# Patient Record
Sex: MALE | Race: BLACK OR AFRICAN AMERICAN | Employment: FULL TIME | URBAN - METROPOLITAN AREA
[De-identification: names, ages, dates, MRNs, and addresses within clinical notes are randomized per-mention and may not be internally consistent; named-entity substitution may affect disease eponyms.]

---

## 2019-03-08 ENCOUNTER — HOSPITAL ENCOUNTER (INPATIENT)
Age: 44
LOS: 2 days | Discharge: HOME | DRG: 305 | End: 2019-03-10
Attending: EMERGENCY MEDICINE | Admitting: FAMILY MEDICINE
Payer: COMMERCIAL

## 2019-03-08 ENCOUNTER — APPOINTMENT (OUTPATIENT)
Dept: GENERAL RADIOLOGY | Age: 44
DRG: 305 | End: 2019-03-08
Payer: COMMERCIAL

## 2019-03-08 DIAGNOSIS — R77.8 ELEVATED TROPONIN: ICD-10-CM

## 2019-03-08 DIAGNOSIS — N17.9 AKI (ACUTE KIDNEY INJURY) (HCC): ICD-10-CM

## 2019-03-08 DIAGNOSIS — I16.0 HYPERTENSIVE URGENCY: Primary | ICD-10-CM

## 2019-03-08 LAB
A/G RATIO: 1.2 (ref 1.1–2.2)
ALBUMIN SERPL-MCNC: 4.6 G/DL (ref 3.4–5)
ALP BLD-CCNC: 77 U/L (ref 40–129)
ALT SERPL-CCNC: 15 U/L (ref 10–40)
ANION GAP SERPL CALCULATED.3IONS-SCNC: 16 MMOL/L (ref 3–16)
AST SERPL-CCNC: 15 U/L (ref 15–37)
BASOPHILS ABSOLUTE: 0.1 K/UL (ref 0–0.2)
BASOPHILS RELATIVE PERCENT: 1.1 %
BILIRUB SERPL-MCNC: 0.7 MG/DL (ref 0–1)
BILIRUBIN URINE: NEGATIVE
BLOOD, URINE: ABNORMAL
BUN BLDV-MCNC: 18 MG/DL (ref 7–20)
CALCIUM SERPL-MCNC: 9.6 MG/DL (ref 8.3–10.6)
CHLORIDE BLD-SCNC: 101 MMOL/L (ref 99–110)
CLARITY: ABNORMAL
CO2: 23 MMOL/L (ref 21–32)
COLOR: YELLOW
CREAT SERPL-MCNC: 1.4 MG/DL (ref 0.9–1.3)
EOSINOPHILS ABSOLUTE: 0 K/UL (ref 0–0.6)
EOSINOPHILS RELATIVE PERCENT: 0.6 %
EPITHELIAL CELLS, UA: 1 /HPF (ref 0–5)
GFR AFRICAN AMERICAN: >60
GFR NON-AFRICAN AMERICAN: 55
GLOBULIN: 3.7 G/DL
GLUCOSE BLD-MCNC: 104 MG/DL (ref 70–99)
GLUCOSE URINE: NEGATIVE MG/DL
HCT VFR BLD CALC: 37.9 % (ref 40.5–52.5)
HEMOGLOBIN: 12.8 G/DL (ref 13.5–17.5)
HYALINE CASTS: 7 /LPF (ref 0–8)
KETONES, URINE: NEGATIVE MG/DL
LEUKOCYTE ESTERASE, URINE: NEGATIVE
LYMPHOCYTES ABSOLUTE: 2.4 K/UL (ref 1–5.1)
LYMPHOCYTES RELATIVE PERCENT: 31.8 %
MCH RBC QN AUTO: 28.5 PG (ref 26–34)
MCHC RBC AUTO-ENTMCNC: 33.8 G/DL (ref 31–36)
MCV RBC AUTO: 84.3 FL (ref 80–100)
MICROSCOPIC EXAMINATION: YES
MONOCYTES ABSOLUTE: 0.5 K/UL (ref 0–1.3)
MONOCYTES RELATIVE PERCENT: 6.6 %
NEUTROPHILS ABSOLUTE: 4.5 K/UL (ref 1.7–7.7)
NEUTROPHILS RELATIVE PERCENT: 59.9 %
NITRITE, URINE: NEGATIVE
PDW BLD-RTO: 13.3 % (ref 12.4–15.4)
PH UA: 6.5 (ref 5–8)
PLATELET # BLD: 255 K/UL (ref 135–450)
PMV BLD AUTO: 11 FL (ref 5–10.5)
POTASSIUM SERPL-SCNC: 3.6 MMOL/L (ref 3.5–5.1)
PROTEIN UA: >=300 MG/DL
RBC # BLD: 4.5 M/UL (ref 4.2–5.9)
RBC UA: 5 /HPF (ref 0–4)
SODIUM BLD-SCNC: 140 MMOL/L (ref 136–145)
SPECIFIC GRAVITY UA: 1.02 (ref 1–1.03)
TOTAL PROTEIN: 8.3 G/DL (ref 6.4–8.2)
TROPONIN: 0.02 NG/ML
TROPONIN: 0.03 NG/ML
URINE TYPE: ABNORMAL
UROBILINOGEN, URINE: 1 E.U./DL
WBC # BLD: 7.5 K/UL (ref 4–11)
WBC UA: 2 /HPF (ref 0–5)

## 2019-03-08 PROCEDURE — 6370000000 HC RX 637 (ALT 250 FOR IP): Performed by: NURSE PRACTITIONER

## 2019-03-08 PROCEDURE — 71046 X-RAY EXAM CHEST 2 VIEWS: CPT

## 2019-03-08 PROCEDURE — 93005 ELECTROCARDIOGRAM TRACING: CPT | Performed by: EMERGENCY MEDICINE

## 2019-03-08 PROCEDURE — 99285 EMERGENCY DEPT VISIT HI MDM: CPT

## 2019-03-08 PROCEDURE — 6370000000 HC RX 637 (ALT 250 FOR IP): Performed by: FAMILY MEDICINE

## 2019-03-08 PROCEDURE — 83036 HEMOGLOBIN GLYCOSYLATED A1C: CPT

## 2019-03-08 PROCEDURE — 2580000003 HC RX 258: Performed by: FAMILY MEDICINE

## 2019-03-08 PROCEDURE — 6360000002 HC RX W HCPCS: Performed by: FAMILY MEDICINE

## 2019-03-08 PROCEDURE — 85025 COMPLETE CBC W/AUTO DIFF WBC: CPT

## 2019-03-08 PROCEDURE — 93010 ELECTROCARDIOGRAM REPORT: CPT | Performed by: INTERNAL MEDICINE

## 2019-03-08 PROCEDURE — 2060000000 HC ICU INTERMEDIATE R&B

## 2019-03-08 PROCEDURE — 80053 COMPREHEN METABOLIC PANEL: CPT

## 2019-03-08 PROCEDURE — 84484 ASSAY OF TROPONIN QUANT: CPT

## 2019-03-08 PROCEDURE — 81001 URINALYSIS AUTO W/SCOPE: CPT

## 2019-03-08 PROCEDURE — 6370000000 HC RX 637 (ALT 250 FOR IP): Performed by: EMERGENCY MEDICINE

## 2019-03-08 PROCEDURE — 36415 COLL VENOUS BLD VENIPUNCTURE: CPT

## 2019-03-08 RX ORDER — NICOTINE 21 MG/24HR
1 PATCH, TRANSDERMAL 24 HOURS TRANSDERMAL DAILY
Status: DISCONTINUED | OUTPATIENT
Start: 2019-03-08 | End: 2019-03-09

## 2019-03-08 RX ORDER — AMLODIPINE BESYLATE 5 MG/1
10 TABLET ORAL DAILY
Status: DISCONTINUED | OUTPATIENT
Start: 2019-03-09 | End: 2019-03-10

## 2019-03-08 RX ORDER — CARVEDILOL 6.25 MG/1
12.5 TABLET ORAL 2 TIMES DAILY WITH MEALS
Status: DISCONTINUED | OUTPATIENT
Start: 2019-03-08 | End: 2019-03-10

## 2019-03-08 RX ORDER — SODIUM CHLORIDE 0.9 % (FLUSH) 0.9 %
10 SYRINGE (ML) INJECTION EVERY 12 HOURS SCHEDULED
Status: DISCONTINUED | OUTPATIENT
Start: 2019-03-08 | End: 2019-03-10 | Stop reason: HOSPADM

## 2019-03-08 RX ORDER — ACETAMINOPHEN 80 MG
TABLET,CHEWABLE ORAL
Status: COMPLETED
Start: 2019-03-08 | End: 2019-03-08

## 2019-03-08 RX ORDER — SODIUM CHLORIDE 0.9 % (FLUSH) 0.9 %
10 SYRINGE (ML) INJECTION PRN
Status: DISCONTINUED | OUTPATIENT
Start: 2019-03-08 | End: 2019-03-10 | Stop reason: HOSPADM

## 2019-03-08 RX ORDER — ONDANSETRON 2 MG/ML
4 INJECTION INTRAMUSCULAR; INTRAVENOUS EVERY 6 HOURS PRN
Status: DISCONTINUED | OUTPATIENT
Start: 2019-03-08 | End: 2019-03-10 | Stop reason: HOSPADM

## 2019-03-08 RX ORDER — ACETAMINOPHEN 325 MG/1
650 TABLET ORAL EVERY 4 HOURS PRN
Status: DISCONTINUED | OUTPATIENT
Start: 2019-03-08 | End: 2019-03-10 | Stop reason: HOSPADM

## 2019-03-08 RX ORDER — AMLODIPINE BESYLATE 5 MG/1
5 TABLET ORAL DAILY
Status: DISCONTINUED | OUTPATIENT
Start: 2019-03-08 | End: 2019-03-09

## 2019-03-08 RX ORDER — LISINOPRIL 10 MG/1
10 TABLET ORAL DAILY
Status: DISCONTINUED | OUTPATIENT
Start: 2019-03-09 | End: 2019-03-09

## 2019-03-08 RX ORDER — LISINOPRIL 10 MG/1
5 TABLET ORAL DAILY
Status: DISCONTINUED | OUTPATIENT
Start: 2019-03-08 | End: 2019-03-09

## 2019-03-08 RX ORDER — BENZONATATE 100 MG/1
100 CAPSULE ORAL 3 TIMES DAILY PRN
Status: DISCONTINUED | OUTPATIENT
Start: 2019-03-08 | End: 2019-03-10 | Stop reason: HOSPADM

## 2019-03-08 RX ORDER — HYDRALAZINE HYDROCHLORIDE 20 MG/ML
10 INJECTION INTRAMUSCULAR; INTRAVENOUS EVERY 4 HOURS PRN
Status: DISCONTINUED | OUTPATIENT
Start: 2019-03-08 | End: 2019-03-10

## 2019-03-08 RX ADMIN — ENOXAPARIN SODIUM 40 MG: 40 INJECTION SUBCUTANEOUS at 22:45

## 2019-03-08 RX ADMIN — CARVEDILOL 12.5 MG: 6.25 TABLET, FILM COATED ORAL at 19:02

## 2019-03-08 RX ADMIN — LISINOPRIL 5 MG: 10 TABLET ORAL at 17:28

## 2019-03-08 RX ADMIN — Medication: at 17:45

## 2019-03-08 RX ADMIN — HYDRALAZINE HYDROCHLORIDE 10 MG: 20 INJECTION INTRAMUSCULAR; INTRAVENOUS at 18:57

## 2019-03-08 RX ADMIN — Medication 10 ML: at 22:47

## 2019-03-08 RX ADMIN — AMLODIPINE BESYLATE 5 MG: 5 TABLET ORAL at 17:28

## 2019-03-08 RX ADMIN — BENZONATATE 100 MG: 100 CAPSULE ORAL at 22:45

## 2019-03-09 ENCOUNTER — APPOINTMENT (OUTPATIENT)
Dept: CT IMAGING | Age: 44
DRG: 305 | End: 2019-03-09
Payer: COMMERCIAL

## 2019-03-09 LAB
CHOLESTEROL, TOTAL: 186 MG/DL (ref 0–199)
EKG ATRIAL RATE: 107 BPM
EKG DIAGNOSIS: NORMAL
EKG P AXIS: 55 DEGREES
EKG P-R INTERVAL: 148 MS
EKG Q-T INTERVAL: 386 MS
EKG QRS DURATION: 116 MS
EKG QTC CALCULATION (BAZETT): 515 MS
EKG R AXIS: 43 DEGREES
EKG T AXIS: 26 DEGREES
EKG VENTRICULAR RATE: 107 BPM
ESTIMATED AVERAGE GLUCOSE: 99.7 MG/DL
HBA1C MFR BLD: 5.1 %
HDLC SERPL-MCNC: 45 MG/DL (ref 40–60)
LDL CHOLESTEROL CALCULATED: 122 MG/DL
LEFT VENTRICULAR EJECTION FRACTION MODE: NORMAL
LV EF: 45 %
LV EF: 45 %
LVEF MODALITY: NORMAL
TRIGL SERPL-MCNC: 93 MG/DL (ref 0–150)
TROPONIN: 0.02 NG/ML
VLDLC SERPL CALC-MCNC: 19 MG/DL

## 2019-03-09 PROCEDURE — 2060000000 HC ICU INTERMEDIATE R&B

## 2019-03-09 PROCEDURE — 80061 LIPID PANEL: CPT

## 2019-03-09 PROCEDURE — 6370000000 HC RX 637 (ALT 250 FOR IP): Performed by: INTERNAL MEDICINE

## 2019-03-09 PROCEDURE — 84484 ASSAY OF TROPONIN QUANT: CPT

## 2019-03-09 PROCEDURE — 6370000000 HC RX 637 (ALT 250 FOR IP): Performed by: FAMILY MEDICINE

## 2019-03-09 PROCEDURE — 36415 COLL VENOUS BLD VENIPUNCTURE: CPT

## 2019-03-09 PROCEDURE — 6360000002 HC RX W HCPCS: Performed by: FAMILY MEDICINE

## 2019-03-09 PROCEDURE — 2580000003 HC RX 258: Performed by: FAMILY MEDICINE

## 2019-03-09 PROCEDURE — 93306 TTE W/DOPPLER COMPLETE: CPT

## 2019-03-09 PROCEDURE — 99222 1ST HOSP IP/OBS MODERATE 55: CPT | Performed by: INTERNAL MEDICINE

## 2019-03-09 PROCEDURE — 71250 CT THORAX DX C-: CPT

## 2019-03-09 PROCEDURE — 6370000000 HC RX 637 (ALT 250 FOR IP): Performed by: EMERGENCY MEDICINE

## 2019-03-09 RX ORDER — ASPIRIN 81 MG/1
81 TABLET, CHEWABLE ORAL DAILY
Status: DISCONTINUED | OUTPATIENT
Start: 2019-03-09 | End: 2019-03-10 | Stop reason: HOSPADM

## 2019-03-09 RX ORDER — LISINOPRIL 20 MG/1
20 TABLET ORAL DAILY
Status: DISCONTINUED | OUTPATIENT
Start: 2019-03-09 | End: 2019-03-10 | Stop reason: HOSPADM

## 2019-03-09 RX ADMIN — ASPIRIN 81 MG 81 MG: 81 TABLET ORAL at 08:26

## 2019-03-09 RX ADMIN — LISINOPRIL 5 MG: 10 TABLET ORAL at 08:26

## 2019-03-09 RX ADMIN — CARVEDILOL 12.5 MG: 6.25 TABLET, FILM COATED ORAL at 17:54

## 2019-03-09 RX ADMIN — LISINOPRIL 20 MG: 20 TABLET ORAL at 18:00

## 2019-03-09 RX ADMIN — HYDRALAZINE HYDROCHLORIDE 10 MG: 20 INJECTION INTRAMUSCULAR; INTRAVENOUS at 00:32

## 2019-03-09 RX ADMIN — ENOXAPARIN SODIUM 40 MG: 40 INJECTION SUBCUTANEOUS at 22:10

## 2019-03-09 RX ADMIN — Medication 10 ML: at 08:25

## 2019-03-09 RX ADMIN — CARVEDILOL 12.5 MG: 6.25 TABLET, FILM COATED ORAL at 08:25

## 2019-03-09 RX ADMIN — AMLODIPINE BESYLATE 10 MG: 5 TABLET ORAL at 08:27

## 2019-03-09 RX ADMIN — Medication 10 ML: at 22:10

## 2019-03-09 RX ADMIN — AMLODIPINE BESYLATE 5 MG: 5 TABLET ORAL at 08:27

## 2019-03-09 RX ADMIN — LISINOPRIL 10 MG: 10 TABLET ORAL at 08:26

## 2019-03-10 VITALS
BODY MASS INDEX: 27.04 KG/M2 | HEART RATE: 81 BPM | WEIGHT: 210.7 LBS | OXYGEN SATURATION: 97 % | TEMPERATURE: 98.3 F | HEIGHT: 74 IN | RESPIRATION RATE: 18 BRPM | DIASTOLIC BLOOD PRESSURE: 106 MMHG | SYSTOLIC BLOOD PRESSURE: 157 MMHG

## 2019-03-10 LAB
ALBUMIN SERPL-MCNC: 4 G/DL (ref 3.4–5)
ANION GAP SERPL CALCULATED.3IONS-SCNC: 12 MMOL/L (ref 3–16)
BUN BLDV-MCNC: 20 MG/DL (ref 7–20)
CALCIUM SERPL-MCNC: 8.9 MG/DL (ref 8.3–10.6)
CHLORIDE BLD-SCNC: 102 MMOL/L (ref 99–110)
CO2: 25 MMOL/L (ref 21–32)
CREAT SERPL-MCNC: 1.4 MG/DL (ref 0.9–1.3)
CREATININE URINE: 82.2 MG/DL (ref 39–259)
GFR AFRICAN AMERICAN: >60
GFR NON-AFRICAN AMERICAN: 55
GLUCOSE BLD-MCNC: 143 MG/DL (ref 70–99)
PHOSPHORUS: 3.8 MG/DL (ref 2.5–4.9)
POTASSIUM SERPL-SCNC: 3.8 MMOL/L (ref 3.5–5.1)
PROTEIN PROTEIN: 12 MG/DL
SODIUM BLD-SCNC: 139 MMOL/L (ref 136–145)

## 2019-03-10 PROCEDURE — 2580000003 HC RX 258: Performed by: FAMILY MEDICINE

## 2019-03-10 PROCEDURE — 84156 ASSAY OF PROTEIN URINE: CPT

## 2019-03-10 PROCEDURE — 6370000000 HC RX 637 (ALT 250 FOR IP): Performed by: NURSE PRACTITIONER

## 2019-03-10 PROCEDURE — 99999 PR OFFICE/OUTPT VISIT,PROCEDURE ONLY: CPT | Performed by: INTERNAL MEDICINE

## 2019-03-10 PROCEDURE — 6370000000 HC RX 637 (ALT 250 FOR IP): Performed by: FAMILY MEDICINE

## 2019-03-10 PROCEDURE — 82570 ASSAY OF URINE CREATININE: CPT

## 2019-03-10 PROCEDURE — 80069 RENAL FUNCTION PANEL: CPT

## 2019-03-10 PROCEDURE — 6370000000 HC RX 637 (ALT 250 FOR IP): Performed by: INTERNAL MEDICINE

## 2019-03-10 PROCEDURE — 36415 COLL VENOUS BLD VENIPUNCTURE: CPT

## 2019-03-10 PROCEDURE — 6360000002 HC RX W HCPCS: Performed by: FAMILY MEDICINE

## 2019-03-10 RX ORDER — NIFEDIPINE 30 MG/1
60 TABLET, EXTENDED RELEASE ORAL DAILY
Status: DISCONTINUED | OUTPATIENT
Start: 2019-03-10 | End: 2019-03-10 | Stop reason: HOSPADM

## 2019-03-10 RX ORDER — CARVEDILOL 25 MG/1
25 TABLET ORAL 2 TIMES DAILY WITH MEALS
Status: DISCONTINUED | OUTPATIENT
Start: 2019-03-10 | End: 2019-03-10 | Stop reason: HOSPADM

## 2019-03-10 RX ORDER — LISINOPRIL 20 MG/1
20 TABLET ORAL DAILY
Qty: 30 TABLET | Refills: 3 | Status: SHIPPED | OUTPATIENT
Start: 2019-03-11 | End: 2019-04-02 | Stop reason: SDUPTHER

## 2019-03-10 RX ORDER — NIFEDIPINE 30 MG/1
60 TABLET, EXTENDED RELEASE ORAL DAILY
Status: DISCONTINUED | OUTPATIENT
Start: 2019-03-10 | End: 2019-03-10

## 2019-03-10 RX ORDER — HYDRALAZINE HYDROCHLORIDE 25 MG/1
25 TABLET, FILM COATED ORAL EVERY 8 HOURS SCHEDULED
Status: DISCONTINUED | OUTPATIENT
Start: 2019-03-10 | End: 2019-03-10

## 2019-03-10 RX ORDER — CARVEDILOL 25 MG/1
25 TABLET ORAL 2 TIMES DAILY WITH MEALS
Qty: 60 TABLET | Refills: 3 | Status: SHIPPED | OUTPATIENT
Start: 2019-03-11 | End: 2019-04-02 | Stop reason: SDUPTHER

## 2019-03-10 RX ORDER — CARVEDILOL 6.25 MG/1
12.5 TABLET ORAL ONCE
Status: COMPLETED | OUTPATIENT
Start: 2019-03-10 | End: 2019-03-10

## 2019-03-10 RX ORDER — HYDRALAZINE HYDROCHLORIDE 25 MG/1
25 TABLET, FILM COATED ORAL EVERY 6 HOURS PRN
Status: DISCONTINUED | OUTPATIENT
Start: 2019-03-10 | End: 2019-03-10 | Stop reason: HOSPADM

## 2019-03-10 RX ORDER — NIFEDIPINE 60 MG/1
60 TABLET, FILM COATED, EXTENDED RELEASE ORAL 2 TIMES DAILY
Qty: 60 TABLET | Refills: 3 | Status: SHIPPED | OUTPATIENT
Start: 2019-03-10 | End: 2019-04-02 | Stop reason: SDUPTHER

## 2019-03-10 RX ORDER — ASPIRIN 81 MG/1
81 TABLET, CHEWABLE ORAL DAILY
Qty: 30 TABLET | Refills: 3 | Status: SHIPPED | OUTPATIENT
Start: 2019-03-11 | End: 2019-04-02 | Stop reason: SDUPTHER

## 2019-03-10 RX ADMIN — CARVEDILOL 12.5 MG: 6.25 TABLET, FILM COATED ORAL at 06:53

## 2019-03-10 RX ADMIN — BENZONATATE 100 MG: 100 CAPSULE ORAL at 06:53

## 2019-03-10 RX ADMIN — NIFEDIPINE 60 MG: 30 TABLET, FILM COATED, EXTENDED RELEASE ORAL at 17:59

## 2019-03-10 RX ADMIN — AMLODIPINE BESYLATE 10 MG: 5 TABLET ORAL at 08:13

## 2019-03-10 RX ADMIN — HYDRALAZINE HYDROCHLORIDE 25 MG: 25 TABLET, FILM COATED ORAL at 13:50

## 2019-03-10 RX ADMIN — HYDRALAZINE HYDROCHLORIDE 10 MG: 20 INJECTION INTRAMUSCULAR; INTRAVENOUS at 06:53

## 2019-03-10 RX ADMIN — Medication 10 ML: at 08:15

## 2019-03-10 RX ADMIN — CARVEDILOL 25 MG: 25 TABLET, FILM COATED ORAL at 18:04

## 2019-03-10 RX ADMIN — ASPIRIN 81 MG 81 MG: 81 TABLET ORAL at 08:13

## 2019-03-10 RX ADMIN — LISINOPRIL 20 MG: 20 TABLET ORAL at 08:13

## 2019-03-10 RX ADMIN — CARVEDILOL 12.5 MG: 6.25 TABLET, FILM COATED ORAL at 16:10

## 2019-03-10 RX ADMIN — HYDRALAZINE HYDROCHLORIDE 25 MG: 25 TABLET, FILM COATED ORAL at 08:13

## 2019-03-10 ASSESSMENT — PAIN SCALES - GENERAL
PAINLEVEL_OUTOF10: 0

## 2019-03-11 ENCOUNTER — TELEPHONE (OUTPATIENT)
Dept: CARDIOLOGY | Age: 44
End: 2019-03-11

## 2019-04-02 ENCOUNTER — OFFICE VISIT (OUTPATIENT)
Dept: CARDIOLOGY CLINIC | Age: 44
End: 2019-04-02
Payer: COMMERCIAL

## 2019-04-02 VITALS
WEIGHT: 213.2 LBS | SYSTOLIC BLOOD PRESSURE: 126 MMHG | HEIGHT: 74 IN | HEART RATE: 65 BPM | DIASTOLIC BLOOD PRESSURE: 96 MMHG | OXYGEN SATURATION: 98 % | BODY MASS INDEX: 27.36 KG/M2

## 2019-04-02 DIAGNOSIS — I10 HYPERTENSION, UNSPECIFIED TYPE: ICD-10-CM

## 2019-04-02 DIAGNOSIS — R06.83 SNORES: Primary | ICD-10-CM

## 2019-04-02 PROCEDURE — 99213 OFFICE O/P EST LOW 20 MIN: CPT | Performed by: INTERNAL MEDICINE

## 2019-04-02 RX ORDER — LISINOPRIL 20 MG/1
TABLET ORAL
Qty: 90 TABLET | Refills: 3 | Status: SHIPPED | OUTPATIENT
Start: 2019-04-02 | End: 2020-04-27

## 2019-04-02 RX ORDER — CARVEDILOL 25 MG/1
25 TABLET ORAL 2 TIMES DAILY WITH MEALS
Qty: 180 TABLET | Refills: 3 | Status: SHIPPED | OUTPATIENT
Start: 2019-04-02 | End: 2020-04-27

## 2019-04-02 RX ORDER — NIFEDIPINE 60 MG/1
TABLET, FILM COATED, EXTENDED RELEASE ORAL
Qty: 90 TABLET | Refills: 3 | Status: SHIPPED | OUTPATIENT
Start: 2019-04-02 | End: 2020-04-27

## 2019-04-02 RX ORDER — ASPIRIN 81 MG/1
81 TABLET, CHEWABLE ORAL DAILY
Qty: 90 TABLET | Refills: 3 | Status: ON HOLD | OUTPATIENT
Start: 2019-04-02 | End: 2020-07-27 | Stop reason: SDUPTHER

## 2019-04-02 NOTE — PATIENT INSTRUCTIONS
132/84 LA, 138/72 RA   Suggest getting upper arm blood pressure cuff (Omron is a good brand). Take Lisinopril with morning carvedilol and nifedipine with evening carvedilol.

## 2019-04-02 NOTE — PROGRESS NOTES
[facility]   Cardiac Follow up    Referring Provider:  Mirna Troncoso MD     Chief Complaint   Patient presents with    Hypertension    1 Month Follow-Up    Dizziness     \" from the medicine\", started after the new med      History of Present Illness:  Mr. Elton Ansari is here today for hospital follow up (see note below). He has a history of labile hypertension, stopped taking meds for awhile. Recent admission for elevated pressures and no symptoms. Today, he states he feels fairly well overall, but his meds make him feel \"high. \" He has dizziness at times    3/9/19 [facility] cardiology consult> The patient is a 42-year-old gentleman who has a history of hypertension. In the past when he was told he had hypertension, he decided to make lifestyle changes with regular exercise, smoking cessation, and dietary treatment. With that, he thought he would no longer need treatment for hypertension, so he stopped his medications. He also went through a marital divorce which was quite stressful for the patient. With that, he became quite  depressed and had trouble with activity. He has noted in the last month, he has had a fairly persistent cough. He went to an Urgent [facility]. He was not having chest discomfort but was found to have a blood pressure of 220/110. Troponin was elevated. He was referred to the emergency room and from the emergency room, he was admitted for further care. As mentioned above, he denies significant shortness of breath. He denies chest discomfort. His only complaint is this  persistent cough. Denies headache, leg swelling or chills. Past Medical History:   has a past medical history of Hyperlipidemia and Hypertension. Surgical History:   has no past surgical history on file. Social History:   reports that he quit smoking about 12 months ago. His smoking use included cigarettes. He smoked 1.00 pack per day.  He has never used smokeless tobacco. He study. Return for follow up in 4 months. Recommend stress test once pressures are under consistent control. I appreciate the opportunity of cooperating in the care of this individual.    Jonah Welch. Francisco Bravo M.D., [address] attestation: This note was scribed in the presence of Dr. Francisco Bravo MD, by Brice Zavaleta RN. The scribe's documentation has been prepared under my direction and personally reviewed by me in its entirety. I confirm that the note above accurately reflects all work, treatment, procedures, and medical decision making performed by me.

## 2019-05-01 ENCOUNTER — APPOINTMENT (OUTPATIENT)
Dept: CT IMAGING | Age: 44
End: 2019-05-01
Payer: COMMERCIAL

## 2019-05-01 ENCOUNTER — HOSPITAL ENCOUNTER (EMERGENCY)
Age: 44
Discharge: HOME | End: 2019-05-01
Attending: EMERGENCY MEDICINE
Payer: COMMERCIAL

## 2019-05-01 VITALS
TEMPERATURE: 98.2 F | OXYGEN SATURATION: 97 % | HEART RATE: 97 BPM | DIASTOLIC BLOOD PRESSURE: 100 MMHG | WEIGHT: 212 LBS | RESPIRATION RATE: 18 BRPM | SYSTOLIC BLOOD PRESSURE: 163 MMHG | BODY MASS INDEX: 27.21 KG/M2 | HEIGHT: 74 IN

## 2019-05-01 DIAGNOSIS — R09.89 GLOBUS SENSATION: Primary | ICD-10-CM

## 2019-05-01 LAB
A/G RATIO: 1 (ref 1.1–2.2)
ALBUMIN SERPL-MCNC: 4.2 G/DL (ref 3.4–5)
ALP BLD-CCNC: 56 U/L (ref 40–129)
ALT SERPL-CCNC: 9 U/L (ref 10–40)
ANION GAP SERPL CALCULATED.3IONS-SCNC: 10 MMOL/L (ref 3–16)
APTT: 31.2 SEC (ref 26–36)
AST SERPL-CCNC: 43 U/L (ref 15–37)
BASOPHILS ABSOLUTE: 0.1 K/UL (ref 0–0.2)
BASOPHILS RELATIVE PERCENT: 1.1 %
BILIRUB SERPL-MCNC: 0.4 MG/DL (ref 0–1)
BUN BLDV-MCNC: 11 MG/DL (ref 7–20)
CALCIUM SERPL-MCNC: 8.8 MG/DL (ref 8.3–10.6)
CHLORIDE BLD-SCNC: 98 MMOL/L (ref 99–110)
CO2: 22 MMOL/L (ref 21–32)
CREAT SERPL-MCNC: 1 MG/DL (ref 0.9–1.3)
EOSINOPHILS ABSOLUTE: 0 K/UL (ref 0–0.6)
EOSINOPHILS RELATIVE PERCENT: 0.5 %
GFR AFRICAN AMERICAN: >60
GFR NON-AFRICAN AMERICAN: >60
GLOBULIN: 4.1 G/DL
GLUCOSE BLD-MCNC: 164 MG/DL (ref 70–99)
HCT VFR BLD CALC: 35.3 % (ref 40.5–52.5)
HEMOGLOBIN: 11.9 G/DL (ref 13.5–17.5)
INR BLD: 1.06 (ref 0.86–1.14)
LYMPHOCYTES ABSOLUTE: 1.1 K/UL (ref 1–5.1)
LYMPHOCYTES RELATIVE PERCENT: 22 %
MCH RBC QN AUTO: 28.2 PG (ref 26–34)
MCHC RBC AUTO-ENTMCNC: 33.9 G/DL (ref 31–36)
MCV RBC AUTO: 83.3 FL (ref 80–100)
MONOCYTES ABSOLUTE: 0.3 K/UL (ref 0–1.3)
MONOCYTES RELATIVE PERCENT: 6.6 %
NEUTROPHILS ABSOLUTE: 3.5 K/UL (ref 1.7–7.7)
NEUTROPHILS RELATIVE PERCENT: 69.8 %
PDW BLD-RTO: 13.7 % (ref 12.4–15.4)
PLATELET # BLD: 250 K/UL (ref 135–450)
PMV BLD AUTO: 10.4 FL (ref 5–10.5)
POTASSIUM SERPL-SCNC: 4.9 MMOL/L (ref 3.5–5.1)
PRO-BNP: 178 PG/ML (ref 0–124)
PROTHROMBIN TIME: 12.1 SEC (ref 9.8–13)
RBC # BLD: 4.23 M/UL (ref 4.2–5.9)
SODIUM BLD-SCNC: 130 MMOL/L (ref 136–145)
TOTAL PROTEIN: 8.3 G/DL (ref 6.4–8.2)
TROPONIN: <0.01 NG/ML
WBC # BLD: 5 K/UL (ref 4–11)

## 2019-05-01 PROCEDURE — 6360000002 HC RX W HCPCS: Performed by: PHYSICIAN ASSISTANT

## 2019-05-01 PROCEDURE — 99284 EMERGENCY DEPT VISIT MOD MDM: CPT

## 2019-05-01 PROCEDURE — 70450 CT HEAD/BRAIN W/O DYE: CPT

## 2019-05-01 PROCEDURE — 85730 THROMBOPLASTIN TIME PARTIAL: CPT

## 2019-05-01 PROCEDURE — 36415 COLL VENOUS BLD VENIPUNCTURE: CPT

## 2019-05-01 PROCEDURE — 6360000004 HC RX CONTRAST MEDICATION: Performed by: EMERGENCY MEDICINE

## 2019-05-01 PROCEDURE — 2500000003 HC RX 250 WO HCPCS: Performed by: PHYSICIAN ASSISTANT

## 2019-05-01 PROCEDURE — 96375 TX/PRO/DX INJ NEW DRUG ADDON: CPT

## 2019-05-01 PROCEDURE — 84484 ASSAY OF TROPONIN QUANT: CPT

## 2019-05-01 PROCEDURE — 83880 ASSAY OF NATRIURETIC PEPTIDE: CPT

## 2019-05-01 PROCEDURE — 96374 THER/PROPH/DIAG INJ IV PUSH: CPT

## 2019-05-01 PROCEDURE — 93005 ELECTROCARDIOGRAM TRACING: CPT | Performed by: PHYSICIAN ASSISTANT

## 2019-05-01 PROCEDURE — 80053 COMPREHEN METABOLIC PANEL: CPT

## 2019-05-01 PROCEDURE — 85025 COMPLETE CBC W/AUTO DIFF WBC: CPT

## 2019-05-01 PROCEDURE — 85610 PROTHROMBIN TIME: CPT

## 2019-05-01 PROCEDURE — 70491 CT SOFT TISSUE NECK W/DYE: CPT

## 2019-05-01 RX ORDER — OMEPRAZOLE 20 MG/1
20 CAPSULE, DELAYED RELEASE ORAL DAILY
Qty: 30 CAPSULE | Refills: 0 | Status: ON HOLD | OUTPATIENT
Start: 2019-05-01 | End: 2020-07-27 | Stop reason: HOSPADM

## 2019-05-01 RX ORDER — ONDANSETRON 2 MG/ML
4 INJECTION INTRAMUSCULAR; INTRAVENOUS ONCE
Status: DISCONTINUED | OUTPATIENT
Start: 2019-05-01 | End: 2019-05-01 | Stop reason: HOSPADM

## 2019-05-01 RX ORDER — SODIUM CHLORIDE 9 MG/ML
INJECTION, SOLUTION INTRAVENOUS
Status: DISCONTINUED
Start: 2019-05-01 | End: 2019-05-01 | Stop reason: HOSPADM

## 2019-05-01 RX ORDER — METHYLPREDNISOLONE SODIUM SUCCINATE 125 MG/2ML
125 INJECTION, POWDER, LYOPHILIZED, FOR SOLUTION INTRAMUSCULAR; INTRAVENOUS ONCE
Status: COMPLETED | OUTPATIENT
Start: 2019-05-01 | End: 2019-05-01

## 2019-05-01 RX ADMIN — METHYLPREDNISOLONE SODIUM SUCCINATE 125 MG: 125 INJECTION, POWDER, FOR SOLUTION INTRAMUSCULAR; INTRAVENOUS at 14:50

## 2019-05-01 RX ADMIN — FAMOTIDINE 20 MG: 10 INJECTION, SOLUTION INTRAVENOUS at 14:50

## 2019-05-01 RX ADMIN — IOPAMIDOL 75 ML: 755 INJECTION, SOLUTION INTRAVENOUS at 16:34

## 2019-05-01 RX ADMIN — GLUCAGON HYDROCHLORIDE 1 MG: KIT at 14:50

## 2019-05-01 NOTE — ED NOTES
Pt reports he feels much better - has stopped spitting and clearing throat. Pt updated on POC. Pt positioned for comfort. Call light in reach. Bed locked and in low position. Pt denies any needs or concerns at this time.      Yolanda Sharp RN  05/01/19 1200

## 2019-05-01 NOTE — ED NOTES
Patient discharged at this time in no acute distress after verbalizing understanding of discharge instructions and need for follow up with PCP .   Pt left ambulatory to discharge area after reviewing and receiving copy of ov AVS.   Patient education  Learner- Patient and family  Motivation and readiness to learn- Medium to High  Barriers to learning- None  Learning preference/provided instructions- Both written and verbal discharge instructions     Elisa Godoy RN  05/01/19 8172

## 2019-05-01 NOTE — ED PROVIDER NOTES
This patient was seen by the Mid-Level Provider. I have seen and examined the patient, agree with the workup, evaluation, management and diagnosis. Care plan has been discussed. My assessment reveals     A 17-year-old male who presents with difficulty swallowing. The patient states he was eating salad prior to arrival when he felt like something got stuck in his throat. He denies any other complaints. He denies any history of this. Exam: The patient was given a difficult time swallowing his secretions. He would causally spit into a emesis bag. There are no focal neurological motor sensory deficits. Medical decision making: The patient has remained stable status hospital course. His workup was extensive including a CT to rule out underlying stroke. Finally, the patient suddenly started to swallow without difficulty. We contacted GI for appropriate follow-up. The patient was discharged with instructions to return if worse.     Results for orders placed or performed during the hospital encounter of 05/01/19   CBC Auto Differential   Result Value Ref Range    WBC 5.0 4.0 - 11.0 K/uL    RBC 4.23 4.20 - 5.90 M/uL    Hemoglobin 11.9 (L) 13.5 - 17.5 g/dL    Hematocrit 35.3 (L) 40.5 - 52.5 %    MCV 83.3 80.0 - 100.0 fL    MCH 28.2 26.0 - 34.0 pg    MCHC 33.9 31.0 - 36.0 g/dL    RDW 13.7 12.4 - 15.4 %    Platelets 466 355 - 996 K/uL    MPV 10.4 5.0 - 10.5 fL    Neutrophils % 69.8 %    Lymphocytes % 22.0 %    Monocytes % 6.6 %    Eosinophils % 0.5 %    Basophils % 1.1 %    Neutrophils # 3.5 1.7 - 7.7 K/uL    Lymphocytes # 1.1 1.0 - 5.1 K/uL    Monocytes # 0.3 0.0 - 1.3 K/uL    Eosinophils # 0.0 0.0 - 0.6 K/uL    Basophils # 0.1 0.0 - 0.2 K/uL   Comprehensive Metabolic Panel   Result Value Ref Range    Sodium 130 (L) 136 - 145 mmol/L    Potassium 4.9 3.5 - 5.1 mmol/L    Chloride 98 (L) 99 - 110 mmol/L    CO2 22 21 - 32 mmol/L    Anion Gap 10 3 - 16    Glucose 164 (H) 70 - 99 mg/dL    BUN 11 7 - 20 mg/dL CREATININE 1.0 0.9 - 1.3 mg/dL    GFR Non-African American >60 >60    GFR African American >60 >60    Calcium 8.8 8.3 - 10.6 mg/dL    Total Protein 8.3 (H) 6.4 - 8.2 g/dL    Alb 4.2 3.4 - 5.0 g/dL    Albumin/Globulin Ratio 1.0 (L) 1.1 - 2.2    Total Bilirubin 0.4 0.0 - 1.0 mg/dL    Alkaline Phosphatase 56 40 - 129 U/L    ALT 9 (L) 10 - 40 U/L    AST 43 (H) 15 - 37 U/L    Globulin 4.1 g/dL   Troponin   Result Value Ref Range    Troponin <0.01 <0.01 ng/mL   APTT   Result Value Ref Range    aPTT 31.2 26.0 - 36.0 sec   Protime-INR   Result Value Ref Range    Protime 12.1 9.8 - 13.0 sec    INR 1.06 0.86 - 1.14   Brain Natriuretic Peptide   Result Value Ref Range    Pro- (H) 0 - 124 pg/mL   EKG 12 Lead   Result Value Ref Range    Ventricular Rate 65 BPM    Atrial Rate 65 BPM    P-R Interval 144 ms    QRS Duration 110 ms    Q-T Interval 440 ms    QTc Calculation (Bazett) 457 ms    P Axis 55 degrees    R Axis 36 degrees    T Axis 55 degrees    Diagnosis Normal sinus rhythmNormal ECG      Ct Head Wo Contrast    Result Date: 5/1/2019  EXAMINATION: CT OF THE HEAD WITHOUT CONTRAST  5/1/2019 4:31 pm TECHNIQUE: CT of the head was performed without the administration of intravenous contrast. Dose modulation, iterative reconstruction, and/or weight based adjustment of the mA/kV was utilized to reduce the radiation dose to as low as reasonably achievable. COMPARISON: None. HISTORY: ORDERING SYSTEM PROVIDED HISTORY: difficulty swallowing TECHNOLOGIST PROVIDED HISTORY: Has a \"code stroke\" or \"stroke alert\" been called? ->No Ordering Physician Provided Reason for Exam: difficulty swallowing Acuity: Unknown Type of Exam: Unknown FINDINGS: BRAIN/VENTRICLES: There is a area of encephalomalacia changes seen within the internal capsule near the caudate nucleus on the right from prior old lacune or infarct. There is no intra-axial or extra-axial bleed. There is no evidence for mass or mass effect.   The ventricles are normal size shape and configuration. ORBITS: The visualized portion of the orbits demonstrate no acute abnormality. SINUSES: The visualized paranasal sinuses and mastoid air cells demonstrate no acute abnormality. SOFT TISSUES/SKULL:  No acute abnormality of the visualized skull or soft tissues. Old lacunar infarcts seen within the internal capsule near the caudate nucleus on the right measuring approximately 9.5 mm in with. No acute intracranial abnormality. Ct Soft Tissue Neck W Contrast    Result Date: 5/1/2019  EXAMINATION: CT OF THE NECK SOFT TISSUE WITH CONTRAST  5/1/2019 TECHNIQUE: CT of the neck was performed with the administration of intravenous contrast. Multiplanar reformatted images are provided for review. Dose modulation, iterative reconstruction, and/or weight based adjustment of the mA/kV was utilized to reduce the radiation dose to as low as reasonably achievable. COMPARISON: None. HISTORY: ORDERING SYSTEM PROVIDED HISTORY: trouble swallowing TECHNOLOGIST PROVIDED HISTORY: If patient is on cardiac monitor and/or pulse ox, they may be taken off cardiac monitor and pulse ox, left on O2 if currently on. All monitors reattached when patient returns to room. Ordering Physician Provided Reason for Exam: Foreign Body ([facility] he was eating a salad and startred feeling like something was stuck in his throat, constantly clearing throat, no SOB, some emesis noted) Acuity: Acute Type of Exam: Initial FINDINGS: PHARYNX/LARYNX:  Nasopharynx, oropharynx, larynx, and hypopharynx are patent without asymmetric soft tissue or focal, discrete mass. There is nonspecific pooling of secretions in the dependent aspect of the hypopharynx. Visualized esophagus is normal. ORAL CAVITY:  Evaluation of the oral cavity is degraded by artifact related to dental amalgam.  Visualized floor of mouth and oral tongue are unremarkable.  SALIVARY GLANDS/THYROID:  The parotid and submandibular glands appear

## 2019-05-01 NOTE — ED NOTES
Pt returned from CT. Pt remains alert, still denies resp distress but states his throat feels the same. Pt continues to spit up. Pt updated on POC. Pt positioned for comfort. Call light in reach. Bed locked and in low position. Pt denies any needs or concerns at this time.      Irma Riley RN  05/01/19 6041

## 2019-05-01 NOTE — ED NOTES
Bed: 20  Expected date:   Expected time:   Means of arrival:   Comments:  [facility]  05/01/19 3171

## 2019-05-02 ENCOUNTER — TELEPHONE (OUTPATIENT)
Dept: CARDIOLOGY CLINIC | Age: 44
End: 2019-05-02

## 2019-05-02 LAB
EKG ATRIAL RATE: 65 BPM
EKG DIAGNOSIS: NORMAL
EKG P AXIS: 55 DEGREES
EKG P-R INTERVAL: 144 MS
EKG Q-T INTERVAL: 440 MS
EKG QRS DURATION: 110 MS
EKG QTC CALCULATION (BAZETT): 457 MS
EKG R AXIS: 36 DEGREES
EKG T AXIS: 55 DEGREES
EKG VENTRICULAR RATE: 65 BPM

## 2019-05-02 PROCEDURE — 93010 ELECTROCARDIOGRAM REPORT: CPT | Performed by: INTERNAL MEDICINE

## 2019-05-02 ASSESSMENT — ENCOUNTER SYMPTOMS
RHINORRHEA: 0
COUGH: 0
DIARRHEA: 0
TROUBLE SWALLOWING: 1
SHORTNESS OF BREATH: 0
ABDOMINAL PAIN: 0
VOMITING: 0
VOICE CHANGE: 0
NAUSEA: 0

## 2019-05-02 NOTE — ED PROVIDER NOTES
[address] [facility]        Pt Name: Nory Goff  MRN: [id number]  [place] 1975  Date of evaluation: 5/1/2019  Provider: Rylie Faulkner PA-C  PCP: Chelsea Hamm MD    This patient was seen and evaluated by the attending physician Linda Collier MD      [address] [place] [address]       Chief Complaint   Patient presents with    Foreign Body     Pt arrived via EMS from [facility] he was eating a salad and startred feeling like something was stuck in his throat, constantly clearing throat, no SOB, some emesis noted       HISTORY OF PRESENT ILLNESS   (Location/Symptom, Timing/Onset, Context/Setting, Quality, Duration, Modifying Factors, Severity)  Note limiting factors. Nory Goff is a 40 y.o. male who presents to the emergency department today for evaluation for nausea and vomiting. The patient states that shortly before arriving to the ED he was at [facility], and he states that he was eating a salad, and he suddenly felt like \"my friend will go down anymore\". He states that he went to the bathroom and he started vomiting. The patient does not feel that there is anything stuck in his throat but he continues to feel like he cannot tolerate his secretions and he states that \"I feel like my throat is paralyzed and I can't get anything down\". Patient otherwise has no complaints. He denies feeling nauseous but he feels that he has to vomit up his secretions. He denies any headaches. No visual changes. No numbness, tingling or weakness. No chest pain or shortness of breath. Denies any rashes. He denies any trouble swallowing or voice changes. He denies any fevers or chills. He states that he is not allergic to anything that he ate, he denies any history of a food impaction. He has no other complaints at this time. Nursing Notes were all reviewed and agreed with or any disagreements were addressed  in the HPI.     REVIEW OF SYSTEMS (2-9 systems for level 4, 10 or more for level 5)     Review of Systems   Constitutional: Negative for activity change, appetite change, chills and fever. HENT: Positive for trouble swallowing. Negative for congestion, rhinorrhea and voice change. Eyes: Negative for visual disturbance. Respiratory: Negative for cough and shortness of breath. Cardiovascular: Negative for chest pain. Gastrointestinal: Negative for abdominal pain, diarrhea, nausea and vomiting. Genitourinary: Negative for difficulty urinating, dysuria and hematuria. Neurological: Negative for weakness and numbness. Positives and Pertinent negatives as per HPI. Except as noted abovein the ROS, all other systems were reviewed and negative. PAST MEDICAL HISTORY     Past Medical History:   Diagnosis Date    Hyperlipidemia     Hypertension          SURGICAL HISTORY   History reviewed. No pertinent surgical history. [address]       Discharge Medication List as of 5/1/2019  6:03 PM      CONTINUE these medications which have NOT CHANGED    Details   aspirin 81 MG chewable tablet Take 1 tablet by mouth daily, Disp-90 tablet, R-3Normal      lisinopril (PRINIVIL;ZESTRIL) 20 MG tablet Take one tablet in the morning., Disp-90 tablet, R-3Normal      carvedilol (COREG) 25 MG tablet Take 1 tablet by mouth 2 times daily (with meals), Disp-180 tablet, R-3Normal      NIFEdipine (ADALAT CC) 60 MG extended release tablet Take one tablet in the evening, Disp-90 tablet, R-3Normal               ALLERGIES     Patient has no known allergies.     FAMILYHISTORY       Family History   Problem Relation Age of Onset    Diabetes Father     Diabetes Mother     Diabetes Maternal Grandmother     Diabetes Maternal Grandfather     Diabetes Paternal Grandmother     Diabetes Paternal Grandfather           SOCIAL HISTORY       Social History     Socioeconomic History    Marital status: Legally      Spouse name: None    Number of children: None    Years of education: None    Highest education level: None   Occupational History    None   Social Needs    Financial resource strain: None    Food insecurity:     Worry: None     Inability: None    Transportation needs:     Medical: None     Non-medical: None   Tobacco Use    Smoking status: Former Smoker     Packs/day: 1.00     Types: Cigarettes     Last attempt to quit: 3/5/2018     Years since quittin.1    Smokeless tobacco: Never Used   Substance and Sexual Activity    Alcohol use: Yes     Comment: occacional    Drug use: No    Sexual activity: None   Lifestyle    Physical activity:     Days per week: None     Minutes per session: None    Stress: None   Relationships    Social connections:     Talks on phone: None     Gets together: None     Attends Church service: None     Active member of club or organization: None     Attends meetings of clubs or organizations: None     Relationship status: None    Intimate partner violence:     Fear of current or ex partner: None     Emotionally abused: None     Physically abused: None     Forced sexual activity: None   Other Topics Concern    None   Social History Narrative    None       SCREENINGS             PHYSICAL EXAM    (up to 7 for level 4, 8 or more for level 5)     ED Triage Vitals   BP Temp Temp Source Pulse Resp SpO2 Height Weight   19 1407 19 1409 19 1409 19 1409 19 1409 19 1407 19 1409 19 1409   (!) 163/100 98.2 °F (36.8 °C) Oral 97 18 96 % 6' 2\" (1.88 m) 212 lb (96.2 kg)       Physical Exam   Constitutional: He is oriented to person, place, and time. He appears well-developed and well-nourished. HENT:   Head: Normocephalic and atraumatic. Right Ear: External ear normal.   Left Ear: External ear normal.   Nose: Nose normal.   Mouth/Throat: Oropharynx is clear and moist. No oropharyngeal exudate.     actively spitting his secretions into the bag, there is no sublingual small food bolus impaction which has now passed  I did discuss this with GI, who recommends that we start the patient on a PPI, and to follow up to have an outpatient endoscopy. As patient is feeling back to baseline he is tolerating oral secretions without any difficulty do not feel that he needs an emergent endoscopy or further workup at this time. He is to obtain follow-up with GI within the next 2-3 days reevaluation. He'll be given a prescription for Prilosec. He is to return to the ED for any new or worsening symptoms. Patient was understanding and agreeable plan. She will discharge. My suspicion is low at this time for acute CVA, TIA, cranial hemorrhage, retropharyngeal abscess, peritonsillar abscess, epiglottitis, acute respiratory distress or other emergent etiology    FINAL IMPRESSION      1.  Globus sensation          DISPOSITION/PLAN   DISPOSITION Decision To Discharge 05/01/2019 05:51:19 PM      PATIENT REFERREDTO:  Charlette Baumann MD  [address] [facility], [address] [place] [address]  [phone]    Schedule an appointment as soon as possible for a visit in 2 days      [facility]  [address] [facility]  [phone]    As needed, If symptoms worsen      DISCHARGE MEDICATIONS:  Discharge Medication List as of 5/1/2019  6:03 PM      START taking these medications    Details   omeprazole (PRILOSEC) 20 MG delayed release capsule Take 1 capsule by mouth daily for 30 doses, Disp-30 capsule, R-0Print             DISCONTINUED MEDICATIONS:  Discharge Medication List as of 5/1/2019  6:03 PM                 (Please note that portions ofthis note were completed with a voice recognition program.  Efforts were made to edit the dictations but occasionally words are mis-transcribed.)    Zachary Patel PA-C (electronically signed)            Zachary Patel PA-C  05/02/19 1012

## 2019-05-02 NOTE — TELEPHONE ENCOUNTER
Type of paperwork dropped off: (FMLA/ DISABILITY/PATIENT ASSISTANCE)  FMLA    Who is the paperwork for: Hannah Herrera    What condition is it for:     Is the condition chronic or intermittent:     Dates of missed work:  ( if applicable) 3/06/53-7/81/57    Where is the paperwork to be sent: [address] [phone]    Patient would like to  a copy of paperwork once faxed.       caitie

## 2020-04-27 RX ORDER — NIFEDIPINE 60 MG/1
TABLET, EXTENDED RELEASE ORAL
Qty: 30 TABLET | Refills: 11 | Status: ON HOLD | OUTPATIENT
Start: 2020-04-27 | End: 2020-08-07 | Stop reason: HOSPADM

## 2020-04-27 RX ORDER — LISINOPRIL 20 MG/1
TABLET ORAL
Qty: 30 TABLET | Refills: 11 | Status: ON HOLD | OUTPATIENT
Start: 2020-04-27 | End: 2020-07-27 | Stop reason: HOSPADM

## 2020-04-27 RX ORDER — CARVEDILOL 25 MG/1
TABLET ORAL
Qty: 60 TABLET | Refills: 11 | Status: ON HOLD | OUTPATIENT
Start: 2020-04-27 | End: 2020-08-07 | Stop reason: HOSPADM

## 2020-07-21 ENCOUNTER — HOSPITAL ENCOUNTER (EMERGENCY)
Age: 45
Discharge: ANOTHER HOSPITAL | DRG: 066 | End: 2020-07-21
Attending: EMERGENCY MEDICINE | Admitting: INTERNAL MEDICINE
Payer: COMMERCIAL

## 2020-07-21 ENCOUNTER — APPOINTMENT (OUTPATIENT)
Dept: GENERAL RADIOLOGY | Age: 45
DRG: 066 | End: 2020-07-21
Payer: COMMERCIAL

## 2020-07-21 ENCOUNTER — APPOINTMENT (OUTPATIENT)
Dept: MRI IMAGING | Age: 45
DRG: 065 | End: 2020-07-21
Attending: INTERNAL MEDICINE
Payer: COMMERCIAL

## 2020-07-21 ENCOUNTER — APPOINTMENT (OUTPATIENT)
Dept: CT IMAGING | Age: 45
DRG: 066 | End: 2020-07-21
Payer: COMMERCIAL

## 2020-07-21 ENCOUNTER — HOSPITAL ENCOUNTER (INPATIENT)
Age: 45
LOS: 8 days | Discharge: NURSING, REHAB OR LONG-TERM CARE | DRG: 065 | End: 2020-07-29
Attending: INTERNAL MEDICINE | Admitting: INTERNAL MEDICINE
Payer: COMMERCIAL

## 2020-07-21 VITALS
HEART RATE: 77 BPM | SYSTOLIC BLOOD PRESSURE: 141 MMHG | DIASTOLIC BLOOD PRESSURE: 77 MMHG | RESPIRATION RATE: 15 BRPM | BODY MASS INDEX: 27.59 KG/M2 | TEMPERATURE: 97.7 F | HEIGHT: 74 IN | WEIGHT: 215 LBS | OXYGEN SATURATION: 96 %

## 2020-07-21 PROBLEM — I63.9 ACUTE CVA (CEREBROVASCULAR ACCIDENT) (HCC): Status: ACTIVE | Noted: 2020-07-21

## 2020-07-21 PROBLEM — I16.1 HYPERTENSIVE EMERGENCY: Status: ACTIVE | Noted: 2020-07-21

## 2020-07-21 LAB
A/G RATIO: 1.4 (ref 1.1–2.2)
ALBUMIN SERPL-MCNC: 4.3 G/DL (ref 3.4–5)
ALP BLD-CCNC: 68 U/L (ref 40–129)
ALT SERPL-CCNC: 10 U/L (ref 10–40)
ANION GAP SERPL CALCULATED.3IONS-SCNC: 12 MMOL/L (ref 3–16)
AST SERPL-CCNC: 13 U/L (ref 15–37)
BASOPHILS ABSOLUTE: 0.1 K/UL (ref 0–0.2)
BASOPHILS RELATIVE PERCENT: 0.8 %
BILIRUB SERPL-MCNC: 0.7 MG/DL (ref 0–1)
BILIRUBIN URINE: NEGATIVE
BLOOD, URINE: ABNORMAL
BUN BLDV-MCNC: 13 MG/DL (ref 7–20)
CALCIUM SERPL-MCNC: 9.2 MG/DL (ref 8.3–10.6)
CHLORIDE BLD-SCNC: 103 MMOL/L (ref 99–110)
CHOLESTEROL, TOTAL: 213 MG/DL (ref 0–199)
CLARITY: ABNORMAL
CO2: 23 MMOL/L (ref 21–32)
COLOR: YELLOW
CREAT SERPL-MCNC: 1.3 MG/DL (ref 0.9–1.3)
EKG ATRIAL RATE: 60 BPM
EKG DIAGNOSIS: NORMAL
EKG P AXIS: 33 DEGREES
EKG P-R INTERVAL: 160 MS
EKG Q-T INTERVAL: 436 MS
EKG QRS DURATION: 110 MS
EKG QTC CALCULATION (BAZETT): 436 MS
EKG R AXIS: 33 DEGREES
EKG T AXIS: 10 DEGREES
EKG VENTRICULAR RATE: 60 BPM
EOSINOPHILS ABSOLUTE: 0 K/UL (ref 0–0.6)
EOSINOPHILS RELATIVE PERCENT: 0.1 %
EPITHELIAL CELLS, UA: 0 /HPF (ref 0–5)
GFR AFRICAN AMERICAN: >60
GFR NON-AFRICAN AMERICAN: 60
GLOBULIN: 3 G/DL
GLUCOSE BLD-MCNC: 166 MG/DL (ref 70–99)
GLUCOSE URINE: NEGATIVE MG/DL
HCT VFR BLD CALC: 38.2 % (ref 40.5–52.5)
HDLC SERPL-MCNC: 52 MG/DL (ref 40–60)
HEMOGLOBIN: 12.9 G/DL (ref 13.5–17.5)
HYALINE CASTS: 0 /LPF (ref 0–8)
INR BLD: 1.02 (ref 0.86–1.14)
KETONES, URINE: NEGATIVE MG/DL
LDL CHOLESTEROL CALCULATED: 141 MG/DL
LEUKOCYTE ESTERASE, URINE: NEGATIVE
LYMPHOCYTES ABSOLUTE: 1.4 K/UL (ref 1–5.1)
LYMPHOCYTES RELATIVE PERCENT: 20 %
MCH RBC QN AUTO: 29.3 PG (ref 26–34)
MCHC RBC AUTO-ENTMCNC: 33.8 G/DL (ref 31–36)
MCV RBC AUTO: 86.6 FL (ref 80–100)
MICROSCOPIC EXAMINATION: YES
MONOCYTES ABSOLUTE: 0.4 K/UL (ref 0–1.3)
MONOCYTES RELATIVE PERCENT: 5.8 %
NEUTROPHILS ABSOLUTE: 5.3 K/UL (ref 1.7–7.7)
NEUTROPHILS RELATIVE PERCENT: 73.3 %
NITRITE, URINE: NEGATIVE
PDW BLD-RTO: 13.4 % (ref 12.4–15.4)
PH UA: 8 (ref 5–8)
PLATELET # BLD: 253 K/UL (ref 135–450)
PMV BLD AUTO: 9.7 FL (ref 5–10.5)
POTASSIUM REFLEX MAGNESIUM: 4 MMOL/L (ref 3.5–5.1)
PRO-BNP: 212 PG/ML (ref 0–124)
PROTEIN UA: ABNORMAL MG/DL
PROTHROMBIN TIME: 11.8 SEC (ref 10–13.2)
RBC # BLD: 4.42 M/UL (ref 4.2–5.9)
RBC UA: 3 /HPF (ref 0–4)
SODIUM BLD-SCNC: 138 MMOL/L (ref 136–145)
SPECIFIC GRAVITY UA: 1.01 (ref 1–1.03)
TOTAL PROTEIN: 7.3 G/DL (ref 6.4–8.2)
TRIGL SERPL-MCNC: 100 MG/DL (ref 0–150)
TROPONIN: <0.01 NG/ML
TSH REFLEX: 0.35 UIU/ML (ref 0.27–4.2)
URINE REFLEX TO CULTURE: ABNORMAL
URINE TYPE: ABNORMAL
UROBILINOGEN, URINE: 0.2 E.U./DL
VLDLC SERPL CALC-MCNC: 20 MG/DL
WBC # BLD: 7.2 K/UL (ref 4–11)
WBC UA: 0 /HPF (ref 0–5)

## 2020-07-21 PROCEDURE — 84443 ASSAY THYROID STIM HORMONE: CPT

## 2020-07-21 PROCEDURE — 6370000000 HC RX 637 (ALT 250 FOR IP): Performed by: STUDENT IN AN ORGANIZED HEALTH CARE EDUCATION/TRAINING PROGRAM

## 2020-07-21 PROCEDURE — 6360000004 HC RX CONTRAST MEDICATION: Performed by: EMERGENCY MEDICINE

## 2020-07-21 PROCEDURE — 6360000004 HC RX CONTRAST MEDICATION: Performed by: NEUROLOGICAL SURGERY

## 2020-07-21 PROCEDURE — 80061 LIPID PANEL: CPT

## 2020-07-21 PROCEDURE — A9576 INJ PROHANCE MULTIPACK: HCPCS | Performed by: NEUROLOGICAL SURGERY

## 2020-07-21 PROCEDURE — 96374 THER/PROPH/DIAG INJ IV PUSH: CPT

## 2020-07-21 PROCEDURE — U0003 INFECTIOUS AGENT DETECTION BY NUCLEIC ACID (DNA OR RNA); SEVERE ACUTE RESPIRATORY SYNDROME CORONAVIRUS 2 (SARS-COV-2) (CORONAVIRUS DISEASE [COVID-19]), AMPLIFIED PROBE TECHNIQUE, MAKING USE OF HIGH THROUGHPUT TECHNOLOGIES AS DESCRIBED BY CMS-2020-01-R: HCPCS

## 2020-07-21 PROCEDURE — 99285 EMERGENCY DEPT VISIT HI MDM: CPT

## 2020-07-21 PROCEDURE — 2500000003 HC RX 250 WO HCPCS: Performed by: STUDENT IN AN ORGANIZED HEALTH CARE EDUCATION/TRAINING PROGRAM

## 2020-07-21 PROCEDURE — 2000000000 HC ICU R&B

## 2020-07-21 PROCEDURE — 83880 ASSAY OF NATRIURETIC PEPTIDE: CPT

## 2020-07-21 PROCEDURE — 71045 X-RAY EXAM CHEST 1 VIEW: CPT

## 2020-07-21 PROCEDURE — 85025 COMPLETE CBC W/AUTO DIFF WBC: CPT

## 2020-07-21 PROCEDURE — 2580000003 HC RX 258: Performed by: EMERGENCY MEDICINE

## 2020-07-21 PROCEDURE — 2500000003 HC RX 250 WO HCPCS: Performed by: EMERGENCY MEDICINE

## 2020-07-21 PROCEDURE — 2580000003 HC RX 258: Performed by: STUDENT IN AN ORGANIZED HEALTH CARE EDUCATION/TRAINING PROGRAM

## 2020-07-21 PROCEDURE — 6360000002 HC RX W HCPCS: Performed by: EMERGENCY MEDICINE

## 2020-07-21 PROCEDURE — 93010 ELECTROCARDIOGRAM REPORT: CPT | Performed by: INTERNAL MEDICINE

## 2020-07-21 PROCEDURE — 85610 PROTHROMBIN TIME: CPT

## 2020-07-21 PROCEDURE — 70450 CT HEAD/BRAIN W/O DYE: CPT

## 2020-07-21 PROCEDURE — 36415 COLL VENOUS BLD VENIPUNCTURE: CPT

## 2020-07-21 PROCEDURE — 80053 COMPREHEN METABOLIC PANEL: CPT

## 2020-07-21 PROCEDURE — 70496 CT ANGIOGRAPHY HEAD: CPT

## 2020-07-21 PROCEDURE — 6360000002 HC RX W HCPCS: Performed by: STUDENT IN AN ORGANIZED HEALTH CARE EDUCATION/TRAINING PROGRAM

## 2020-07-21 PROCEDURE — 84484 ASSAY OF TROPONIN QUANT: CPT

## 2020-07-21 PROCEDURE — 1200000000 HC SEMI PRIVATE

## 2020-07-21 PROCEDURE — 83036 HEMOGLOBIN GLYCOSYLATED A1C: CPT

## 2020-07-21 PROCEDURE — 93005 ELECTROCARDIOGRAM TRACING: CPT | Performed by: EMERGENCY MEDICINE

## 2020-07-21 PROCEDURE — 70553 MRI BRAIN STEM W/O & W/DYE: CPT

## 2020-07-21 PROCEDURE — 81001 URINALYSIS AUTO W/SCOPE: CPT

## 2020-07-21 PROCEDURE — 99223 1ST HOSP IP/OBS HIGH 75: CPT | Performed by: INTERNAL MEDICINE

## 2020-07-21 RX ORDER — ACETAMINOPHEN 325 MG/1
650 TABLET ORAL EVERY 6 HOURS PRN
Status: DISCONTINUED | OUTPATIENT
Start: 2020-07-21 | End: 2020-07-29 | Stop reason: HOSPADM

## 2020-07-21 RX ORDER — MECLIZINE HCL 12.5 MG/1
25 TABLET ORAL ONCE
Status: DISCONTINUED | OUTPATIENT
Start: 2020-07-21 | End: 2020-07-21 | Stop reason: HOSPADM

## 2020-07-21 RX ORDER — DIPHENHYDRAMINE HYDROCHLORIDE 50 MG/ML
25 INJECTION INTRAMUSCULAR; INTRAVENOUS ONCE
Status: COMPLETED | OUTPATIENT
Start: 2020-07-21 | End: 2020-07-21

## 2020-07-21 RX ORDER — ASPIRIN 300 MG/1
300 SUPPOSITORY RECTAL DAILY
Status: CANCELLED | OUTPATIENT
Start: 2020-07-21

## 2020-07-21 RX ORDER — ONDANSETRON 2 MG/ML
4 INJECTION INTRAMUSCULAR; INTRAVENOUS
Status: DISCONTINUED | OUTPATIENT
Start: 2020-07-21 | End: 2020-07-21 | Stop reason: HOSPADM

## 2020-07-21 RX ORDER — ACETAMINOPHEN 650 MG/1
650 SUPPOSITORY RECTAL EVERY 6 HOURS PRN
Status: DISCONTINUED | OUTPATIENT
Start: 2020-07-21 | End: 2020-07-29 | Stop reason: HOSPADM

## 2020-07-21 RX ORDER — NIFEDIPINE 30 MG/1
60 TABLET, FILM COATED, EXTENDED RELEASE ORAL DAILY
Status: DISCONTINUED | OUTPATIENT
Start: 2020-07-21 | End: 2020-07-29 | Stop reason: HOSPADM

## 2020-07-21 RX ORDER — SODIUM CHLORIDE 0.9 % (FLUSH) 0.9 %
10 SYRINGE (ML) INJECTION EVERY 12 HOURS SCHEDULED
Status: DISCONTINUED | OUTPATIENT
Start: 2020-07-21 | End: 2020-07-29 | Stop reason: HOSPADM

## 2020-07-21 RX ORDER — ASPIRIN 81 MG/1
81 TABLET, CHEWABLE ORAL DAILY
Status: DISCONTINUED | OUTPATIENT
Start: 2020-07-21 | End: 2020-07-29 | Stop reason: HOSPADM

## 2020-07-21 RX ORDER — CARVEDILOL 25 MG/1
25 TABLET ORAL 2 TIMES DAILY WITH MEALS
Status: DISCONTINUED | OUTPATIENT
Start: 2020-07-21 | End: 2020-07-29 | Stop reason: HOSPADM

## 2020-07-21 RX ORDER — ONDANSETRON 2 MG/ML
4 INJECTION INTRAMUSCULAR; INTRAVENOUS EVERY 6 HOURS PRN
Status: DISCONTINUED | OUTPATIENT
Start: 2020-07-21 | End: 2020-07-29 | Stop reason: HOSPADM

## 2020-07-21 RX ORDER — SODIUM CHLORIDE, SODIUM LACTATE, POTASSIUM CHLORIDE, CALCIUM CHLORIDE 600; 310; 30; 20 MG/100ML; MG/100ML; MG/100ML; MG/100ML
1000 INJECTION, SOLUTION INTRAVENOUS ONCE
Status: COMPLETED | OUTPATIENT
Start: 2020-07-21 | End: 2020-07-21

## 2020-07-21 RX ORDER — POLYETHYLENE GLYCOL 3350 17 G/17G
17 POWDER, FOR SOLUTION ORAL DAILY PRN
Status: CANCELLED | OUTPATIENT
Start: 2020-07-21

## 2020-07-21 RX ORDER — ONDANSETRON 2 MG/ML
4 INJECTION INTRAMUSCULAR; INTRAVENOUS EVERY 6 HOURS PRN
Status: CANCELLED | OUTPATIENT
Start: 2020-07-21

## 2020-07-21 RX ORDER — PROMETHAZINE HYDROCHLORIDE 25 MG/1
12.5 TABLET ORAL EVERY 6 HOURS PRN
Status: DISCONTINUED | OUTPATIENT
Start: 2020-07-21 | End: 2020-07-29 | Stop reason: HOSPADM

## 2020-07-21 RX ORDER — METOCLOPRAMIDE HYDROCHLORIDE 5 MG/ML
10 INJECTION INTRAMUSCULAR; INTRAVENOUS ONCE
Status: COMPLETED | OUTPATIENT
Start: 2020-07-21 | End: 2020-07-21

## 2020-07-21 RX ORDER — SODIUM CHLORIDE 0.9 % (FLUSH) 0.9 %
10 SYRINGE (ML) INJECTION EVERY 12 HOURS SCHEDULED
Status: CANCELLED | OUTPATIENT
Start: 2020-07-21

## 2020-07-21 RX ORDER — PROMETHAZINE HYDROCHLORIDE 25 MG/1
12.5 TABLET ORAL EVERY 6 HOURS PRN
Status: CANCELLED | OUTPATIENT
Start: 2020-07-21

## 2020-07-21 RX ORDER — ASPIRIN 81 MG/1
81 TABLET ORAL DAILY
Status: CANCELLED | OUTPATIENT
Start: 2020-07-21

## 2020-07-21 RX ORDER — POLYETHYLENE GLYCOL 3350 17 G/17G
17 POWDER, FOR SOLUTION ORAL DAILY PRN
Status: DISCONTINUED | OUTPATIENT
Start: 2020-07-21 | End: 2020-07-29 | Stop reason: HOSPADM

## 2020-07-21 RX ORDER — SODIUM CHLORIDE 0.9 % (FLUSH) 0.9 %
10 SYRINGE (ML) INJECTION PRN
Status: DISCONTINUED | OUTPATIENT
Start: 2020-07-21 | End: 2020-07-29 | Stop reason: HOSPADM

## 2020-07-21 RX ORDER — LISINOPRIL 20 MG/1
20 TABLET ORAL DAILY
Status: DISCONTINUED | OUTPATIENT
Start: 2020-07-22 | End: 2020-07-22

## 2020-07-21 RX ORDER — LABETALOL HYDROCHLORIDE 5 MG/ML
10 INJECTION, SOLUTION INTRAVENOUS EVERY 10 MIN PRN
Status: CANCELLED | OUTPATIENT
Start: 2020-07-21

## 2020-07-21 RX ORDER — PANTOPRAZOLE SODIUM 40 MG/1
40 TABLET, DELAYED RELEASE ORAL
Status: DISCONTINUED | OUTPATIENT
Start: 2020-07-22 | End: 2020-07-29 | Stop reason: HOSPADM

## 2020-07-21 RX ORDER — ASPIRIN 81 MG/1
324 TABLET, CHEWABLE ORAL ONCE
Status: DISCONTINUED | OUTPATIENT
Start: 2020-07-21 | End: 2020-07-21 | Stop reason: HOSPADM

## 2020-07-21 RX ORDER — ATORVASTATIN CALCIUM 80 MG/1
40 TABLET, FILM COATED ORAL NIGHTLY
Status: CANCELLED | OUTPATIENT
Start: 2020-07-21

## 2020-07-21 RX ORDER — SODIUM CHLORIDE 0.9 % (FLUSH) 0.9 %
10 SYRINGE (ML) INJECTION PRN
Status: CANCELLED | OUTPATIENT
Start: 2020-07-21

## 2020-07-21 RX ADMIN — ASPIRIN 81 MG: 81 TABLET, CHEWABLE ORAL at 18:12

## 2020-07-21 RX ADMIN — METOCLOPRAMIDE HYDROCHLORIDE 10 MG: 5 INJECTION INTRAMUSCULAR; INTRAVENOUS at 07:08

## 2020-07-21 RX ADMIN — SODIUM CHLORIDE 2.5 MG/HR: 9 INJECTION, SOLUTION INTRAVENOUS at 20:01

## 2020-07-21 RX ADMIN — CARVEDILOL 25 MG: 25 TABLET, FILM COATED ORAL at 19:34

## 2020-07-21 RX ADMIN — IOPAMIDOL 75 ML: 755 INJECTION, SOLUTION INTRAVENOUS at 05:48

## 2020-07-21 RX ADMIN — DIPHENHYDRAMINE HYDROCHLORIDE 25 MG: 50 INJECTION, SOLUTION INTRAMUSCULAR; INTRAVENOUS at 07:07

## 2020-07-21 RX ADMIN — ACETAMINOPHEN 650 MG: 325 TABLET ORAL at 18:12

## 2020-07-21 RX ADMIN — SODIUM CHLORIDE, POTASSIUM CHLORIDE, SODIUM LACTATE AND CALCIUM CHLORIDE 1000 ML: 600; 310; 30; 20 INJECTION, SOLUTION INTRAVENOUS at 05:26

## 2020-07-21 RX ADMIN — NIFEDIPINE 60 MG: 30 TABLET, EXTENDED RELEASE ORAL at 19:34

## 2020-07-21 RX ADMIN — DEXTROSE MONOHYDRATE 5 MG/HR: 50 INJECTION, SOLUTION INTRAVENOUS at 07:08

## 2020-07-21 RX ADMIN — ONDANSETRON 4 MG: 2 INJECTION INTRAMUSCULAR; INTRAVENOUS at 14:31

## 2020-07-21 RX ADMIN — ONDANSETRON 4 MG: 2 INJECTION INTRAMUSCULAR; INTRAVENOUS at 05:27

## 2020-07-21 RX ADMIN — SODIUM CHLORIDE 7.5 MG/HR: 9 INJECTION, SOLUTION INTRAVENOUS at 12:15

## 2020-07-21 RX ADMIN — GADOTERIDOL 20 ML: 279.3 INJECTION, SOLUTION INTRAVENOUS at 21:03

## 2020-07-21 ASSESSMENT — PAIN - FUNCTIONAL ASSESSMENT: PAIN_FUNCTIONAL_ASSESSMENT: ACTIVITIES ARE NOT PREVENTED

## 2020-07-21 ASSESSMENT — PAIN DESCRIPTION - DESCRIPTORS: DESCRIPTORS: ACHING;DULL

## 2020-07-21 ASSESSMENT — ENCOUNTER SYMPTOMS
NAUSEA: 0
SINUS PRESSURE: 0
PHOTOPHOBIA: 0
SHORTNESS OF BREATH: 0
VOMITING: 0
WHEEZING: 0

## 2020-07-21 ASSESSMENT — PAIN SCALES - GENERAL
PAINLEVEL_OUTOF10: 3
PAINLEVEL_OUTOF10: 0

## 2020-07-21 ASSESSMENT — PAIN DESCRIPTION - FREQUENCY: FREQUENCY: CONTINUOUS

## 2020-07-21 ASSESSMENT — PAIN DESCRIPTION - PAIN TYPE: TYPE: ACUTE PAIN

## 2020-07-21 ASSESSMENT — PAIN DESCRIPTION - PROGRESSION: CLINICAL_PROGRESSION: NOT CHANGED

## 2020-07-21 ASSESSMENT — PAIN DESCRIPTION - LOCATION: LOCATION: HEAD

## 2020-07-21 NOTE — ED NOTES
Report called to [facility], spoke with Noella Gitelman RN, v/u. Denies further questions at this time. [employer] transport team report given as well, v/u. A&O with no signs of distress. Belongings in tow. Pt on [facility] and taken out ER exit.         Jez Morris RN  07/21/20 5180

## 2020-07-21 NOTE — H&P
MEDICATIONS:     No current facility-administered medications on file prior to encounter. Current Outpatient Medications on File Prior to Encounter   Medication Sig Dispense Refill    carvedilol (COREG) 25 MG tablet TAKE 1 TABLET BY MOUTH TWICE A DAY WITH MEALS 60 tablet 11    lisinopril (PRINIVIL;ZESTRIL) 20 MG tablet TAKE ONE TABLET IN THE MORNING. 30 tablet 11    NIFEdipine (PROCARDIA XL) 60 MG extended release tablet TAKE ONE TABLET IN THE EVENING 30 tablet 11    omeprazole (PRILOSEC) 20 MG delayed release capsule Take 1 capsule by mouth daily for 30 doses 30 capsule 0    aspirin 81 MG chewable tablet Take 1 tablet by mouth daily 90 tablet 3         Scheduled Meds:   Continuous Infusions:  PRN Meds:    Allergies: No Known Allergies    REVIEW OF SYSTEMS:       History obtained from chart review and the patient    Review of Systems   Constitutional: Negative for fever. HENT: Negative for sinus pressure. Eyes: Negative for photophobia and visual disturbance. Respiratory: Negative for shortness of breath and wheezing. Cardiovascular: Negative for chest pain. Gastrointestinal: Negative for nausea and vomiting. Neurological: Positive for dizziness. Negative for light-headedness and headaches. PHYSICAL EXAM:       Vitals: There were no vitals taken for this visit. I/O:  No intake or output data in the 24 hours ending 07/21/20 1139  No intake/output data recorded. No intake/output data recorded. Physical Examination:     Physical Exam  Vitals signs reviewed. HENT:      Head: Normocephalic. Cardiovascular:      Rate and Rhythm: Normal rate and regular rhythm. Heart sounds: No murmur. Pulmonary:      Effort: Pulmonary effort is normal.      Breath sounds: Normal breath sounds. No wheezing. Abdominal:      General: Bowel sounds are normal.   Neurological:      General: No focal deficit present. Mental Status: He is alert and oriented to person, place, and time. Sensory: No sensory deficit. Motor: No weakness. No results for input(s): PHART, LXL2RXS, PO2ART in the last 72 hours. DATA:       Labs:  CBC:   Recent Labs     07/21/20  0500   WBC 7.2   HGB 12.9*   HCT 38.2*          BMP:   Recent Labs     07/21/20  0500      K 4.0      CO2 23   BUN 13   CREATININE 1.3   GLUCOSE 166*     LFT's:   Recent Labs     07/21/20  0500   AST 13*   ALT 10   BILITOT 0.7   ALKPHOS 68     Troponin:   Recent Labs     07/21/20  0500   TROPONINI <0.01     BNP:No results for input(s): BNP in the last 72 hours. ABGs: No results for input(s): PHART, ATA5HVK, PO2ART in the last 72 hours. INR:   Recent Labs     07/21/20  0500   INR 1.02       U/A:  Recent Labs     07/21/20  0717   COLORU YELLOW   PHUR 8.0   WBCUA 0   RBCUA 3   CLARITYU CLOUDY*   SPECGRAV 1.015   LEUKOCYTESUR Negative   UROBILINOGEN 0.2   BILIRUBINUR Negative   BLOODU TRACE*   GLUCOSEU Negative       MRI BRAIN WO CONTRAST    (Results Pending)       EKG:   Echo:  Micro:     ASSESSMENT AND PLAN:   Timmy Guillaume is a 39 y.o. male, who presented with headache and dizziness. In the ED was found to be hypertensive  and CTA revealed severe stenosis of both ICAs, VAs. CT showed a right cerebellar infarct. Dizziness/vertigo 2/2 right cerebellar hemisphere infarct  CT head showed Subtle apparent infarct within the lateral aspect of the right cerebellar hemisphere, not definitely seen on the prior study of 05/01/2019, possibly acute or chronic.  -neurosurgery following  -neurology consulted  -q1h neuro checks  -MRI brain- pending  -ECHO w bubble study ordered   -continue ASA 81    Severe stenosis of ICA, VA bilaterally  CTA head and neck showed Right V4 segment of vertebral artery occlusion, Left V4 segment is severely stenotic. Right intracranial ICA cavernous and paraclinoid segments 70% stenosis. Left intracranial ICA cavernous segment 50% stenosis.    -neurosurgery following    Hypertensive emergency  In ED SBP 200s, on admission [facility] 150/107  -Currently normotensive  -Cardene gtt (5mg/hr)  -Keep SBP <160  -hold home antihypertensives (carvedilol 25, lisinopril 20, nifedipine 60)  -Plan to wean off cardene drip and start home meds when appropriate     Hyperlipidemia  Most recent  (March 2019)  -Initiate high intensity statin once he has passed swallow evaluation  -Lipid panel pending      Code Status:Full Code  FEN: diet general  PPX:  none  DISPO: icu    This patient has been staffed and discussed with Adarsh Waldron MD.   -----------------------------  Dominguez Spicer DO, PGY-1  7/21/2020  11:39 AM    Pulm/CC    Patient seen and examined. I agree with Dr. Tito Harmon history, physical, lab findings, assessment and plan.     Carlos Sabillon mD

## 2020-07-21 NOTE — CONSULTS
Neurology Consult Note  Reason for Consult: Right cerebellar stroke  Chief complaint:dizziness  Dr Mary Fregoso MD asked me to see Jessika Arndt in consultation for evaluation of right cerebellar stroke    History of Present Illness:  Patient in isolation for R/O COVID, HPI obtained per chart review. Patient was seen during Covid-19 pandemic and all efforts made to respect recommendations of social distancing and decrease PPE have been made. Some portions of history and examination may be omitted if not essential to active issue. History obtained by chart review. Patient woke up yesterday in his normal state of health. At around 9 am he developed a headache. At 1300, patient became dizzy as well and began having some nausea and dry heaving. Went to [facility] ER early this morning with complaints of headache, dizziness, and nausea. CT scan was obtained and was concerning for acute versus chronic right cerebellar stroke. Was hypertensive upon arrival (systolic BP 082F) and was placed on a nicardipine gtt. CTA Head/Neck shows moderate to severe stenosis of both ICA's and both VA's (right V4 segment of vertebral artery occlusion. Patient was transferred to [place] [address] ICU for further neurosurgical follow up. Neurosurgery on board as well given possible cerebellar stroke in a young patient and concern for developing cerebellar edema. Per ICU RN, patient dizziness has improved since admission. He has a history of hypertension and hyperlipidemia. On 81 mg Aspirin at home . Does not take statin at home. Remote history of smoking. Quit in 2018. Denies history of stroke or heart attack but does have old stroke visualized on head CT. Medical History:  Past Medical History:   Diagnosis Date    Hyperlipidemia     Hypertension      No past surgical history on file.   Medications Prior to Admission: carvedilol (COREG) 25 MG tablet, TAKE 1 TABLET BY MOUTH TWICE A DAY WITH MEALS  lisinopril (PRINIVIL;ZESTRIL) 20 MG tablet, TAKE ONE TABLET IN THE MORNING. NIFEdipine (PROCARDIA XL) 60 MG extended release tablet, TAKE ONE TABLET IN THE EVENING  omeprazole (PRILOSEC) 20 MG delayed release capsule, Take 1 capsule by mouth daily for 30 doses  aspirin 81 MG chewable tablet, Take 1 tablet by mouth daily  No Known Allergies  Family History   Problem Relation Age of Onset    Diabetes Father     Diabetes Mother     Diabetes Maternal Grandmother     Diabetes Maternal Grandfather     Diabetes Paternal Grandmother     Diabetes Paternal Grandfather      Social History     Tobacco Use   Smoking Status Former Smoker    Packs/day: 1.00    Types: Cigarettes    Last attempt to quit: 3/5/2018    Years since quittin.3   Smokeless Tobacco Never Used     Social History     Substance and Sexual Activity   Drug Use No     Social History     Substance and Sexual Activity   Alcohol Use Yes    Comment: occacional       ROS:  Unable to obtain - in COVID isolation    Exam:  Blood pressure 126/87, pulse 94, temperature 97.6 °F (36.4 °C), resp. rate 14, SpO2 94 %. Per RN:  Patient is alert, and fully oriented. Following commands well. No speech deficits  Tracks RN around the room  Moving extremities fully and symmetrically     NIHSS 0      Labs  Na: 138  BUN: 13  Cr: 1.3  WBC: 7.2  Glucose: 166      Studies  CT Head WO Contrast:  Impression    1. No acute intracranial hemorrhage or mass. 2. Subtle apparent infarct within the lateral aspect of the right cerebellar    hemisphere, not definitely seen on the prior study of 2019, possibly    acute or chronic.  Suggest clinical correlation, and if concerning, consider    further characterization with a follow-up brain MRI. 3. Stable chronic lacunar infarcts within the right basal ganglia, right    internal capsule, extending into the right caudate nucleus.       CTA Head and Neck:     Impression    Both cervical ICAs are patent.         Variant anatomy of right vertebral arteries, both diminutive.         Right V4 segment of vertebral artery occlusion.         Left V4 segment is severely stenotic.         Basilar artery has multifocal moderate stenoses.         Segmental moderately severe stenoses of both P2 segments.         Left PICA is severely stenotic distally.         Right intracranial ICA cavernous and paraclinoid segments 70% stenosis.         Left intracranial ICA cavernous segment 50% stenosis.         Both MCAs and ACAs are patent. Impression:  1. Dizziness   2. Right vertebral artery occlusion   3. Intracranial atherosclerosis  4. Hypertension  5. Hyperlipidemia     Francie Brar is a 39 y.o. male who presented with headache, dizziness, nausea and vomiting in the setting of severe hypertension and CT head concerning for acute vs chronic right cerebellar ischemic stroke. Vessel imaging revealed right vertebral artery stenosis and multiple areas of intracranial stenosis. Must rule out vascular event. If MRI unrevealing for acute ischemic stroke,  it is possible that symptoms are related to severe hypertension. Recommendations:  - Await MRI brain   - Further recs pending MRI  - ECHO w/ bubble   - Continue Aspirin 81 mg   - Initiate high intensity statin once he has passed his swallow evaluation  - Maintain good secondary stroke prevention; LDL goal 70 and below, A1C goal 7.0 and below, BP goal eventually 140/90 and below, however would very slowly lower to this goal over the next week while avoiding marked fluctuations in BP and hypotension   - Continue ICU care and q1h neuro checks   - Obtain TSH, A1C, and lipid panel(ordered)  - PT/OT/ST, rehab as able      Total time spent on chart review, discussion with RN , and preparing the note 60 minutes.        A copy of this note was provided for Dr Whit Delvalle MD     [address] Neurology

## 2020-07-21 NOTE — ED NOTES
Bed: 05  Expected date:   Expected time:   Means of arrival:   Comments:  RN has [address] [facility], [facility]  07/21/20 2318

## 2020-07-21 NOTE — PLAN OF CARE
Received a call from [facility] ED regarding Mr. Kev Bennett. 38 yo M with a hx of HTN and HLD who presented to the ED with headache and dizziness that started on 7/20. The headache started around 09:00 and then the dizziness/off balance sensation started around 13:00. After the dizziness began, he also had associated nausea. In the ED, patient was severely hypertensive with SBP >200. I personally reviewed and independently interpreted Mr. Marily Nroth imaging. CTH was negative for acute hemorrhage. There was concern for possible R lateral cerebellar infarct. There is also evidence of moderate bilateral microvascular/lacunar disease including an old R basal ganglia infarct. There was also evidence of diffuse ICAD. CTA revealed a diminutive/hypoplastic and irregular R vertebral artery that completely occludes within the proximal V4 segment. The L vertebral artery is dominant and is without significant atherosclerotic disease until the V4 segment where there is diffuse, moderate-to-severe, atherosclerotic disease which results in multifocal narrowing within the segment including an area of near-critical narrowing within the distal V4 segment just proximal to the VB junction. The right posterior inferior cerebellar artery distribution appears to be supplied by an AICA-PICA complex, which appears grossly patent. There is diffuse ICAD, which results in multifocal mild-to-moderate narrowing throughout the basilar artery, cavernous and supraclinoid ICA, proximal MCAs, ACAs, and proximal PCAs. No appreciable evidence of filling of a posterior communicating artery bilaterally. There is no significant extracranial or carotid bifurcation stenosis. I am concerned that Mr. Kev Bennett has a new R cerebellar or other posterior circulation infarct versus TIA related to severe vertebrobasilar ICAD. Other possibility is hypertensive urgency.  He also has occlusion of a diminutive R vertebral artery within the proximal V4 segment    - No indication for acute surgical intervention  - Recommend transfer to [facility] for further evaluation and management and possible malignant edema watch depending on size of the potential R cerebellar infarct  - Recommend starting ASA. Would recommend loading and starting Plavix depending on the size of the stroke volume if a stroke is present if it is felt that patient's symptoms are related to posterior circulation infarct or TIA  - MRI brain ASAP to evaluate for posterior circulation infarct  - Neurology consult for secondary stroke prevention and management of antiplatelets  - Check lipid panel and hemoglobin A1c  - Optimize atherosclerotic risk factors with a long term goal of normotension (<140/90 or <130/80 in diabetics) and immediate goals of LDL <70 and HgA1c <7.0  - Patient needs to be extensively counseled about the importance of managing his risk factors.  He has near critical stenosis within his distal V4 segment and his posterior circulation is isolated (no evidence of a posterior communicating artery bilaterally), and so he is at significantly increased risk of a devastating posterior circulation infarct

## 2020-07-21 NOTE — PROGRESS NOTES
Patient p/w dizziness, with hypertensive emergency  Discussed with ICU team, patient will be getting covid test.  To preserve PPE and minimize exposure patient was not seen physically   Once COVID negative and/or patient out of ICU will take over care.   Management per critical care team.    Mary Fregoso

## 2020-07-21 NOTE — ED PROVIDER NOTES
[facility] Emergency Department    CHIEF COMPLAINT  Chief Complaint   Patient presents with    Dizziness     Pt states that he started with a headache around 9 am then dizziness around 13, + n/v, denies sob or chest pain. HISTORY OF PRESENT ILLNESS  Mulugeta Golden is a 39 y.o. male  who presents to the ED complaining of headache onset around 9 AM or so with a vertiginous type dizziness and feeling off balance starting around 1 PM.  Patient has had some nausea with dry heaving since then and poor appetite today. No chest pain or shortness of breath. He is notably hypertensive in triage and does have a history of hypertension. He also has a history of hyperlipidemia. No history of stroke or heart attack though. He denies any focalizing numbness tingling or weakness in the arms or legs. He has had no confusion or trouble with speech or swallowing. No other complaints, modifying factors or associated symptoms. I have reviewed the following from the nursing documentation. Past Medical History:   Diagnosis Date    Hyperlipidemia     Hypertension      History reviewed. No pertinent surgical history.   Family History   Problem Relation Age of Onset    Diabetes Father     Diabetes Mother     Diabetes Maternal Grandmother     Diabetes Maternal Grandfather     Diabetes Paternal Grandmother     Diabetes Paternal Grandfather      Social History     Socioeconomic History    Marital status: Legally      Spouse name: Not on file    Number of children: Not on file    Years of education: Not on file    Highest education level: Not on file   Occupational History    Not on file   Social Needs    Financial resource strain: Not on file    Food insecurity     Worry: Not on file     Inability: Not on file   [employer] needs     Medical: Not on file     Non-medical: Not on file   Tobacco Use    Smoking status: Former Smoker     Packs/day: 1.00     Types: Cigarettes (PRILOSEC) 20 MG delayed release capsule Take 1 capsule by mouth daily for 30 doses 30 capsule 0     No Known Allergies    REVIEW OF SYSTEMS  10 systems reviewed, pertinent positives per HPI otherwise noted to be negative. PHYSICAL EXAM  BP (!) 203/101   Pulse 60   Temp 97.7 °F (36.5 °C) (Oral)   Resp 14   Ht 6' 2\" (1.88 m)   Wt 215 lb (97.5 kg)   SpO2 97%   BMI 27.60 kg/m²    GENERAL APPEARANCE: Awake and alert. Cooperative. No distress. HENT: Normocephalic. Atraumatic. Mucous membranes are dry. NECK: Supple. EYES: PERRL. EOM's grossly intact. No nystagmus noted. HEART/CHEST: RRR. No murmurs. No chest wall tenderness. LUNGS: Respirations unlabored. CTAB. Good air exchange. Speaking comfortably in full sentences. ABDOMEN: No tenderness. Soft. Non-distended. No masses. No organomegaly. No guarding or rebound. Normal bowel sounds throughout. MUSCULOSKELETAL: No extremity edema. Compartments soft. No deformity. No tenderness in the extremities. All extremities neurovascularly intact. SKIN: Warm and dry. No acute rashes. NEUROLOGICAL: Alert and oriented. CN's 2-12 intact. No gross facial drooping. Strength 5/5, sensation intact in all extremities. NIHSS 0. 2 plus DTR's in knees bilaterally. Gait not able to stand. Finger nose finger testing normal bilaterally. Heel shin testing normal bilaterally. PSYCHIATRIC: Normal mood and affect. LABS  I have reviewed all labs for this visit.    Results for orders placed or performed during the hospital encounter of 07/21/20   CBC auto differential   Result Value Ref Range    WBC 7.2 4.0 - 11.0 K/uL    RBC 4.42 4.20 - 5.90 M/uL    Hemoglobin 12.9 (L) 13.5 - 17.5 g/dL    Hematocrit 38.2 (L) 40.5 - 52.5 %    MCV 86.6 80.0 - 100.0 fL    MCH 29.3 26.0 - 34.0 pg    MCHC 33.8 31.0 - 36.0 g/dL    RDW 13.4 12.4 - 15.4 %    Platelets 675 544 - 038 K/uL    MPV 9.7 5.0 - 10.5 fL    Neutrophils % 73.3 %    Lymphocytes % 20.0 %    Monocytes % 5.8 % Eosinophils % 0.1 %    Basophils % 0.8 %    Neutrophils Absolute 5.3 1.7 - 7.7 K/uL    Lymphocytes Absolute 1.4 1.0 - 5.1 K/uL    Monocytes Absolute 0.4 0.0 - 1.3 K/uL    Eosinophils Absolute 0.0 0.0 - 0.6 K/uL    Basophils Absolute 0.1 0.0 - 0.2 K/uL   Comprehensive Metabolic Panel w/ Reflex to MG   Result Value Ref Range    Sodium 138 136 - 145 mmol/L    Potassium reflex Magnesium 4.0 3.5 - 5.1 mmol/L    Chloride 103 99 - 110 mmol/L    CO2 23 21 - 32 mmol/L    Anion Gap 12 3 - 16    Glucose 166 (H) 70 - 99 mg/dL    BUN 13 7 - 20 mg/dL    CREATININE 1.3 0.9 - 1.3 mg/dL    GFR Non-African American 60 (A) >60    GFR African American >60 >60    Calcium 9.2 8.3 - 10.6 mg/dL    Total Protein 7.3 6.4 - 8.2 g/dL    Alb 4.3 3.4 - 5.0 g/dL    Albumin/Globulin Ratio 1.4 1.1 - 2.2    Total Bilirubin 0.7 0.0 - 1.0 mg/dL    Alkaline Phosphatase 68 40 - 129 U/L    ALT 10 10 - 40 U/L    AST 13 (L) 15 - 37 U/L    Globulin 3.0 g/dL   Protime-INR   Result Value Ref Range    Protime 11.8 10.0 - 13.2 sec    INR 1.02 0.86 - 1.14   Troponin   Result Value Ref Range    Troponin <0.01 <0.01 ng/mL   Brain Natriuretic Peptide   Result Value Ref Range    Pro- (H) 0 - 124 pg/mL   EKG 12 Lead   Result Value Ref Range    Ventricular Rate 60 BPM    Atrial Rate 60 BPM    P-R Interval 160 ms    QRS Duration 110 ms    Q-T Interval 436 ms    QTc Calculation (Bazett) 436 ms    P Axis 33 degrees    R Axis 33 degrees    T Axis 10 degrees    Diagnosis       Normal sinus rhythmMinimal voltage criteria for LVH, may be normal variantBorderline ECG     The 12 lead EKG was interpreted by me as follows:  Rate: normal with a rate of 60  Rhythm: sinus  Axis: normal  Intervals: normal [place], narrow QRS, normal QTc  ST segments: no ST elevations or depressions  T waves: no abnormal inversions  Non-specific T wave changes: not present   LVH  Prior EKG comparison: EKG dated 5/1/19 is not significantly different    RADIOLOGY    Ct Head Wo Contrast    Result Date: 7/21/2020  1. No acute intracranial hemorrhage or mass. 2. Subtle apparent infarct within the lateral aspect of the right cerebellar hemisphere, not definitely seen on the prior study of 05/01/2019, possibly acute or chronic. Suggest clinical correlation, and if concerning, consider further characterization with a follow-up brain MRI. 3. Stable chronic lacunar infarcts within the right basal ganglia, right internal capsule, extending into the right caudate nucleus. Xr Chest Portable    Result Date: 7/21/2020  New nonspecific elevation of the right hemidiaphragm, with mild right basilar atelectasis evident. There is no evidence of pneumonia. Cta Head Neck W Contrast    Result Date: 7/21/2020  EXAMINATION: CTA OF THE HEAD AND NECK WITH CONTRAST 7/21/2020 5:35 am: TECHNIQUE: CTA of the head and neck was performed with the administration of intravenous contrast. Multiplanar reformatted images are provided for review. MIP images are provided for review. Stenosis of the internal carotid arteries measured using NASCET criteria. Dose modulation, iterative reconstruction, and/or weight based adjustment of the mA/kV was utilized to reduce the radiation dose to as low as reasonably achievable. COMPARISON: None. HISTORY: ORDERING SYSTEM PROVIDED HISTORY: dizzy vertigo HTN TECHNOLOGIST PROVIDED HISTORY: Reason for exam:->dizzy vertigo HTN Reason for Exam: vertigo Acuity: Unknown Type of Exam: Unknown Additional signs and symptoms: dizzy vertigo HTN Relevant Medical/Surgical History: (Pt states that he started with a headache around 9 am then dizziness around 13, + n/v, denies sob or chest pain. ) FINDINGS: CTA NECK: AORTIC ARCH/ARCH VESSELS: No dissection or arterial injury. No significant stenosis of the brachiocephalic or subclavian arteries. CAROTID ARTERIES: No dissection, arterial injury, or hemodynamically significant stenosis by NASCET criteria.  VERTEBRAL ARTERIES: On the right, there is variant anatomy with is dual vertebral artery branches, both of which are diminutive and both of which arising from the subclavian is is. The more distal branch is tweak like within indeterminate terminus while the proximal branch supplies the majority of flow which becomes diminutive and occluded at the V4 level. On the left, dissection, arterial injury, or significant stenosis. SOFT TISSUES: The lung apices are clear. No cervical or superior mediastinal lymphadenopathy. The larynx and pharynx are unremarkable. No acute abnormality of the salivary and thyroid glands. BONES: No acute osseous abnormality. CTA HEAD: ANTERIOR CIRCULATION: On the right, there is approximately 70% segmental stenosis of the cavernous and paraclinoid segment of internal carotid artery is. MCA and CANELO are patent. On the left, there is 50% stenosis of the cavernous and paraclinoid segment of ICA, MCA and CANELO are patent. POSTERIOR CIRCULATION: Right V4 segment is occluded. Left beast 4 segment is is 75% narrowed multifocal moderate stenosis of the basilar artery. Right and left P segment segmental is moderately severe stenoses. .  Right PICA appears patent. Left PICA severely narrowed distally. OTHER: No dural venous sinus thrombosis on this non-dedicated study. BRAIN: No mass effect or midline shift. No extra-axial fluid collection. The gray-white differentiation is maintained. Both cervical ICAs are patent. Variant anatomy of right vertebral arteries, both diminutive. Right V4 segment of vertebral artery occlusion. Left V4 segment is severely stenotic. Basilar artery has multifocal moderate stenoses. Segmental moderately severe stenoses of both P2 segments. Left PICA is severely stenotic distally. Right intracranial ICA cavernous and paraclinoid segments 70% stenosis. Left intracranial ICA cavernous segment 50% stenosis. Both MCAs and ACAs are patent. ED COURSE/MDM  Patient seen and evaluated. Old records reviewed.  Labs and imaging reviewed and results discussed with patient. After initial evaluation, differential diagnostic considerations included: benign positional vertigo, labyrinthitis/otitis, sepsis, dehydration/orthostasis, vasovagal reaction, stroke, TIA, intracranial bleed, migraine, anxiety, ACS/dysrhythmia, medication side effects, head trauma    The patient's ED workup was notable for initial concern for posterior circulation type dizziness based on his significant hypertension and vertiginous type dizziness. NIH score is 0. Symptoms were between 12 and 24 hours, with a headache starting at 9 AM yesterday and progressing into the more vertiginous type dizziness closer to 1 PM with presentation to our ER after 4 AM this morning. His head CT demonstrated a cerebellar infarct of age-indeterminates but based on it is a CT finding, would suggest that this is at least 12 or more hours old and therefore not amenable to TPA despite his symptoms. Patient was ordered aspirin at this point as well as a Cardene drip for blood pressure management and antiemetics. At this time the patient is not able to tolerate meclizine. No obvious metabolic derangement in the rest of his work-up and his CBC is normal.  EKG is a sinus rhythm with LVH but his troponin is negative. Dr. Pippa Resendiz from stroke team at [facility] was consulted about the patient's ED history, physical, workup, and course so far. Recommendations from this consultant included due to timeline, agreed that not a tPA candidate, BP goal for now [id number] systolic or so. Not amenable to intervention based on review of HCT and symptoms and timeline. Vertebral artery occlusion at V4 on the right as noted on CTA which otherwise shows significant disease but no acute findings in the posterior circulation. Catina will be consulted for any ongoing potential management of this finding.     Dr. Shawna Salazar from [facility] was consulted about the patient's ED history, physical, workup, and course so far. Recommendations from this consultant included transfer [facility]. During the patient's ED course, the patient was given:  Medications   ondansetron (ZOFRAN) injection 4 mg (4 mg Intravenous Given 7/21/20 0527)   meclizine (ANTIVERT) tablet 25 mg (0 mg Oral Held 7/21/20 9744)   aspirin chewable tablet 324 mg (has no administration in time range)   niCARdipine (CARDENE) 25 mg in dextrose 5 % 250 mL infusion (5 mg/hr Intravenous New Bag 7/21/20 0708)   lactated ringers infusion 1,000 mL (0 mLs Intravenous Stopped 7/21/20 0625)   iopamidol (ISOVUE-370) 76 % injection 75 mL (75 mLs Intravenous Given 7/21/20 0548)   diphenhydrAMINE (BENADRYL) injection 25 mg (25 mg Intravenous Given 7/21/20 0707)   metoclopramide (REGLAN) injection 10 mg (10 mg Intravenous Given 7/21/20 0708)        CLINICAL IMPRESSION  1. Dizziness    2. Cerebellar stroke ([facility] [address]    3. Acute nonintractable headache, unspecified headache type    4. Malignant hypertension        Blood pressure (!) 203/101, pulse 60, temperature 97.7 °F (36.5 °C), temperature source Oral, resp. rate 14, height 6' 2\" (1.88 m), weight 215 lb (97.5 kg), SpO2 97 %. DISPOSITION  Timmy Guillaume was admitted in fair condition. Transfer to [facility]    The total critical care time spent while evaluating and treating this patient was at least 50 minutes. This excludes time spent doing separately billable procedures. This includes time at the bedside, data interpretation, medication management, obtaining critical history from collateral sources if the patient is unable to provide it directly, and physician consultation. Specifics of interventions taken and potentially life-threatening diagnostic considerations are listed above in the medical decision making. DISCLAIMER: This chart was created using Dragon dictation software.   Efforts were made by me to ensure accuracy, however some errors may be present due to limitations of this technology and occasionally words are

## 2020-07-21 NOTE — CONSULTS
NEUROSURGERY CONSULT NOTE    Tami Venegasis  [id number]   1975   2020    Requesting physician: Carolina Cool MD    Reason for consultation: Craniotomy watch for cerebellar stroke    History of present illness: Patient is a 39 y.o. male w/ PMH of HTN and HLD who presented on 2020 to [facility] ED c/o dizziness and vertigo. Patient is in [place] precautions and so information provided by previous provider and nursing notes. Patient states his headache started yesterday around 9 AM, and feeling off balance started around 1 PM. Patient has had some nausea with dry heaves since then and poor appetite today. No chest pain or shortness of breath. He is notably hypertensive in triage. No history of stroke or heart attack though. He denies any focalizing numbness tingling or weakness in the arms or legs. He has had no confusion or trouble with speech or swallowing. CT Head showed acute right cerebellar stroke and CTA Head/Neck shows moderate to severe stenosis of both ICA's and both VA's (right V4 segment of vertebral artery occlusion). Patient was transferred to [facility] ICU for further neurosurgical follow up. ROS:   MILADIS 2/2 COVID precautions    No Known Allergies    Past Medical History:   Diagnosis Date    Hyperlipidemia     Hypertension         No past surgical history on file.     Social History     Occupational History    Not on file   Tobacco Use    Smoking status: Former Smoker     Packs/day: 1.00     Types: Cigarettes     Last attempt to quit: 3/5/2018     Years since quittin.3    Smokeless tobacco: Never Used   Substance and Sexual Activity    Alcohol use: Yes     Comment: occacional    Drug use: No    Sexual activity: Not on file        Family History   Problem Relation Age of Onset    Diabetes Father     Diabetes Mother     Diabetes Maternal Grandmother     Diabetes Maternal Grandfather     Diabetes Paternal Grandmother     Diabetes Paternal Grandfather         No outpatient medications have been marked as taking for the 7/21/20 encounter [facility] Encounter). No current facility-administered medications for this encounter. Objective: There were no vitals taken for this visit. Physical Exam:   Due to limiting exposure to COVID-19, initial encounter was completed by using EMR and from conversations with medical team involved in case. Patient was not interviewed or examined, at this time. I have personally reviewed the reports and images of labs, radiological studies, current and old medical records    Per bedside RN: Patient c/o dizziness and vertigo, otherwise neurologically intact. Patient is A&O x4. No weakness, numbness, tingling, and/or pain along spine or in extremities. Radiological Findings:  Ct Head Wo Contrast  Result Date: 7/21/2020  1. No acute intracranial hemorrhage or mass. 2. Subtle apparent infarct within the lateral aspect of the right cerebellar hemisphere, not definitely seen on the prior study of 05/01/2019, possibly acute or chronic. Suggest clinical correlation, and if concerning, consider further characterization with a follow-up brain MRI. 3. Stable chronic lacunar infarcts within the right basal ganglia, right internal capsule, extending into the right caudate nucleus. Cta Head Neck W Contrast  Result Date: 7/21/2020  Both cervical ICAs are patent. Variant anatomy of right vertebral arteries, both diminutive. Right V4 segment of vertebral artery occlusion. Left V4 segment is severely stenotic. Basilar artery has multifocal moderate stenoses. Segmental moderately severe stenoses of both P2 segments. Left PICA is severely stenotic distally. Right intracranial ICA cavernous and paraclinoid segments 70% stenosis. Left intracranial ICA cavernous segment 50% stenosis. Both MCAs and ACAs are patent.        Labs:  Recent Labs     07/21/20  0500   WBC 7.2   HGB 12.9*   HCT 38.2*          Recent Labs     07/21/20  0500      K 4.0    CO2 23   BUN 13   CREATININE 1.3   GLUCOSE 166*   CALCIUM 9.2       Recent Labs     07/21/20  0500   PROTIME 11.8   INR 1.02       Patient Active Problem List    Diagnosis Date Noted    Acute CVA (cerebrovascular accident) ([address] 07/21/2020    Hypertensive emergency 07/21/2020    Hypertension 04/02/2019    Hypertensive urgency 03/08/2019       Assessment:  Patient is a 39 y.o. male w/dizziness and vertigo 2/2 right cerebellar hemisphere infarct with CTA Head/Neck showing moderate to severe stenosis of both ICA's and both VA's (right V4 segment of vertebral artery occlusion). Plan:  1. No emergent neurosurgical intervention indicated  2. Neurologic exams frequency:  - ICU: Q1H until otherwise directed  3. For change in exam MUST contact neurosurgery team along with critical care or primary team  4. Cerebellar Stroke:  - MRI Brain to evaluate stroke burden  - Neurology consulted for stroke w/u  - Neurovascular surgeon reviewed CTA Head/Neck and stated no neurovascular intervention indicated  5. Pain control: Managed by medical team  6. Thank you for consult. Will follow inpatient. Please call with any questions or decline in neurological status    DISPO: Remain inpatient from neurosurgery standpoint. Dispo timing to be determined by primary team once patient is medically stable for discharge. Patient was discussed with Dr. Ree Geller who agrees with above assessment and plan.      Electronically signed by: MARY Pinzon, 7/21/2020 11:30 AM  [phone]

## 2020-07-22 LAB
A/G RATIO: 1.4 (ref 1.1–2.2)
ALBUMIN SERPL-MCNC: 4.5 G/DL (ref 3.4–5)
ALP BLD-CCNC: 68 U/L (ref 40–129)
ALT SERPL-CCNC: 9 U/L (ref 10–40)
ANION GAP SERPL CALCULATED.3IONS-SCNC: 11 MMOL/L (ref 3–16)
AST SERPL-CCNC: 12 U/L (ref 15–37)
BASOPHILS ABSOLUTE: 0.1 K/UL (ref 0–0.2)
BASOPHILS RELATIVE PERCENT: 0.7 %
BILIRUB SERPL-MCNC: 0.5 MG/DL (ref 0–1)
BUN BLDV-MCNC: 17 MG/DL (ref 7–20)
CALCIUM SERPL-MCNC: 9.7 MG/DL (ref 8.3–10.6)
CHLORIDE BLD-SCNC: 100 MMOL/L (ref 99–110)
CO2: 26 MMOL/L (ref 21–32)
CREAT SERPL-MCNC: 1.3 MG/DL (ref 0.9–1.3)
EOSINOPHILS ABSOLUTE: 0 K/UL (ref 0–0.6)
EOSINOPHILS RELATIVE PERCENT: 0.2 %
ESTIMATED AVERAGE GLUCOSE: 108.3 MG/DL
GFR AFRICAN AMERICAN: >60
GFR NON-AFRICAN AMERICAN: 60
GLOBULIN: 3.3 G/DL
GLUCOSE BLD-MCNC: 163 MG/DL (ref 70–99)
HBA1C MFR BLD: 5.4 %
HCT VFR BLD CALC: 38.7 % (ref 40.5–52.5)
HEMOGLOBIN: 13.2 G/DL (ref 13.5–17.5)
LYMPHOCYTES ABSOLUTE: 2.2 K/UL (ref 1–5.1)
LYMPHOCYTES RELATIVE PERCENT: 23.4 %
MCH RBC QN AUTO: 29.6 PG (ref 26–34)
MCHC RBC AUTO-ENTMCNC: 34 G/DL (ref 31–36)
MCV RBC AUTO: 86.9 FL (ref 80–100)
MONOCYTES ABSOLUTE: 0.6 K/UL (ref 0–1.3)
MONOCYTES RELATIVE PERCENT: 6.9 %
NEUTROPHILS ABSOLUTE: 6.4 K/UL (ref 1.7–7.7)
NEUTROPHILS RELATIVE PERCENT: 68.8 %
PDW BLD-RTO: 13.5 % (ref 12.4–15.4)
PLATELET # BLD: 276 K/UL (ref 135–450)
PMV BLD AUTO: 10.4 FL (ref 5–10.5)
POTASSIUM REFLEX MAGNESIUM: 4.1 MMOL/L (ref 3.5–5.1)
RBC # BLD: 4.45 M/UL (ref 4.2–5.9)
SODIUM BLD-SCNC: 137 MMOL/L (ref 136–145)
TOTAL PROTEIN: 7.8 G/DL (ref 6.4–8.2)
WBC # BLD: 9.2 K/UL (ref 4–11)

## 2020-07-22 PROCEDURE — 6370000000 HC RX 637 (ALT 250 FOR IP): Performed by: NEUROLOGICAL SURGERY

## 2020-07-22 PROCEDURE — 2500000003 HC RX 250 WO HCPCS: Performed by: STUDENT IN AN ORGANIZED HEALTH CARE EDUCATION/TRAINING PROGRAM

## 2020-07-22 PROCEDURE — 80053 COMPREHEN METABOLIC PANEL: CPT

## 2020-07-22 PROCEDURE — 2000000000 HC ICU R&B

## 2020-07-22 PROCEDURE — 2580000003 HC RX 258: Performed by: STUDENT IN AN ORGANIZED HEALTH CARE EDUCATION/TRAINING PROGRAM

## 2020-07-22 PROCEDURE — 6370000000 HC RX 637 (ALT 250 FOR IP): Performed by: STUDENT IN AN ORGANIZED HEALTH CARE EDUCATION/TRAINING PROGRAM

## 2020-07-22 PROCEDURE — 6360000002 HC RX W HCPCS: Performed by: NURSE PRACTITIONER

## 2020-07-22 PROCEDURE — 36415 COLL VENOUS BLD VENIPUNCTURE: CPT

## 2020-07-22 PROCEDURE — 6360000002 HC RX W HCPCS: Performed by: STUDENT IN AN ORGANIZED HEALTH CARE EDUCATION/TRAINING PROGRAM

## 2020-07-22 PROCEDURE — 85025 COMPLETE CBC W/AUTO DIFF WBC: CPT

## 2020-07-22 PROCEDURE — 99232 SBSQ HOSP IP/OBS MODERATE 35: CPT | Performed by: INTERNAL MEDICINE

## 2020-07-22 RX ORDER — LABETALOL 20 MG/4 ML (5 MG/ML) INTRAVENOUS SYRINGE
10 EVERY 4 HOURS PRN
Status: DISCONTINUED | OUTPATIENT
Start: 2020-07-22 | End: 2020-07-29 | Stop reason: HOSPADM

## 2020-07-22 RX ORDER — CLOPIDOGREL BISULFATE 75 MG/1
75 TABLET ORAL DAILY
Status: DISCONTINUED | OUTPATIENT
Start: 2020-07-22 | End: 2020-07-29 | Stop reason: HOSPADM

## 2020-07-22 RX ORDER — HYDRALAZINE HYDROCHLORIDE 20 MG/ML
10 INJECTION INTRAMUSCULAR; INTRAVENOUS EVERY 6 HOURS PRN
Status: DISCONTINUED | OUTPATIENT
Start: 2020-07-22 | End: 2020-07-29 | Stop reason: HOSPADM

## 2020-07-22 RX ORDER — PRAVASTATIN SODIUM 40 MG
40 TABLET ORAL NIGHTLY
Status: DISCONTINUED | OUTPATIENT
Start: 2020-07-22 | End: 2020-07-22

## 2020-07-22 RX ORDER — ATORVASTATIN CALCIUM 80 MG/1
80 TABLET, FILM COATED ORAL NIGHTLY
Status: DISCONTINUED | OUTPATIENT
Start: 2020-07-22 | End: 2020-07-29 | Stop reason: HOSPADM

## 2020-07-22 RX ORDER — LISINOPRIL 20 MG/1
40 TABLET ORAL DAILY
Status: DISCONTINUED | OUTPATIENT
Start: 2020-07-23 | End: 2020-07-25

## 2020-07-22 RX ORDER — HEPARIN SODIUM 5000 [USP'U]/ML
5000 INJECTION, SOLUTION INTRAVENOUS; SUBCUTANEOUS EVERY 8 HOURS
Status: DISCONTINUED | OUTPATIENT
Start: 2020-07-22 | End: 2020-07-29 | Stop reason: HOSPADM

## 2020-07-22 RX ORDER — LABETALOL 20 MG/4 ML (5 MG/ML) INTRAVENOUS SYRINGE
10 EVERY 4 HOURS PRN
Status: DISCONTINUED | OUTPATIENT
Start: 2020-07-22 | End: 2020-07-22

## 2020-07-22 RX ADMIN — CLOPIDOGREL BISULFATE 75 MG: 75 TABLET ORAL at 13:32

## 2020-07-22 RX ADMIN — HYDRALAZINE HYDROCHLORIDE 10 MG: 20 INJECTION INTRAMUSCULAR; INTRAVENOUS at 12:25

## 2020-07-22 RX ADMIN — PANTOPRAZOLE SODIUM 40 MG: 40 TABLET, DELAYED RELEASE ORAL at 07:38

## 2020-07-22 RX ADMIN — ONDANSETRON 4 MG: 2 INJECTION INTRAMUSCULAR; INTRAVENOUS at 21:28

## 2020-07-22 RX ADMIN — HEPARIN SODIUM 5000 UNITS: 5000 INJECTION INTRAVENOUS; SUBCUTANEOUS at 12:21

## 2020-07-22 RX ADMIN — ATORVASTATIN CALCIUM 80 MG: 80 TABLET, FILM COATED ORAL at 20:06

## 2020-07-22 RX ADMIN — Medication 10 ML: at 09:00

## 2020-07-22 RX ADMIN — Medication 10 ML: at 11:20

## 2020-07-22 RX ADMIN — LABETALOL 20 MG/4 ML (5 MG/ML) INTRAVENOUS SYRINGE 10 MG: at 11:20

## 2020-07-22 RX ADMIN — HEPARIN SODIUM 5000 UNITS: 5000 INJECTION INTRAVENOUS; SUBCUTANEOUS at 20:06

## 2020-07-22 RX ADMIN — ASPIRIN 81 MG: 81 TABLET, CHEWABLE ORAL at 09:22

## 2020-07-22 RX ADMIN — NIFEDIPINE 60 MG: 30 TABLET, EXTENDED RELEASE ORAL at 09:21

## 2020-07-22 RX ADMIN — CARVEDILOL 25 MG: 25 TABLET, FILM COATED ORAL at 18:15

## 2020-07-22 RX ADMIN — Medication 10 ML: at 20:06

## 2020-07-22 RX ADMIN — CARVEDILOL 25 MG: 25 TABLET, FILM COATED ORAL at 09:22

## 2020-07-22 RX ADMIN — LISINOPRIL 20 MG: 20 TABLET ORAL at 09:21

## 2020-07-22 ASSESSMENT — ENCOUNTER SYMPTOMS
CHOKING: 0
VOMITING: 0
DIARRHEA: 0
NAUSEA: 0
SHORTNESS OF BREATH: 0

## 2020-07-22 ASSESSMENT — PAIN SCALES - GENERAL
PAINLEVEL_OUTOF10: 0

## 2020-07-22 NOTE — PROGRESS NOTES
135/79 -- -- 91 17 --   07/22/20 0515 (!) 148/76 -- -- 79 17 --   07/22/20 0500 (!) 148/82 -- -- 88 17 --   07/22/20 0445 136/79 -- -- 74 17 --   07/22/20 0430 (!) 158/97 -- -- 74 15 --   07/22/20 0415 (!) 145/87 -- -- 78 15 --   07/22/20 0400 138/81 98.8 °F (37.1 °C) Oral 81 17 98 %   07/22/20 0345 138/79 -- -- 81 17 --   07/22/20 0330 126/81 -- -- 81 18 --   07/22/20 0300 (!) 131/98 -- -- 91 20 94 %   07/22/20 0245 112/73 -- -- 91 15 93 %   07/22/20 0230 115/74 -- -- 88 (!) 0 --   07/22/20 0215 120/74 -- -- 93 17 --   07/22/20 0200 108/70 -- -- 88 (!) 0 --   07/22/20 0145 108/64 -- -- 89 10 --   07/22/20 0130 107/68 -- -- 87 12 --   07/22/20 0115 106/70 -- -- 90 16 --   07/22/20 0100 (!) 118/54 -- -- 91 14 93 %   07/22/20 0045 (!) 103/39 -- -- 96 12 94 %   07/22/20 0030 (!) 116/57 -- -- 88 13 94 %   07/22/20 0015 126/67 -- -- 86 (!) 0 96 %   07/22/20 0000 114/68 98.8 °F (37.1 °C) Oral 87 (!) 0 97 %   07/21/20 2345 126/72 -- -- 87 10 97 %       Intake/Output Summary (Last 24 hours) at 7/22/2020 0741  Last data filed at 7/22/2020 0045  Gross per 24 hour   Intake 400 ml   Output 1875 ml   Net -1475 ml       Review of Systems   Constitutional: Negative for fever. Eyes: Positive for visual disturbance. Trouble focusing on text   Respiratory: Negative for choking and shortness of breath. Cardiovascular: Negative for chest pain. Gastrointestinal: Negative for diarrhea, nausea and vomiting. Neurological: Negative for dizziness, light-headedness, numbness and headaches. Physical Exam  Vitals signs reviewed. HENT:      Head: Normocephalic. Cardiovascular:      Rate and Rhythm: Normal rate and regular rhythm. Pulses: Normal pulses. Heart sounds: No murmur. No gallop. Pulmonary:      Effort: Pulmonary effort is normal.      Breath sounds: No wheezing. Abdominal:      General: Bowel sounds are normal.   Neurological:      General: No focal deficit present.       Mental Status: He is oriented to person, place, and time. Motor: No weakness. LABS:    CBC:   Recent Labs     07/21/20  0500 07/22/20  0438   WBC 7.2 9.2   HGB 12.9* 13.2*   HCT 38.2* 38.7*    276   MCV 86.6 86.9     Renal:    Recent Labs     07/21/20  0500 07/22/20  0438    137   K 4.0 4.1    100   CO2 23 26   BUN 13 17   CREATININE 1.3 1.3   GLUCOSE 166* 163*   CALCIUM 9.2 9.7   ANIONGAP 12 11     Hepatic:   Recent Labs     07/21/20  0500 07/22/20  0438   AST 13* 12*   ALT 10 9*   BILITOT 0.7 0.5   PROT 7.3 7.8   LABALBU 4.3 4.5   ALKPHOS 68 68     Troponin:   Recent Labs     07/21/20  0500   TROPONINI <0.01     BNP: No results for input(s): BNP in the last 72 hours. Lipids:   Recent Labs     07/21/20  1717   CHOL 213*   HDL 52     ABGs:  No results for input(s): PHART, QSG7RIS, PO2ART, EKH0JJW, BEART, THGBART, Z1OSAKUL, PTC3KIC in the last 72 hours. INR:   Recent Labs     07/21/20  0500   INR 1.02     Lactate: No results for input(s): LACTATE in the last 72 hours. Cultures:  -----------------------------------------------------------------  RAD:   MRI BRAIN W WO CONTRAST   Final Result      1. Small right and moderate left cerebellar infarcts. No acute hemorrhage. 2.  Mild chronic small vessel ischemic disease. Chronic right basal ganglia lacunar infarct. Assessment/Plan:   Lyla Teixeira is a 39 y.o. male, who presented with headache and dizziness.  In the ED was found to be hypertensive  and CTA revealed severe stenosis of both ICAs, VAs. CT showed a right cerebellar infarct.     Dizziness/vertigo 2/2 right cerebellar hemisphere infarct  CT head showed Subtle apparent infarct within the lateral aspect of the right cerebellar hemisphere, not definitely seen on the prior study of 05/01/2019, possibly acute or chronic.  -neurosurgery following  -neurology consulted  -q1h neuro checks  -MRI brain showed small right and moderate left cerebellar infarcts without acute hemorrhage. Mild chronic small vessel ischemic disease. Chronic right basal ganglia lacunar infarct. -ECHO w bubble study ordered (March 2019 ECHO EF=45)  -continue ASA 81     Severe stenosis of ICA, VA bilaterally  CTA head and neck showed Right V4 segment of vertebral artery occlusion, Left V4 segment is severely stenotic. Right intracranial ICA cavernous and paraclinoid segments 70% stenosis. Left intracranial ICA cavernous segment 50% stenosis. -neurosurgery following     Hypertensive emergency  In ED SBP 200s, on admission [facility] 150/107  -Currently normotensive  -Cardene gtt stopped   -restarted home antihypertensives (carvedilol 25, increased lisinopril 40, nifedipine 60)  -Labetolol 10 prn     Hyperlipidemia  Lipid panel- cholesterol 213, , HDL 52,   -Start Pravastatin 40    Code Status: full  FEN: diet general  PPX: none  DISPO: icu    Dominguez Spicer DO, PGY-1  07/22/20  7:41 AM    This patient has been staffed and discussed with Adarsh Waldron MD.     [facility]     Patient seen and examined. I agree with Dr. Tito Harmon history, physical, lab findings, assessment and plan.     Keep in ICU with frequent neuro checks given bilateral cerebellar infarcts  ASA/Plavix  Lipitor  Goal SBP < 220  Neuro following     Carlos Sabillon MD

## 2020-07-22 NOTE — PROGRESS NOTES
Pt continues to complain of dizziness while lying in bed. Neuro exam unchanged. Reminded not to exit bed without assistance. Bed alarm on. Pt has call light.  Will continue to monitor

## 2020-07-22 NOTE — CARE COORDINATION
refill/ meds to beds program?:      Meds To Beds General Rules:  1. Can ONLY be done Monday- Friday between 8:30am-5pm  2. Prescription(s) must be in pharmacy by 3pm to be filled same day  3. Copy of patient's insurance/ prescription drug card and patient face sheet must be sent along with the prescription(s)  4. Cost of Rx cannot be added to hospital bill. If financial assistance is needed, please contact unit  or ;  or  CANNOT provide pharmacy voucher for patients co-pays  5.  Patients can then  the prescription on their way out of the hospital at discharge, or pharmacy can deliver to the bedside if staff is available. (payment due at time of pick-up or delivery - cash, check, or card accepted)     Able to afford home medications/ co-pay costs: Yes    ADLS  Support Systems:      PT AM-PAC:   /24  OT AM-PAC:   /24    [place]: home with son  Steps:     Plans to RETURN to current housing: Yes  Barriers to RETURNING to current housing: medical complications    Home Care Information  Currently ACTIVE with [address]: No  Home Care Agency: Not Applicable    Currently ACTIVE with [place] on Aging: No      Durable Medical Equipment  DME Provider:   Equipment: none noted    Home Oxygen and [address] prior to admission: No  [address]: Not Applicable    Dialysis  Active with HD/PD prior to admission: No  Nephrologist:     HD Center:  Not Applicable    DISCHARGE PLAN:  Disposition: Home- No Services Needed    Transportation PLAN for discharge: family     Factors facilitating achievement of predicted outcomes: Family support, Motivated, Cooperative and Pleasant    Barriers to discharge: Medical complications    Additional Case Management Notes: see above    The Plan for Transition of Care is related to the following treatment goals of Hypertensive emergency [I16.1]    The Patient and/or patient representative Jesus Walls and his family were provided with a choice of provider and agrees with the discharge plan Not Indicated    Freedom of choice list was provided with basic dialogue that supports the patient's individualized plan of care/goals and shares the quality data associated with the providers.  Not Indicated    Care Transition patient: No    Governor JAYA Castellano, DAYANA-S  [facility]  Case Management Department  Ph: [phone]

## 2020-07-22 NOTE — PROGRESS NOTES
NEUROSURGERY PROGRESS NOTE    7/22/2020 9:32 AM                               Deepti Romero                      LOS: 1 day     Physical Exam:    Vitals:    07/22/20 0736   BP: (!) 145/87   Pulse: 96   Resp: 22   Temp: 98.3 °F (36.8 °C)   SpO2: 95%     Due to limiting exposure to COVID-19, initial encounter was completed by using EMR and from conversations with medical team involved in case. Patient was not interviewed or examined, at this time. I have personally reviewed the reports and images of labs, radiological studies, current and old medical records     Per bedside RN: Patient c/o dizziness and vertigo, otherwise neurologically intact. Patient is A&O x4. No weakness, numbness, tingling, and/or pain along spine or in extremities.     Radiological Findings:  Ct Head Wo Contrast  Result Date: 7/21/2020  1. No acute intracranial hemorrhage or mass. 2. Subtle apparent infarct within the lateral aspect of the right cerebellar hemisphere, not definitely seen on the prior study of 05/01/2019, possibly acute or chronic. Suggest clinical correlation, and if concerning, consider further characterization with a follow-up brain MRI. 3. Stable chronic lacunar infarcts within the right basal ganglia, right internal capsule, extending into the right caudate nucleus.      Cta Head Neck W Contrast  Result Date: 7/21/2020  Both cervical ICAs are patent. Variant anatomy of right vertebral arteries, both diminutive. Right V4 segment of vertebral artery occlusion. Left V4 segment is severely stenotic. Basilar artery has multifocal moderate stenoses. Segmental moderately severe stenoses of both P2 segments. Left PICA is severely stenotic distally. Right intracranial ICA cavernous and paraclinoid segments 70% stenosis. Left intracranial ICA cavernous segment 50% stenosis. Both MCAs and ACAs are patent. Mri Brain W Wo Contrast  Result Date: 7/21/2020  1. Small right and moderate left cerebellar infarcts.  No acute hemorrhage. 2.  Mild chronic small vessel ischemic disease. Chronic right basal ganglia lacunar infarct. Labs:  Recent Labs     07/22/20  0438   WBC 9.2   HGB 13.2*   HCT 38.7*          Recent Labs     07/22/20  0438      K 4.1      CO2 26   BUN 17   CREATININE 1.3   GLUCOSE 163*   CALCIUM 9.7       Recent Labs     07/21/20  0500   PROTIME 11.8   INR 1.02       Patient Active Problem List    Diagnosis Date Noted    Acute CVA (cerebrovascular accident) ([facility] [address] 07/21/2020    Hypertensive emergency 07/21/2020    Hypertension 04/02/2019    Hypertensive urgency 03/08/2019     Assessment:  Patient is a 39 y.o. male w/dizziness and vertigo 2/2 right cerebellar hemisphere infarct with CTA Head/Neck showing moderate to severe stenosis of both ICA's and both VA's (right V4 segment of vertebral artery occlusion).      Plan:  1. Neurologic exams frequency: Q1H  2. For change in exam MUST contact neurosurgery team along with critical care or primary team  3. Cerebellar Stroke:  - Neurovascular surgeon reviewed CTA Head/Neck and stated no neurovascular intervention indicated  - MRI Brain shows small right and moderate left cerebellar infarcts. No acute hemorrhage  - Neurology consulted for secondary stroke prevention and management of antiplatelets, appreciate recs  - Patient needs to be extensively counseled about the importance of managing his risk factors. He has near critical stenosis within his distal V4 segment and his posterior circulation is isolated (no evidence of a posterior communicating artery bilaterally), and so he is at significantly increased risk of a devastating posterior circulation infarct  4. Pain control: Managed by medical team  5.  Will follow peripherally while inpatient.  Please call with any questions or decline in neurological status     DISPO: Remain in ICU until tomorrow morning and if stable patient can go to [facility] neuro checks and transfer to the floor from neurosurgery standpoint. Dispo timing to be determined by primary team once patient is medically stable for discharge.     Patient was discussed with Dr. Melvi Wtason who agrees with above assessment and plan.      Electronically signed by: MARY Carrizales, 7/22/2020 9:32 AM  [phone]

## 2020-07-22 NOTE — PROGRESS NOTES
Pt's SBP sustaining 160-170's. ICU resident Dr Ankit Sharp notified of above. Verbal order with readback to administer 10mg Labetalol IV. Awaiting medication from pharmacy.

## 2020-07-22 NOTE — PLAN OF CARE
- MRI personally reviewed and independently interpreted. Patchy bilateral cerebellar infarcts, left greater than right. No significant mass effect from the infarcts. Redemonstration of significant bilateral microvascular/lacunar disease including a R basal ganglia infarct. - After reviewing imaging, it is felt that the patient is unlikely to develop malignant edema related to his infarcts and so recommend starting Plavix (ordered). Would avoid a loading dose of Plavix in the off chance he does require a decompressive surgery  - Continue ASA  - Neurology for management of DAPT moving forward. Likely will need 90 days of DAPT for symptomatic ICAD followed by lifelong antiplatelet monotherapy  - Significantly elevated LDL.  Change Pravastatin to Atorvastatin 80 mg (ordered)  - Spoke with Neurology MALGORZATA Saurav Shaffer yesterday and reiterated the importance of communicating his increased risk of a devastating posterior circulation infarct to Paula Ian Fan and encouraging to aggressively work to control his atherosclerotic risk factors with goals of normotension (<140/90 or <130/80 in diabetics), LDL <70, and HgA1c <7.0  - Neurosurgery will follow peripherally

## 2020-07-22 NOTE — PROGRESS NOTES
Neurology Consult Note    Updates:  Neurologic exam stable - still having dizziness / ataxia  Per nursing staff tries to get up w/o assistance    Exam:  Blood pressure (!) 175/112, pulse 84, temperature 98.3 °F (36.8 °C), temperature source Oral, resp. rate 15, SpO2 96 %. Patient was admitted during Covid-19 pandemic and currently in droplet isolation. All efforts made to respect recommendations of social distancing and to decrease PPE usage have been made. Unable to perform face to face examination of the patient. Exam taken from nursing staff. Patient observed from doorway and exam taken from nursing staff:  Patient awake, alert, oriented x 4, speech clear  Full strength throughout all extremities - R sided ataxia   C/o dizziness when out of bed    Labs:  Reviewed   Studies:  MRI of brain w/o contrast 7/21/2020  Impression         1.  Small right and moderate left cerebellar infarcts. No acute hemorrhage. 2.  Mild chronic small vessel ischemic disease. Chronic right basal ganglia lacunar infarct.           Impression:  Chema Leon is a 39 y.o. male who presented with headaches, dizziness, n/v; found to have acute multifocal cerebellar ischemic strokes, largest on the L    Recommendations:  -Echocardiogram with bubble  -Nursing bedside swallow before PO   -ASA 81mg PO daily / Plavix - DAPT x 90 days  -Lipitor 80mg PO QHS  -SCDs for DVT prophylaxis & SQH  -PT/OT: eval and treat, PMR consult if rehab appropriate   -ST: eval and treat   -Allow BP to autoregulate for first 24 hours after stroke and treat BP >220/110 with labetalol   -Check lipid panel and hemoglobin A1C - if not already completed  -Telemetry   -ICU today w/ Q1H Neurologic exams - if remains stable can transfer to floor in AM    Continue education w/ patient reiterated risk of a devastating posterior circulation infarct to Mr. Seun Meehan and encouraging to aggressively work to control his atherosclerotic risk factors with goals of normotension (<140/90 or <130/80 in diabetics), LDL <70, and HgA1c <7.0    tPA given: Not candidate d/t presenting outside window  DVT prophylaxis: Yes  Dysphagia Screening: Yes  PT/OT Rehab: Yes  Smoking Cessation Counseling: Yes  Stroke Education: Yes  Antithrombotic by end of day 2: Yes    Follow up w/ Dr. Keke Red in 1 month    Horace Wall, APRN - CNP  Neurology

## 2020-07-22 NOTE — PROGRESS NOTES
Pt continues to exit bed without calling for assistance. PT again re-education on need to call for assistance to exit bed. Pt states, \"I don't really want help using the bathroom. \" pt educated on MD orders for bedrest/ICU protocols re: exiting bed with assistance.

## 2020-07-22 NOTE — PROGRESS NOTES
BP sustaining >160. Last labetalol at 1120, last hydralazine 1225. ICU residents aware. Will continue to monitor. [id number]

## 2020-07-22 NOTE — PROGRESS NOTES
AM assessment completed. Pt in bed, eyes closed. Denies pain, nausea. Awakens easily. NIHSS 0. No neuro deficits noted. R Hand PIV NS locked and capped. T 98.3 HR 83, RR 13, /90, spo2 94% on RA. Pt has call light. Alarm on bed. Will continue to monitor.

## 2020-07-22 NOTE — FLOWSHEET NOTE
Speech Language Pathology - Attempt - No Treatment  Order received for \"SLP eval treat. \"  Chart reviewed; pt is on a general diet. Spoke with RN, who indicates pt needs cognitive assessment but they have no swallowing or communication concerns at this time. RN also indicates that pt is still in isolation for covid rule-out. In an effort to minimize exposure & conserve PPE, will hold off on face-to-face assessment until covid results are back. Please page ST department with any concerns.     Electronically Signed by:  Olivia Souza., Jenni Medina  Speech-Language Pathologist  [place]. [phone]  Pager #[phone]

## 2020-07-22 NOTE — PROGRESS NOTES
Pt c/o dizziness when standing. Pt re-educated on calling for assistance when transferring OOB. Pt agrees to plan, has call light. Alarm on bed for safety. Will continue to monitor.

## 2020-07-22 NOTE — PROGRESS NOTES
Shift assessment completed. Pt AXOX4. C/o slight nausea. No neuro deficits noted. Cardene gtt remains running. PO BP meds given per order. Plan to go to MRI tonight. Will monitor.

## 2020-07-22 NOTE — PROGRESS NOTES
Pt complains of \"unfocused\" vision and LUE/hand \"flailing, uncontrolled\". No neurological deficits noted on assessment. Will continue to monitor.

## 2020-07-22 NOTE — PLAN OF CARE
Problem: HEMODYNAMIC STATUS  Goal: Patient has stable vital signs and fluid balance  7/22/2020 1540 by Iam Ricks RN  Outcome: Ongoing  Note: PT's SBP has been 130-170's with dBP . PRN doses of labetalol and hydralazine administered at 1120 and 1225, respectively. Pt off Cardene gtt since 0000 on 7/22. Will continue to monitor. 7/22/2020 0540 by Kerwin Cadena RN  Outcome: Ongoing     Problem: ACTIVITY INTOLERANCE/IMPAIRED MOBILITY  Goal: Mobility/activity is maintained at optimum level for patient  7/22/2020 1540 by Iam Ricks RN  Outcome: Ongoing  Note: PT complains of dizziness/lightheadedness while sitting up in bed. Pt states he feels his L hand is weak and \"flails\". BUE  are strong, equal on exam. PT/OT order in place, awaiting COVID19 results. Will continue to monitor. 7/22/2020 0540 by Kerwin Cadena RN  Outcome: Ongoing     Problem: COMMUNICATION IMPAIRMENT  Goal: Ability to express needs and understand communication  7/22/2020 1540 by Iam Ricks RN  Outcome: Ongoing  7/22/2020 0540 by Kerwin Cadena RN  Outcome: Ongoing     Problem: Falls - Risk of:  Goal: Will remain free from falls  Description: Will remain free from falls  Outcome: Ongoing  Note: Pt is a fall risk. Fall risk protocol in place. See Lavada Hopping Fall Score. Pt bed is in low position, bed alarm is on, side rails up, fall risk bracelet applied. , non-skid footwear in use. Patient/family educated on fall risk protocol, instructed to call for assistance when needed and belongings are in reach. PT does exit bed without calling for assistance. Will continue with hourly rounds for po intake, pain needs, toileting and repositioning as needed. Will continue to monitor for needs. Goal: Absence of physical injury  Description: Absence of physical injury  Outcome: Ongoing     Problem: Pain:  Description: Pain management should include both nonpharmacologic and pharmacologic interventions.   Goal: Pain level will decrease  Description: Pain level will decrease  Outcome: Ongoing  Goal: Control of acute pain  Description: Control of acute pain  Outcome: Ongoing  Note: Pt denies pain, will continue to monitor. Goal: Control of chronic pain  Description: Control of chronic pain  Outcome: Ongoing     Problem: Skin Integrity:  Goal: Will show no infection signs and symptoms  Description: Will show no infection signs and symptoms  Outcome: Ongoing  Note: PT skin is warm, dry and overall intact. Pt repositions self frequently in bed. Will continue to monitor.    Goal: Absence of new skin breakdown  Description: Absence of new skin breakdown  Outcome: Ongoing

## 2020-07-23 LAB
A/G RATIO: 1.3 (ref 1.1–2.2)
ALBUMIN SERPL-MCNC: 4.4 G/DL (ref 3.4–5)
ALP BLD-CCNC: 64 U/L (ref 40–129)
ALT SERPL-CCNC: 11 U/L (ref 10–40)
ANION GAP SERPL CALCULATED.3IONS-SCNC: 14 MMOL/L (ref 3–16)
AST SERPL-CCNC: 14 U/L (ref 15–37)
BASOPHILS ABSOLUTE: 0.1 K/UL (ref 0–0.2)
BASOPHILS RELATIVE PERCENT: 1.3 %
BILIRUB SERPL-MCNC: 0.6 MG/DL (ref 0–1)
BUN BLDV-MCNC: 17 MG/DL (ref 7–20)
CALCIUM SERPL-MCNC: 9.8 MG/DL (ref 8.3–10.6)
CHLORIDE BLD-SCNC: 100 MMOL/L (ref 99–110)
CO2: 24 MMOL/L (ref 21–32)
CREAT SERPL-MCNC: 1.2 MG/DL (ref 0.9–1.3)
EOSINOPHILS ABSOLUTE: 0 K/UL (ref 0–0.6)
EOSINOPHILS RELATIVE PERCENT: 0.6 %
GFR AFRICAN AMERICAN: >60
GFR NON-AFRICAN AMERICAN: >60
GLOBULIN: 3.3 G/DL
GLUCOSE BLD-MCNC: 121 MG/DL (ref 70–99)
HCT VFR BLD CALC: 40.1 % (ref 40.5–52.5)
HEMOGLOBIN: 13.5 G/DL (ref 13.5–17.5)
LV EF: 55 %
LVEF MODALITY: NORMAL
LYMPHOCYTES ABSOLUTE: 2.5 K/UL (ref 1–5.1)
LYMPHOCYTES RELATIVE PERCENT: 31.9 %
MCH RBC QN AUTO: 29.2 PG (ref 26–34)
MCHC RBC AUTO-ENTMCNC: 33.6 G/DL (ref 31–36)
MCV RBC AUTO: 86.8 FL (ref 80–100)
MONOCYTES ABSOLUTE: 0.6 K/UL (ref 0–1.3)
MONOCYTES RELATIVE PERCENT: 7.8 %
NEUTROPHILS ABSOLUTE: 4.6 K/UL (ref 1.7–7.7)
NEUTROPHILS RELATIVE PERCENT: 58.4 %
PDW BLD-RTO: 13.4 % (ref 12.4–15.4)
PLATELET # BLD: 306 K/UL (ref 135–450)
PMV BLD AUTO: 9.6 FL (ref 5–10.5)
POTASSIUM REFLEX MAGNESIUM: 3.7 MMOL/L (ref 3.5–5.1)
RBC # BLD: 4.62 M/UL (ref 4.2–5.9)
REPORT: NORMAL
SARS-COV-2: NOT DETECTED
SODIUM BLD-SCNC: 138 MMOL/L (ref 136–145)
THIS TEST SENT TO: NORMAL
TOTAL PROTEIN: 7.7 G/DL (ref 6.4–8.2)
WBC # BLD: 7.9 K/UL (ref 4–11)

## 2020-07-23 PROCEDURE — 6370000000 HC RX 637 (ALT 250 FOR IP): Performed by: STUDENT IN AN ORGANIZED HEALTH CARE EDUCATION/TRAINING PROGRAM

## 2020-07-23 PROCEDURE — 97530 THERAPEUTIC ACTIVITIES: CPT

## 2020-07-23 PROCEDURE — 80053 COMPREHEN METABOLIC PANEL: CPT

## 2020-07-23 PROCEDURE — 2580000003 HC RX 258: Performed by: STUDENT IN AN ORGANIZED HEALTH CARE EDUCATION/TRAINING PROGRAM

## 2020-07-23 PROCEDURE — 6360000002 HC RX W HCPCS: Performed by: STUDENT IN AN ORGANIZED HEALTH CARE EDUCATION/TRAINING PROGRAM

## 2020-07-23 PROCEDURE — C8929 TTE W OR WO FOL WCON,DOPPLER: HCPCS

## 2020-07-23 PROCEDURE — 93356 MYOCRD STRAIN IMG SPCKL TRCK: CPT

## 2020-07-23 PROCEDURE — 85025 COMPLETE CBC W/AUTO DIFF WBC: CPT

## 2020-07-23 PROCEDURE — 2000000000 HC ICU R&B

## 2020-07-23 PROCEDURE — 6360000002 HC RX W HCPCS: Performed by: NURSE PRACTITIONER

## 2020-07-23 PROCEDURE — 97535 SELF CARE MNGMENT TRAINING: CPT

## 2020-07-23 PROCEDURE — 97162 PT EVAL MOD COMPLEX 30 MIN: CPT

## 2020-07-23 PROCEDURE — 97166 OT EVAL MOD COMPLEX 45 MIN: CPT

## 2020-07-23 PROCEDURE — 99232 SBSQ HOSP IP/OBS MODERATE 35: CPT | Performed by: INTERNAL MEDICINE

## 2020-07-23 PROCEDURE — 2500000003 HC RX 250 WO HCPCS: Performed by: STUDENT IN AN ORGANIZED HEALTH CARE EDUCATION/TRAINING PROGRAM

## 2020-07-23 PROCEDURE — 6370000000 HC RX 637 (ALT 250 FOR IP): Performed by: NEUROLOGICAL SURGERY

## 2020-07-23 PROCEDURE — 97116 GAIT TRAINING THERAPY: CPT

## 2020-07-23 PROCEDURE — 36415 COLL VENOUS BLD VENIPUNCTURE: CPT

## 2020-07-23 RX ORDER — ISOSORBIDE MONONITRATE 30 MG/1
30 TABLET, EXTENDED RELEASE ORAL DAILY
Status: DISCONTINUED | OUTPATIENT
Start: 2020-07-23 | End: 2020-07-29 | Stop reason: HOSPADM

## 2020-07-23 RX ADMIN — PROMETHAZINE HYDROCHLORIDE 12.5 MG: 25 TABLET ORAL at 11:10

## 2020-07-23 RX ADMIN — ATORVASTATIN CALCIUM 80 MG: 80 TABLET, FILM COATED ORAL at 20:39

## 2020-07-23 RX ADMIN — LISINOPRIL 40 MG: 20 TABLET ORAL at 09:16

## 2020-07-23 RX ADMIN — HEPARIN SODIUM 5000 UNITS: 5000 INJECTION INTRAVENOUS; SUBCUTANEOUS at 12:42

## 2020-07-23 RX ADMIN — Medication 10 ML: at 20:42

## 2020-07-23 RX ADMIN — PANTOPRAZOLE SODIUM 40 MG: 40 TABLET, DELAYED RELEASE ORAL at 06:21

## 2020-07-23 RX ADMIN — HEPARIN SODIUM 5000 UNITS: 5000 INJECTION INTRAVENOUS; SUBCUTANEOUS at 20:39

## 2020-07-23 RX ADMIN — Medication 10 ML: at 11:11

## 2020-07-23 RX ADMIN — ONDANSETRON 4 MG: 2 INJECTION INTRAMUSCULAR; INTRAVENOUS at 11:06

## 2020-07-23 RX ADMIN — CARVEDILOL 25 MG: 25 TABLET, FILM COATED ORAL at 09:16

## 2020-07-23 RX ADMIN — CARVEDILOL 25 MG: 25 TABLET, FILM COATED ORAL at 18:09

## 2020-07-23 RX ADMIN — HYDRALAZINE HYDROCHLORIDE 10 MG: 20 INJECTION INTRAMUSCULAR; INTRAVENOUS at 09:35

## 2020-07-23 RX ADMIN — NIFEDIPINE 60 MG: 30 TABLET, EXTENDED RELEASE ORAL at 09:17

## 2020-07-23 RX ADMIN — ISOSORBIDE MONONITRATE 30 MG: 30 TABLET, EXTENDED RELEASE ORAL at 09:16

## 2020-07-23 RX ADMIN — LABETALOL 20 MG/4 ML (5 MG/ML) INTRAVENOUS SYRINGE 10 MG: at 06:29

## 2020-07-23 RX ADMIN — CLOPIDOGREL BISULFATE 75 MG: 75 TABLET ORAL at 09:17

## 2020-07-23 RX ADMIN — HEPARIN SODIUM 5000 UNITS: 5000 INJECTION INTRAVENOUS; SUBCUTANEOUS at 04:30

## 2020-07-23 RX ADMIN — ASPIRIN 81 MG: 81 TABLET, CHEWABLE ORAL at 09:17

## 2020-07-23 ASSESSMENT — ENCOUNTER SYMPTOMS
SHORTNESS OF BREATH: 0
DIARRHEA: 0
COUGH: 0
NAUSEA: 0
VOMITING: 0

## 2020-07-23 ASSESSMENT — PAIN SCALES - GENERAL
PAINLEVEL_OUTOF10: 0
PAINLEVEL_OUTOF10: 0

## 2020-07-23 NOTE — PROGRESS NOTES
Hospitalist Progress Note      PCP: Hiral Magallon MD    Date of Admission: 7/21/2020    Chief Complaint:  Dizziness. Interval history: 17-year-old male with a past medical history of hypertension (noncompliant with the medication) presented with dizziness diagnosed with a cerebellar stroke      Subjective: Patient is feeling well. He is complaining of nausea. He denies any focal weakness. Complaining of dizziness. Denies any headache. Medications:  Reviewed    InfusionMedications   Scheduled Medications    isosorbide mononitrate  30 mg Oral Daily    lisinopril  40 mg Oral Daily    heparin (porcine)  5,000 Units Subcutaneous Q8H    atorvastatin  80 mg Oral Nightly    clopidogrel  75 mg Oral Daily    aspirin  81 mg Oral Daily    pantoprazole  40 mg Oral QAM AC    sodium chloride flush  10 mL Intravenous 2 times per day    carvedilol  25 mg Oral BID WC    NIFEdipine  60 mg Oral Daily     PRN Meds: hydrALAZINE, labetalol, sodium chloride flush, acetaminophen **OR** acetaminophen, polyethylene glycol, promethazine **OR** ondansetron, perflutren lipid microspheres      Intake/Output Summary (Last 24 hours) at 7/23/2020 1155  Last data filed at 7/23/2020 0941  Gross per 24 hour   Intake --   Output 1600 ml   Net -1600 ml       Physical Exam Performed:    BP (!) 162/106   Pulse 83   Temp 98.8 °F (37.1 °C) (Oral)   Resp 16   Wt 245 lb 9.5 oz (111.4 kg) Comment: bed extendr added  SpO2 98%   BMI 31.53 kg/m²   Physical Exam  Constitutional:       Appearance: Normal appearance. HENT:      Head: Normocephalic and atraumatic. Neck:      Musculoskeletal: Normal range of motion and neck supple. Cardiovascular:      Rate and Rhythm: Normal rate and regular rhythm. Pulses: Normal pulses. Heart sounds: Normal heart sounds. Pulmonary:      Effort: Pulmonary effort is normal.      Breath sounds: Normal breath sounds. Abdominal:      General: Abdomen is flat.       Palpations: Abdomen is soft. Skin:     General: Skin is warm and dry. Capillary Refill: Capillary refill takes less than 2 seconds. Neurological:      Mental Status: He is alert. Comments: No focal deficits. He has ataxia on the left side while ambulating. Given that he was feeling dizzy he was not ambulated. Physical examination is limited   Psychiatric:         Mood and Affect: Mood normal.         Behavior: Behavior normal.           Labs:   Recent Labs     07/21/20  0500 07/22/20  0438 07/23/20  0446   WBC 7.2 9.2 7.9   HGB 12.9* 13.2* 13.5   HCT 38.2* 38.7* 40.1*    276 306     Recent Labs     07/21/20  0500 07/22/20  0438 07/23/20  0445    137 138   K 4.0 4.1 3.7    100 100   CO2 23 26 24   BUN 13 17 17   CREATININE 1.3 1.3 1.2   CALCIUM 9.2 9.7 9.8     Recent Labs     07/21/20  0500 07/22/20  0438 07/23/20  0445   AST 13* 12* 14*   ALT 10 9* 11   BILITOT 0.7 0.5 0.6   ALKPHOS 68 68 64     Recent Labs     07/21/20  0500   INR 1.02     Recent Labs     07/21/20  0500   TROPONINI <0.01       Urinalysis:      Lab Results   Component Value Date    NITRU Negative 07/21/2020    WBCUA 0 07/21/2020    BACTERIA 1+ 02/14/2013    RBCUA 3 07/21/2020    BLOODU TRACE 07/21/2020    SPECGRAV 1.015 07/21/2020    GLUCOSEU Negative 07/21/2020    GLUCOSEU NEGATIVE 11/14/2011       Radiology:  MRI BRAIN W WO CONTRAST   Final Result      1. Small right and moderate left cerebellar infarcts. No acute hemorrhage. 2.  Mild chronic small vessel ischemic disease. Chronic right basal ganglia lacunar infarct. Assessment & Plan  ASSESSMENT AND PLAN      Active Problems:    Hypertensive emergency  Plan: Patient is on Coreg, lisinopril, added on Imdur. Discussed with ICU team, consider addition of hydrochlorothiazide if the blood pressure remains uncontrolled. 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 Patient is on aspirin, statin, Plavix. Echocardiogram pending.   Neurology is following. DVT prophylaxis: [] Lovenox  [x] SQ Heparin  [] SCDs because of  [] warfarin/oral direct thrombin inhibitor [] Encourage ambulation      Diet:  DIET GENERAL; Safety Tray; Safety Tray (Disposables)    Code Status: Full Code    PT/OT Eval Status: pending     Dispo - ICU for another 24 hours.     Luis Ware MD

## 2020-07-23 NOTE — PROGRESS NOTES
ICU Progress Note    Admit Date: 7/21/2020  Day: 3  Vent Day: None  IV Access:Peripheral  IV Fluids:None  Vasopressors:None                Antibiotics: None  Diet: DIET GENERAL; Safety Tray; Safety Tray (Disposables)    CC: Cerebellar stroke, hypertensive emergency, headaches/dizziness     Interval history: Remains hypertensive -170s. Added Imdur 30 daily. No acute events overnight. Pt denies headaches, dizziness, feeling off balance, NVD, fever. HPI: Mr. Margy Gonzalez is a 40 yo M PMH HTN, hyperlipidemia who presented to [facility] ED early Tuesday 7/21 AM with complaint of HA and dizziness. He describes the dizziness as the room spinning around him. Symptoms began on Monday 7/20 AM. He states he takes 3 hypertensive medications (carvedilol, lisinopril, and nifedipine) and has been taking for 1 year. Began having a headache. By afternoon he felt off balance  He denies any prior episodes of dizziness. Had intermittent episodes of dry heaving. Denied nausea, vomiting, visual disturbances, trouble speaking, weakness, history of stroke. In the ED was found to be hypertensive  and CTA revealed severe stenosis of both ICAs, VAs. CT showed a right cerebellar infarct. MRI revealed small right and moderate left cerebellar infarcts.      Medications:     Scheduled Meds:   isosorbide mononitrate  30 mg Oral Daily    lisinopril  40 mg Oral Daily    heparin (porcine)  5,000 Units Subcutaneous Q8H    atorvastatin  80 mg Oral Nightly    clopidogrel  75 mg Oral Daily    aspirin  81 mg Oral Daily    pantoprazole  40 mg Oral QAM AC    sodium chloride flush  10 mL Intravenous 2 times per day    carvedilol  25 mg Oral BID WC    NIFEdipine  60 mg Oral Daily     Continuous Infusions:  PRN Meds:hydrALAZINE, labetalol, sodium chloride flush, acetaminophen **OR** acetaminophen, polyethylene glycol, promethazine **OR** ondansetron, perflutren lipid microspheres    Objective:   Vitals:   T-max:  Patient Vitals for the past 8 hrs:   BP Temp Temp src Pulse Resp SpO2 Weight   07/23/20 0916 (!) 162/106 -- -- 83 -- -- --   07/23/20 0915 (!) 162/106 -- -- 87 16 98 % --   07/23/20 0900 (!) 160/109 -- -- 88 11 95 % --   07/23/20 0845 -- -- -- 85 -- 97 % --   07/23/20 0830 (!) 159/106 -- -- 92 22 95 % --   07/23/20 0815 (!) 152/99 -- -- 81 (!) 0 96 % --   07/23/20 0800 (!) 185/120 98.8 °F (37.1 °C) Oral 81 15 96 % --   07/23/20 0745 (!) 187/123 -- -- 83 19 95 % --   07/23/20 0730 (!) 194/140 -- -- 79 13 96 % --   07/23/20 0715 (!) 187/135 -- -- 80 16 96 % --   07/23/20 0700 (!) 178/120 -- -- 74 10 98 % --   07/23/20 0645 (!) 167/116 -- -- 82 14 99 % --   07/23/20 0600 (!) 179/129 -- -- 69 16 96 % 245 lb 9.5 oz (111.4 kg)   07/23/20 0500 (!) 158/113 -- -- 82 17 97 % --   07/23/20 0400 (!) 156/99 98.2 °F (36.8 °C) Oral 82 23 92 % --   07/23/20 0300 (!) 155/112 -- -- 82 12 91 % --       Intake/Output Summary (Last 24 hours) at 7/23/2020 1036  Last data filed at 7/23/2020 0941  Gross per 24 hour   Intake --   Output 1650 ml   Net -1650 ml       Review of Systems   Constitutional: Negative for fever. Respiratory: Negative for cough and shortness of breath. Cardiovascular: Negative for chest pain and leg swelling. Gastrointestinal: Negative for diarrhea, nausea and vomiting. Neurological: Negative for dizziness, weakness, light-headedness and headaches. Physical Exam  Vitals signs reviewed. HENT:      Head: Normocephalic. Cardiovascular:      Rate and Rhythm: Normal rate and regular rhythm. Heart sounds: No murmur. No gallop. Pulmonary:      Effort: Pulmonary effort is normal. No respiratory distress. Neurological:      General: No focal deficit present. Mental Status: He is alert and oriented to person, place, and time.            LABS:    CBC:   Recent Labs     07/21/20  0500 07/22/20  0438 07/23/20  0446   WBC 7.2 9.2 7.9   HGB 12.9* 13.2* 13.5   HCT 38.2* 38.7* 40.1*    276 306   MCV 86.6 86.9 86.8     Renal:    Recent Labs     07/21/20  0500 07/22/20  0438 07/23/20  0445    137 138   K 4.0 4.1 3.7    100 100   CO2 23 26 24   BUN 13 17 17   CREATININE 1.3 1.3 1.2   GLUCOSE 166* 163* 121*   CALCIUM 9.2 9.7 9.8   ANIONGAP 12 11 14     Hepatic:   Recent Labs     07/21/20  0500 07/22/20  0438 07/23/20  0445   AST 13* 12* 14*   ALT 10 9* 11   BILITOT 0.7 0.5 0.6   PROT 7.3 7.8 7.7   LABALBU 4.3 4.5 4.4   ALKPHOS 68 68 64     Troponin:   Recent Labs     07/21/20  0500   TROPONINI <0.01     BNP: No results for input(s): BNP in the last 72 hours. Lipids:   Recent Labs     07/21/20  1717   CHOL 213*   HDL 52     ABGs:  No results for input(s): PHART, HHX5WGU, PO2ART, YKD7JZW, BEART, THGBART, Z3DIJBXN, SPA5JVB in the last 72 hours. INR:   Recent Labs     07/21/20  0500   INR 1.02     Lactate: No results for input(s): LACTATE in the last 72 hours. Cultures:  -----------------------------------------------------------------  RAD:   MRI BRAIN W WO CONTRAST   Final Result      1. Small right and moderate left cerebellar infarcts. No acute hemorrhage. 2.  Mild chronic small vessel ischemic disease. Chronic right basal ganglia lacunar infarct. Assessment/Plan:   Trevin Chavis is a 38 yo M who presented with headache and dizziness. In the eD was found to be hypertensive  and CTA revealed severe stenosis of both ICAs, VAs. CT showed a right cerebellar infarct. MRI confirmed. Dizziness/vertigo 2/2 right cerebellar hemisphere infarct  CT head showed Subtle apparent infarct within the lateral aspect of the right cerebellar hemisphere, not definitely seen on the prior study of 05/01/2019 possibly acute or chronic. MRI brain showed small right and moderate left cerebellar infarcts without acute hemorrhage. Mild chronic small vessel ischemic disease.  Chronic right basal ganglia lacunar infarct.   -neurosurgery following  -neurology following  -PT/OT: eval and treat, PMR consult if rehab appropriate   -ST: eval and treat  -q1h neuro checks  -ECHO w bubble study ordered (March 2019 ECHO EF=45)  -ASA 81mg PO daily / Plavix - DAPT x 90 days  -SCDs for DVT prophylaxis & SQH     Severe stenosis of ICA, VA bilaterally  CTA head and neck showed Right V4 segment of vertebral artery occlusion, Left V4 segment is severely stenotic. Right intracranial ICA cavernous and paraclinoid segments 70% stenosis. Left intracranial ICA cavernous segment 50% stenosis.   -neurosurgery following     Hypertensive emergency  In ED SBP 200s, on admission [facility] 150/107  -BP: (152-194)/() past 8 hours  -Cardene gtt stopped   -restarted home antihypertensives (carvedilol 25, increased lisinopril 40, nifedipine 60)  -Imdur 30 daily  -Labetolol 10 prn     Hyperlipidemia  Lipid panel- cholesterol 213, , HDL 52,   -Lipitor 80mg PO QHS (switched from Pravastatin per neuro recs)    Code Status: full  FEN: diet gen  PPX: subq heparin, SCDs  DISPO: icu    John Van DO, PGY-1  07/23/20  7:22 AM    This patient has been staffed and discussed with Tammi Sahu MD.    [facility]     Patient seen and examined.  I agree with Dr. Wilkins's history, physical, lab findings, assessment and plan.     Keep in ICU until tmrw with frequent neuro checks given bilateral cerebellar infarcts (will be 72 hrs)  ASA/Plavix  Lipitor  Goal SBP < 220  Neuro following    Anila Joseph MD

## 2020-07-23 NOTE — PROGRESS NOTES
Neurology Progress Note  Seen for acute ischemic stroke    Updates:  Patient tells me that dizziness is improving, but not yet resolved    ROS:   Review of Systems   Respiratory: Negative for cough and shortness of breath. Gastrointestinal: Negative for nausea and vomiting. Neurological: Positive for dizziness. Negative for headaches. Exam:  Blood pressure (!) 162/106, pulse 83, temperature 98.8 °F (37.1 °C), temperature source Oral, resp. rate 16, weight 245 lb 9.5 oz (111.4 kg), SpO2 98 %. Constitutional    Vital signs: BP, HR, and RR reviewed   General Alert, no distress, well-nourished  Eyes: Unable to visualize the fundi  Cardiovascular: pulses symmetric in all 4 extremities. No peripheral edema. Psychiatric: cooperative with examination, no  psychotic behavior noted. Neurologic  Mental status: Eyes open spontaneously   orientation to person, place, month, year   General fund of knowledge grossly intact   Memory grossly intact   Attention intact as able to attend well to the exam , able to read the clock    Language fluent in conversation   Comprehension intact; follows simple commands  Cranial nerves:   CN2: Visual Fields full w/o extinction on confrontational testing  CN 3,4,6: extraocular muscles intact  CN5: facial sensation symmetric   CN7:face symmetric without dysarthria  CN8: hearing grossly intact  CN9: palate elevated symmetrically  CN11: trap full strength on shoulder shrug  CN12: tongue midline with protrusion  Strength: 5/5 strength in all 4 extremities   Cerebellar/coordination: finger nose finger normal without ataxia on the right, with ataxia on the left  Tone: normal in all 4 extremities  Gait: Deferred for safety       Labs:  LDL: 141  A1C: 5.4  TSH: 0.35      Studies:  MRI of brain w/o contrast 7/21/2020  Impression         1.  Small right and moderate left cerebellar infarcts. No acute hemorrhage. 2.  Mild chronic small vessel ischemic disease.  Chronic right basal ganglia lacunar infarct.           CT Head WO Contrast:  Impression    1. No acute intracranial hemorrhage or mass. 2. Subtle apparent infarct within the lateral aspect of the right cerebellar    hemisphere, not definitely seen on the prior study of 05/01/2019, possibly    acute or chronic.  Suggest clinical correlation, and if concerning, consider    further characterization with a follow-up brain MRI. 3. Stable chronic lacunar infarcts within the right basal ganglia, right    internal capsule, extending into the right caudate nucleus.       CTA Head and Neck:      Impression    Both cervical ICAs are patent.         Variant anatomy of right vertebral arteries, both diminutive.         Right V4 segment of vertebral artery occlusion.         Left V4 segment is severely stenotic.         Basilar artery has multifocal moderate stenoses.         Segmental moderately severe stenoses of both P2 segments.         Left PICA is severely stenotic distally.         Right intracranial ICA cavernous and paraclinoid segments 70% stenosis.         Left intracranial ICA cavernous segment 50% stenosis.         Both MCAs and ACAs are patent.            Impression:  1. Acute ischemic stroke  2. Right vertebral artery occlusion   3. Intracranial atherosclerosis  4. Hypertension  5. Hyperlipidemia     Lyla Teixeira is a 39 y.o. male who presented with headaches, dizziness, n/v; found to have acute bilateral cerebellar ischemic strokes, largest on the left. Vessel imaging revealed right vertebral artery occlusion and multiple areas of intracranial stenosis.      Clinically improving. Recommendations:  - Had long discussion with the patient regarding his high risk for a devastating posterior circulation infarct given he has an occluded right vertebral artery and severe stenosis of his left vertebral artery.  He understands the risk and appears very motivated to remain compliant with medication, follow up with a neurologist as

## 2020-07-23 NOTE — PROGRESS NOTES
Speech Language Pathology  Facility/Department: [facility] ICU   CLINICAL BEDSIDE SWALLOW EVALUATION    NAME: Soledad Ariza  : 1975  MRN: [id number]    ADMISSION DATE: 2020  ADMITTING DIAGNOSIS: has Hypertensive urgency; Hypertension; Acute CVA (cerebrovascular accident) ([address] and Hypertensive emergency on their problem list.  ONSET DATE: 2020      RECENT RESULTS  CXR 2020  Impression    New nonspecific elevation of the right hemidiaphragm, with mild right basilar    atelectasis evident. Jayda Carota is no evidence of pneumonia.           MRI brain results 2020  Impression         1.  Small right and moderate left cerebellar infarcts. No acute hemorrhage. 2.  Mild chronic small vessel ischemic disease. Chronic right basal ganglia lacunar infarct.             Date of Eval: 2020  Evaluating Therapist: Zulma Amin    Current Diet level:  Current Diet : Regular  Current Liquid Diet : Thin    Primary Complaint  Patient Complaint: pt denies any problems with swallowing    Pain:  Pain Assessment  Pain Assessment: denies    Reason for Referral  Soledad Ariza was referred for a bedside swallow evaluation to assess the efficiency of his swallow function, identify signs and symptoms of aspiration and make recommendations regarding safe dietary consistencies, effective compensatory strategies, and safe eating environment. Impression  Pt consumed trials of water via cup and straw, including 3 ounces of uninterrupted swallows of water via straw with no overt signs of aspiration, voice remained clear. After cricket cracker, pt coughing and clearing throat. Pt reported it felt like crumbs were in his throat and his mouth had been dry. Pt given time to recover and then re-assessed with liquids including another 3 ounce water trial with no further coughing noted. Pt demonstrated effective mastication with hard solids.    Dysphagia Diagnosis: Swallow function appears grossly intact  Dysphagia Outcome Severity Scale: Level 6: Within functional limits/Modified independence     Treatment Plan  Requires SLP Intervention: Yes  Duration/Frequency of Treatment: 1-2 x  D/C Recommendations: To be determined     Recommended Diet and Intervention  Diet Solids Recommendation: cont Regular  Liquid Consistency Recommendation: Thin   Recommended Form of Meds: Whole with water  Therapeutic Interventions: Diet tolerance monitoring;Patient/Family education;Oral care    Compensatory Swallowing Strategies  Upright as possible for all oral intake    Treatment/Goals  1- The patient will tolerate recommended diet without observed clinical signs of aspiration    2-The patient/caregiver will demonstrate understanding of dysphagia recommendations/ compensatory strategies for improved swallowing safety. 7/23: Educated pt to purpose of visit, s/s of aspiration, concern if aspiration occurs, rationale for diet recommendation/strategies to reduce risk for aspiration and instruction to notify staff if any signs emerge. Pt and family stated good understanding and agreeable  Cont goal     General  Chart Reviewed: Yes  Behavior/Cognition: Alert; Cooperative;Pleasant mood  Respiratory Status: Room air  Breath Sounds: Clear  Communication Observation: Functional  Follows Directions: Complex  Dentition: Adequate  Patient Positioning: Upright in bed  Baseline Vocal Quality: Normal  Volitional Cough: Strong  Prior Dysphagia History: none  Consistencies Administered: Reg solid; Dysphagia Pureed (Dysphagia I); Thin - straw; Thin - cup; Ice Chips    Vision/Hearing  Vision  Vision: Impaired  Vision Exceptions: Wears glasses for reading  Hearing  Hearing: Within functional limits    Oral Motor Deficits  Oral/Motor  Oral Motor:  Within functional limits    Oral Phase Dysfunction  WFL     Indicators of Pharyngeal Phase Dysfunction  Pt consumed trials of water via cup and straw, including 3 ounces of uninterrupted swallows of water via straw with no overt signs of aspiration, voice remained clear. After cricket cracker, pt coughing and clearing throat. Pt reported it felt like crumbs were in his throat and his mouth had been dry. Pt given time to recover and then re-assessed with liquids including another 3 ounce water trial with no further coughing noted. Prognosis  Individuals consulted  Consulted and agree with results and recommendations: Patient; Family member;RN    Education  Patient Education: pt and family member educated to purpose of visit  Patient Education Response: Verbalizes understanding  Safety Devices in place: Yes  Type of devices: Call light within reach       Therapy Time  SLP Individual Minutes  Time In: 2967  Time Out: 1304  Minutes: 15     Plan:  Recommended diet: cont regular diet/thin liquids -if any s/s of aspiration emerge, or there is respiratory decline,  make NPO until further evaluated by SLP  Dc goal - most likely will not require follow up  Pt therapy goal: denies speech or swallowing issues  Pt dc goal: to figure out what I need to do when I go home  Lidia Carvajal M.S./[facility]SLP #[id number]  Pg.  # [phone]  Needs met prior to leaving room, call light within reach, d/w WALESKA Londono  This document will serve as a dc summary if pt dc prior to next visit  7/23/2020 2:02 PM

## 2020-07-23 NOTE — PROGRESS NOTES
Speech Language Pathology  Facility/Department: [facility] ICU  Initial Speech/Language/Cognitive Assessment/DC    NAME: Francie Brar  : 1975   MRN: [id number]  ADMISSION DATE: 2020  ADMITTING DIAGNOSIS: has Hypertensive urgency; Hypertension; Acute CVA (cerebrovascular accident) [facility]); and Hypertensive emergency on their problem list.  DATE ONSET:  2020    Date of Eval: 2020   Evaluating Therapist: LILY Graves    RECENT RESULTS  CXR 2020  Impression    New nonspecific elevation of the right hemidiaphragm, with mild right basilar    atelectasis evident. Lesleigh Lyndon is no evidence of pneumonia.             MRI brain results 2020  Impression         1.  Small right and moderate left cerebellar infarcts. No acute hemorrhage. 2.  Mild chronic small vessel ischemic disease. Chronic right basal ganglia lacunar infarct.           Primary Complaint: pt states he still feels a little unsteady    Pain:  Pain Assessment  Pain Assessment: denies  Assessment:  Speech/language/cognition WFL's. Pt speech is intelligible, no dysarthria or apraxia noted. No aphasia/ word finding noted, good thought formulation. Pt scored 30/30 on the MOCA cognitive screening test. Pt provided solutions to everyday problems, solved money/time concept problems and demonstrated good insight into deficits. pt asking appropriate questions about when he is dc. Pt and family member deny any changes in cognition or speech.     Recommendations:  Requires SLP Intervention: no  Duration/Frequency of Treatment: no follow up indicated     Plan:   Goals:  Long-term Goals  Goal 1: n/a   Patient/family involved in developing goals and treatment plan: yes    Subjective:  Previous level of function and limitations: independent, working, lives with 12 yr old son  General  Chart Reviewed: Yes  Family / Caregiver Present: Yes     Vision  Vision: glasses for reading  Hearing  Hearing: Within functional limits      Objective: Oral/Motor  Oral Motor: Within functional limits    Auditory Comprehension  Comprehension: Within Functional Limits      Expression  Primary Mode of Expression: Verbal    Verbal Expression  Verbal Expression: Within functional limits    Reading:   [facility]    Written Expression  Dominant Hand: Right  Written Expression: Within Functional Limits    Motor Speech  Motor Speech: Within Functional Limits    Pragmatics/Social Functioning  Pragmatics: Within functional limits    Cognition:   Pt scored 30/30 on the MOCA cognitive screening tool  Orientation  Overall Orientation Status: Within Functional Limits  Attention  Attention: Within Functional Limits  Memory  Memory: Within Funtional Limits  Problem Solving  Problem Solving: Within Functional Limits  Numeric Reasoning  Numeric Reasoning: Within Functional Limits  Safety/Judgement  Safety/Judgement: Within Functional Limits     Prognosis:  Speech Therapy Prognosis  Prognosis: Good  Individuals consulted  Consulted and agree with results and recommendations: Patient; Family member;RN    Education:  Patient Education: pt and family member educated to purpose of evaluation and results  Patient Education Response: Verbalizes understanding  Safety Devices in place: Yes  Type of devices: Call light within reach    Therapy Time:   Individual Concurrent Group Co-treatment   Time In 1240         Time Out 1259         Minutes 19               Plan:  No follow up for speech/cog indicated at this time. Needs met prior to leaving room, call light within reach, family member at bedside. D/w RN  Dougie Aleman M.S./CCC-SLP #[id number]  Pg.  # [phone]  7/23/2020 1:52 PM

## 2020-07-23 NOTE — PROGRESS NOTES
PM assessment completed. Medications administered. Neuro assessment unchanged. Patient VSS, alert and oriented but lethargic from not having adequate sleep. Will continue to monitor.

## 2020-07-23 NOTE — CARE COORDINATION
Case Management Assessment           Daily Note                 Date/ Time of Note: 7/23/2020 4:35 PM         Note completed by: Nedra Danger Day    Patient Name: Mulugeta Golden  YOB: 1975    Diagnosis:Hypertensive emergency [I16.1]  Patient Admission Status: Inpatient    Date of Admission:7/21/2020 11:17 AM Length of Stay: 2 GLOS:        Current Plan of Care: Therapy recommendations for ARU. Stroke. Still some dizziness. Remain in ICU another 24 hours. Still hypertensive.   ________________________________________________________________________________________  PT AM-PAC: 12 / 24 per last evaluation on: 7/23    OT AM-PAC: 18 / 24 per last evaluation on: 7/23     DME Needs for discharge: Defer   ________________________________________________________________________________________  Discharge Plan: Other: Home vs. Placement  Therapy recommendations for inpatient rehab. Tentative discharge date: 7/25    Current barriers to discharge: Possible placement. Medical Clearance, Any DME recommendations     Referrals completed: Not Applicable    Resources/ information provided: Not indicated at this time  ________________________________________________________________________________________  Case Management Notes:Origonal plan was to return home with teenage son. Therapy scores currently indicate ARU. Therapy may re-eval for possible progress. SW to follow up tomorrow and dicuss rehab options if patient interested vs. C. Would require pre-cert. \      Wale Tomlinson and his family were provided with choice of provider; he and his family are in agreement with the discharge plan.     Care Transition Patient: JAYA Castelan  The [address] [place] [address] ICU  Case Management Department  Ph: [phone]

## 2020-07-23 NOTE — PROGRESS NOTES
Occupational Therapy   Occupational Therapy Initial Assessment and Treatment    Date: 2020   Patient Name: Jenn Chakraborty  MRN: [id number]     : 1975    Date of Service: 2020    Discharge Recommendations:  Jenn Chakraborty scored a 18/24 on the AM-PAC ADL Inpatient form. Current research shows that an AM-PAC score of 17 or less is typically not associated with a discharge to the patient's home setting. Based on the patient's AM-PAC score and their current ADL deficits, it is recommended that the patient have 5-7 sessions per week of Occupational Therapy at d/c to increase the patient's independence. At this time, this patient demonstrates the endurance, and/or tolerance for 3 hours of therapy each day, with a treatment frequency of 5-7x/wk. Please see assessment section for further patient specific details. If patient discharges prior to next session this note will serve as a discharge summary. Please see below for the latest assessment towards goals. OT Equipment Recommendations  Equipment Needed: No  Other: defer recommendations to discharge faciltiy    Assessment   Performance deficits / Impairments: Decreased functional mobility ; Decreased ADL status; Decreased balance;Decreased coordination  Assessment: Pt admitted w/ c/o vertigo - found to have (+) for Small right and moderate left cerebellar infarcts. Pt demonstrating impaired standing balance and requiring increased assist w/ ADLs, transfers, and functional mobility from independent baseline. Pt needing Mod A of 2 persons for short distance mobiltiy - ataxic LLE w/ L knee hyperextending. Pt will benefit from continued inpt OT at discharge. Pt expressing high level of motivation. Continue per POC.   Treatment Diagnosis: impaired ADLs/transfers, impaired LUE coordination  Decision Making: Medium Complexity  OT Education: OT Role;Plan of Care  Patient Education: encouraged pt to incorporate use of L hand in tasks - stated understanding/agreement  REQUIRES OT FOLLOW UP: Yes  Activity Tolerance  Activity Tolerance: Patient Tolerated treatment well  Safety Devices  Safety Devices in place: Yes  Type of devices: Nurse notified; Left in bed;Call light within reach; Bed alarm in place           Patient Diagnosis(es): There were no encounter diagnoses. has a past medical history of Hyperlipidemia and Hypertension. has no past surgical history on file. Treatment Diagnosis: impaired ADLs/transfers, impaired LUE coordination      Restrictions  Position Activity Restriction  Other position/activity restrictions: activity as tolerated    Subjective   General  Chart Reviewed: Yes  Patient assessed for rehabilitation services?: Yes  Additional Pertinent Hx: 39 y.o. M presented to the ED with headache and dizziness that started on 7/20. 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 Patient was transferred to [facility] ICU for further neurosurgical follow up; Leisa Pereira Neurovascular surgeon reviewed CTA Head/Neck and stated no neurovascular intervention indicated. PMH: HTN, Hyperlipidemia.   Referring Practitioner: ANYA Matos CNP  Diagnosis: Hypertensive Urgency    Patient Currently in Pain: Denies    Social/Functional History  Social/Functional History  Lives With: Son  Type of Home: Apartment  Home Layout: One level  Home Access: (4 ALEX w/ rail to lower level apartment)  Bathroom Shower/Tub: Tub/Shower unit  Bathroom Toilet: Standard  Bathroom Equipment: (none)  Bathroom Accessibility: Accessible  Home Equipment: (none)  ADL Assistance: Independent  Homemaking Assistance: Independent  Ambulation Assistance: Independent  Transfer Assistance: Independent  Active : Yes  Occupation: Full time employment  Type of occupation: customer service  Leisure & Hobbies: playing the piano; playing chess Objective   Vision: Impaired  Vision Exceptions: Wears glasses at all times(reports blurry w/ close up reading (ie texts))  Hearing: Within functional limits      Orientation  Overall Orientation Status: Within Normal Limits     Observation/Palpation  Observation: L eyelid droop - reports baseline; keeping eyes closed much of treatment; mild nystagmus     Balance  Sitting Balance: Stand by assistance  Standing Balance: (Mod A of 2 for posterior LOB (after initially standing from bed); wide KAMALA \"I feel like I'm being pulled backwards.; Monet of 1-2)    Functional Mobility  Functional - Mobility Device: Other(bilateral hand held assist)  Activity: To/from bathroom  Assist Level: (Mod A of 2)  Functional Mobility Comments: Note: very unsteady, ataxic L foot placement and L knee hyperextending    Toilet Transfers  Toilet - Technique: Ambulating  Equipment Used: Standard toilet(w/ bar)  Level of assistance: Min A     ADL  LE Dressing: Contact guard assistance(to don/doff socks)  Toileting: (denied need; at least Min A for standing balance for clothing management)     Tone RUE  RUE Tone: Normotonic  Tone LUE  LUE Tone: Normotonic  Coordination  Movements Are Fluid And Coordinated: No  Coordination and Movement description: Fine motor impairments;Gross motor impairments;Decreased accuracy; Left UE        Bed mobility  Supine to Sit: Stand by assistance  Sit to Supine: Stand by assistance     Transfers  Stand Step Transfers: 2 Person assistance(Mod A of 2 (bed to chair w/ bilateral hand held assist))  Sit to stand: (Min A of 2 (from bed);  Min A (from toilet w/ bar))  Stand to sit: Minimal assistance(cues to orient body prior to sitting down)        Cognition  Overall Cognitive Status: WFL           Sensation  Overall Sensation Status: Impaired(numbness lips since Monday)             LUE Strength  Gross LUE Strength: WFL  LUE Strength Comment: 5/5  RUE Strength  Gross RUE Strength: WFL  RUE Strength Comment: 5/5 Pt seen by OT for eval and treat.  Treatment included: bed mobility, unsupported sitting, transfer, ADL, pt education            Plan   Plan  Times per week: 5-7  Times per day: Daily  Current Treatment Recommendations: Balance Training, Functional Mobility Training, Endurance Training, Safety Education & Training, Self-Care / ADL                                                      AM-PAC Score        AM-PAC Inpatient Daily Activity Raw Score: 18 (07/23/20 1457)  AM-PAC Inpatient ADL T-Scale Score : 38.66 (07/23/20 1457)  ADL Inpatient CMS 0-100% Score: 46.65 (07/23/20 1457)  ADL Inpatient CMS G-Code Modifier : CK (07/23/20 1457)    Goals  Short term goals  Time Frame for Short term goals: Discharge  Short term goal 1: toilet transfer w/ CGA  Short term goal 2: grooming task in standing, CGA  Short term goal 3: pants w/ CGA  Patient Goals   Patient goals : to be independent; be able to play the piano       Therapy Time   Individual Concurrent Group Co-treatment   Time In 1315         Time Out 1353         Minutes 38              Timed Code Treatment Minutes:  23 Minutes    Total Treatment Minutes:  Wandy PATTERSONR/L #[id number]

## 2020-07-23 NOTE — PLAN OF CARE
Problem: Falls - Risk of:  Goal: Will remain free from falls  Description: Will remain free from falls  7/23/2020 0242 by Luz Malhotra RN  Outcome: Patient has been free from falls and injuries this shift. Chahal fall risk assessed each shift. Bed locked in lowest position, alarm engaged. Fall bracelet in place, fall sign present outside door, fall socks present on patient. Patients call light within reach. Patient educated on use of call light. No attempts to get out of bed at this time. Room free of clutter. Will continue to round and assess needs. 7/22/2020 1540 by Ivan Heller RN  Outcome: Ongoing  Note: Pt is a fall risk. Fall risk protocol in place. See Brittney Medrano Fall Score. Pt bed is in low position, bed alarm is on, side rails up, fall risk bracelet applied. , non-skid footwear in use. Patient/family educated on fall risk protocol, instructed to call for assistance when needed and belongings are in reach. PT does exit bed without calling for assistance. Will continue with hourly rounds for po intake, pain needs, toileting and repositioning as needed. Will continue to monitor for needs. Problem: Pain:  Goal: Control of acute pain  Description: Control of acute pain  7/23/2020 0242 by Luz Malhotra RN  Outcome: Patient's pain assessed every shift, post procedure, prn, or any changes in status. Pt's pain is assessed using 0-10 pain scale. Pt understands how to communicate pain to the RN using the pain scale. Pt understands to notify RN with of new onset pain. Pt's pain has been controlled this shift. 7/22/2020 1540 by Ivan Heller RN  Outcome: Ongoing  Note: Pt denies pain, will continue to monitor. Problem: Skin Integrity:  Goal: Absence of new skin breakdown  Description: Absence of new skin breakdown  7/23/2020 0242 by Luz Malhotra RN  Outcome: Patient's skin assessed q4h hours and prn. Patient turns self. Skin is kept clean and dry.  Preventative dressings have been applied. Sacral heart placed and assessed to be CDI. Mike scale is assessed and documented each shift. Pt family is educated on importance of frequent repositioning and assessment. Patient's skin integrity maintained throughout shift.     7/22/2020 1540 by Iam Rikcs RN  Outcome: Ongoing

## 2020-07-23 NOTE — PROGRESS NOTES
Patient is in isolation pending COVID test.  To preserve PPE and minimize exposure , patient was not seen physically. Once patient is out of ICU or COVID negative. Will take over care. Management per critical care team.    Discussed with NS, 1 more day in ICU.     Leonard Jordan

## 2020-07-23 NOTE — PROGRESS NOTES
Physical Therapy    Facility/Department: [address] [place] [address] ICU  Initial Assessment and treatment    NAME: Fam Guidry  : 1975  MRN: [id number]    Date of Service: 2020    Discharge Recommendations:    Fam Guidry scored a  on the AM-PAC short mobility form. Current research shows that an AM-PAC score of 17 or less is typically not associated with a discharge to the patient's home setting. Based on the patient's AM-PAC score and their current functional mobility deficits, it is recommended that the patient have 5-7 sessions per week of Physical Therapy at d/c to increase the patient's independence. At this time, this patient demonstrates the endurance, and/or tolerance for 3 hours of therapy each day, with a treatment frequency of 5-7x/wk. Please see assessment section for further patient specific details. PT Equipment Recommendations  Equipment Needed: No(defer to next level of care)    Assessment   Body structures, Functions, Activity limitations: Decreased functional mobility ; Decreased safe awareness;Decreased balance;Decreased endurance  Assessment: Patient tolerated session well, transferring and ambulating in room and bathroom as above with assist x 2. Patient demonstrates unsteady gait with ataxic LLE as outlined above. Patient overall shows decreased awareness of deficits and reports dizziness throughout session. Patient needing verbal cues for safety throughout session. Patient typically independent with ADLs and IADLs. He is currently functioning well below baseline level. Recommend continued skilled PT upon discharge. Will follow while in acute care setting to maximize independence with mobility. Treatment Diagnosis: impaired mobility associated with hypertensive emergency  Prognosis: Good  Decision Making: Medium Complexity  Patient Education: Educated on role of PT, safety with mobility, use of call light, d/c recommendations; patient verbalized understanding.   REQUIRES PT FOLLOW UP: Yes  Activity Tolerance  Activity Tolerance: Patient Tolerated treatment well;Patient limited by endurance       Patient Diagnosis(es): There were no encounter diagnoses. has a past medical history of Hyperlipidemia and Hypertension. has no past surgical history on file. Restrictions  Position Activity Restriction  Other position/activity restrictions: activity as tolerated  Vision/Hearing  Vision: Impaired  Vision Exceptions: Wears glasses for reading  Hearing: Within functional limits     Subjective  General  Chart Reviewed: Yes  Patient assessed for rehabilitation services?: Yes  Additional Pertinent Hx: Patient is a 40 y/o male admitted 7/21 with dizziness, vertigo and headache. Patient found to be hypertensive in the emergency room. MRI brain (+) for Small right and moderate left cerebellar infarcts. No acute hemorrhage. CT head (+) for Subtle apparent infarct within the lateral aspect of the right cerebellar hemisphere. PMH significant for HTN, HLD. Response To Previous Treatment: Not applicable  Family / Caregiver Present: Yes  Referring Practitioner: MARY Small  Referral Date : 07/22/20  Diagnosis: hypertensive emergency  Follows Commands: Within Functional Limits  General Comment  Comments: Patient supine in bed upon arrival.  Subjective  Subjective: Patient agreeable to PT evaluation. \"When will I be able to walk again? \"  Pain Screening  Patient Currently in Pain: Denies  Vital Signs  Patient Currently in Pain: Denies       Orientation  Orientation  Overall Orientation Status: Within Functional Limits  Social/Functional History  Social/Functional History  Lives With: Son  Type of Home: Apartment  Home Layout: One level  Home Access: (4 ALEX w/ rail to lower level apartment)  Bathroom Shower/Tub: Tub/Shower unit  Bathroom Toilet: Standard  Bathroom Equipment: (none)  Bathroom Accessibility: Accessible  Home Equipment: (none)  ADL Assistance: Independent  Homemaking Assistance: Independent  Ambulation Assistance: Independent  Transfer Assistance: Independent  Active : Yes  Occupation: Full time employment  Type of occupation: customer service  Leisure & Hobbies: playing the piano; playing chess  Cognition   Cognition  Overall Cognitive Status: WFL    Objective          AROM RLE (degrees)  RLE AROM: WFL  AROM LLE (degrees)  LLE AROM : WFL  Strength RLE  Strength RLE: WFL  Strength LLE  Strength LLE: WFL        Bed mobility  Supine to Sit: Stand by assistance  Sit to Supine: Stand by assistance  Scooting: Stand by assistance(to EOB)  Comment: patient returned to supine at end of session for ECHO  Transfers  Sit to Stand: Minimal Assistance;2 Person Assistance(min assist x 2 from EOB, min assist x 1 from toilet with grab bar and from bedside chair)  Stand to sit: Minimal Assistance(to toilet, to bedside chair and to EOB, verbal cues for safety and reach back)  Bed to Chair: Moderate assistance;2 Person Assistance(to take steps with HHA x 2)  Ambulation  Ambulation?: Yes  Ambulation 1  Surface: level tile  Device: Hand-Held Assist(HHA x 2)  Assistance:  Moderate assistance;2 Person assistance(mod assist x 2)  Quality of Gait: gait ataxic on left with variable foot placement, left knee hyperextension during stance, decreased hip/knee flexion during swing, unsteady gait  Distance: 3-4 steps from EOB to bedside chair, 12' into bathroom, 15' back to EOB sitting  Stairs/Curb  Stairs?: No     Balance  Sitting - Static: Good  Sitting - Dynamic: Good  Standing - Static: Poor(mod assist x 2 for static standing at EOB, loss of balance posteriorly)  Standing - Dynamic: Poor(mod assist x 2 to take steps with HHA x 2)        Plan   Plan  Times per week: 5-7  Current Treatment Recommendations: Strengthening, Transfer Training, Endurance Training, Patient/Caregiver Education & Training, Balance Training, Gait Training, Functional Mobility Training, Safety Education & Training  Safety Devices  Type of devices: Gait belt, Left in bed, Call light within reach, Bed alarm in place, Nurse notified    OutComes Score                                                  AM-PAC Score  AM-PAC Inpatient Mobility Raw Score : 12 (07/23/20 1508)  AM-PAC Inpatient T-Scale Score : 35.33 (07/23/20 1508)  Mobility Inpatient CMS 0-100% Score: 68.66 (07/23/20 1508)  Mobility Inpatient CMS G-Code Modifier : CL (07/23/20 1508)          Goals  Short term goals  Time Frame for Short term goals: discharge  Short term goal 1: patient will perform bed mobility with modified independence  Short term goal 2: patient will perform transfers sit<>stand with SBA  Short term goal 3: patient will ambulate 48' with FWW and min assist x 1  Patient Goals   Patient goals : to be independent       Therapy Time   Individual Concurrent Group Co-treatment   Time In 1315         Time Out 1353         Minutes 38         Timed Code Treatment Minutes: 23 Minutes    Timed Code Treatment Minutes:  23 Minutes    Total Treatment Minutes:    23 minutes treatment + 15 minutes evaluation = 38 total treatment minutes  If patient discharges prior to next session this note will serve as a discharge summary. Please see below for the latest assessment towards goals.    Edgardo CAPUTO [address]

## 2020-07-24 LAB
A/G RATIO: 1.3 (ref 1.1–2.2)
ALBUMIN SERPL-MCNC: 4.3 G/DL (ref 3.4–5)
ALP BLD-CCNC: 66 U/L (ref 40–129)
ALT SERPL-CCNC: 11 U/L (ref 10–40)
ANION GAP SERPL CALCULATED.3IONS-SCNC: 12 MMOL/L (ref 3–16)
AST SERPL-CCNC: 12 U/L (ref 15–37)
BASOPHILS ABSOLUTE: 0 K/UL (ref 0–0.2)
BASOPHILS RELATIVE PERCENT: 0.5 %
BILIRUB SERPL-MCNC: 0.4 MG/DL (ref 0–1)
BUN BLDV-MCNC: 22 MG/DL (ref 7–20)
CALCIUM SERPL-MCNC: 10 MG/DL (ref 8.3–10.6)
CHLORIDE BLD-SCNC: 100 MMOL/L (ref 99–110)
CO2: 23 MMOL/L (ref 21–32)
CREAT SERPL-MCNC: 1.5 MG/DL (ref 0.9–1.3)
EOSINOPHILS ABSOLUTE: 0 K/UL (ref 0–0.6)
EOSINOPHILS RELATIVE PERCENT: 0.6 %
GFR AFRICAN AMERICAN: >60
GFR NON-AFRICAN AMERICAN: 51
GLOBULIN: 3.3 G/DL
GLUCOSE BLD-MCNC: 110 MG/DL (ref 70–99)
HCT VFR BLD CALC: 38.4 % (ref 40.5–52.5)
HEMOGLOBIN: 12.9 G/DL (ref 13.5–17.5)
LYMPHOCYTES ABSOLUTE: 2.5 K/UL (ref 1–5.1)
LYMPHOCYTES RELATIVE PERCENT: 31.8 %
MCH RBC QN AUTO: 29.1 PG (ref 26–34)
MCHC RBC AUTO-ENTMCNC: 33.7 G/DL (ref 31–36)
MCV RBC AUTO: 86.3 FL (ref 80–100)
MONOCYTES ABSOLUTE: 0.7 K/UL (ref 0–1.3)
MONOCYTES RELATIVE PERCENT: 8.9 %
NEUTROPHILS ABSOLUTE: 4.5 K/UL (ref 1.7–7.7)
NEUTROPHILS RELATIVE PERCENT: 58.2 %
PDW BLD-RTO: 13.5 % (ref 12.4–15.4)
PLATELET # BLD: 284 K/UL (ref 135–450)
PMV BLD AUTO: 10 FL (ref 5–10.5)
POTASSIUM REFLEX MAGNESIUM: 4.1 MMOL/L (ref 3.5–5.1)
RBC # BLD: 4.45 M/UL (ref 4.2–5.9)
SODIUM BLD-SCNC: 135 MMOL/L (ref 136–145)
TOTAL PROTEIN: 7.6 G/DL (ref 6.4–8.2)
WBC # BLD: 7.8 K/UL (ref 4–11)

## 2020-07-24 PROCEDURE — 97112 NEUROMUSCULAR REEDUCATION: CPT

## 2020-07-24 PROCEDURE — 36415 COLL VENOUS BLD VENIPUNCTURE: CPT

## 2020-07-24 PROCEDURE — 97116 GAIT TRAINING THERAPY: CPT

## 2020-07-24 PROCEDURE — 6360000002 HC RX W HCPCS: Performed by: NURSE PRACTITIONER

## 2020-07-24 PROCEDURE — 92526 ORAL FUNCTION THERAPY: CPT

## 2020-07-24 PROCEDURE — 99232 SBSQ HOSP IP/OBS MODERATE 35: CPT | Performed by: INTERNAL MEDICINE

## 2020-07-24 PROCEDURE — 85025 COMPLETE CBC W/AUTO DIFF WBC: CPT

## 2020-07-24 PROCEDURE — 1200000000 HC SEMI PRIVATE

## 2020-07-24 PROCEDURE — 6370000000 HC RX 637 (ALT 250 FOR IP): Performed by: NEUROLOGICAL SURGERY

## 2020-07-24 PROCEDURE — 6370000000 HC RX 637 (ALT 250 FOR IP): Performed by: STUDENT IN AN ORGANIZED HEALTH CARE EDUCATION/TRAINING PROGRAM

## 2020-07-24 PROCEDURE — 2580000003 HC RX 258: Performed by: STUDENT IN AN ORGANIZED HEALTH CARE EDUCATION/TRAINING PROGRAM

## 2020-07-24 PROCEDURE — 6360000002 HC RX W HCPCS: Performed by: STUDENT IN AN ORGANIZED HEALTH CARE EDUCATION/TRAINING PROGRAM

## 2020-07-24 PROCEDURE — 97530 THERAPEUTIC ACTIVITIES: CPT

## 2020-07-24 PROCEDURE — 97535 SELF CARE MNGMENT TRAINING: CPT

## 2020-07-24 PROCEDURE — 80053 COMPREHEN METABOLIC PANEL: CPT

## 2020-07-24 RX ADMIN — PANTOPRAZOLE SODIUM 40 MG: 40 TABLET, DELAYED RELEASE ORAL at 06:00

## 2020-07-24 RX ADMIN — ATORVASTATIN CALCIUM 80 MG: 80 TABLET, FILM COATED ORAL at 21:34

## 2020-07-24 RX ADMIN — HEPARIN SODIUM 5000 UNITS: 5000 INJECTION INTRAVENOUS; SUBCUTANEOUS at 12:36

## 2020-07-24 RX ADMIN — HEPARIN SODIUM 5000 UNITS: 5000 INJECTION INTRAVENOUS; SUBCUTANEOUS at 21:35

## 2020-07-24 RX ADMIN — ASPIRIN 81 MG: 81 TABLET, CHEWABLE ORAL at 08:00

## 2020-07-24 RX ADMIN — LISINOPRIL 40 MG: 20 TABLET ORAL at 08:00

## 2020-07-24 RX ADMIN — NIFEDIPINE 60 MG: 30 TABLET, EXTENDED RELEASE ORAL at 08:00

## 2020-07-24 RX ADMIN — HEPARIN SODIUM 5000 UNITS: 5000 INJECTION INTRAVENOUS; SUBCUTANEOUS at 06:00

## 2020-07-24 RX ADMIN — CLOPIDOGREL BISULFATE 75 MG: 75 TABLET ORAL at 08:00

## 2020-07-24 RX ADMIN — Medication 10 ML: at 08:01

## 2020-07-24 RX ADMIN — CARVEDILOL 25 MG: 25 TABLET, FILM COATED ORAL at 07:59

## 2020-07-24 RX ADMIN — CARVEDILOL 25 MG: 25 TABLET, FILM COATED ORAL at 16:49

## 2020-07-24 RX ADMIN — ISOSORBIDE MONONITRATE 30 MG: 30 TABLET, EXTENDED RELEASE ORAL at 08:00

## 2020-07-24 RX ADMIN — HYDRALAZINE HYDROCHLORIDE 10 MG: 20 INJECTION INTRAMUSCULAR; INTRAVENOUS at 08:07

## 2020-07-24 ASSESSMENT — ENCOUNTER SYMPTOMS
NAUSEA: 0
VOMITING: 0
WHEEZING: 0
ABDOMINAL PAIN: 0
SHORTNESS OF BREATH: 0
DIARRHEA: 0

## 2020-07-24 ASSESSMENT — PAIN SCALES - GENERAL
PAINLEVEL_OUTOF10: 0

## 2020-07-24 NOTE — PROGRESS NOTES
Neurology Octavio Note  Seen for acute ischemic stroke    Updates:  Walked with therapy this morning. Endorses he feels weak and dizzy when standing but symptoms continue to improve     ROS:   Respiratory: Negative for cough and shortness of breath. Gastrointestinal: Negative for nausea and vomiting. Neurological: Positive for dizziness. Negative for headaches. Exam:  Blood pressure (!) 158/114, pulse 84, temperature 98 °F (36.7 °C), temperature source Oral, resp. rate 14, weight 245 lb 9.5 oz (111.4 kg), SpO2 97 %. Constitutional                          Vital signs: BP, HR, and RR reviewed            General Alert, no distress, well-nourished  Eyes: Unable to visualize the fundi  Cardiovascular: pulses symmetric in all 4 extremities. No peripheral edema. Psychiatric: cooperative with examination, no  psychotic behavior noted.   Neurologic  Mental status: Eyes open spontaneously   orientation to person, place, month, year              General fund of knowledge grossly intact              Memory grossly intact              Attention intact as able to attend well to the exam , able to read the clock               Language fluent in conversation              Comprehension intact; follows simple commands  Cranial nerves:   CN2: Visual Fields full w/o extinction on confrontational testing, although he did cheat  CN 3,4,6: extraocular muscles intact  CN5: facial sensation symmetric   CN7:face symmetric without dysarthria  CN8: hearing grossly intact  CN9: palate elevated symmetrically  CN11: trap full strength on shoulder shrug  CN12: tongue midline with protrusion  Strength: 5/5 strength in all 4 extremities   Cerebellar/coordination: finger nose finger normal without ataxia on the right, with ataxia on the left  Tone: normal in all 4 extremities  Gait: Deferred for safety      Labs:  LDL: 141  A1C: 5.4  TSH: 0.35     Studies:  MRI of brain w/o contrast 7/21/2020  Impression         1.  Small right and moderate left cerebellar infarcts. No acute hemorrhage. 2.  Mild chronic small vessel ischemic disease. Chronic right basal ganglia lacunar infarct.            CT Head WO Contrast:  Impression    1. No acute intracranial hemorrhage or mass. 2. Subtle apparent infarct within the lateral aspect of the right cerebellar    hemisphere, not definitely seen on the prior study of 05/01/2019, possibly    acute or chronic.  Suggest clinical correlation, and if concerning, consider    further characterization with a follow-up brain MRI. 3. Stable chronic lacunar infarcts within the right basal ganglia, right    internal capsule, extending into the right caudate nucleus.       CTA Head and Neck:      Impression    Both cervical ICAs are patent.         Variant anatomy of right vertebral arteries, both diminutive.         Right V4 segment of vertebral artery occlusion.         Left V4 segment is severely stenotic.         Basilar artery has multifocal moderate stenoses.         Segmental moderately severe stenoses of both P2 segments.         Left PICA is severely stenotic distally.         Right intracranial ICA cavernous and paraclinoid segments 70% stenosis.         Left intracranial ICA cavernous segment 50% stenosis.         Both MCAs and ACAs are patent.        ECHO 7/23:  EF 55%  LA normal size  A bubble study was performed and fails to show evidence of right to left shunting     Impression:  1. Acute ischemic stroke  2. Right vertebral artery occlusion   3. Intracranial atherosclerosis  4. Hypertension  5. Hyperlipidemia      Ravinder Tamez a 39 y. o. male who presented with headaches, dizziness, n/v; found to have acute bilateral cerebellar ischemic strokes, largest on the left.      Vessel imaging revealed right vertebral artery occlusion and multiple areas of intracranial stenosis, some areas severe.     ECHO completed yesterday, unrevealing.     Continues to clinically improve.      Recommendations:  - Continue DAPT ( aspirin 81 mg daily and plavix 75 mg daily ) for 3 months, followed by monotherapy with Plavix 75 mg daily  -Continue high intensity statin  - PT/OT/ST, rehab as able  -Continue on telemetry while admitted   - Maintain good secondary stroke prevention ; LDL goal 70 and below, A1C goal 7.0 and below, BP goal eventually 141/90 and below, however, BP goal should be slowly achieved over the next few days while avoiding marked fluctuations in BP and hypotension  - Follow up with Dr. Ramsey Slider with [address] [place] [address] [address] Neurology in 1 month  - Will sign off. No further recommendations.  Please call with questions     A copy of this note was provided for MD Pari Chang [address] Neurology

## 2020-07-24 NOTE — PROGRESS NOTES
Patient transferred to [facility]. Medications collected from lockbox. Patient's belongings also transferred to [facility].

## 2020-07-24 NOTE — CONSULTS
NEPHROLOGY CONSULTATION      Reason for Consult:  HTN, CKD  Requesting Physician:          HISTORY OF PRESENT ILLNESS:      39 y.o. male admitted 3 days ago for headaches and dizziness; he was found to have acute ischemic stroke: MRI  showed bilateral cerebellar infarcts, small on right, and moderately sized on the left( also had CT and CTA)   Stroke team was consulted and recommended against tPA. Neurosurgery and Neurology are  on board . Mr Mahi Mullins was seen by my partner dr Katharina Nobles, in March 2019 at [facility]. He  has a significant past medical history of HTN known for several years; he stopped BP meds at some point in 2018 ( started on an exercise regimen and weight loss). He was admitted in March 2019 with  profound BP elevation 230's/120's.    The patient has elevated creatinine of 1.4 in March 2019. He has extensive family history of HTN and his mother has ESRD,on maintenance HD(she has profound HTN despite multiple medications).       Past Medical History:    Diagnosis Date    Hyperlipidemia      Hypertension               Past Surgical History:    No past surgical history on file.          Current Medications:    List reviewed  Allergies:  Patient has no known allergies. Social History: Former smoker, quit in 2018    Family History: Mother has severe HTN and ESRD ( on maintenance HD at [facility])    REVIEW OF SYSTEMS:    As per HPI, otherwise negative or noncontributory on review of 10 systems  PHYSICAL EXAM:      Vitals:     Wt Readings from Last 3 Encounters:   07/23/20 245 lb 9.5 oz (111.4 kg)   07/21/20 215 lb (97.5 kg)   05/01/19 212 lb (96.2 kg)     Temp Readings from Last 3 Encounters:   07/24/20 97.3 °F (36.3 °C) (Oral)   07/21/20 97.7 °F (36.5 °C) (Oral)   05/01/19 98.2 °F (36.8 °C) (Oral)     BP Readings from Last 3 Encounters:   07/24/20 125/74   07/21/20 (!) 141/77   05/01/19 (!) 163/100     Pulse Readings from Last 3 Encounters:   07/24/20 84   07/21/20 77 05/01/19 97   GEN:NAD  PERRLA, EOMI, oropharynx clear  Neck supple, no thyromegaly  No LAD in cervical or supraclavicular areas  Lungs: normal respirtaory effort, CTA  Heart RRR, no rub  Abdomen soft, no mass  No CCE      DATA:    Lab Results   Component Value Date    WBC 7.8 07/24/2020    HGB 12.9 (L) 07/24/2020    HCT 38.4 (L) 07/24/2020    MCV 86.3 07/24/2020     07/24/2020     Lab Results   Component Value Date     07/24/2020    K 4.1 07/24/2020     07/24/2020    CO2 23 07/24/2020    BUN 22 07/24/2020    CREATININE 1.5 07/24/2020    GLUCOSE 110 07/24/2020    CALCIUM 10.0 07/24/2020            IMPRESSION/RECOMMENDATIONS:    1. HTN- possibly malignant HTN, but there is a certain degree of medical non-compliance ( not taking medications, did not f/u with dr Anyi Naranjo as planned); in 2019 he was discharged on carvedilol 25 mg BID, lisinopril 20 mg daily,Nifedipine ED 60 mg po daily and take second dose in day if SBP >160    -given strong family history, essential HTN was suspected ; still agree with workup for secondary etiology at this time, as ordered    2. CKD- creatinine 1.4 mg/dl in 2019, now at 1.5  -Due likely to uncontrolled HTN   UPC was  0.15 in 2019 - so concern for FSG or other GN much less with this  -will repeat     3. Acute ischemic stroke- per your management    DISPO- will need to f/u with dr Anyi Naranjo upon discharge

## 2020-07-24 NOTE — PROGRESS NOTES
HOD#4 Htn crisis with cerebellar CVA, bilat intracranial stenosis    Pt remains oriented x4 denying loss of sensation, headache, or parasthesia of any extremity. L UE + LE slightly weaker. L sided ataxia seems lessened. SBP/MAP maintained per parameters. Resting comfortably overnight. Call light within reach.

## 2020-07-24 NOTE — PROGRESS NOTES
perflutren lipid microspheres    Objective:   Vitals:   T-max:  Patient Vitals for the past 8 hrs:   BP Temp Temp src Pulse Resp   07/24/20 0730 (!) 158/114 -- -- 84 14   07/24/20 0700 (!) 144/116 -- -- 81 15   07/24/20 0630 (!) 151/114 -- -- 78 14   07/24/20 0600 (!) 155/116 -- -- 82 14   07/24/20 0530 (!) 155/125 -- -- 83 11   07/24/20 0500 (!) 167/119 -- -- 77 15   07/24/20 0430 (!) 140/106 -- -- 82 12   07/24/20 0400 (!) 146/83 98 °F (36.7 °C) Oral 83 15   07/24/20 0330 (!) 129/90 -- -- 83 13   07/24/20 0300 119/76 -- -- 80 14       Intake/Output Summary (Last 24 hours) at 7/24/2020 3289  Last data filed at 7/24/2020 0400  Gross per 24 hour   Intake 500 ml   Output 1075 ml   Net -575 ml       Review of Systems   Constitutional: Negative for fatigue and fever. Respiratory: Negative for shortness of breath and wheezing. Cardiovascular: Negative for chest pain. Gastrointestinal: Negative for abdominal pain, diarrhea, nausea and vomiting. Neurological: Positive for dizziness. Negative for light-headedness and headaches. Psychiatric/Behavioral: Positive for dysphoric mood. Physical Exam  Vitals signs and nursing note reviewed. Cardiovascular:      Rate and Rhythm: Normal rate and regular rhythm. Pulses: Normal pulses. Heart sounds: No murmur. Pulmonary:      Effort: Pulmonary effort is normal.      Breath sounds: Normal breath sounds. No wheezing. Abdominal:      Tenderness: There is no abdominal tenderness. Neurological:      General: No focal deficit present. Mental Status: He is oriented to person, place, and time. Sensory: No sensory deficit. Motor: Weakness present.       Comments: LUE weakness 4/5           LABS:    CBC:   Recent Labs     07/22/20  0438 07/23/20  0446 07/24/20  0437   WBC 9.2 7.9 7.8   HGB 13.2* 13.5 12.9*   HCT 38.7* 40.1* 38.4*    306 284   MCV 86.9 86.8 86.3     Renal:    Recent Labs     07/22/20  0438 07/23/20  0445 07/24/20  0437   NA 137 138 135*   K 4.1 3.7 4.1    100 100   CO2 26 24 23   BUN 17 17 22*   CREATININE 1.3 1.2 1.5*   GLUCOSE 163* 121* 110*   CALCIUM 9.7 9.8 10.0   ANIONGAP 11 14 12     Hepatic:   Recent Labs     07/22/20  0438 07/23/20  0445 07/24/20  0437   AST 12* 14* 12*   ALT 9* 11 11   BILITOT 0.5 0.6 0.4   PROT 7.8 7.7 7.6   LABALBU 4.5 4.4 4.3   ALKPHOS 68 64 66     Troponin: No results for input(s): TROPONINI in the last 72 hours. BNP: No results for input(s): BNP in the last 72 hours. Lipids:   Recent Labs     07/21/20  1717   CHOL 213*   HDL 52     ABGs:  No results for input(s): PHART, UXN0JPK, PO2ART, VIN2PFR, BEART, THGBART, Y5RUVNAJ, UPQ3MWS in the last 72 hours. INR: No results for input(s): INR in the last 72 hours. Lactate: No results for input(s): LACTATE in the last 72 hours. Cultures:  -----------------------------------------------------------------  RAD:   MRI BRAIN W WO CONTRAST   Final Result      1. Small right and moderate left cerebellar infarcts. No acute hemorrhage. 2.  Mild chronic small vessel ischemic disease. Chronic right basal ganglia lacunar infarct. Assessment/Plan:   Alex Taylor is a 40 yo M who presented with headache and dizziness. In the eD was found to be hypertensive  and CTA revealed severe stenosis of both ICAs, VAs. CT showed a right cerebellar infarct. MRI confirmed.      Dizziness/vertigo 2/2 right cerebellar hemisphere infarct  CT head showed Subtle apparent infarct within the lateral aspect of the right cerebellar hemisphere, not definitely seen on the prior study of 05/01/2019 possibly acute or chronic. MRI brain showed small right and moderate left cerebellar infarcts without acute hemorrhage. Mild chronic small vessel ischemic disease.  Chronic right basal ganglia lacunar infarct.   -neurosurgery following  -neurology following  -PT/OT: eval and treat, PMR consult if rehab appropriate   -ST: eval and treat  -q4h neuro checks  -ECHO w bubble study (7/23)- Ejection fraction of 55%, bubble study negative for right to left shunting. -ASA 81mg PO daily / Plavix - DAPT x 90 days  -SCDs for DVT prophylaxis & SQH     Severe stenosis of ICA, VA bilaterally  CTA head and neck showed Right V4 segment of vertebral artery occlusion, Left V4 segment is severely stenotic. Right intracranial ICA cavernous and paraclinoid segments 70% stenosis. Left intracranial ICA cavernous segment 50% stenosis.   -neurosurgery following     Hypertensive emergency  In ED SBP 200s, on admission [facility] 150/107  -BP: (119-167)/()  for the past 8hours  -Cardene gtt stopped   -restarted home antihypertensives (carvedilol 25, increased lisinopril 40, nifedipine 60)  -Imdur 30 daily  -Labetolol 10 prn  -Aldosterone, renin pending  -Nephrology consulted for secondary hypertension workup     Hyperlipidemia  Lipid panel- cholesterol 213, , HDL 52,   -Lipitor 80mg PO QHS    Code Status: full  FEN: diet gen  PPX: subq heparin, SCDs  DISPO: transfer to [address] [place] [address] DO Diane, PGY-1  07/24/20  8:21 AM    This patient has been staffed and discussed with Fabrizio Simms MD.    [facility]     Patient seen and examined.  I agree with Dr. Wilkins's history, physical, lab findings, assessment and plan.     Ok to TXF to 5T Tele with q 4 hrs neurochecks  Cont ASA/Plavix  Cont Lipitor  Goal SBP ~ 140  PT/OT  PM&R eval for ARU as think he will begood candidate    Will sign off     Katina Duran MD

## 2020-07-24 NOTE — PROGRESS NOTES
ICU Transfer Note          PGY-1    Hospital Day: 4                                                         Code:Full Code  Admit Date: 7/21/2020                                 PCP: Junior De Leon MD      SUBJECTIVE:  Briefly this is a 39 y.o. male who presented with a 1 day history of headaches and dizziness. He was found to have found to have severe hypertension and a right lateral cerebellar ischemic stroke on CT non-contrast. CTA showed right V4 segment vertebral artery occlusion, Left V4 segment of vertebral artery severely stenotic. MRI later showed bilateral cerebellar infarcts, small on right, and moderately sized on the left. Stroke team was consulted and recommended against tPA. Neurosurgery and Neurology were on board and following the patient regularly. Patient was started on nicardipine drip for blood pressure control after which he was transitioned to PO medications (carvedilol 25 mg BID, lisinopril 40mg, Nifedipine 60mg, and later Imdur 30mg). Patient was also started on a statin and low-dose aspirin. Echo yesterday showed normal EF. Nephrology was consulted for secondary hypertension, renin aldosterone levels pending. Renal US planned, but not yet ordered. PHYSICAL EXAM:  BP (!) 158/114   Pulse 75   Temp 98 °F (36.7 °C) (Oral)   Resp 14   Wt 245 lb 9.5 oz (111.4 kg) Comment: bed extendr added  SpO2 97%   BMI 31.53 kg/m²      Physical Exam  Constitutional:       General: He is not in acute distress. Appearance: Normal appearance. He is not ill-appearing. Comments: sleepy   HENT:      Head: Normocephalic and atraumatic. Eyes:      General: No visual field deficit. Cardiovascular:      Rate and Rhythm: Normal rate and regular rhythm. Pulses: Normal pulses. Heart sounds: Normal heart sounds. No murmur. No friction rub. No gallop. Pulmonary:      Effort: Pulmonary effort is normal.      Breath sounds: Normal breath sounds. No wheezing, rhonchi or rales.    Abdominal: Palpations: Abdomen is soft. Tenderness: There is no abdominal tenderness. There is no guarding or rebound. Musculoskeletal:         General: No swelling. Skin:     General: Skin is warm and dry. Capillary Refill: Capillary refill takes less than 2 seconds. Neurological:      Mental Status: He is alert and oriented to person, place, and time. Cranial Nerves: Cranial nerves are intact. No cranial nerve deficit, dysarthria or facial asymmetry. Sensory: Sensation is intact. Motor: Motor function is intact. No weakness, tremor or atrophy. Coordination: Finger-Nose-Finger Test abnormal (Left UE ataxia). Heel to [facility] Test normal.      Comments: 5/5 Strength in Bilateral Upper and Lower Extremities    Per PT/OT note: patient very unsteady and frequently loses balance when on feet;       ASSESSMENT AND PLAN:    Please see progress note dated 7/24/2020    FEN: DIET GENERAL; Safety Tray; Safety Tray (Disposables)  PPx: SubQ hep  CODE: Full Code  DISPO: Transfer from ICU to [facility]    The objective and subjective findings as well as the ICU course of treatment have been reviewed with the ICU team. The treatment plan has been reviewed with the ICU team. The patient is being transferred to [place] [address] in stable condition.      Miguel Tadeo MD  Internal Medicine Resident, PGY-1  7/24/2020  11:39 AM

## 2020-07-24 NOTE — PROGRESS NOTES
PT'S ORIGINAL APPT WAS @   1300  , PT ARRIVED @  1305    , PT WAS ROOMED @   1311    hormonal replacement therapy (delestrogen & depotestosterone) ,injection given via   L  gluteal   information given to pt about menopause & hrt medications,    Physical Therapy  Facility/Department: [facility] ICU  Daily Treatment Note  NAME: Raul Solis  : 1975  MRN: [id number]    Date of Service: 2020    Discharge Recommendations:  Raul Solis scored a 17/24 on the AM-PAC short mobility form. Current research shows that an AM-PAC score of 17 or less is typically not associated with a discharge to the patient's home setting. Based on the patient's AM-PAC score and their current functional mobility deficits, it is recommended that the patient have 5-7 sessions per week of Physical Therapy at d/c to increase the patient's independence. At this time, this patient demonstrates the endurance, and/or tolerance for 3 hours of therapy each day, with a treatment frequency of 5-7x/wk. Please see assessment section for further patient specific details. If patient discharges prior to next session this note will serve as a discharge summary. Please see below for the latest assessment towards goals. Patient would benefit from continued therapy after discharge   PT Equipment Recommendations  Equipment Needed: (defer for now)    Assessment   Body structures, Functions, Activity limitations: Decreased functional mobility ; Decreased safe awareness;Decreased balance;Decreased endurance  Assessment: Pt progressing with functional mobility. However remains a high fall risk & unsafe for D/C home. Pt would benefit from cont inpt PT upon D/C. Will cont PT per plan of care to maximize independence. Treatment Diagnosis: impaired mobility associated with hypertensive emergency  Prognosis: Good  PT Education: Transfer Training;General Safety;Gait Training  REQUIRES PT FOLLOW UP: Yes  Activity Tolerance  Activity Tolerance: Patient Tolerated treatment well;Patient limited by fatigue     Patient Diagnosis(es): There were no encounter diagnoses. has a past medical history of Hyperlipidemia and Hypertension. has no past surgical history on file.     Restrictions  Position Activity Restriction  Other position/activity restrictions: activity as tolerated  Subjective   General  Chart Reviewed: Yes  Additional Pertinent Hx: Patient is a 40 y/o male admitted 7/21 with dizziness, vertigo and headache. Patient found to be hypertensive in the emergency room. MRI brain (+) for Small right and moderate left cerebellar infarcts. No acute hemorrhage. CT head (+) for Subtle apparent infarct within the lateral aspect of the right cerebellar hemisphere. PMH significant for HTN, HLD. Family / Caregiver Present: Yes(wife)  Referring Practitioner: MARY Small  Subjective  Subjective: Pt supine in bed & agreeable to PT. General Comment  Comments: Pt motivated & cooperative. Pain Screening  Patient Currently in Pain: Denies  Vital Signs  Patient Currently in Pain: Denies             Objective   Bed mobility  Supine to Sit: Stand by assistance  Sit to Supine: Stand by assistance  Transfers  Sit to Stand: Minimal Assistance  Stand to sit: Minimal Assistance;Contact guard assistance  Ambulation  Ambulation?: Yes  Ambulation 1  Surface: level tile  Device: Rolling Walker  Assistance: Minimal assistance  Quality of Gait: step-to pattern leading with left. decreased stance time & foot clearance left. narrow KAMALA. slight lean to left at times. heavy use of UEs. Distance: 150 ft  Comments: verbal cues for increased KAMALA. pt reporting fatigue & light-headedness. Stairs/Curb  Stairs?: No     Balance  Standing - Static: Fair(CGA to min A with RW)  Standing - Dynamic: Fair;Poor(min A with RW)  Exercises  Comments: standing with RW x 20 bilat LE: marching & heel/toe raises.  (pt required min A for balance)         Comment: sit<->stand x 10 from chair to RW                                                                    AM-PAC Score  AM-PAC Inpatient Mobility Raw Score : 17 (07/24/20 1524)  AM-PAC Inpatient T-Scale Score : 42.13 (07/24/20 1524)  Mobility Inpatient CMS 0-100% Score: 50.57 (07/24/20 1524)  Mobility Inpatient CMS G-Code Modifier : CK (07/24/20 1524)          Goals  Short term goals  Time Frame for Short term goals: discharge  Short term goal 1: patient will perform bed mobility with modified independence  Short term goal 2: patient will perform transfers sit<>stand with SBA  Short term goal 3: patient will ambulate 48' with FWW and min assist x 1 (met 7/24). new goal: amb 150 ft with/without AAD CGA.   Patient Goals   Patient goals : to be independent    Plan    Plan  Times per week: 5-7  Current Treatment Recommendations: Strengthening, Transfer Training, Endurance Training, Patient/Caregiver Education & Training, Balance Training, Gait Training, Functional Mobility Training, Safety Education & Training  Safety Devices  Type of devices: Gait belt, Left in bed, Call light within reach, Bed alarm in place, Nurse notified     Therapy Time   Individual Concurrent Group Co-treatment   Time In 1227         Time Out 1255         Minutes ELMER Rosa

## 2020-07-24 NOTE — CARE COORDINATION
Case Management Assessment           Daily Note                 Date/ Time of Note: 7/24/2020 3:36 PM         Note completed by: Torri Rising Day    Patient Name: Fam Guidry  YOB: 1975    Diagnosis:Hypertensive emergency [I16.1]  Patient Admission Status: Inpatient    Date of Admission:7/21/2020 11:17 AM Length of Stay: 3 GLOS:        Current Plan of Care: Therapy recommendations for ARU. Stroke. Downgrade to floor. ARU consult placed. Referral to [facility], [facility], and [facility] (has bed Tuesday) Will require pre-cert.  ______________________________________________________________________________  PT AM-PAC: 17 / 24 per last evaluation on: 7/23    OT AM-PAC: 18 / 24 per last evaluation on: 7/23     DME Needs for discharge: Defer   ________________________________________________________________________________________  Discharge Plan: Other: Home vs. Placement  Therapy recommendations for inpatient rehab. Tentative discharge date: 7/27 vs 7/28     Current barriers to discharge: ARU placement Pre-cert needed. Medical Clearance     Referrals completed: ARU: [facility], [facility], [facility] ARU     Resources/ information provided: Not indicated at this time  ________________________________________________________________________________________  Case Management Notes: SW met with patient at bedside. SW dicussed possible placement and patient agreeable of need for rehab before returning home. Recs for ARU. Patient has no preference. Referrals placed to [facility] (Jeannine Cushing), [facility] Amy Lips [phone]), and [facility] ([phone]). SW sent referrals. Pre-cert needed. [facility] reported having bed early next week. Dr. Mikala Lozoya placed PM&R consult after MEAGAN ment with RN LAZARUS cadet and Director and they directed that consult could be placed. MEAGAN continues to follow.      Both [facility] and [facility] accept patient's insurance and will follow up [facility] Monday AM regarding need and to start pre-cert pending patient choice. Saminamaninder Escobar and his family were provided with choice of provider; he and his family are in agreement with the discharge plan.     Care Transition Patient: JAYA Gomez  The [address] [place] [address] ICU  Case Management Department  Ph: [phone]

## 2020-07-24 NOTE — PROGRESS NOTES
Patient Tolerated treatment well  Safety Devices  Safety Devices in place: Yes  Type of devices: Nurse notified;Call light within reach; Chair alarm in place; Left in chair         Patient Diagnosis(es): There were no encounter diagnoses. has a past medical history of Hyperlipidemia and Hypertension. has no past surgical history on file. Restrictions  Position Activity Restriction  Other position/activity restrictions: activity as tolerated  Subjective   General  Chart Reviewed: Yes  Patient assessed for rehabilitation services?: Yes  Additional Pertinent Hx: 39 y.o. M presented to the ED with headache and dizziness that started on 7/20. 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 Patient was transferred to [facility] ICU for further neurosurgical follow up; Tip Lopez Neurovascular surgeon reviewed CTA Head/Neck and stated no neurovascular intervention indicated. PMH: HTN, Hyperlipidemia. Family / Caregiver Present: No  Referring Practitioner: ANYA Rainey CNP  Diagnosis: Hypertensive Urgency    Subjective  Subjective: In bed on entry. Agreeable to OT. \"I'm trying to keep my mind from the darkness. \" (expressing feelings of depressing w/ current situation)    Vital Signs  Patient Currently in Pain: Denies     Orientation  Orientation  Overall Orientation Status: Within Normal Limits     Objective    ADL  Grooming: Contact guard assistance(brushing teeth in standing at sink level; CGA for balance; able to open containers and utilizing both RUE and LUE - decreased dexterity and motor control w/ LUE)  LE Dressing: Minimal assistance(don pants - steadying assist in standing; donned socks while seated at EOB - SBA)           Balance  Sitting Balance: Stand by assistance  Standing Balance: (CGA - static (for brushing teeth at sink - abd resting against sink);  Min A (for pullup of pants in standing))    Standing Balance  Time: > 10 minutes  Activity: mobiltiy bed > sink for grooming task > mobility in room  Comment: tolerated well; c/o fatigue at end of treatment session    Functional Mobility  Functional Mobility Comments:  Mod A ambulation to/from sink w/o AD - very unsteady and multiple LOB during mobility, LLE ataxic and \"tightrope walking\" - cues to keep KAMALA widened; introduced wh walker - improved to Min/Mod A (cues for stepping sequence, increased assist w/ turning, cues for widened KAMALA)    Bed mobility  Supine to Sit: Stand by assistance  Scooting: Stand by assistance(to EOB)     Transfers  Sit to stand: Contact guard assistance(from EOB and chair)  Stand to sit: Minimal assistance(cues to reach back and be centered prior to sitting down)           Coordination  Gross Motor: reaching objects and intentional placing/stacking of items - clumsy LUE (although improved some from yesterday); did knock over a few items; utilized LUE in grooming task - clumsy and decreased dexterity observed  Fine Motor: finger opposition intact bilaterally; able to open toothpaste tube and mouthrinse bottle w/ effort and intentionality                 Cognition  Overall Cognitive Status: WFL                       Type of ROM/Therapeutic Exercise  Comment: AROM shoulder flex x 7 (emphasis on slow controlled movements)                    Plan   Plan  Times per week: 5-7  Times per day: Daily  Current Treatment Recommendations: Balance Training, Functional Mobility Training, Endurance Training, Safety Education & Training, Self-Care / ADL                                                    AM-PAC Score        AM-[facility] Inpatient Daily Activity Raw Score: 18 (07/24/20 0955)  AM-PAC Inpatient ADL T-Scale Score : 38.66 (07/24/20 0955)  ADL Inpatient CMS 0-100% Score: 46.65 (07/24/20 0955)  ADL Inpatient CMS G-Code Modifier : CK (07/24/20 0955)    Goals  Short term goals  Time Frame for Short term goals: Discharge  Short term goal 1: toilet transfer w/ CGA (not met)  Short term goal 2: grooming task in standing, CGA (MET 7/24); revised goal: grooming task in standing at sink level, SBA (not met)  Short term goal 3: pants w/ CGA (not met)  Patient Goals   Patient goals : to be independent; be able to play the piano       Therapy Time   Individual Concurrent Group Co-treatment   Time In 0830         Time Out 0915         Minutes [facility] OTR/L #[id number]

## 2020-07-24 NOTE — PLAN OF CARE
Problem: HEMODYNAMIC STATUS  Goal: Patient has stable vital signs and fluid balance  7/24/2020 1113 by Jose Juan Valentine RN  Outcome: Ongoing  Pt on scheduled BP medications. Hydralazine given once this shift. BP has been controlled after AM medications so far. Problem: ACTIVITY INTOLERANCE/IMPAIRED MOBILITY  Goal: Mobility/activity is maintained at optimum level for patient  7/24/2020 1113 by Jose Juan Valentine RN  Outcome: Ongoing  Pt worked with PT/OT this shift. Up to chair for about an hour after. Walks with walker, gait belt, and x1-2 assist.     Problem: Falls - Risk of:  Goal: Will remain free from falls  Description: Will remain free from falls  7/24/2020 1113 by Jose Juan Valentine RN  Outcome: Ongoing  Independent with ADL's. Uses call light appropriately for assistance as needed. Bed breaks on, non skid footwear on, side rails up x2, call light within reach. Hourly rounding performed per unit protocol. Will continue safety precautions. Problem: Pain:  Goal: Pain level will decrease  Description: Pain level will decrease  Outcome: Ongoing   No complaints of pain this shift. Problem: Skin Integrity:  Goal: Will show no infection signs and symptoms  Description: Will show no infection signs and symptoms  Outcome: Met This Shift   No skin issues noted this shift.

## 2020-07-24 NOTE — PROGRESS NOTES
Speech Language Pathology  Facility/Department: [facility] ICU  Dysphagia Daily Treatment Note / DISCHARGE    NAME: Alex Taylor  : 1975  MRN: [id number]    Patient Diagnosis(es):   Patient Active Problem List    Diagnosis Date Noted    Acute CVA (cerebrovascular accident) ([facility] [address] 2020    Hypertensive emergency 2020    Hypertension 2019    Hypertensive urgency 2019     Allergies: No Known Allergies  Onset Date: 2020  Current Diet Level:  Regular / thins        CXR (2020)-  Impression    New nonspecific elevation of the right hemidiaphragm, with mild right basilar    atelectasis evident.  There is no evidence of pneumonia. Previous MBS -N/A    Chart reviewed. Medical Diagnosis: CVA  Treatment Diagnosis: Oropharyngeal Dysphagia    BSE Impression (2020)-  Pt consumed trials of water via cup and straw, including 3 ounces of uninterrupted swallows of water via straw with no overt signs of aspiration, voice remained clear. After cricket cracker, pt coughing and clearing throat. Pt reported it felt like crumbs were in his throat and his mouth had been dry. Pt given time to recover and then re-assessed with liquids including another 3 ounce water trial with no further coughing noted. Pt demonstrated effective mastication with hard solids. Dysphagia Diagnosis: Swallow function appears grossly intact  Dysphagia Outcome Severity Scale: Level 6: Within functional limits/Modified independence     MBS results - Not indicated    Pain: None indicated     Current Diet : Regular / thins    Treatment:  Pt seen bedside to address the following goals:  1- The patient will tolerate recommended diet without observed clinical signs of aspiration   - Chart review does not reveal any documented difficulty on current recommended diet level. Pt afebrile and on room air per flowsheets. Rn with no specific concerns re: dysphagia at this time.  Pt consumed soft solids, thins via straw and pills whole with liquids. No overt s/s of aspiration. Pt does report coughing and \"tickling\" with breads and crackers at baseline. None this date. GOAL MET.      2-The patient/caregiver will demonstrate understanding of dysphagia recommendations/ compensatory strategies for improved swallowing safety. 7/23: Educated pt to purpose of visit, s/s of aspiration, concern if aspiration occurs, rationale for diet recommendation/strategies to reduce risk for aspiration and instruction to notify staff if any signs emerge. Pt and family stated good understanding and agreeable  Cont goal   7/24 - Education ongoing re: aspiration, aspiration pneumonia, importance of oral care. Provided with utensils for oral care. Pt indicated understanding and also requesting mouthwash at this time. GOAL MET.      Patient/Family/Caregiver Education:  No family present. See above. Compensatory Strategies:  General aspiration precautions only     Plan: Discharge from speech therapy  Diet recommendations: Regular diet / thins  DC recommendation: No indications for speech therapy. Treatment: 10 minutes  D/W nursing. Viji  Needs met prior to leaving room, call button in reach. Salazar Bui M.A., [address] [place] [phone]  Speech-Language Pathologist  Pg.  # [phone]

## 2020-07-24 NOTE — PROGRESS NOTES
4 Eyes Admission Assessment     I agree as the admission nurse that 2 RN's have performed a thorough Head to Toe Skin Assessment on the patient. ALL assessment sites listed below have been assessed on admission. Areas assessed by both nurses:   [x]   Head, Face, and Ears   [x]   Shoulders, Back, and Chest  [x]   Arms, Elbows, and Hands   [x]   Coccyx, Sacrum, and Ischum  [x]   Legs, Feet, and Heels        Does the Patient have Skin Breakdown?   No         Mike Prevention initiated:  No   Wound Care Orders initiated:  No      [facility] nurse consulted for Pressure Injury (Stage 3,4, Unstageable, DTI, NWPT, and Complex wounds):  No      Nurse 1 eSignature: Electronically signed by Nimco Lopes on 7/24/20 at 7:09 PM EDT    **SHARE this note so that the co-signing nurse is able to place an eSignature**    Nurse 2 eSignature: Electronically signed by Caesar Sosa RN on 7/24/20 at 7:10 PM EDT

## 2020-07-24 NOTE — PROGRESS NOTES
Patient admitted to room 5527 at 1900 from ICU. 4-eye skin assessment completed. Patient is a/o x4. Patient denies complaints of nausea/pain. Non-skid socks remain in place. Oriented patient to room and call light. Bed locked and in lowest positioned. Bedside table, personal belongings, and nurse call light within reach. Instructed patient to utilize call light for assistance. Bed alarm on. Will continue to monitor.

## 2020-07-25 LAB
A/G RATIO: 1.3 (ref 1.1–2.2)
ALBUMIN SERPL-MCNC: 4.2 G/DL (ref 3.4–5)
ALP BLD-CCNC: 71 U/L (ref 40–129)
ALT SERPL-CCNC: 11 U/L (ref 10–40)
ANION GAP SERPL CALCULATED.3IONS-SCNC: 10 MMOL/L (ref 3–16)
AST SERPL-CCNC: 11 U/L (ref 15–37)
BASOPHILS ABSOLUTE: 0 K/UL (ref 0–0.2)
BASOPHILS RELATIVE PERCENT: 0.6 %
BILIRUB SERPL-MCNC: 0.7 MG/DL (ref 0–1)
BUN BLDV-MCNC: 19 MG/DL (ref 7–20)
CALCIUM SERPL-MCNC: 9.7 MG/DL (ref 8.3–10.6)
CHLORIDE BLD-SCNC: 99 MMOL/L (ref 99–110)
CO2: 24 MMOL/L (ref 21–32)
CREAT SERPL-MCNC: 1.3 MG/DL (ref 0.9–1.3)
EOSINOPHILS ABSOLUTE: 0.1 K/UL (ref 0–0.6)
EOSINOPHILS RELATIVE PERCENT: 0.7 %
GFR AFRICAN AMERICAN: >60
GFR NON-AFRICAN AMERICAN: 60
GLOBULIN: 3.3 G/DL
GLUCOSE BLD-MCNC: 106 MG/DL (ref 70–99)
HCT VFR BLD CALC: 38.1 % (ref 40.5–52.5)
HEMOGLOBIN: 13 G/DL (ref 13.5–17.5)
LYMPHOCYTES ABSOLUTE: 2.4 K/UL (ref 1–5.1)
LYMPHOCYTES RELATIVE PERCENT: 34.4 %
MCH RBC QN AUTO: 29.4 PG (ref 26–34)
MCHC RBC AUTO-ENTMCNC: 34 G/DL (ref 31–36)
MCV RBC AUTO: 86.4 FL (ref 80–100)
MONOCYTES ABSOLUTE: 0.6 K/UL (ref 0–1.3)
MONOCYTES RELATIVE PERCENT: 8.8 %
NEUTROPHILS ABSOLUTE: 3.9 K/UL (ref 1.7–7.7)
NEUTROPHILS RELATIVE PERCENT: 55.5 %
PDW BLD-RTO: 13.4 % (ref 12.4–15.4)
PLATELET # BLD: 282 K/UL (ref 135–450)
PMV BLD AUTO: 9.7 FL (ref 5–10.5)
POTASSIUM REFLEX MAGNESIUM: 4.1 MMOL/L (ref 3.5–5.1)
RBC # BLD: 4.41 M/UL (ref 4.2–5.9)
SODIUM BLD-SCNC: 133 MMOL/L (ref 136–145)
TOTAL PROTEIN: 7.5 G/DL (ref 6.4–8.2)
WBC # BLD: 7 K/UL (ref 4–11)

## 2020-07-25 PROCEDURE — 82088 ASSAY OF ALDOSTERONE: CPT

## 2020-07-25 PROCEDURE — 2580000003 HC RX 258: Performed by: STUDENT IN AN ORGANIZED HEALTH CARE EDUCATION/TRAINING PROGRAM

## 2020-07-25 PROCEDURE — 97110 THERAPEUTIC EXERCISES: CPT

## 2020-07-25 PROCEDURE — 6370000000 HC RX 637 (ALT 250 FOR IP): Performed by: STUDENT IN AN ORGANIZED HEALTH CARE EDUCATION/TRAINING PROGRAM

## 2020-07-25 PROCEDURE — 80053 COMPREHEN METABOLIC PANEL: CPT

## 2020-07-25 PROCEDURE — 97530 THERAPEUTIC ACTIVITIES: CPT

## 2020-07-25 PROCEDURE — 6360000002 HC RX W HCPCS: Performed by: STUDENT IN AN ORGANIZED HEALTH CARE EDUCATION/TRAINING PROGRAM

## 2020-07-25 PROCEDURE — 85025 COMPLETE CBC W/AUTO DIFF WBC: CPT

## 2020-07-25 PROCEDURE — 84244 ASSAY OF RENIN: CPT

## 2020-07-25 PROCEDURE — 1200000000 HC SEMI PRIVATE

## 2020-07-25 PROCEDURE — 36415 COLL VENOUS BLD VENIPUNCTURE: CPT

## 2020-07-25 RX ORDER — LOSARTAN POTASSIUM 25 MG/1
100 TABLET ORAL DAILY
Status: DISCONTINUED | OUTPATIENT
Start: 2020-07-26 | End: 2020-07-29 | Stop reason: HOSPADM

## 2020-07-25 RX ADMIN — PANTOPRAZOLE SODIUM 40 MG: 40 TABLET, DELAYED RELEASE ORAL at 05:33

## 2020-07-25 RX ADMIN — CARVEDILOL 25 MG: 25 TABLET, FILM COATED ORAL at 08:32

## 2020-07-25 RX ADMIN — CLOPIDOGREL BISULFATE 75 MG: 75 TABLET ORAL at 08:32

## 2020-07-25 RX ADMIN — Medication 10 ML: at 08:33

## 2020-07-25 RX ADMIN — ATORVASTATIN CALCIUM 80 MG: 80 TABLET, FILM COATED ORAL at 21:17

## 2020-07-25 RX ADMIN — LISINOPRIL 40 MG: 20 TABLET ORAL at 08:32

## 2020-07-25 RX ADMIN — HEPARIN SODIUM 5000 UNITS: 5000 INJECTION INTRAVENOUS; SUBCUTANEOUS at 11:29

## 2020-07-25 RX ADMIN — CARVEDILOL 25 MG: 25 TABLET, FILM COATED ORAL at 17:40

## 2020-07-25 RX ADMIN — HEPARIN SODIUM 5000 UNITS: 5000 INJECTION INTRAVENOUS; SUBCUTANEOUS at 05:33

## 2020-07-25 RX ADMIN — HEPARIN SODIUM 5000 UNITS: 5000 INJECTION INTRAVENOUS; SUBCUTANEOUS at 21:17

## 2020-07-25 RX ADMIN — ISOSORBIDE MONONITRATE 30 MG: 30 TABLET, EXTENDED RELEASE ORAL at 08:32

## 2020-07-25 RX ADMIN — ASPIRIN 81 MG: 81 TABLET, CHEWABLE ORAL at 08:32

## 2020-07-25 RX ADMIN — NIFEDIPINE 60 MG: 30 TABLET, EXTENDED RELEASE ORAL at 08:32

## 2020-07-25 ASSESSMENT — PAIN DESCRIPTION - PROGRESSION
CLINICAL_PROGRESSION: NOT CHANGED

## 2020-07-25 ASSESSMENT — PAIN SCALES - GENERAL: PAINLEVEL_OUTOF10: 0

## 2020-07-25 NOTE — PROGRESS NOTES
Checked in on pt at this time, the pt tells me that he isn't any more tired than he has been since he has been here and also that he isn't any more unsteady since he has been here, pt states \"nothing is new, I've been like this. \"  Pt denies feeling like her was going to pass out or anything like that. Pt has no current needs, will continue to follow.   Harjinder Merchant

## 2020-07-25 NOTE — PROGRESS NOTES
Internal Medicine  Progress Note  PGY-3    Hospital Day: 5                                                      Admit Date: 7/21/2020                                     PCP: Patricia Kimbrough MD      CC:  Dizziness, headaches                      Interval Hx: No acute events overnight. Daily Plan:  7/25/2020    Subjective: Patient seen and examined at bedside. Overall feeling well aside from that his eyes feel \"heavy\". No headaches or dizziness, no vision changes, weakness, numbness or tingling. Denies any chest pain, SOB, abdominal pain, nausea, vomiting. Medications:    Scheduled Meds:   isosorbide mononitrate  30 mg Oral Daily    lisinopril  40 mg Oral Daily    heparin (porcine)  5,000 Units Subcutaneous Q8H    atorvastatin  80 mg Oral Nightly    clopidogrel  75 mg Oral Daily    aspirin  81 mg Oral Daily    pantoprazole  40 mg Oral QAM AC    sodium chloride flush  10 mL Intravenous 2 times per day    carvedilol  25 mg Oral BID WC    NIFEdipine  60 mg Oral Daily      Continuous Infusions:  PRN Meds:hydrALAZINE, labetalol, sodium chloride flush, acetaminophen **OR** acetaminophen, polyethylene glycol, promethazine **OR** ondansetron, perflutren lipid microspheres    Allergies: No Known Allergies      Physical Exam:     Vitals: BP (!) 146/101   Pulse 80   Temp 99.2 °F (37.3 °C) (Oral)   Resp 16   Ht 6' 2\" (1.88 m)   Wt 226 lb 10.1 oz (102.8 kg)   SpO2 96%   BMI 29.10 kg/m²     I/O:      Intake/Output Summary (Last 24 hours) at 7/25/2020 0810  Last data filed at 7/25/2020 0724  Gross per 24 hour   Intake 240 ml   Output 1450 ml   Net -1210 ml       Physical Exam  Constitutional:       General: He is not in acute distress. Appearance: He is normal weight. He is not ill-appearing, toxic-appearing or diaphoretic. HENT:      Head: Normocephalic and atraumatic. Eyes:      General:         Right eye: No discharge. Left eye: No discharge.       Extraocular Movements: Extraocular movements intact. Pupils: Pupils are equal, round, and reactive to light. Neck:      Musculoskeletal: Normal range of motion and neck supple. No neck rigidity. Cardiovascular:      Rate and Rhythm: Normal rate and regular rhythm. Pulses: Normal pulses. Heart sounds: Normal heart sounds. No murmur. No friction rub. No gallop. Pulmonary:      Effort: Pulmonary effort is normal. No respiratory distress. Breath sounds: Normal breath sounds. No wheezing, rhonchi or rales. Abdominal:      General: Abdomen is flat. Bowel sounds are normal. There is no distension. Palpations: Abdomen is soft. Tenderness: There is no abdominal tenderness. There is no guarding or rebound. Musculoskeletal: Normal range of motion. General: No swelling or tenderness. Skin:     General: Skin is warm and dry. Coloration: Skin is not jaundiced. Neurological:      Mental Status: He is alert.       Comments: AAOx3   CN II-XII intact  Mild left partial ptosis  Strength 5/5 upper and lower extremities bilaterally  Sensation to light touch intact bilaterally  Past pointing present with finger to nose on left, finger to nose on right normal.   Heel to shin normal bilaterally  No disdiadochokinesia          DATA:       Labs  CBC:   Recent Labs     07/23/20  0446 07/24/20  0437 07/25/20  0554   WBC 7.9 7.8 7.0   HGB 13.5 12.9* 13.0*   HCT 40.1* 38.4* 38.1*    284 282       BMP:   Recent Labs     07/23/20 0445 07/24/20  0437 07/25/20  0554    135* 133*   K 3.7 4.1 4.1    100 99   CO2 24 23 24   BUN 17 22* 19   CREATININE 1.2 1.5* 1.3   GLUCOSE 121* 110* 106*     LFT's:   Recent Labs     07/23/20  0445 07/24/20  0437 07/25/20  0554   AST 14* 12* 11*   ALT 11 11 11   BILITOT 0.6 0.4 0.7   ALKPHOS 64 66 71       Urinalysis:  Lab Results   Component Value Date    NITRU Negative 07/21/2020    WBCUA 0 07/21/2020    BACTERIA 1+ 02/14/2013    RBCUA 3 07/21/2020    BLOODU TRACE 07/21/2020 SPECGRAV 1.015 07/21/2020    GLUCOSEU Negative 07/21/2020    GLUCOSEU NEGATIVE 11/14/2011       Radiology:  MRI BRAIN W WO CONTRAST   Final Result      1. Small right and moderate left cerebellar infarcts. No acute hemorrhage. 2.  Mild chronic small vessel ischemic disease. Chronic right basal ganglia lacunar infarct. US RENAL COMPLETE    (Results Pending)       ASSESSMENT AND PLAN:   Trisha Harris is a 38 yo M who presented with headache and dizziness. In the eD was found to be hypertensive  and CTA revealed severe stenosis of both ICAs, VAs. CT showed a right cerebellar infarct. MRI confirmed.      Dizziness/vertigo 2/2 bilateral cerebellar infarcts L>R - ECHO negative for bubble  - neurology following, continue DAPT x 3 months followed by Plavix monotherapy  - statin  - q4h neuro checks     Severe stenosis of ICA, VA bilaterally- CTA head and neck showed Right V4 segment of vertebral artery occlusion, Left V4 segment is severely stenotic. Right intracranial ICA cavernous and paraclinoid segments 70% stenosis. Left intracranial ICA cavernous segment 50% stenosis.   - neurosurgery following, no intervention at this time     Hypertensive emergency - resolved   - nephrology consulted, secondary HTN started.    - renin/aldosterone pending  - renal US pending  - continue imdur, lisinopril, nifedipine, coreg     Hyperlipidemia  - Lipitor 80 mg     Code Status:Full Code  FEN: DIET GENERAL; Safety Tray; Safety Tray (Disposables)  PPX: subQ heparin  DISPO: F  PT/OT Eval Status: 18/24 with OT, 12/24 with PT    I will discuss the patient with Lico Choe MD  -----------------------------  Hari Bhagat MD  Internal Medicine Resident, PGY-3

## 2020-07-25 NOTE — PROGRESS NOTES
Physical Therapy  Facility/Department: [facility] 5T ORTHO/NEURO  Daily Treatment Note  NAME: Alex Taylor  : 1975  MRN: [id number]    Date of Service: 2020    Discharge Recommendations:    Alex Taylor scored a 17/24 on the AM-PAC short mobility form. Current research shows that an AM-PAC score of 17 or less is typically not associated with a discharge to the patient's home setting. Based on the patient's AM-PAC score and their current functional mobility deficits, it is recommended that the patient have 5-7 sessions per week of Physical Therapy at d/c to increase the patient's independence. At this time, this patient demonstrates the endurance, and/or tolerance for 3 hours of therapy each day, with a treatment frequency of 5-7x/wk. Please see assessment section for further patient specific details. If patient discharges prior to next session this note will serve as a discharge summary. Please see below for the latest assessment towards goals. IP Rehab   PT Equipment Recommendations  Equipment Needed: (defer for now)    Assessment   Body structures, Functions, Activity limitations: Decreased functional mobility ; Decreased safe awareness;Decreased balance;Decreased endurance  Assessment: Pt progressing with functional mobility. However remains a high fall risk & unsafe for D/C home. Pt orthostatic this date with positional changes. RN notified (see VS). Good tolerance for therapeutic exercises, and highly motivated d/t active and independent PLOF. Pt would benefit from cont inpt PT upon D/C. Will cont PT per plan of care to maximize independence.   Treatment Diagnosis: impaired mobility associated with hypertensive emergency  Prognosis: Good  PT Education: Transfer Training;General Safety;Gait Training  REQUIRES PT FOLLOW UP: Yes  Activity Tolerance  Activity Tolerance: Patient limited by fatigue;Treatment limited secondary to medical complications (free text)  Activity Tolerance: orthostatic- see VS; RN notified     Patient Diagnosis(es): There were no encounter diagnoses. has a past medical history of Hyperlipidemia and Hypertension. has no past surgical history on file.     Restrictions  Position Activity Restriction  Other position/activity restrictions: activity as tolerated  Subjective   General  Chart Reviewed: Yes  Family / Caregiver Present: Yes(S.O.)  Pain Screening  Patient Currently in Pain: Denies  Vital Signs  Orthostatic B/P and Pulse?: Yes  Blood Pressure Lyin/87  Pulse Lyin PER MINUTE  Blood Pressure Sittin/83(pt symptomatic, swaying EOB)  Blood Pressure Standing: (not able to take d/t pt eyes closed, reporting extreme fatigue (symptomatic))  Level of Consciousness: Responds to Voice or New Confusion or Agitation  Patient Currently in Pain: Denies  Orthostatic B/P and Pulse?: Yes       Orientation  Orientation  Overall Orientation Status: Within Functional Limits  Cognition      Objective   Bed mobility  Supine to Sit: Stand by assistance  Sit to Supine: Stand by assistance  Transfers  Sit to Stand: Minimal Assistance(reports extreme exhaustion)  Stand to sit: Minimal Assistance        Balance  Sitting - Static: Fair  Sitting - Dynamic: Fair;-(postural sway increasing with increased time sitting)  Standing - Static: Fair(CGA to min A with RW)  Standing - Dynamic: Poor(CGA to min A with RW)  Exercises  Straight Leg Raise: 10x B  Heelslides: 10x B  Hip Abduction: abd/add in hooklying, 10x  Ankle Pumps: 10x  Core Strengthening: bicycles 10x B (initiated by pt)                                      AM-PAC Score  AM-PAC Inpatient Mobility Raw Score : 17 (20)  AM-PAC Inpatient T-Scale Score : 42.13 (20)  Mobility Inpatient CMS 0-100% Score: 50.57 (20)  Mobility Inpatient CMS G-Code Modifier : CK (20)          Goals  Short term goals  Time Frame for Short term goals: discharge  Short term goal 1: patient will perform bed mobility with modified independence  Short term goal 2: patient will perform transfers sit<>stand with SBA  Short term goal 3: patient will ambulate 48' with FWW and min assist x 1 (met 7/24). new goal: amb 150 ft with/without AAD CGA.   Patient Goals   Patient goals : to be independent    Plan    Plan  Times per week: 5-7  Current Treatment Recommendations: Strengthening, Transfer Training, Endurance Training, Patient/Caregiver Education & Training, Balance Training, Gait Training, Functional Mobility Training, Safety Education & Training  Safety Devices  Type of devices: Gait belt, Left in bed, Call light within reach, Bed alarm in place, Nurse notified     Therapy Time   Individual Concurrent Group Co-treatment   Time In 1513         Time Out 1536         Minutes 23         Timed Code Treatment Minutes: 23 Minutes   8 min TE, 15 min TA    Gilbert Joseph, PT

## 2020-07-25 NOTE — PROGRESS NOTES
Patient is alert and oriented x 4. Calls out appropriately. NIHSS is a 0. Neuro checks are WDL. Patient denies pain at this time. He is tolerating PO fluids well, he is voiding adequately via the urinal. Vital signs are stable. Patient is resting comfortably in bed at this time. Will continue to assess and monitor.

## 2020-07-25 NOTE — PLAN OF CARE
Problem: COMMUNICATION IMPAIRMENT  Goal: Ability to express needs and understand communication  Outcome: Ongoing  Note: Patient communicates wants and needs effectively without any difficulty. He has no sign of dysarthria or aphasia. His sentences are fluent and easy to understand. NIHSS of 0. Will continue to monitor and assess. Problem: Falls - Risk of:  Goal: Will remain free from falls  Description: Will remain free from falls  7/25/2020 0017 by Vicky Gama RN  Outcome: Ongoing  Note: Fall precautions in place. Bed is in lowest position, wheels locked and alarm on. Non-skid socks on. Call light and bedside table within reach. Pt calls out appropriately. Pt is up x 1 assist with gait belt and a walker. Will continue to assess and monitor. Problem: Pain:  Goal: Pain level will decrease  Description: Pain level will decrease  7/25/2020 0017 by Vicky Gama RN  Outcome: Ongoing  Note: Patient uses 0-10 pain scale. Patient denies any pain at this time. He is encouraged to call out if any new pain arises. Will continue to monitor and assess.

## 2020-07-25 NOTE — PROGRESS NOTES
[facility])  Nephrology Progress Note  7/25/2020 11:43 AM     Patient: Soledad Ariza [id number]  [id number]  Date of Admit: 7/21/2020 LOS: 4 days  Referring physician: Kush Neal MD  Outpatient Nephrologist:  Dr. Monica Dee, [address] [facility] location. Has not seen yet as o/p. Missed appt. Chief Concern/  A&P  39 y. o. male admitted 3 days ago for headaches and dizziness; he was found to have acute ischemic stroke: MRI  showed bilateral cerebellar infarcts, small on right, and moderately sized on the left( also had CT and CTA)    Assessment & Plan  # HTN- possibly malignant HTN, but there is a certain degree of medical non-compliance ( not taking medications, did not f/u with dr Monica Dee as planned); in 2019 he was discharged on carvedilol 25 mg BID, lisinopril 20 mg daily,Nifedipine ED 60 mg po daily and take second dose in day if SBP >160     -given strong family history, essential HTN was suspected ; still agree with workup for secondary etiology at this time, as ordered    - Work up pending; sent off additional studies  - Symptoms of SHRUTHI positive  - Will arrange f/u with Dr. Monica Dee at [facility] location, closer to patients house. # CKD- creatinine 1.4 mg/dl in 2019, now at 1.5  -Due likely to uncontrolled HTN   UPC was  0.15 in 2019 - so concern for FSG or other GN much less with this  -will repeat     #. Acute ischemic stroke; Neurology following suspected severe stenosis of ICA/VA. NSG following. No current surgical intervention planned. Recommendations:   Changed lisinopril to Losartan for better efficacy   D/w patient may need to consider o/p sleep study, severe symptoms c/w SHRUTHI. Will order additional studies for secondary work up  Many studies remain pending  Will arrange o/p follow up at [facility] location closer to his house, will follow while inpatient. Thank you for allowing us to participate in the care of this patient.   Please call with any questions. Signed:  Juan Baires M.D.   [facility]  Office Number: [phone]  Fax Number: [phone]  7/25/2020, 11:43 AM  Summary/Interval History    39 y. o. male admitted 3 days ago for headaches and dizziness; he was found to have acute ischemic stroke: MRI  showed bilateral cerebellar infarcts, small on right, and moderately sized on the left( also had CT and CTA)   Stroke team was consulted and recommended against tPA.   Neurosurgery and Neurology are  on board . Mr Sammi Simon was seen by my partner dr Florina Ponce, in March 2019 at [facility]. He  has a significant past medical history of HTN known for several years; he stopped BP meds at some point in 2018 ( started on an exercise regimen and weight loss). He was admitted in March 2019 with  profound BP elevation 230's/120's.    The patient has elevated creatinine of 1.4 in March 2019. He has extensive family history of HTN and his mother has ESRD,on maintenance HD(she has profound HTN despite multiple medications). Interval History (Chart/Data reviewed): BP better controlled, noted imaging, pt reports strong history of SHRUTHI but has never been tested, admits misses medication dosing. Discussed/counseled risks of prolonged Hypertension and strong family hx. Updated at bedside: Patient, no visitors. Past medical, family, and social histories were reviewed as previously documented. Updates were made as necessary. Review of Systems ROS with pertinent positives and negatives listed in interval history. Medications   Inpatient Meds:  Medications Prior to Admission: carvedilol (COREG) 25 MG tablet, TAKE 1 TABLET BY MOUTH TWICE A DAY WITH MEALS  lisinopril (PRINIVIL;ZESTRIL) 20 MG tablet, TAKE ONE TABLET IN THE MORNING.   NIFEdipine (PROCARDIA XL) 60 MG extended release tablet, TAKE ONE TABLET IN THE EVENING  omeprazole (PRILOSEC) 20 MG delayed release capsule, Take 1 capsule by mouth daily for 30 doses  aspirin 81 MG chewable tablet, Take 1 tablet by mouth daily    Scheduled Meds:   isosorbide mononitrate  30 mg Oral Daily    lisinopril  40 mg Oral Daily    heparin (porcine)  5,000 Units Subcutaneous Q8H    atorvastatin  80 mg Oral Nightly    clopidogrel  75 mg Oral Daily    aspirin  81 mg Oral Daily    pantoprazole  40 mg Oral QAM AC    sodium chloride flush  10 mL Intravenous 2 times per day    carvedilol  25 mg Oral BID WC    NIFEdipine  60 mg Oral Daily     Continuous Infusions:  PRN medications: hydrALAZINE, labetalol, sodium chloride flush, acetaminophen **OR** acetaminophen, polyethylene glycol, promethazine **OR** ondansetron, perflutren lipid microspheres    Allergies: No Known Allergies  Vital Signs / Exam     Vitals:    20 0230 20 0725 20 0951 20 1131   BP: 129/75 (!) 146/101  116/69   Pulse: 85 80 75 79   Resp: 17 16  15   Temp: 98.6 °F (37 °C) 99.2 °F (37.3 °C)  98.4 °F (36.9 °C)   TempSrc: Oral Oral  Oral   SpO2: 95% 96%  97%   Weight:       Height:         Wt Readings from Last 10 Encounters:   20 226 lb 10.1 oz (102.8 kg)   20 215 lb (97.5 kg)   19 212 lb (96.2 kg)   19 213 lb 3.2 oz (96.7 kg)   03/10/19 210 lb 11.2 oz (95.6 kg)   16 237 lb (107.5 kg)   13 260 lb (117.9 kg)   12 242 lb (109.8 kg)   11 246 lb (111.6 kg)     Admit Wt: Weight: 245 lb 9.5 oz (111.4 kg)(bed extendr added)   Todays Wt: Weight: 226 lb 10.1 oz (102.8 kg)  Average, Min, and Max for last 24 hours Vitals:  TEMPERATURE:  Temp  Av.3 °F (36.8 °C)  Min: 97.3 °F (36.3 °C)  Max: 99.2 °F (37.3 °C)  RESPIRATIONS RANGE: Resp  Avg: 15  Min: 10  Max: 21  PULSE RANGE: Pulse  Av.6  Min: 75  Max: 103  BLOOD PRESSURE RANGE:    Systolic (51WCF), ZNL:202 , Min:112 , JEH:022     Diastolic (95CPD), LWP:37, Min:69, Max:105    PULSE OXIMETRY RANGE: SpO2  Av %  Min: 91 %  Max: 99 %    Estimated body mass index is 29.1 kg/m² as calculated from the following:    Height as of RETICCTPCT  Lab Results   Component Value Date    LABA1C 5.4 07/21/2020    LABA1C 5.1 03/08/2019    LABA1C 6.1 03/30/2016     Lab Results   Component Value Date    COLORU YELLOW 07/21/2020    CLARITYU CLOUDY (A) 07/21/2020    PHUR 8.0 07/21/2020    GLUCOSEU Negative 07/21/2020    BLOODU TRACE (A) 07/21/2020    LEUKOCYTESUR Negative 07/21/2020    BILIRUBINUR Negative 07/21/2020    UROBILINOGEN 0.2 07/21/2020    RBCUA 3 07/21/2020    WBCUA 0 07/21/2020    BACTERIA 1+ 02/14/2013      No results found for: HAV, HEPAIGM, HEPBIGM, HEPBCAB, HBEAG, HEPCAB   No results found for: AEROBOT, ANABOT, LABGRAM, ISO2, ISO3, ISO4, ISO5, ISO6       Ct Head Wo Contrast    Result Date: 7/21/2020    1. No acute intracranial hemorrhage or mass. 2. Subtle apparent infarct within the lateral aspect of the right cerebellar hemisphere, not definitely seen on the prior study of 05/01/2019, possibly acute or chronic. Suggest clinical correlation, and if concerning, consider further characterization with a follow-up brain MRI. 3. Stable chronic lacunar infarcts within the right basal ganglia, right internal capsule, extending into the right caudate nucleus. Cta Head Neck W Contrast    Result Date: 7/21/2020      Both cervical ICAs are patent. Variant anatomy of right vertebral arteries, both diminutive. Right V4 segment of vertebral artery occlusion. Left V4 segment is severely stenotic. Basilar artery has multifocal moderate stenoses. Segmental moderately severe stenoses of both P2 segments. Left PICA is severely stenotic distally. Right intracranial ICA cavernous and paraclinoid segments 70% stenosis. Left intracranial ICA cavernous segment 50% stenosis. Both MCAs and ACAs are patent. Mri Brain W Wo Contrast    Result Date: 7/21/2020    1. Small right and moderate left cerebellar infarcts. No acute hemorrhage. 2.  Mild chronic small vessel ischemic disease. Chronic right basal ganglia lacunar infarct.        Prior TTE:  7/21/20 Left ventricular cavity size is normal with moderate LVH . Overall left ventricular systolic function appears normal with an ejection   fraction of 55%. No regional wall motion abnormalities   Normal diastolic function. Mild mitral regurgitation   A bubble study was performed and fails to show evidence of right to left   shunting. Global strain is -17.9%. The aortic root is dilated, measuring 3.9 cm. The ascending aorta is mildly dilated, measuring 3.8 cm. Morbidity / complication risk is felt to be: moderate  In addition to the above personally reviewed labs, vitals, imaging and chart.

## 2020-07-25 NOTE — PROGRESS NOTES
Therapy called stating that the pt was swaying and fatigued while working with them and getting up, PT at the time took bp laying down which was 135/87 and sitting was 114/83, they did not stand him, I passed this information off to the Medical resident covering the pt, I will continue to follow the pt throughout the shift.   Ngoc Mancilla

## 2020-07-25 NOTE — PLAN OF CARE
Problem: HEMODYNAMIC STATUS  Goal: Patient has stable vital signs and fluid balance  Outcome: Ongoing     Problem: ACTIVITY INTOLERANCE/IMPAIRED MOBILITY  Goal: Mobility/activity is maintained at optimum level for patient  Outcome: Ongoing     Problem: Falls - Risk of:  Goal: Will remain free from falls  Description: Will remain free from falls  7/25/2020 1039 by Any Veliz RN  Outcome: Ongoing  7/25/2020 0017 by Dexter Miller RN  Outcome: Ongoing  Note: Fall precautions in place. Bed is in lowest position, wheels locked and alarm on. Non-skid socks on. Call light and bedside table within reach. Pt calls out appropriately. Pt is up x 1 assist with gait belt and a walker. Will continue to assess and monitor.    Goal: Absence of physical injury  Description: Absence of physical injury  Outcome: Ongoing     Problem: Skin Integrity:  Goal: Will show no infection signs and symptoms  Description: Will show no infection signs and symptoms  Outcome: Ongoing  Goal: Absence of new skin breakdown  Description: Absence of new skin breakdown  Outcome: Ongoing     Problem: Cardiovascular  Goal: Hemodynamic stability  Outcome: Ongoing

## 2020-07-26 LAB
A/G RATIO: 1.4 (ref 1.1–2.2)
ALBUMIN SERPL-MCNC: 4.5 G/DL (ref 3.4–5)
ALP BLD-CCNC: 76 U/L (ref 40–129)
ALT SERPL-CCNC: 12 U/L (ref 10–40)
ANION GAP SERPL CALCULATED.3IONS-SCNC: 10 MMOL/L (ref 3–16)
AST SERPL-CCNC: 13 U/L (ref 15–37)
BASOPHILS ABSOLUTE: 0.1 K/UL (ref 0–0.2)
BASOPHILS RELATIVE PERCENT: 0.7 %
BILIRUB SERPL-MCNC: 0.7 MG/DL (ref 0–1)
BUN BLDV-MCNC: 18 MG/DL (ref 7–20)
CALCIUM SERPL-MCNC: 9.5 MG/DL (ref 8.3–10.6)
CHLORIDE BLD-SCNC: 98 MMOL/L (ref 99–110)
CO2: 26 MMOL/L (ref 21–32)
CORTISOL - AM: 12.2 UG/DL (ref 4.3–22.4)
CREAT SERPL-MCNC: 1.2 MG/DL (ref 0.9–1.3)
EOSINOPHILS ABSOLUTE: 0.1 K/UL (ref 0–0.6)
EOSINOPHILS RELATIVE PERCENT: 0.8 %
GFR AFRICAN AMERICAN: >60
GFR NON-AFRICAN AMERICAN: >60
GLOBULIN: 3.2 G/DL
GLUCOSE BLD-MCNC: 122 MG/DL (ref 70–99)
HCT VFR BLD CALC: 36.8 % (ref 40.5–52.5)
HEMOGLOBIN: 12.6 G/DL (ref 13.5–17.5)
LYMPHOCYTES ABSOLUTE: 2.3 K/UL (ref 1–5.1)
LYMPHOCYTES RELATIVE PERCENT: 33.5 %
MCH RBC QN AUTO: 29.4 PG (ref 26–34)
MCHC RBC AUTO-ENTMCNC: 34.2 G/DL (ref 31–36)
MCV RBC AUTO: 86.1 FL (ref 80–100)
MONOCYTES ABSOLUTE: 0.6 K/UL (ref 0–1.3)
MONOCYTES RELATIVE PERCENT: 8.5 %
NEUTROPHILS ABSOLUTE: 3.9 K/UL (ref 1.7–7.7)
NEUTROPHILS RELATIVE PERCENT: 56.5 %
PDW BLD-RTO: 13.2 % (ref 12.4–15.4)
PLATELET # BLD: 284 K/UL (ref 135–450)
PMV BLD AUTO: 10 FL (ref 5–10.5)
POTASSIUM REFLEX MAGNESIUM: 4.3 MMOL/L (ref 3.5–5.1)
RBC # BLD: 4.27 M/UL (ref 4.2–5.9)
SODIUM BLD-SCNC: 134 MMOL/L (ref 136–145)
TOTAL PROTEIN: 7.7 G/DL (ref 6.4–8.2)
TSH SERPL DL<=0.05 MIU/L-ACNC: 0.52 UIU/ML (ref 0.27–4.2)
WBC # BLD: 6.9 K/UL (ref 4–11)

## 2020-07-26 PROCEDURE — 80053 COMPREHEN METABOLIC PANEL: CPT

## 2020-07-26 PROCEDURE — 82533 TOTAL CORTISOL: CPT

## 2020-07-26 PROCEDURE — 6360000002 HC RX W HCPCS: Performed by: STUDENT IN AN ORGANIZED HEALTH CARE EDUCATION/TRAINING PROGRAM

## 2020-07-26 PROCEDURE — 2580000003 HC RX 258: Performed by: STUDENT IN AN ORGANIZED HEALTH CARE EDUCATION/TRAINING PROGRAM

## 2020-07-26 PROCEDURE — 1200000000 HC SEMI PRIVATE

## 2020-07-26 PROCEDURE — 6370000000 HC RX 637 (ALT 250 FOR IP): Performed by: STUDENT IN AN ORGANIZED HEALTH CARE EDUCATION/TRAINING PROGRAM

## 2020-07-26 PROCEDURE — 82384 ASSAY THREE CATECHOLAMINES: CPT

## 2020-07-26 PROCEDURE — 83835 ASSAY OF METANEPHRINES: CPT

## 2020-07-26 PROCEDURE — 6370000000 HC RX 637 (ALT 250 FOR IP): Performed by: INTERNAL MEDICINE

## 2020-07-26 PROCEDURE — 36415 COLL VENOUS BLD VENIPUNCTURE: CPT

## 2020-07-26 PROCEDURE — 85025 COMPLETE CBC W/AUTO DIFF WBC: CPT

## 2020-07-26 PROCEDURE — 84443 ASSAY THYROID STIM HORMONE: CPT

## 2020-07-26 RX ADMIN — CARVEDILOL 25 MG: 25 TABLET, FILM COATED ORAL at 08:24

## 2020-07-26 RX ADMIN — ONDANSETRON 4 MG: 2 INJECTION INTRAMUSCULAR; INTRAVENOUS at 09:33

## 2020-07-26 RX ADMIN — HYDRALAZINE HYDROCHLORIDE 10 MG: 20 INJECTION INTRAMUSCULAR; INTRAVENOUS at 05:14

## 2020-07-26 RX ADMIN — Medication 10 ML: at 22:00

## 2020-07-26 RX ADMIN — ATORVASTATIN CALCIUM 80 MG: 80 TABLET, FILM COATED ORAL at 21:42

## 2020-07-26 RX ADMIN — HEPARIN SODIUM 5000 UNITS: 5000 INJECTION INTRAVENOUS; SUBCUTANEOUS at 03:44

## 2020-07-26 RX ADMIN — HEPARIN SODIUM 5000 UNITS: 5000 INJECTION INTRAVENOUS; SUBCUTANEOUS at 11:34

## 2020-07-26 RX ADMIN — Medication 10 ML: at 08:24

## 2020-07-26 RX ADMIN — PANTOPRAZOLE SODIUM 40 MG: 40 TABLET, DELAYED RELEASE ORAL at 06:28

## 2020-07-26 RX ADMIN — Medication 10 ML: at 00:42

## 2020-07-26 RX ADMIN — CARVEDILOL 25 MG: 25 TABLET, FILM COATED ORAL at 17:36

## 2020-07-26 RX ADMIN — CLOPIDOGREL BISULFATE 75 MG: 75 TABLET ORAL at 08:24

## 2020-07-26 RX ADMIN — NIFEDIPINE 60 MG: 30 TABLET, EXTENDED RELEASE ORAL at 08:23

## 2020-07-26 RX ADMIN — ISOSORBIDE MONONITRATE 30 MG: 30 TABLET, EXTENDED RELEASE ORAL at 08:24

## 2020-07-26 RX ADMIN — LOSARTAN POTASSIUM 100 MG: 25 TABLET, FILM COATED ORAL at 08:23

## 2020-07-26 RX ADMIN — HEPARIN SODIUM 5000 UNITS: 5000 INJECTION INTRAVENOUS; SUBCUTANEOUS at 21:42

## 2020-07-26 RX ADMIN — ASPIRIN 81 MG: 81 TABLET, CHEWABLE ORAL at 08:23

## 2020-07-26 ASSESSMENT — ENCOUNTER SYMPTOMS
CHOKING: 0
SHORTNESS OF BREATH: 0
CHEST TIGHTNESS: 0
COUGH: 0
CONSTIPATION: 0
ABDOMINAL PAIN: 0
VOMITING: 0
DIARRHEA: 0
NAUSEA: 0

## 2020-07-26 ASSESSMENT — PAIN DESCRIPTION - PROGRESSION
CLINICAL_PROGRESSION: NOT CHANGED

## 2020-07-26 NOTE — PROGRESS NOTES
seconds. Neurological:      Mental Status: He is alert and oriented to person, place, and time. Cranial Nerves: Cranial nerves are intact. No cranial nerve deficit, dysarthria or facial asymmetry. Sensory: Sensation is intact. Motor: Motor function is intact. No weakness, tremor or atrophy. Coordination: Finger-Nose-Finger Test abnormal (Past-pointing with left hand, mildly improved from previous exam). Heel to [place] bernadine Test normal.      Comments: 5/5 Strength in Bilateral Upper and Lower Extremities    Per PT/OT note: patient very unsteady and frequently loses balance when on feet;       LABS:    CBC:   Recent Labs     07/24/20 0437 07/25/20 0554 07/26/20  0528   WBC 7.8 7.0 6.9   HGB 12.9* 13.0* 12.6*   HCT 38.4* 38.1* 36.8*   MCV 86.3 86.4 86.1    282 284                                                                BMP:    Recent Labs     07/24/20 0437 07/25/20 0554 07/26/20  0528   * 133* 134*   K 4.1 4.1 4.3    99 98*   CO2 23 24 26   BUN 22* 19 18   CREATININE 1.5* 1.3 1.2   GLUCOSE 110* 106* 122*       LFT's:   Recent Labs     07/24/20 0437 07/25/20 0554 07/26/20  0528   AST 12* 11* 13*   ALT 11 11 12   BILITOT 0.4 0.7 0.7   ALKPHOS 66 71 76     -----------------------------------------------------------------  RAD:   MRI BRAIN W WO CONTRAST   Final Result      1. Small right and moderate left cerebellar infarcts. No acute hemorrhage. 2.  Mild chronic small vessel ischemic disease. Chronic right basal ganglia lacunar infarct. US RENAL COMPLETE    (Results Pending)   VL Renal Arterial Duplex Complete    (Results Pending)       Assessment/Plan:     Cassidy Fierro is a 38 yo M who presented with headache and dizziness.  In the eD was found to be hypertensive  and CTA revealed severe stenosis of both ICAs, VAs. CT showed a right cerebellar infarct. MRI confirmed.      Dizziness/vertigo 2/2 bilateral cerebellar infarcts L>R - Improving:  - ECHO negative for bubble  - Neurology following, recommend continuing DAPT x 3 months followed by Plavix monotherapy  - Continue statin  - q4h neuro checks     Severe stenosis of ICA, VA bilaterally- CTA head and neck showed Right V4 segment of vertebral artery occlusion, Left V4 segment is severely stenotic. Right intracranial ICA cavernous and paraclinoid segments 70% stenosis. Left intracranial ICA cavernous segment 50% stenosis.   - neurosurgery following, no intervention at this time  - Aggressive risk factor management; Goals: BP <140/90, LDL < 70, HA1C <7.0     Hypertensive emergency - resolved:   - nephrology consulted: feel his presentation likely due to essential hypertension w/ noncompliance of medications.  However, continuing to working up secondary HTN  - F/U on TSH, plasma meatanephrines, plasma catecholamines  - renin/aldosterone levels pending  - renal US and renal arterial doppler pending  - Continue carvedilol 25 mg BID, Losartan 100mg, Nifedipine 60mg, and Imdur 30mg  - Recommend patient consider getting sleep study outpatient  - Patient will need follow-up with Dr. Jim Barthel after d/c     Hyperlipidemia  - Atorvastatin 80 mg     Code Status:Full Code  FEN: DIET GENERAL; Safety Tray; Safety Tray (Disposables)  PPX: subQ heparin  DISPO: Wards  PT/OT Eval Status: 18/24 with OT, 17/24 with PT    Niki Ritchie MD  7/26/2020  10:12 AM

## 2020-07-26 NOTE — PROGRESS NOTES
Assessment complete, see flow sheet. Pt states he has no new symptoms at this time, assessment unchanged. Pt still reports being tired. MD came to see him at this time, no new orders at this time. Pt also told the MD about yesterday working with PT and being unsteady.     Michelle Paulino

## 2020-07-26 NOTE — PLAN OF CARE
Problem: HEMODYNAMIC STATUS  Goal: Patient has stable vital signs and fluid balance  7/26/2020 0034 by Luis Chilel RN  Outcome: Ongoing  Note: Patients vital signs have been stable thus far. Patient also tolerating PO fluids and diet. Will continue to assess and monitor. Problem: Falls - Risk of:  Goal: Will remain free from falls  Description: Will remain free from falls  7/26/2020 0034 by Luis Chilel RN  Outcome: Ongoing  Note: Patient will remain free from falls. Patient alert and orientated and uses call light appropriately. Patient up x1, gait is unsteady, tolerates ambulation fairly well with walker. Fall precautions in place. Bed locked and in lowest position, bed alarm on, nonskid socks on. Call light within reach.

## 2020-07-26 NOTE — PROGRESS NOTES
Assisted pt back to bed per request, pt states his dizziness was better this time while getting back to bed. I also attached a bed extender for the pt for comfort. Bed alarm on and call light in reach, vitals stable and assessment unchanged. Gave pt a warm blanket per request as well, pt has no other needs. Will continue to follow throughout the shift.  Faith Jennings

## 2020-07-26 NOTE — PROGRESS NOTES
any questions. Signed:  Tim Flores M.D.   [facility]  Office Number: [phone]  Fax Number: [phone]  7/26/2020, 6:34 AM  Summary/Interval History    39 y. o. male admitted 3 days ago for headaches and dizziness; he was found to have acute ischemic stroke: MRI  showed bilateral cerebellar infarcts, small on right, and moderately sized on the left( also had CT and CTA)   Stroke team was consulted and recommended against tPA.   Neurosurgery and Neurology are  on board . Mr Michael Roa was seen by my partner dr Maura Singh, in March 2019 at [facility]. He  has a significant past medical history of HTN known for several years; he stopped BP meds at some point in 2018 ( started on an exercise regimen and weight loss). He was admitted in March 2019 with  profound BP elevation 230's/120's.    The patient has elevated creatinine of 1.4 in March 2019. He has extensive family history of HTN and his mother has ESRD,on maintenance HD(she has profound HTN despite multiple medications). Interval History (Chart/Data reviewed): BP better controlled, noted imaging, pt reports strong history of SHRUTHI but has never been tested, admits misses medication dosing. Discussed/counseled risks of prolonged Hypertension and strong family hx. He reports he's going to rehab after this, likely tomorrow. Updated at bedside: Patient, no visitors. Past medical, family, and social histories were reviewed as previously documented. Updates were made as necessary. Review of Systems ROS with pertinent positives and negatives listed in interval history. Medications   Inpatient Meds:  Medications Prior to Admission: carvedilol (COREG) 25 MG tablet, TAKE 1 TABLET BY MOUTH TWICE A DAY WITH MEALS  lisinopril (PRINIVIL;ZESTRIL) 20 MG tablet, TAKE ONE TABLET IN THE MORNING.   NIFEdipine (PROCARDIA XL) 60 MG extended release tablet, TAKE ONE TABLET IN THE EVENING  omeprazole (PRILOSEC) 20 MG delayed release capsule, Take 1 capsule by mouth daily for 30 doses  aspirin 81 MG chewable tablet, Take 1 tablet by mouth daily    Scheduled Meds:   losartan  100 mg Oral Daily    isosorbide mononitrate  30 mg Oral Daily    heparin (porcine)  5,000 Units Subcutaneous Q8H    atorvastatin  80 mg Oral Nightly    clopidogrel  75 mg Oral Daily    aspirin  81 mg Oral Daily    pantoprazole  40 mg Oral QAM AC    sodium chloride flush  10 mL Intravenous 2 times per day    carvedilol  25 mg Oral BID WC    NIFEdipine  60 mg Oral Daily     Continuous Infusions:  PRN medications: hydrALAZINE, labetalol, sodium chloride flush, acetaminophen **OR** acetaminophen, polyethylene glycol, promethazine **OR** ondansetron, perflutren lipid microspheres    Allergies: No Known Allergies  Vital Signs / Exam     Vitals:    20 1605 20 1905 20 2310 20 0350   BP: (!) 145/92 122/74 126/86 (!) 168/113   Pulse: 83 78 82 76   Resp: 16 15 16 16   Temp: 99.4 °F (37.4 °C) 98.9 °F (37.2 °C) 98.2 °F (36.8 °C) 98.1 °F (36.7 °C)   TempSrc: Oral Oral Oral Oral   SpO2: 92% 94% 98% 95%   Weight:       Height:         Wt Readings from Last 10 Encounters:   20 226 lb 10.1 oz (102.8 kg)   20 215 lb (97.5 kg)   19 212 lb (96.2 kg)   19 213 lb 3.2 oz (96.7 kg)   03/10/19 210 lb 11.2 oz (95.6 kg)   16 237 lb (107.5 kg)   13 260 lb (117.9 kg)   12 242 lb (109.8 kg)   11 246 lb (111.6 kg)     Admit Wt: Weight: 245 lb 9.5 oz (111.4 kg)(bed extendr added)   Todays Wt: Weight: 226 lb 10.1 oz (102.8 kg)  Average, Min, and Max for last 24 hours Vitals:  TEMPERATURE:  Temp  Av.7 °F (37.1 °C)  Min: 98.1 °F (36.7 °C)  Max: 99.4 °F (37.4 °C)  RESPIRATIONS RANGE: Resp  Avg: 15.7  Min: 15  Max: 16  PULSE RANGE: Pulse  Av.3  Min: 75  Max: 88  BLOOD PRESSURE RANGE:    Systolic (96LCJ), WLR:328 , Min:116 , DLB:684     Diastolic (28XWD), QYL:46, Min:69, Max:113    PULSE OXIMETRY RANGE: SpO2  Av.3 %  Min: 92 % Max: 98 %    Estimated body mass index is 29.1 kg/m² as calculated from the following:    Height as of this encounter: 6' 2\" (1.88 m). Weight as of this encounter: 226 lb 10.1 oz (102.8 kg). Intake/Output Summary (Last 24 hours) at 7/26/2020 0634  Last data filed at 7/26/2020 0524  Gross per 24 hour   Intake 370 ml   Output 1300 ml   Net -930 ml     I/O last 3 completed shifts: In: 350 [P.O.:340; I.V.:10]  Out: 900 [Urine:900]    Appearance Appearing comfortable,  Appears stated age. CV  RRR, no heave. Respiratory Clear to auscultation bilaterally, unlabored. MSK  No clubbing or cyanosis,  warm well perfused, no obvious edema  HEENT  NC/AT, Pupils equal & round, Sclera anicteric. Hearing grossly intact, normal external ears and nares, mucous membranes moist.      Chest  Symmetric, normal shape and expansion, no tenderness on chest wall. Skin  No rashes seen, skin feels warm and dry  Psychiatric Alert, appropriate affect, appropriate insight  Laboratory / Diagnostic Data     Recent Labs     07/24/20 0437 07/25/20  0554 07/26/20  0528   WBC 7.8 7.0 6.9   HGB 12.9* 13.0* 12.6*   HCT 38.4* 38.1* 36.8*   MCV 86.3 86.4 86.1    282 284     Recent Labs     07/24/20 0437 07/25/20  0554   * 133*   K 4.1 4.1    99   CO2 23 24   BUN 22* 19   CREATININE 1.5* 1.3   GLUCOSE 110* 106*   CALCIUM 10.0 9.7   ANIONGAP 12 10     Estimated Creatinine Clearance: 92 mL/min (based on SCr of 1.3 mg/dL). Recent Labs     07/24/20 0437 07/25/20  0554   ALT 11 11   AST 12* 11*   ALKPHOS 66 71   BILITOT 0.4 0.7     No results for input(s): INR, PROTIME, PTT in the last 72 hours.   Lab Results   Component Value Date    CALCIUM 9.7 07/25/2020    PHOS 3.8 03/10/2019      No results found for: IRON, TIBC, FERRITIN, HVEDHLTX11, FOLATE, RETICCTPCT  Lab Results   Component Value Date    LABA1C 5.4 07/21/2020    LABA1C 5.1 03/08/2019    LABA1C 6.1 03/30/2016     Lab Results   Component Value Date    COLORU YELLOW 07/21/2020    CLARITYU CLOUDY (A) 07/21/2020    PHUR 8.0 07/21/2020    GLUCOSEU Negative 07/21/2020    BLOODU TRACE (A) 07/21/2020    LEUKOCYTESUR Negative 07/21/2020    BILIRUBINUR Negative 07/21/2020    UROBILINOGEN 0.2 07/21/2020    RBCUA 3 07/21/2020    WBCUA 0 07/21/2020    BACTERIA 1+ 02/14/2013      No results found for: HAV, HEPAIGM, HEPBIGM, HEPBCAB, HBEAG, HEPCAB   No results found for: AEROBOT, ANABOT, LABGRAM, ISO2, ISO3, ISO4, ISO5, ISO6       Ct Head Wo Contrast    Result Date: 7/21/2020    1. No acute intracranial hemorrhage or mass. 2. Subtle apparent infarct within the lateral aspect of the right cerebellar hemisphere, not definitely seen on the prior study of 05/01/2019, possibly acute or chronic. Suggest clinical correlation, and if concerning, consider further characterization with a follow-up brain MRI. 3. Stable chronic lacunar infarcts within the right basal ganglia, right internal capsule, extending into the right caudate nucleus. Cta Head Neck W Contrast    Result Date: 7/21/2020      Both cervical ICAs are patent. Variant anatomy of right vertebral arteries, both diminutive. Right V4 segment of vertebral artery occlusion. Left V4 segment is severely stenotic. Basilar artery has multifocal moderate stenoses. Segmental moderately severe stenoses of both P2 segments. Left PICA is severely stenotic distally. Right intracranial ICA cavernous and paraclinoid segments 70% stenosis. Left intracranial ICA cavernous segment 50% stenosis. Both MCAs and ACAs are patent. Mri Brain W Wo Contrast    Result Date: 7/21/2020    1. Small right and moderate left cerebellar infarcts. No acute hemorrhage. 2.  Mild chronic small vessel ischemic disease. Chronic right basal ganglia lacunar infarct. Prior TTE:  7/21/20   Left ventricular cavity size is normal with moderate LVH . Overall left ventricular systolic function appears normal with an ejection   fraction of 55%.    No regional

## 2020-07-26 NOTE — PROGRESS NOTES
Patient alert and orientated. VSS, BP elevated this morning, hydralazine administered. Neuro check remains stable/ unchanged. Patient very lethargic and slept majority of the night. Patient resting comfortably in bed. Fall precautions in place. Call light within reach. Will continue to assess and monitor.

## 2020-07-27 ENCOUNTER — APPOINTMENT (OUTPATIENT)
Dept: ULTRASOUND IMAGING | Age: 45
DRG: 065 | End: 2020-07-27
Attending: INTERNAL MEDICINE
Payer: COMMERCIAL

## 2020-07-27 ENCOUNTER — APPOINTMENT (OUTPATIENT)
Dept: VASCULAR LAB | Age: 45
DRG: 065 | End: 2020-07-27
Attending: INTERNAL MEDICINE
Payer: COMMERCIAL

## 2020-07-27 LAB
A/G RATIO: 1.4 (ref 1.1–2.2)
ALBUMIN SERPL-MCNC: 4.3 G/DL (ref 3.4–5)
ALDOSTERONE: 14 NG/DL
ALP BLD-CCNC: 75 U/L (ref 40–129)
ALT SERPL-CCNC: 14 U/L (ref 10–40)
ANION GAP SERPL CALCULATED.3IONS-SCNC: 13 MMOL/L (ref 3–16)
AST SERPL-CCNC: 13 U/L (ref 15–37)
BASOPHILS ABSOLUTE: 0 K/UL (ref 0–0.2)
BASOPHILS RELATIVE PERCENT: 0.5 %
BILIRUB SERPL-MCNC: 0.6 MG/DL (ref 0–1)
BUN BLDV-MCNC: 19 MG/DL (ref 7–20)
CALCIUM SERPL-MCNC: 9.7 MG/DL (ref 8.3–10.6)
CHLORIDE BLD-SCNC: 100 MMOL/L (ref 99–110)
CO2: 25 MMOL/L (ref 21–32)
CREAT SERPL-MCNC: 1.4 MG/DL (ref 0.9–1.3)
EOSINOPHILS ABSOLUTE: 0 K/UL (ref 0–0.6)
EOSINOPHILS RELATIVE PERCENT: 0.4 %
GFR AFRICAN AMERICAN: >60
GFR NON-AFRICAN AMERICAN: 55
GLOBULIN: 3.1 G/DL
GLUCOSE BLD-MCNC: 113 MG/DL (ref 70–99)
HCT VFR BLD CALC: 37.2 % (ref 40.5–52.5)
HEMOGLOBIN: 12.4 G/DL (ref 13.5–17.5)
LYMPHOCYTES ABSOLUTE: 1.9 K/UL (ref 1–5.1)
LYMPHOCYTES RELATIVE PERCENT: 25.7 %
MCH RBC QN AUTO: 29.1 PG (ref 26–34)
MCHC RBC AUTO-ENTMCNC: 33.2 G/DL (ref 31–36)
MCV RBC AUTO: 87.6 FL (ref 80–100)
MONOCYTES ABSOLUTE: 0.7 K/UL (ref 0–1.3)
MONOCYTES RELATIVE PERCENT: 9 %
NEUTROPHILS ABSOLUTE: 4.7 K/UL (ref 1.7–7.7)
NEUTROPHILS RELATIVE PERCENT: 64.4 %
PDW BLD-RTO: 13.2 % (ref 12.4–15.4)
PLATELET # BLD: 297 K/UL (ref 135–450)
PMV BLD AUTO: 10.3 FL (ref 5–10.5)
POTASSIUM REFLEX MAGNESIUM: 4.3 MMOL/L (ref 3.5–5.1)
RBC # BLD: 4.24 M/UL (ref 4.2–5.9)
RENIN ACTIVITY: 3 NG/ML/HR
SODIUM BLD-SCNC: 138 MMOL/L (ref 136–145)
TOTAL PROTEIN: 7.4 G/DL (ref 6.4–8.2)
WBC # BLD: 7.3 K/UL (ref 4–11)

## 2020-07-27 PROCEDURE — 36415 COLL VENOUS BLD VENIPUNCTURE: CPT

## 2020-07-27 PROCEDURE — 76770 US EXAM ABDO BACK WALL COMP: CPT

## 2020-07-27 PROCEDURE — 2580000003 HC RX 258: Performed by: STUDENT IN AN ORGANIZED HEALTH CARE EDUCATION/TRAINING PROGRAM

## 2020-07-27 PROCEDURE — 1200000000 HC SEMI PRIVATE

## 2020-07-27 PROCEDURE — 6370000000 HC RX 637 (ALT 250 FOR IP): Performed by: STUDENT IN AN ORGANIZED HEALTH CARE EDUCATION/TRAINING PROGRAM

## 2020-07-27 PROCEDURE — 6360000002 HC RX W HCPCS: Performed by: STUDENT IN AN ORGANIZED HEALTH CARE EDUCATION/TRAINING PROGRAM

## 2020-07-27 PROCEDURE — 93975 VASCULAR STUDY: CPT

## 2020-07-27 PROCEDURE — 80053 COMPREHEN METABOLIC PANEL: CPT

## 2020-07-27 PROCEDURE — 85025 COMPLETE CBC W/AUTO DIFF WBC: CPT

## 2020-07-27 PROCEDURE — 6370000000 HC RX 637 (ALT 250 FOR IP): Performed by: INTERNAL MEDICINE

## 2020-07-27 PROCEDURE — 97530 THERAPEUTIC ACTIVITIES: CPT

## 2020-07-27 RX ORDER — ISOSORBIDE MONONITRATE 30 MG/1
30 TABLET, EXTENDED RELEASE ORAL DAILY
Qty: 30 TABLET | Refills: 3 | Status: ON HOLD | OUTPATIENT
Start: 2020-07-28 | End: 2020-08-07 | Stop reason: HOSPADM

## 2020-07-27 RX ORDER — PANTOPRAZOLE SODIUM 40 MG/1
40 TABLET, DELAYED RELEASE ORAL
Qty: 90 TABLET | Refills: 0 | Status: SHIPPED | OUTPATIENT
Start: 2020-07-28 | End: 2020-07-27

## 2020-07-27 RX ORDER — LOSARTAN POTASSIUM 100 MG/1
100 TABLET ORAL DAILY
Qty: 30 TABLET | Refills: 3 | Status: SHIPPED | OUTPATIENT
Start: 2020-07-28 | End: 2020-07-27

## 2020-07-27 RX ORDER — ATORVASTATIN CALCIUM 80 MG/1
80 TABLET, FILM COATED ORAL NIGHTLY
Qty: 30 TABLET | Refills: 3 | Status: SHIPPED | OUTPATIENT
Start: 2020-07-27 | End: 2020-07-27

## 2020-07-27 RX ORDER — ATORVASTATIN CALCIUM 80 MG/1
80 TABLET, FILM COATED ORAL NIGHTLY
Qty: 30 TABLET | Refills: 3 | Status: SHIPPED | OUTPATIENT
Start: 2020-07-27 | End: 2020-09-24 | Stop reason: SDUPTHER

## 2020-07-27 RX ORDER — LOSARTAN POTASSIUM 100 MG/1
100 TABLET ORAL DAILY
Qty: 30 TABLET | Refills: 3 | Status: SHIPPED | OUTPATIENT
Start: 2020-07-28 | End: 2020-09-24 | Stop reason: SDUPTHER

## 2020-07-27 RX ORDER — CLOPIDOGREL BISULFATE 75 MG/1
75 TABLET ORAL DAILY
Qty: 30 TABLET | Refills: 3 | Status: SHIPPED | OUTPATIENT
Start: 2020-07-28 | End: 2020-07-27

## 2020-07-27 RX ORDER — PANTOPRAZOLE SODIUM 40 MG/1
40 TABLET, DELAYED RELEASE ORAL
Qty: 90 TABLET | Refills: 0 | Status: ON HOLD | OUTPATIENT
Start: 2020-07-28 | End: 2020-08-07 | Stop reason: HOSPADM

## 2020-07-27 RX ORDER — ASPIRIN 81 MG/1
81 TABLET, CHEWABLE ORAL DAILY
Qty: 90 TABLET | Refills: 0 | Status: SHIPPED | OUTPATIENT
Start: 2020-07-27 | End: 2020-10-16

## 2020-07-27 RX ORDER — ISOSORBIDE MONONITRATE 30 MG/1
30 TABLET, EXTENDED RELEASE ORAL DAILY
Qty: 30 TABLET | Refills: 3 | Status: SHIPPED | OUTPATIENT
Start: 2020-07-28 | End: 2020-07-27

## 2020-07-27 RX ORDER — CLOPIDOGREL BISULFATE 75 MG/1
75 TABLET ORAL DAILY
Qty: 30 TABLET | Refills: 3 | Status: SHIPPED | OUTPATIENT
Start: 2020-07-28 | End: 2020-09-24 | Stop reason: SDUPTHER

## 2020-07-27 RX ADMIN — ASPIRIN 81 MG: 81 TABLET, CHEWABLE ORAL at 11:13

## 2020-07-27 RX ADMIN — CARVEDILOL 25 MG: 25 TABLET, FILM COATED ORAL at 11:13

## 2020-07-27 RX ADMIN — CLOPIDOGREL BISULFATE 75 MG: 75 TABLET ORAL at 11:12

## 2020-07-27 RX ADMIN — HEPARIN SODIUM 5000 UNITS: 5000 INJECTION INTRAVENOUS; SUBCUTANEOUS at 12:40

## 2020-07-27 RX ADMIN — ATORVASTATIN CALCIUM 80 MG: 80 TABLET, FILM COATED ORAL at 20:59

## 2020-07-27 RX ADMIN — PANTOPRAZOLE SODIUM 40 MG: 40 TABLET, DELAYED RELEASE ORAL at 07:30

## 2020-07-27 RX ADMIN — HEPARIN SODIUM 5000 UNITS: 5000 INJECTION INTRAVENOUS; SUBCUTANEOUS at 03:41

## 2020-07-27 RX ADMIN — NIFEDIPINE 60 MG: 30 TABLET, EXTENDED RELEASE ORAL at 11:13

## 2020-07-27 RX ADMIN — Medication 10 ML: at 11:14

## 2020-07-27 RX ADMIN — ISOSORBIDE MONONITRATE 30 MG: 30 TABLET, EXTENDED RELEASE ORAL at 11:13

## 2020-07-27 RX ADMIN — Medication 10 ML: at 22:25

## 2020-07-27 RX ADMIN — LOSARTAN POTASSIUM 100 MG: 25 TABLET, FILM COATED ORAL at 11:13

## 2020-07-27 RX ADMIN — CARVEDILOL 25 MG: 25 TABLET, FILM COATED ORAL at 19:21

## 2020-07-27 RX ADMIN — HEPARIN SODIUM 5000 UNITS: 5000 INJECTION INTRAVENOUS; SUBCUTANEOUS at 20:59

## 2020-07-27 ASSESSMENT — PAIN SCALES - GENERAL: PAINLEVEL_OUTOF10: 0

## 2020-07-27 NOTE — CARE COORDINATION
I was able to round with Dr Driss Guillaume and [facility] from ARU. Patient will need a precert from [facility]. Charissa initiated precert today which may take a few days. We did explain that his insurance could possibly deny him for ARU and I asked what he would want to do if that occurred. He would like home health at d/c if he cannot go to the ARU. He has no preference in home care if needed. I called [facility] and they are in network and able to accept if unable to go to ARU. Will continue to follow.      Electronically signed by Patricia Soas RN on 7/27/2020 at 1:11 PM  [phone]

## 2020-07-27 NOTE — PROGRESS NOTES
Hospitalist Progress Note      PCP: Enrrique Quiros MD    Date of Admission: 7/21/2020    Chief Complaint:  Dizziness. Interval history: 70-year-old male with a past medical history of hypertension (noncompliant with the medication) presented with dizziness diagnosed with a cerebellar stroke      Subjective: Patient is feeling well. He is complaining of nausea. He denies any focal weakness. Complaining of dizziness. Denies any headache. Medications:  Reviewed    InfusionMedications   Scheduled Medications    losartan  100 mg Oral Daily    isosorbide mononitrate  30 mg Oral Daily    heparin (porcine)  5,000 Units Subcutaneous Q8H    atorvastatin  80 mg Oral Nightly    clopidogrel  75 mg Oral Daily    aspirin  81 mg Oral Daily    pantoprazole  40 mg Oral QAM AC    sodium chloride flush  10 mL Intravenous 2 times per day    carvedilol  25 mg Oral BID WC    NIFEdipine  60 mg Oral Daily     PRN Meds: hydrALAZINE, labetalol, sodium chloride flush, acetaminophen **OR** acetaminophen, polyethylene glycol, promethazine **OR** ondansetron, perflutren lipid microspheres      Intake/Output Summary (Last 24 hours) at 7/27/2020 1257  Last data filed at 7/27/2020 0350  Gross per 24 hour   Intake 0 ml   Output 1000 ml   Net -1000 ml       Physical Exam Performed:    BP (!) 172/130   Pulse 93   Temp 98.3 °F (36.8 °C) (Oral)   Resp 18   Ht 6' 2\" (1.88 m)   Wt 226 lb 10.1 oz (102.8 kg)   SpO2 93%   BMI 29.10 kg/m²   Physical Exam  Constitutional:       Appearance: Normal appearance. HENT:      Head: Normocephalic and atraumatic. Neck:      Musculoskeletal: Normal range of motion and neck supple. Cardiovascular:      Rate and Rhythm: Normal rate and regular rhythm. Pulses: Normal pulses. Heart sounds: Normal heart sounds. Pulmonary:      Effort: Pulmonary effort is normal.      Breath sounds: Normal breath sounds. Abdominal:      General: Abdomen is flat.       Palpations: Abdomen is soft. Skin:     General: Skin is warm and dry. Capillary Refill: Capillary refill takes less than 2 seconds. Neurological:      Mental Status: He is alert. Comments: No focal weakness . He has ataxia on the left side while ambulating. Given that he was feeling dizzy he was not ambulated. Physical examination is limited  Post pointing noted. Psychiatric:         Mood and Affect: Mood normal.         Behavior: Behavior normal.           Labs:   Recent Labs     07/25/20 0554 07/26/20 0528 07/27/20 0418   WBC 7.0 6.9 7.3   HGB 13.0* 12.6* 12.4*   HCT 38.1* 36.8* 37.2*    284 297     Recent Labs     07/25/20 0554 07/26/20  0528 07/27/20  0418   * 134* 138   K 4.1 4.3 4.3   CL 99 98* 100   CO2 24 26 25   BUN 19 18 19   CREATININE 1.3 1.2 1.4*   CALCIUM 9.7 9.5 9.7     Recent Labs     07/25/20 0554 07/26/20  0528 07/27/20  0418   AST 11* 13* 13*   ALT 11 12 14   BILITOT 0.7 0.7 0.6   ALKPHOS 71 76 75     No results for input(s): INR in the last 72 hours. No results for input(s): [employer] in the last 72 hours. Urinalysis:      Lab Results   Component Value Date    NITRU Negative 07/21/2020    WBCUA 0 07/21/2020    BACTERIA 1+ 02/14/2013    RBCUA 3 07/21/2020    BLOODU TRACE 07/21/2020    SPECGRAV 1.015 07/21/2020    GLUCOSEU Negative 07/21/2020    GLUCOSEU NEGATIVE 11/14/2011       Radiology:  US RENAL COMPLETE   Final Result      No hydronephrosis. 48 mm simple cyst left kidney. VL Renal Arterial Duplex Complete         MRI BRAIN W WO CONTRAST   Final Result      1. Small right and moderate left cerebellar infarcts. No acute hemorrhage. 2.  Mild chronic small vessel ischemic disease. Chronic right basal ganglia lacunar infarct. Assessment & Plan  ASSESSMENT AND PLAN      Active Problems:    Hypertensive emergency  Plan: Patient is on Coreg, lisinopril, added on Imdur.   Discussed with ICU team, consider addition of hydrochlorothiazide if the blood pressure remains uncontrolled. 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 Patient is on aspirin, statin, Plavix. Echocardiogram normal EF no shunt   Neurology is following.                 DVT prophylaxis: [] Lovenox  [x] SQ Heparin  [] SCDs because of  [] warfarin/oral direct thrombin inhibitor [] Encourage ambulation      Diet:  DIET GENERAL;    Code Status: Full Code    PT/OT Eval Status: pending     Dispo: transfer to Siria Corea MD

## 2020-07-27 NOTE — PROGRESS NOTES
Patient alert and orientated. VSS, BP stable. Patient less lethargic this evening. Neuro check remains stable. Patient up to bathroom, tolerated well, still states that he is somewhat dizzy but overall has improved. Patient currently resting comfortably in bed. Fall precautions in place. Call light within reach. Will continue to assess and monitor.

## 2020-07-27 NOTE — PROGRESS NOTES
at bedside, receiving Rx. BA activated. Safety Devices  Safety Devices in place: Yes  Type of devices: Nurse notified;Call light within reach; Bed alarm in place; Left in bed         Patient Diagnosis(es): There were no encounter diagnoses. has a past medical history of Hyperlipidemia and Hypertension. has no past surgical history on file. Restrictions  Position Activity Restriction  Other position/activity restrictions: activity as tolerated  Subjective   General  Chart Reviewed: Yes  Patient assessed for rehabilitation services?: Yes  Additional Pertinent Hx: 39 y.o. M presented to the ED with headache and dizziness that started on 7/20. 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 Patient was transferred to [facility] ICU for further neurosurgical follow up; Rodrigo Carcamo Neurovascular surgeon reviewed CTA Head/Neck and stated no neurovascular intervention indicated. PMH: HTN, Hyperlipidemia. Family / Caregiver Present: Yes(wife)  Referring Practitioner: ANYA Estes CNP  Diagnosis: Hypertensive Urgency  Subjective  Subjective: Pt semi supine in bed upon arrival, agreeable to OT session for OOB tasks.       Orientation  Orientation  Overall Orientation Status: Within Normal Limits  Objective    ADL  LE Dressing: Stand by assistance(donning socks seated EOB)        Balance  Sitting Balance: Stand by assistance  Standing Balance: Unable to assess(comment)(2/2 dizziness, High BP see tolerance section)  Bed mobility  Supine to Sit: Supervision  Sit to Supine: Supervision                          Cognition  Overall Cognitive Status: [facility]                  Plan   Plan  Times per week: 5-7  Times per day: Daily  Current Treatment Recommendations: Balance Training, Functional Mobility Training, Endurance Training, Safety Education & Training, Self-Care / ADL Goals  Short term goals  Time Frame for Short term goals: Discharge  Short term goal 1: toilet transfer w/ CGA (not met)  Short term goal 2: grooming task in standing, CGA (MET 7/24); revised goal: grooming task in standing at sink level, SBA (not met)  Short term goal 3: pants w/ CGA (not met)  Patient Goals   Patient goals : to be independent; be able to play the piano       Therapy Time   Individual Concurrent Group Co-treatment   Time In 1100         Time Out 1113         Minutes 13         Timed Code Treatment Minutes: [address] [facility] C.  [address] [facility], OTR/L [id number]

## 2020-07-27 NOTE — DISCHARGE INSTR - COC
Continuity of Care Form    Patient Name: Frank Sr   :  1975  MRN:  [id number]    Admit date:  2020  Discharge date:  2020    Code Status Order: Full Code   Advance Directives:     Admitting Physician:  Leonard Jordan MD  PCP: Severiano Atlas, MD    Discharging Nurse: [facility] Unit/Room#: 6326/3055-85  Discharging Unit Phone Number: ***    Emergency Contact:   Extended Emergency Contact Information  Primary Emergency Contact:  [address] of [address] [place] [address] Phone: [phone]  Relation: Brother/Sister    Past Surgical History:  No past surgical history on file.     Immunization History:   Immunization History   Administered Date(s) Administered    Influenza 2011       Active Problems:  Patient Active Problem List   Diagnosis Code    Hypertensive urgency I16.0    Hypertension I10    [facility] I[facility].9    Hypertensive emergency I16.1       Isolation/Infection:   Isolation          No Isolation        Patient Infection Status     Infection Onset Added Last Indicated Last Indicated By Review Planned Expiration Resolved Resolved By    None active    Resolved    COVID-19 Rule Out 20 COVID-19 (Ordered)   20 Kalli Geller, WALESKA          Nurse Assessment:  Last Vital Signs: BP (!) 172/130   Pulse 93   Temp 98.3 °F (36.8 °C) (Oral)   Resp 18   Ht 6' 2\" (1.88 m)   Wt 226 lb 10.1 oz (102.8 kg)   SpO2 93%   BMI 29.10 kg/m²     Last documented pain score (0-10 scale): Pain Level: 0  Last Weight:   Wt Readings from Last 1 Encounters:   20 226 lb 10.1 oz (102.8 kg)     Mental Status:  oriented and alert    IV Access:  - Peripheral IV - site  R Antecubital, insertion date: ***    Nursing Mobility/ADLs:  Walking   Assisted  Transfer  Assisted  Bathing  Independent  Dressing  Assisted  Toileting  Assisted  Feeding  Independent  Med Admin  Independent  Med Delivery   whole    Wound Care Documentation and Therapy:        Elimination:  Continence:   · Bowel: Yes  · Bladder: Yes  Urinary Catheter: None   Colostomy/Ileostomy/Ileal Conduit: No       Date of Last BM:     Intake/Output Summary (Last 24 hours) at 7/27/2020 1133  Last data filed at 7/27/2020 0350  Gross per 24 hour   Intake 0 ml   Output 1000 ml   Net -1000 ml     I/O last 3 completed shifts: In: 0   Out: 1400 [Urine:1400]    Safety Concerns: At Risk for Falls    Impairments/Disabilities:      Speech    Nutrition Therapy:  Current Nutrition Therapy:   - Oral Diet:  General    Routes of Feeding: Oral  Liquids: No Restrictions  Daily Fluid Restriction: no  Last Modified Barium Swallow with Video (Video Swallowing Test): not done    Treatments at the Time of Hospital Discharge:   Respiratory Treatments:   Oxygen Therapy:  is not on home oxygen therapy.   Ventilator:    - No ventilator support    Rehab Therapies: Physical Therapy, Occupational Therapy and Speech/Language Therapy  Weight Bearing Status/Restrictions: No weight bearing restirctions  Other Medical Equipment (for information only, NOT a DME order):  walker  Other Treatments:     Patient's personal belongings (please select all that are sent with patient):  None    RN SIGNATURE:  Electronically signed by Lita Davies RN on 7/29/20 at 4:58 PM EDT    CASE MANAGEMENT/SOCIAL WORK SECTION    Inpatient Status Date: 07/21/2020    Readmission Risk Assessment Score:  Readmission Risk              Risk of Unplanned Readmission:        12           Discharging to Facility/ Agency   · Name: [facility] Pay[facility]  · Address:  · Phone:report- [phone]  · Fax:    Dialysis Facility (if applicable)   · Name:  · Address:  · Dialysis Schedule:  · Phone:  · Fax:    / signature: Electronically signed by Rickie Meyers RN on 7/29/20 at 5:13 PM EDT    PHYSICIAN SECTION    Prognosis: Good    Condition at Discharge: Stable    Rehab Potential (if transferring to Rehab): Good    Recommended Labs or Other Treatments After Discharge:     Continue with PT/OT    Physician Certification: I certify the above information and transfer of Joan Patle  is necessary for the continuing treatment of the diagnosis listed and that he requires Acute Rehab for greater or less than 30 days.      Update Admission H&P: No change in H&P    PHYSICIAN SIGNATURE:  Electronically signed by James Hein MD/ Simone Hensley MD on 7/27/20 at 11:35 AM EDT

## 2020-07-27 NOTE — PLAN OF CARE
Problem: HEMODYNAMIC STATUS  Goal: Patient has stable vital signs and fluid balance  7/27/2020 1504 by Kendall Prabhakar RN  Outcome: Ongoing     Problem: Falls - Risk of:  Goal: Will remain free from falls  Description: Will remain free from falls  7/27/2020 1504 by Kendall Prabhakar RN  Outcome: Ongoing     Problem: Cardiovascular  Goal: Hemodynamic stability  Outcome: Ongoing

## 2020-07-27 NOTE — PLAN OF CARE
Problem: HEMODYNAMIC STATUS  Goal: Patient has stable vital signs and fluid balance  Outcome: Ongoing  Note: Patients vital signs are stable, BP well managed. Patient also able to tolerate PO fluids and is voiding appropriately. Will continue to assess and monitor. Problem: Falls - Risk of:  Goal: Will remain free from falls  Description: Will remain free from falls  Outcome: Ongoing  Note: Patient will remain free from falls. Patient alert and orientated and uses call light appropriately. Patient up x1 with walker, tolerates fairly well. Fall precautions in place. Bed locked and in lowest position, bed alarm on, nonskid socks on. Call light within reach.

## 2020-07-27 NOTE — PROGRESS NOTES
Resident Progress Note      Admit Date: 2020    PCP: Trevor Russ MD                  : 1975  MRN: [id number]    CC: Ischemic Cerebellar Stroke + Hypertensive Emergency    Subjective:     No acute events overnight. Patient reports that his dizziness is continuing to improve. He has no headaches. He feels that he is making improvements with therapy.     Review of Systems   Constitutional: Negative for chills, fatigue and fever. Respiratory: Negative for cough, choking, chest tightness and shortness of breath. Cardiovascular: Negative for chest pain, palpitations and leg swelling. Gastrointestinal: Negative for abdominal pain, constipation, diarrhea, nausea and vomiting. Neurological: Positive for dizziness. Negative for weakness, light-headedness, numbness and headaches.      HPI:  Mr. Lucretia Del Rosario is a 40 yo M PMH HTN, hyperlipidemia who presented to [facility] ED early  AM with complaint of HA and dizziness. He describes the dizziness as the room spinning around him. Symptoms began on . He states he takes 3 hypertensive medications (carvedilol, lisinopril, and nifedipine) and has been taking for 1 year. He states he started feeling dizzy after taking the lisinopril. Then began having a headache. By afternoon he felt off balance  He denies any prior episodes of dizziness. Had intermittent episodes of dry heaving. Denied nausea, vomiting, visual disturbances, trouble speaking, weakness, history of stroke.      ED course: Hypertensive 203/101, EKG normal sinus rhythm with LVH, negative troponin. CXR- unremarkable. CT head without contrast- infarct within R lateral cerebellar hemisphere. No mass or hemorrhage. CTA head neck with contrast- right V4 segment of vertebral artery occlusion, left V4 segment of vertebral artery is severely stenotic.  stroke team consulted and recommended against tPA. In the ED received aspirin and cardene drip, antiemetics.  Was not able to tolerate meclizine    Data:   Scheduled Meds:   losartan  100 mg Oral Daily    isosorbide mononitrate  30 mg Oral Daily    heparin (porcine)  5,000 Units Subcutaneous Q8H    atorvastatin  80 mg Oral Nightly    clopidogrel  75 mg Oral Daily    aspirin  81 mg Oral Daily    pantoprazole  40 mg Oral QAM AC    sodium chloride flush  10 mL Intravenous 2 times per day    carvedilol  25 mg Oral BID WC    NIFEdipine  60 mg Oral Daily     Continuous Infusions:  PRN Meds:hydrALAZINE, labetalol, sodium chloride flush, acetaminophen **OR** acetaminophen, polyethylene glycol, promethazine **OR** ondansetron, perflutren lipid microspheres  I/O last 3 completed shifts: In: 0   Out: 1400 [Urine:1400]  No intake/output data recorded. Intake/Output Summary (Last 24 hours) at 7/27/2020 0820  Last data filed at 7/27/2020 0350  Gross per 24 hour   Intake 0 ml   Output 1000 ml   Net -1000 ml       Objective:     Vitals: BP (!) 146/98   Pulse 84   Temp 98.3 °F (36.8 °C) (Oral)   Resp 18   Ht 6' 2\" (1.88 m)   Wt 226 lb 10.1 oz (102.8 kg)   SpO2 93%   BMI 29.10 kg/m²     Physical Exam  Constitutional:       General: He is not in acute distress.     Appearance: Normal appearance. He is not ill-appearing.    HENT:      Head: Normocephalic and atraumatic. Eyes:      General: No visual field deficit. Cardiovascular:      Rate and Rhythm: Normal rate and regular rhythm.      Pulses: Normal pulses.      Heart sounds: Normal heart sounds. No murmur. No friction rub. No gallop.    Pulmonary:      Effort: Pulmonary effort is normal.      Breath sounds: Normal breath sounds. No wheezing, rhonchi or rales. Abdominal:      Palpations: Abdomen is soft.      Tenderness: There is no abdominal tenderness. There is no guarding or rebound. Musculoskeletal:         General: No swelling. Skin:     General: Skin is warm and dry.      Capillary Refill: Capillary refill takes less than 2 seconds.    Neurological:    Mental Status: He is alert and oriented to person, place, and time.      Cranial Nerves: Cranial nerves are intact. No cranial nerve deficit, dysarthria or facial asymmetry.      Sensory: Sensation is intact.      Motor: Motor function is intact. No weakness, tremor or atrophy.      Coordination: Finger-Nose-Finger Test abnormal (Past-pointing with left hand, again improved from previous exam). Heel to [employer]um[employer].      Comments: 5/5 Strength in Bilateral Upper and Lower Extremities    LABS:    CBC:   Recent Labs     07/25/20 0554 07/26/20 0528 07/27/20 0418   WBC 7.0 6.9 7.3   HGB 13.0* 12.6* 12.4*   HCT 38.1* 36.8* 37.2*   MCV 86.4 86.1 87.6    284 297                                                                BMP:    Recent Labs     07/25/20 0554 07/26/20 0528 07/27/20 0418   * 134* 138   K 4.1 4.3 4.3   CL 99 98* 100   CO2 24 26 25   BUN 19 18 19   CREATININE 1.3 1.2 1.4*   GLUCOSE 106* 122* 113*       LFT's:   Recent Labs     07/25/20 0554 07/26/20 0528 07/27/20 0418   AST 11* 13* 13*   ALT 11 12 14   BILITOT 0.7 0.7 0.6   ALKPHOS 71 76 75        -----------------------------------------------------------------  RAD:   MRI BRAIN W WO CONTRAST   Final Result      1. Small right and moderate left cerebellar infarcts. No acute hemorrhage. 2.  Mild chronic small vessel ischemic disease. Chronic right basal ganglia lacunar infarct. US RENAL COMPLETE    (Results Pending)   VL Renal Arterial Duplex Complete    (Results Pending)       Assessment/Plan:     Osito Mason is a 38 yo M who presented with headache and dizziness.  In the eD was found to be hypertensive  and CTA revealed severe stenosis of both ICAs, VAs. CT showed a right cerebellar infarct. MRI confirmed.      Dizziness/vertigo 2/2 bilateral cerebellar infarcts L>R - Improving:  - ECHO negative for bubble  - Neurology following, recommend continuing DAPT x 3 months followed by Plavix monotherapy  - Continue statin  - q4h neuro checks     Severe stenosis of ICA, VA bilaterally- CTA head and neck showed Right V4 segment of vertebral artery occlusion, Left V4 segment is severely stenotic. Right intracranial ICA cavernous and paraclinoid segments 70% stenosis. Left intracranial ICA cavernous segment 50% stenosis.   - neurosurgery following, no intervention at this time  - Aggressive risk factor management; Goals: BP <140/90, LDL < 70, HA1C <7.0     Hypertensive emergency - resolved:   - nephrology consulted: feel his presentation likely due to essential hypertension w/ noncompliance of medications.  However, continuing to working up secondary HTN  - F/U on TSH, plasma meatanephrines, plasma catecholamines  - renin/aldosterone levels pending  - Renal US and renal arterial doppler pending  - Continue carvedilol 25 mg BID, Losartan 100mg, Nifedipine 60mg, and Imdur 30mg  - Recommend patient consider getting sleep study outpatient  - Patient will need follow-up with Dr. Evgeny hCowdary after d/c     Hyperlipidemia  - Atorvastatin 80 mg     Code Status:Full Code  FEN: DIET GENERAL; Safety Tray; Safety Tray (Disposables)  PPX: subQ heparin  DISPO: D/C to acute rehab facility (ARU)  PT/OT Eval Status: 18/24 with OT, 17/24 with PT    Hamzah Moore MD  7/27/2020  8:20 AM

## 2020-07-27 NOTE — CONSULTS
Caryl Wiseman  7/27/2020  [id number]    Rehab Brief Consult:    Patient is able to tolerate 3 hours of therapy 5 days per week and is medically appropriate for rehab at the Acute Rehabilitation Unit. Approved for admission with insurance pre-certification. Thank you for the consultation.     Adriana Cartagena D.O. M.P.H  PM&R  7/27/2020  12:45 PM

## 2020-07-28 LAB
A/G RATIO: 1.3 (ref 1.1–2.2)
ALBUMIN SERPL-MCNC: 4.2 G/DL (ref 3.4–5)
ALP BLD-CCNC: 68 U/L (ref 40–129)
ALT SERPL-CCNC: 20 U/L (ref 10–40)
ANION GAP SERPL CALCULATED.3IONS-SCNC: 11 MMOL/L (ref 3–16)
AST SERPL-CCNC: 21 U/L (ref 15–37)
BASOPHILS ABSOLUTE: 0.1 K/UL (ref 0–0.2)
BASOPHILS RELATIVE PERCENT: 0.8 %
BILIRUB SERPL-MCNC: 0.6 MG/DL (ref 0–1)
BUN BLDV-MCNC: 19 MG/DL (ref 7–20)
CALCIUM SERPL-MCNC: 9.4 MG/DL (ref 8.3–10.6)
CHLORIDE BLD-SCNC: 101 MMOL/L (ref 99–110)
CO2: 24 MMOL/L (ref 21–32)
CREAT SERPL-MCNC: 1.4 MG/DL (ref 0.9–1.3)
EOSINOPHILS ABSOLUTE: 0.1 K/UL (ref 0–0.6)
EOSINOPHILS RELATIVE PERCENT: 0.8 %
GFR AFRICAN AMERICAN: >60
GFR NON-AFRICAN AMERICAN: 55
GLOBULIN: 3.2 G/DL
GLUCOSE BLD-MCNC: 114 MG/DL (ref 70–99)
HCT VFR BLD CALC: 35.9 % (ref 40.5–52.5)
HEMOGLOBIN: 12.2 G/DL (ref 13.5–17.5)
LYMPHOCYTES ABSOLUTE: 2.5 K/UL (ref 1–5.1)
LYMPHOCYTES RELATIVE PERCENT: 29.8 %
MCH RBC QN AUTO: 29.5 PG (ref 26–34)
MCHC RBC AUTO-ENTMCNC: 34.1 G/DL (ref 31–36)
MCV RBC AUTO: 86.5 FL (ref 80–100)
MONOCYTES ABSOLUTE: 0.7 K/UL (ref 0–1.3)
MONOCYTES RELATIVE PERCENT: 8 %
NEUTROPHILS ABSOLUTE: 5 K/UL (ref 1.7–7.7)
NEUTROPHILS RELATIVE PERCENT: 60.6 %
PDW BLD-RTO: 13.1 % (ref 12.4–15.4)
PLATELET # BLD: 276 K/UL (ref 135–450)
PMV BLD AUTO: 10.4 FL (ref 5–10.5)
POTASSIUM REFLEX MAGNESIUM: 4.4 MMOL/L (ref 3.5–5.1)
RBC # BLD: 4.14 M/UL (ref 4.2–5.9)
SODIUM BLD-SCNC: 136 MMOL/L (ref 136–145)
TOTAL PROTEIN: 7.4 G/DL (ref 6.4–8.2)
WBC # BLD: 8.2 K/UL (ref 4–11)

## 2020-07-28 PROCEDURE — 85025 COMPLETE CBC W/AUTO DIFF WBC: CPT

## 2020-07-28 PROCEDURE — 6370000000 HC RX 637 (ALT 250 FOR IP): Performed by: STUDENT IN AN ORGANIZED HEALTH CARE EDUCATION/TRAINING PROGRAM

## 2020-07-28 PROCEDURE — 2580000003 HC RX 258: Performed by: STUDENT IN AN ORGANIZED HEALTH CARE EDUCATION/TRAINING PROGRAM

## 2020-07-28 PROCEDURE — 36415 COLL VENOUS BLD VENIPUNCTURE: CPT

## 2020-07-28 PROCEDURE — 1200000000 HC SEMI PRIVATE

## 2020-07-28 PROCEDURE — 6360000002 HC RX W HCPCS: Performed by: STUDENT IN AN ORGANIZED HEALTH CARE EDUCATION/TRAINING PROGRAM

## 2020-07-28 PROCEDURE — 97530 THERAPEUTIC ACTIVITIES: CPT

## 2020-07-28 PROCEDURE — 6370000000 HC RX 637 (ALT 250 FOR IP): Performed by: INTERNAL MEDICINE

## 2020-07-28 PROCEDURE — 80053 COMPREHEN METABOLIC PANEL: CPT

## 2020-07-28 RX ORDER — BISACODYL 10 MG
10 SUPPOSITORY, RECTAL RECTAL ONCE
Status: COMPLETED | OUTPATIENT
Start: 2020-07-28 | End: 2020-07-28

## 2020-07-28 RX ADMIN — HEPARIN SODIUM 5000 UNITS: 5000 INJECTION INTRAVENOUS; SUBCUTANEOUS at 11:36

## 2020-07-28 RX ADMIN — BISACODYL 10 MG: 10 SUPPOSITORY RECTAL at 11:36

## 2020-07-28 RX ADMIN — CARVEDILOL 25 MG: 25 TABLET, FILM COATED ORAL at 16:37

## 2020-07-28 RX ADMIN — HEPARIN SODIUM 5000 UNITS: 5000 INJECTION INTRAVENOUS; SUBCUTANEOUS at 20:17

## 2020-07-28 RX ADMIN — HEPARIN SODIUM 5000 UNITS: 5000 INJECTION INTRAVENOUS; SUBCUTANEOUS at 06:02

## 2020-07-28 RX ADMIN — CLOPIDOGREL BISULFATE 75 MG: 75 TABLET ORAL at 08:34

## 2020-07-28 RX ADMIN — CARVEDILOL 25 MG: 25 TABLET, FILM COATED ORAL at 08:34

## 2020-07-28 RX ADMIN — ISOSORBIDE MONONITRATE 30 MG: 30 TABLET, EXTENDED RELEASE ORAL at 08:34

## 2020-07-28 RX ADMIN — LOSARTAN POTASSIUM 100 MG: 25 TABLET, FILM COATED ORAL at 08:34

## 2020-07-28 RX ADMIN — Medication 10 ML: at 08:34

## 2020-07-28 RX ADMIN — ATORVASTATIN CALCIUM 80 MG: 80 TABLET, FILM COATED ORAL at 20:17

## 2020-07-28 RX ADMIN — NIFEDIPINE 60 MG: 30 TABLET, EXTENDED RELEASE ORAL at 08:34

## 2020-07-28 RX ADMIN — PANTOPRAZOLE SODIUM 40 MG: 40 TABLET, DELAYED RELEASE ORAL at 06:02

## 2020-07-28 RX ADMIN — ASPIRIN 81 MG: 81 TABLET, CHEWABLE ORAL at 08:34

## 2020-07-28 ASSESSMENT — PAIN SCALES - GENERAL: PAINLEVEL_OUTOF10: 0

## 2020-07-28 NOTE — PLAN OF CARE
Problem: Falls - Risk of:  Goal: Will remain free from falls  Description: Will remain free from falls  7/28/2020 1051 by Parul Wood RN  Outcome: Ongoing   All fall precautions in place. Bed locked and in lowest position with alarm on. Over-bed table and personal belongings within reach. Patient instructed to use call light for assistance and calls out for needs appropriately. Non-skids socks on. Will continue to monitor. Problem: Skin Integrity:  Goal: Absence of new skin breakdown  Description: Absence of new skin breakdown  Outcome: Ongoing   Patient with good bed mobility and turns self frequently. Patient continent of urine. Will continue to monitor.

## 2020-07-28 NOTE — PROGRESS NOTES
Nephrology Progress Note          CC: We are following this patient for HTN and CKD. Admitted for acute cerebellar CVA    Admitted with headaches and dizziness; he was found to have acute ischemic stroke: MRI  showed bilateral cerebellar infarcts, small on right, and moderately sized on the left( also had CT and CTA).  Stroke team was consulted and recommended against tPA.      I had seen him in 2019 at [facility] but has not kept OP follow up. He  has a significant past medical history of HTN known for several years; he stopped BP meds at some point in 2018 ( started on an exercise regimen and weight loss). He was admitted in 2019 with  profound BP elevation 230's/120's.    The patient has elevated creatinine of 1.4 in 2019. SUBJECTIVE:    Persistent dizziness  BP overall much better but transient spikes at times  Feels weak    No dyspnea, CP. No cough or wheezing. No N/V, abd pain, or diarrhea. No F/C. No visitors      Physical Exam:    VITALS:  /84   Pulse 72   Temp 98.1 °F (36.7 °C) (Oral)   Resp 18   Ht 6' 2\" (1.88 m)   Wt 226 lb 10.1 oz (102.8 kg)   SpO2 95%   BMI 29.10 kg/m²   TEMPERATURE:  Current - Temp: 98.1 °F (36.7 °C); Max - Temp  Av.5 °F (36.9 °C)  Min: 98.1 °F (36.7 °C)  Max: 98.8 °F (37.1 °C)  RESPIRATIONS RANGE: Resp  Av.7  Min: 16  Max: 18  PULSE RANGE: Pulse  Av.8  Min: 72  Max: 93  BLOOD PRESSURE RANGE:  Systolic (01ZEO), JVK:623 , Min:132 , KTJ:270   ; Diastolic (44QWP), UDO:92, Min:78, Max:130    PULSE OXIMETRY RANGE: SpO2  Av %  Min: 92 %  Max: 96 %    Constitutional:  NAD  HEENT:  No oral lesions  Lungs:  clear  Cardiovascular:  RRR, no murmur  Abd:  Soft, NT  Ext:  No edema  Skin:  No rash  Neuro: no asterixis.  No focal loss of strength      DATA:    CBC:   Recent Labs     20  0528 20  0418 20  0454   WBC 6.9 7.3 8.2   RBC 4.27 4.24 4.14*   HGB 12.6* 12.4* 12.2*   HCT 36.8* 37.2* 35.9*    297 085 BMP:    Recent Labs     07/26/20  0528 07/27/20  0418 07/28/20  0454   * 138 136   K 4.3 4.3 4.4   CL 98* 100 101   CO2 26 25 24   BUN 18 19 19   CREATININE 1.2 1.4* 1.4*   CALCIUM 9.5 9.7 9.4   GLUCOSE 122* 113* 114*     Phosphorus:  No results for input(s): PHOS in the last 72 hours. Magnesium:  No results for input(s): MG in the last 72 hours. IMPRESSION/RECOMMENDATIONS:      1. Uncontrolled HTN - most likely essential HTn with non-compliance  Secondary workup: plasma metanephrines & catecholamines - pnding  Renin/aldosterone - renin 3, adi 14, ratio 4.7 - not indicative of aldosteronism  Renal arterial Doppler: normal kidneys, preliminary no LEIGHTON but renal arteries not well visualized  BP much improved but still increased at times  If trend continues, will add thiazide  2. CKD stage 2 - baseline creatinine near 1.5  3. Cerebellar infarcts  4. Bilateral cerebrovascular disease: CTA head and neck showed Right V4 segment of vertebral artery occlusion, Left V4 segment is severely stenotic. Right intracranial ICA cavernous and paraclinoid segments 70% stenosis. Left intracranial ICA cavernous segment 50% stenosis.   No intervention for now  5.  Rehab - plans for acute rehab    Kajal Mayorga MD

## 2020-07-28 NOTE — PROGRESS NOTES
Occupational Therapy  Facility/Department: [facility] 5T ORTHO/NEURO  Daily Treatment Note  NAME: Valdemar Waters  : 1975  MRN: [id number]      Date of Service: 2020     Assessment: Pt. limited by dizziness and BP this session. Able to independently don socks in supine. And able to sit up to EOB and back with Mod I with use of bed rails. Pt. verb dizinnes when sitting. Eyes closed throughout the session and anterior leaning while sitting. Will benefit from continued OT services to work on needs. Discharge Recommendations: Valdemar Waters scored a 18/24 on the AM-PAC ADL Inpatient form. Current research shows that an AM-PAC score of 17 or less is typically not associated with a discharge to the patient's home setting. Based on the patient's AM-PAC score and their current ADL deficits, it is recommended that the patient have 5-7 sessions per week of Occupational Therapy at d/c to increase the patient's independence. At this time, this patient demonstrates the endurance, and/or tolerance for 3 hours of therapy each day, with a treatment frequency of 5-7x/wk. Please see assessment section for further patient specific details. If patient discharges prior to next session this note will serve as a discharge summary. Please see below for the latest assessment towards goals. Assessment   Activity Tolerance  Activity Tolerance: Treatment limited secondary to medical complications (free text)(Pt. verb diziness after sitting up and still dizzy, but  better when laid back down. BP 90/63 when supine)        Restrictions  Position Activity Restriction  Other position/activity restrictions: activity as tolerated  Subjective   General  Chart Reviewed: Yes  Patient assessed for rehabilitation services?: Yes  Additional Pertinent Hx: 39 y.o. M presented to the ED with headache and dizziness that started on .   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 Patient was transferred to [facility] ICU for further neurosurgical follow up; Collette Ruts Neurovascular surgeon reviewed CTA Head/Neck and stated no neurovascular intervention indicated. PMH: HTN, Hyperlipidemia. Family / Caregiver Present: Yes(wife)  Referring Practitioner: ANYA Mane CNP  Diagnosis: Hypertensive Urgency  Subjective  Subjective: Pt semi supine in bed upon arrival, agreeable to OT but verb dizziness. Vital Signs  Patient Currently in Pain: No   Safety: Left in bed.  Bed alarm in place, nurse notified  Orientation     Objective    ADL  LE Dressing: Independent(Socks in supine)           Bed mobility  Supine to Sit: Modified independent(Use of bed rails)  Sit to Supine: Modified independent                 Plan   Plan  Times per week: 5-7  Times per day: Daily  Current Treatment Recommendations: Balance Training, Functional Mobility Training, Endurance Training, Safety Education & Training, Self-Care / ADLG-Code       AM-PAC Score        AM-PAC Inpatient Daily Activity Raw Score: 18 (07/28/20 1039)  AM-PAC Inpatient ADL T-Scale Score : 38.66 (07/28/20 1039)  ADL Inpatient CMS 0-100% Score: 46.65 (07/28/20 1039)  ADL Inpatient CMS G-Code Modifier : CK (07/28/20 1039)    Goals  Short term goals  Time Frame for Short term goals: Discharge  Short term goal 1: toilet transfer w/ CGA (not met)  Short term goal 2: grooming task in standing, CGA (MET 7/24); revised goal: grooming task in standing at sink level, SBA (not met)  Short term goal 3: pants w/ CGA (not met)  Patient Goals   Patient goals : to be independent; be able to play the piano       Therapy Time   Individual Concurrent Group Co-treatment   Time In 1018         Time Out 1031         Minutes 13           Timed Code Tx Min: 13  Total Tx Min: 13    Therapist was present, directed the patient's care, made skilled judgment, and was responsible for assessment and treatment of the patient. If patient is discharged prior to next treatment, this not will serve as the discharge summary.        Lorelei KUMAR/STUART

## 2020-07-28 NOTE — CARE COORDINATION
Spoke with Charissa in [place] [address] and precert is still pending. It was initiated yesterday. Will continue to follow.      Electronically signed by Judson Donahue RN on 7/28/2020 at 3:04 PM  [phone]

## 2020-07-28 NOTE — PROGRESS NOTES
Physical Therapy  Facility/Department: [facility] 5T ORTHO/NEURO  Daily Treatment Note  NAME: Charu Palomares  : 1975  MRN: [id number]    Date of Service: 2020    Discharge Recommendations:  Charu Palomares scored a 15/24 on the AM-PAC short mobility form. Current research shows that an AM-PAC score of 17 or less is typically not associated with a discharge to the patient's home setting. Based on the patient's AM-PAC score and their current functional mobility deficits, it is recommended that the patient have 5-7 sessions per week of Physical Therapy at d/c to increase the patient's independence. At this time, this patient demonstrates the endurance, and/or tolerance for 3 hours of therapy each day, with a treatment frequency of 5-7x/wk. Please see assessment section for further patient specific details. If patient discharges prior to next session this note will serve as a discharge summary. Please see below for the latest assessment towards goals. PT Equipment Recommendations  Equipment Needed: No(defer to next level of care)    Assessment   Body structures, Functions, Activity limitations: Decreased functional mobility ; Decreased safe awareness;Decreased balance;Decreased endurance  Assessment: Pt presents with increased fatigue and dizziness this date. Pt willing to participate, however unable to attempt standing or ambulation d/t fatigue and fear of becoming dizzy. Pt normally Independent with all functional mobility and is hopeful to d/c to a rehab facility. Recommend continued skilled therapy as tolerated to facilitate return to PLOF. Treatment Diagnosis: impaired mobility associated with hypertensive emergency  Prognosis: Good  Decision Making: Medium Complexity  PT Education: General Safety;PT Role;Plan of Care  Patient Education: Activity tolerance. Pt verbalizing understanding.   REQUIRES PT FOLLOW UP: Yes  Activity Tolerance  Activity Tolerance: Patient limited by fatigue;Patient limited by endurance     Patient Diagnosis(es): There were no encounter diagnoses. has a past medical history of Hyperlipidemia and Hypertension. has no past surgical history on file. Restrictions  Position Activity Restriction  Other position/activity restrictions: activity as tolerated  Subjective   General  Chart Reviewed: Yes  Additional Pertinent Hx: Patient is a 38 y/o male admitted 7/21 with dizziness, vertigo and headache. Patient found to be hypertensive in the emergency room. MRI brain (+) for Small right and moderate left cerebellar infarcts. No acute hemorrhage. CT head (+) for Subtle apparent infarct within the lateral aspect of the right cerebellar hemisphere. PMH significant for HTN, HLD. Family / Caregiver Present: No  Referring Practitioner: MARY Burgess  Subjective  Subjective: \"I am so tired. Getting up to go to the bathroom just exhausts me. \" Pt denies pain or dizziness. Agreeable to therapy. General Comment  Comments: Pt sidelying in bed upon arrival. Pt very motivated and willing to participate. Pain Screening  Patient Currently in Pain: Denies  Vital Signs  Patient Currently in Pain: Denies       Orientation  Orientation  Overall Orientation Status: Within Functional Limits  Cognition      Objective   Bed mobility  Supine to Sit: Modified independent(HOB slightly elevated. Use of bedrail.)  Sit to Supine: Modified independent           Balance  Posture: Good  Sitting - Static: Good;-  Sitting - Dynamic: Fair  Comments: Pt sat EOB ~15 minutes with overall good balance and posture SBA/Supervision. Pt did maintain eyes closed most of session, however did not c/o dizziness upon supine<>sit or with seated balance. Pt did report, \"I feel like I'm being pulled to the left, but I am able to resist it. \" Seated, pt was able to perform some small dynamic UE movements without LOB or dizziness.                            G-Code     OutComes Score AM-PAC Score  AM-PAC Inpatient Mobility Raw Score : 15 (07/28/20 1516)  AM-PAC Inpatient T-Scale Score : 39.45 (07/28/20 1516)  Mobility Inpatient CMS 0-100% Score: 57.7 (07/28/20 1516)  Mobility Inpatient CMS G-Code Modifier : CK (07/28/20 1516)          Goals  Short term goals  Time Frame for Short term goals: discharge  Short term goal 1: patient will perform bed mobility with modified independence  Short term goal 2: patient will perform transfers sit<>stand with SBA  Short term goal 3: patient will ambulate 48' with FWW and min assist x 1 (met 7/24). new goal: amb 150 ft with/without AAD CGA.   Patient Goals   Patient goals : to be independent    Plan    Plan  Times per week: 5-7  Current Treatment Recommendations: Strengthening, Transfer Training, Endurance Training, Patient/Caregiver Education & Training, Balance Training, Gait Training, Functional Mobility Training, Safety Education & Training  Safety Devices  Type of devices: Bed alarm in place, Call light within reach, Nurse notified, Left in bed     Therapy Time   Individual Concurrent Group Co-treatment   Time In 1432         Time Out 1500         Minutes [place], [place]

## 2020-07-28 NOTE — PROGRESS NOTES
Progress Note    Admit Date: 7/21/2020  Day: 8  Diet: DIET GENERAL;    CC: Ischemic Cerebellar Stroke + Hypertensive Emergency    Interval history: Asleep in bed. No acute events overnight. Denies dizziness and headache. Medications:     Scheduled Meds:   losartan  100 mg Oral Daily    isosorbide mononitrate  30 mg Oral Daily    heparin (porcine)  5,000 Units Subcutaneous Q8H    atorvastatin  80 mg Oral Nightly    clopidogrel  75 mg Oral Daily    aspirin  81 mg Oral Daily    pantoprazole  40 mg Oral QAM AC    sodium chloride flush  10 mL Intravenous 2 times per day    carvedilol  25 mg Oral BID WC    NIFEdipine  60 mg Oral Daily     Continuous Infusions:  PRN Meds:hydrALAZINE, labetalol, sodium chloride flush, acetaminophen **OR** acetaminophen, polyethylene glycol, promethazine **OR** ondansetron, perflutren lipid microspheres    Objective:   Vitals:   T-max:  Patient Vitals for the past 8 hrs:   BP Temp Temp src Pulse Resp SpO2   07/28/20 1054 106/63 98 °F (36.7 °C) Oral 73 16 93 %   07/28/20 0712 132/84 98.1 °F (36.7 °C) Oral 72 18 95 %       Intake/Output Summary (Last 24 hours) at 7/28/2020 1359  Last data filed at 7/28/2020 0905  Gross per 24 hour   Intake 360 ml   Output 1350 ml   Net -990 ml       Review of Systems:  Constitutional: Negative for chills, fatigue and fever. Respiratory: Negative for cough, choking, chest tightness and shortness of breath.    Cardiovascular: Negative for chest pain, palpitations and leg swelling. Gastrointestinal: Negative for abdominal pain, constipation, diarrhea, nausea and vomiting. Neurological: Negative for dizziness.  Negative for weakness, light-headedness, numbness and headaches.     Physical Exam:  Const: Well nourished, roused from sleep easily  HENT: Normocephalic, atraumatic  Neck: Supple, no JVD  CV: RRR, no murmurs, 2+ pulses peripherally  RESP: CTAB, no wheezing, rhonchi, rales  GI: soft, nontender, nondistended  MSK/Extremities: No LE edema  Neuro: AAOx3. No gross motor deficits. Psych: Appropriate mood and affect. LABS:    CBC:   Recent Labs     07/26/20  0528 07/27/20 0418 07/28/20 0454   WBC 6.9 7.3 8.2   HGB 12.6* 12.4* 12.2*   HCT 36.8* 37.2* 35.9*    297 276   MCV 86.1 87.6 86.5     Renal:    Recent Labs     07/26/20 0528 07/27/20 0418 07/28/20 0454   * 138 136   K 4.3 4.3 4.4   CL 98* 100 101   CO2 26 25 24   BUN 18 19 19   CREATININE 1.2 1.4* 1.4*   GLUCOSE 122* 113* 114*   CALCIUM 9.5 9.7 9.4   ANIONGAP 10 13 11     Hepatic:   Recent Labs     07/26/20 0528 07/27/20 0418 07/28/20 0454   AST 13* 13* 21   ALT 12 14 20   BILITOT 0.7 0.6 0.6   PROT 7.7 7.4 7.4   LABALBU 4.5 4.3 4.2   ALKPHOS 76 75 68     Troponin: No results for input(s): TROPONINI in the last 72 hours. BNP: No results for input(s): BNP in the last 72 hours. Lipids: No results for input(s): CHOL, HDL in the last 72 hours. Invalid input(s): LDLCALCU, TRIGLYCERIDE  ABGs:  No results for input(s): PHART, LMN2MOC, PO2ART, BRR7CGA, BEART, THGBART, N7CWPIHC, FHY1SXC in the last 72 hours. INR: No results for input(s): INR in the last 72 hours. Lactate: No results for input(s): LACTATE in the last 72 hours. Cultures:  -----------------------------------------------------------------  RAD:   US RENAL COMPLETE   Final Result      No hydronephrosis. 48 mm simple cyst left kidney. VL Renal Arterial Duplex Complete         MRI BRAIN W WO CONTRAST   Final Result      1. Small right and moderate left cerebellar infarcts. No acute hemorrhage. 2.  Mild chronic small vessel ischemic disease. Chronic right basal ganglia lacunar infarct. Assessment/Plan:   Jessika Arndt is a 40 yo M who presented with headache and dizziness. In the eD was found to be hypertensive  and CTA revealed severe stenosis of both ICAs, VAs. CT showed a right cerebellar infarct. MRI confirmed.   Awaiting precertification for placement to rehab facility.     Dizziness/vertigo 2/2 bilateral cerebellar infarcts L>R - Improving:  - ECHO negative for bubble  - Neurology following, recommend continuing DAPT x 3 months followed by Plavix monotherapy  - Continue statin  - q4h neuro checks     Severe stenosis of ICA, VA bilaterally- CTA head and neck showed Right V4 segment of vertebral artery occlusion, Left V4 segment is severely stenotic. Right intracranial ICA cavernous and paraclinoid segments 70% stenosis. Left intracranial ICA cavernous segment 50% stenosis.   - neurosurgery following, no intervention at this time  - Aggressive risk factor management; Goals: BP <140/90, LDL < 70, HA1C <7.0     Hypertensive emergency - resolved:   - nephrology consulted: feel his presentation likely due to essential hypertension w/ noncompliance of medications.  However, continuing to working up secondary HTN  - F/U on TSH, plasma meatanephrines, plasma catecholamines  - renin/aldosterone levels pending  - Renal US and renal arterial doppler pending  - Continue carvedilol 25 mg BID, Losartan 100mg, Nifedipine 60mg, and Imdur 30mg  - Recommend patient consider getting sleep study outpatient  - Patient will need follow-up with Dr. Kristal Connell after d/c     Hyperlipidemia  - Atorvastatin 80 mg    Code Status: Full Code  FEN: DIET GENERAL;  PPX:  subq heparin  DISPO: d/c to acute rehab facility    Lalitha Dejesus MD, PGY-1  07/28/20  1:59 PM    This patient has been staffed and discussed with Lester Tinajero MD.

## 2020-07-28 NOTE — PLAN OF CARE
Problem: Falls - Risk of:  Goal: Will remain free from falls  Description: Will remain free from falls  7/28/2020 0052 by Deirdre Villa RN  Outcome: Ongoing   Patient has remained free of falls. 2/4 bed rails up, bed locked and in lowest position, call light within reach. Patient instructed on use of call light and uses appropriately. Bed alarm on. Non-skid footwear and fall band on. Will continue to monitor. Problem: HEMODYNAMIC STATUS  Goal: Patient has stable vital signs and fluid balance  7/28/2020 0052 by Deirdre Villa RN  Outcome: Ongoing   NIH score is a 0. Neuro checks WNL. VSS.

## 2020-07-28 NOTE — PROGRESS NOTES
Patient alert and oriented x4, VSS on room air, neuro status unchanged with NIH 0. Patient denies pain this shift but does complain of dizziness with ambulation. Patient tolerating PO fluids and meals and denies N/V. Patient is up to bathroom x1 CG assist with rolling walker and GB - tolerating ambulation fairly well. Patient voiding adequately and had x2 formed bowel movements this shift following ordered suppository. All all precautions in place. Patient anticipating d/c to ARU. Will continue to monitor.

## 2020-07-29 ENCOUNTER — HOSPITAL ENCOUNTER (INPATIENT)
Age: 45
LOS: 10 days | Discharge: HOME | DRG: 057 | End: 2020-08-08
Attending: PHYSICAL MEDICINE & REHABILITATION | Admitting: PHYSICAL MEDICINE & REHABILITATION
Payer: COMMERCIAL

## 2020-07-29 VITALS
HEIGHT: 74 IN | SYSTOLIC BLOOD PRESSURE: 109 MMHG | BODY MASS INDEX: 29.09 KG/M2 | RESPIRATION RATE: 16 BRPM | HEART RATE: 77 BPM | DIASTOLIC BLOOD PRESSURE: 71 MMHG | WEIGHT: 226.63 LBS | TEMPERATURE: 98.5 F | OXYGEN SATURATION: 97 %

## 2020-07-29 PROBLEM — I63.9 CEREBELLAR INFARCTION (HCC): Status: ACTIVE | Noted: 2020-07-29

## 2020-07-29 LAB
A/G RATIO: 1.4 (ref 1.1–2.2)
ALBUMIN SERPL-MCNC: 4.4 G/DL (ref 3.4–5)
ALP BLD-CCNC: 79 U/L (ref 40–129)
ALT SERPL-CCNC: 20 U/L (ref 10–40)
ANION GAP SERPL CALCULATED.3IONS-SCNC: 11 MMOL/L (ref 3–16)
AST SERPL-CCNC: 18 U/L (ref 15–37)
BASOPHILS ABSOLUTE: 0.1 K/UL (ref 0–0.2)
BASOPHILS RELATIVE PERCENT: 0.6 %
BILIRUB SERPL-MCNC: 0.4 MG/DL (ref 0–1)
BUN BLDV-MCNC: 18 MG/DL (ref 7–20)
CALCIUM SERPL-MCNC: 9.9 MG/DL (ref 8.3–10.6)
CATECHOLAMINE INTERPRETATION, PLASMA: ABNORMAL
CHLORIDE BLD-SCNC: 102 MMOL/L (ref 99–110)
CO2: 25 MMOL/L (ref 21–32)
CREAT SERPL-MCNC: 1.3 MG/DL (ref 0.9–1.3)
DOPAMINE PLASMA: 23 PG/ML (ref 0–20)
EOSINOPHILS ABSOLUTE: 0.1 K/UL (ref 0–0.6)
EOSINOPHILS RELATIVE PERCENT: 0.8 %
EPINEPHRINE PLASMA: 129 PG/ML (ref 10–200)
GFR AFRICAN AMERICAN: >60
GFR NON-AFRICAN AMERICAN: 60
GLOBULIN: 3.2 G/DL
GLUCOSE BLD-MCNC: 110 MG/DL (ref 70–99)
HCT VFR BLD CALC: 36.1 % (ref 40.5–52.5)
HEMOGLOBIN: 12.2 G/DL (ref 13.5–17.5)
LYMPHOCYTES ABSOLUTE: 3 K/UL (ref 1–5.1)
LYMPHOCYTES RELATIVE PERCENT: 30.6 %
MCH RBC QN AUTO: 29.4 PG (ref 26–34)
MCHC RBC AUTO-ENTMCNC: 33.7 G/DL (ref 31–36)
MCV RBC AUTO: 87.2 FL (ref 80–100)
MONOCYTES ABSOLUTE: 0.9 K/UL (ref 0–1.3)
MONOCYTES RELATIVE PERCENT: 8.9 %
NEUTROPHILS ABSOLUTE: 5.8 K/UL (ref 1.7–7.7)
NEUTROPHILS RELATIVE PERCENT: 59.1 %
NOREPINEPHRINE: 1031 PG/ML (ref 80–520)
PDW BLD-RTO: 13.1 % (ref 12.4–15.4)
PLATELET # BLD: 276 K/UL (ref 135–450)
PMV BLD AUTO: 10.7 FL (ref 5–10.5)
POTASSIUM REFLEX MAGNESIUM: 4.6 MMOL/L (ref 3.5–5.1)
RBC # BLD: 4.14 M/UL (ref 4.2–5.9)
SODIUM BLD-SCNC: 138 MMOL/L (ref 136–145)
TOTAL PROTEIN: 7.6 G/DL (ref 6.4–8.2)
WBC # BLD: 9.9 K/UL (ref 4–11)

## 2020-07-29 PROCEDURE — 6360000002 HC RX W HCPCS: Performed by: PHYSICAL MEDICINE & REHABILITATION

## 2020-07-29 PROCEDURE — 80053 COMPREHEN METABOLIC PANEL: CPT

## 2020-07-29 PROCEDURE — 83835 ASSAY OF METANEPHRINES: CPT

## 2020-07-29 PROCEDURE — 6360000002 HC RX W HCPCS: Performed by: STUDENT IN AN ORGANIZED HEALTH CARE EDUCATION/TRAINING PROGRAM

## 2020-07-29 PROCEDURE — 85025 COMPLETE CBC W/AUTO DIFF WBC: CPT

## 2020-07-29 PROCEDURE — 6370000000 HC RX 637 (ALT 250 FOR IP): Performed by: STUDENT IN AN ORGANIZED HEALTH CARE EDUCATION/TRAINING PROGRAM

## 2020-07-29 PROCEDURE — 6370000000 HC RX 637 (ALT 250 FOR IP): Performed by: PHYSICAL MEDICINE & REHABILITATION

## 2020-07-29 PROCEDURE — 97535 SELF CARE MNGMENT TRAINING: CPT

## 2020-07-29 PROCEDURE — 2580000003 HC RX 258: Performed by: STUDENT IN AN ORGANIZED HEALTH CARE EDUCATION/TRAINING PROGRAM

## 2020-07-29 PROCEDURE — 97110 THERAPEUTIC EXERCISES: CPT

## 2020-07-29 PROCEDURE — 6370000000 HC RX 637 (ALT 250 FOR IP): Performed by: INTERNAL MEDICINE

## 2020-07-29 PROCEDURE — 1280000000 HC REHAB R&B

## 2020-07-29 PROCEDURE — 36415 COLL VENOUS BLD VENIPUNCTURE: CPT

## 2020-07-29 PROCEDURE — 97530 THERAPEUTIC ACTIVITIES: CPT

## 2020-07-29 PROCEDURE — 2580000003 HC RX 258: Performed by: PHYSICAL MEDICINE & REHABILITATION

## 2020-07-29 RX ORDER — ASPIRIN 81 MG/1
81 TABLET, CHEWABLE ORAL DAILY
Status: DISCONTINUED | OUTPATIENT
Start: 2020-07-30 | End: 2020-08-08 | Stop reason: HOSPADM

## 2020-07-29 RX ORDER — SENNA AND DOCUSATE SODIUM 50; 8.6 MG/1; MG/1
2 TABLET, FILM COATED ORAL 2 TIMES DAILY
Status: DISCONTINUED | OUTPATIENT
Start: 2020-07-29 | End: 2020-08-08 | Stop reason: HOSPADM

## 2020-07-29 RX ORDER — HYDRALAZINE HYDROCHLORIDE 20 MG/ML
10 INJECTION INTRAMUSCULAR; INTRAVENOUS EVERY 6 HOURS PRN
Status: CANCELLED | OUTPATIENT
Start: 2020-07-29

## 2020-07-29 RX ORDER — ISOSORBIDE MONONITRATE 30 MG/1
30 TABLET, EXTENDED RELEASE ORAL DAILY
Status: DISCONTINUED | OUTPATIENT
Start: 2020-07-30 | End: 2020-08-08 | Stop reason: HOSPADM

## 2020-07-29 RX ORDER — POLYETHYLENE GLYCOL 3350 17 G/17G
17 POWDER, FOR SOLUTION ORAL DAILY
Status: CANCELLED | OUTPATIENT
Start: 2020-07-29

## 2020-07-29 RX ORDER — CARVEDILOL 12.5 MG/1
25 TABLET ORAL 2 TIMES DAILY WITH MEALS
Status: DISCONTINUED | OUTPATIENT
Start: 2020-07-29 | End: 2020-08-08 | Stop reason: HOSPADM

## 2020-07-29 RX ORDER — ONDANSETRON 4 MG/1
4 TABLET, FILM COATED ORAL EVERY 8 HOURS PRN
Status: CANCELLED | OUTPATIENT
Start: 2020-07-29

## 2020-07-29 RX ORDER — HEPARIN SODIUM 5000 [USP'U]/ML
5000 INJECTION, SOLUTION INTRAVENOUS; SUBCUTANEOUS EVERY 8 HOURS
Status: DISCONTINUED | OUTPATIENT
Start: 2020-07-29 | End: 2020-08-05

## 2020-07-29 RX ORDER — SODIUM CHLORIDE 0.9 % (FLUSH) 0.9 %
10 SYRINGE (ML) INJECTION PRN
Status: CANCELLED | OUTPATIENT
Start: 2020-07-29

## 2020-07-29 RX ORDER — CLOPIDOGREL BISULFATE 75 MG/1
75 TABLET ORAL DAILY
Status: DISCONTINUED | OUTPATIENT
Start: 2020-07-30 | End: 2020-08-08 | Stop reason: HOSPADM

## 2020-07-29 RX ORDER — ACETAMINOPHEN 325 MG/1
650 TABLET ORAL EVERY 4 HOURS PRN
Status: CANCELLED | OUTPATIENT
Start: 2020-07-29

## 2020-07-29 RX ORDER — HYDRALAZINE HYDROCHLORIDE 20 MG/ML
10 INJECTION INTRAMUSCULAR; INTRAVENOUS EVERY 6 HOURS PRN
Status: DISCONTINUED | OUTPATIENT
Start: 2020-07-29 | End: 2020-07-30

## 2020-07-29 RX ORDER — ASPIRIN 81 MG/1
81 TABLET, CHEWABLE ORAL DAILY
Status: CANCELLED | OUTPATIENT
Start: 2020-07-30

## 2020-07-29 RX ORDER — HEPARIN SODIUM 5000 [USP'U]/ML
5000 INJECTION, SOLUTION INTRAVENOUS; SUBCUTANEOUS EVERY 8 HOURS
Status: CANCELLED | OUTPATIENT
Start: 2020-07-29

## 2020-07-29 RX ORDER — LOSARTAN POTASSIUM 100 MG/1
100 TABLET ORAL DAILY
Status: DISCONTINUED | OUTPATIENT
Start: 2020-07-30 | End: 2020-08-03

## 2020-07-29 RX ORDER — CLOPIDOGREL BISULFATE 75 MG/1
75 TABLET ORAL DAILY
Status: CANCELLED | OUTPATIENT
Start: 2020-07-30

## 2020-07-29 RX ORDER — PANTOPRAZOLE SODIUM 40 MG/1
40 TABLET, DELAYED RELEASE ORAL
Status: DISCONTINUED | OUTPATIENT
Start: 2020-07-30 | End: 2020-08-08 | Stop reason: HOSPADM

## 2020-07-29 RX ORDER — 0.9 % SODIUM CHLORIDE 0.9 %
500 INTRAVENOUS SOLUTION INTRAVENOUS ONCE
Status: COMPLETED | OUTPATIENT
Start: 2020-07-29 | End: 2020-07-29

## 2020-07-29 RX ORDER — SODIUM CHLORIDE 0.9 % (FLUSH) 0.9 %
10 SYRINGE (ML) INJECTION EVERY 12 HOURS SCHEDULED
Status: DISCONTINUED | OUTPATIENT
Start: 2020-07-29 | End: 2020-08-01

## 2020-07-29 RX ORDER — LOSARTAN POTASSIUM 25 MG/1
100 TABLET ORAL DAILY
Status: CANCELLED | OUTPATIENT
Start: 2020-07-30

## 2020-07-29 RX ORDER — CARVEDILOL 25 MG/1
25 TABLET ORAL 2 TIMES DAILY WITH MEALS
Status: CANCELLED | OUTPATIENT
Start: 2020-07-29

## 2020-07-29 RX ORDER — ONDANSETRON HYDROCHLORIDE 8 MG/1
4 TABLET, FILM COATED ORAL EVERY 8 HOURS PRN
Status: DISCONTINUED | OUTPATIENT
Start: 2020-07-29 | End: 2020-08-08 | Stop reason: HOSPADM

## 2020-07-29 RX ORDER — NIFEDIPINE 60 MG/1
60 TABLET, EXTENDED RELEASE ORAL DAILY
Status: DISCONTINUED | OUTPATIENT
Start: 2020-07-30 | End: 2020-08-03

## 2020-07-29 RX ORDER — POLYETHYLENE GLYCOL 3350 17 G/17G
17 POWDER, FOR SOLUTION ORAL DAILY
Status: DISCONTINUED | OUTPATIENT
Start: 2020-07-29 | End: 2020-08-08 | Stop reason: HOSPADM

## 2020-07-29 RX ORDER — SODIUM CHLORIDE 0.9 % (FLUSH) 0.9 %
10 SYRINGE (ML) INJECTION EVERY 12 HOURS SCHEDULED
Status: CANCELLED | OUTPATIENT
Start: 2020-07-29

## 2020-07-29 RX ORDER — SODIUM CHLORIDE 0.9 % (FLUSH) 0.9 %
10 SYRINGE (ML) INJECTION PRN
Status: DISCONTINUED | OUTPATIENT
Start: 2020-07-29 | End: 2020-08-08 | Stop reason: HOSPADM

## 2020-07-29 RX ORDER — PANTOPRAZOLE SODIUM 40 MG/1
40 TABLET, DELAYED RELEASE ORAL
Status: CANCELLED | OUTPATIENT
Start: 2020-07-30

## 2020-07-29 RX ORDER — BISACODYL 10 MG
10 SUPPOSITORY, RECTAL RECTAL DAILY PRN
Status: DISCONTINUED | OUTPATIENT
Start: 2020-07-29 | End: 2020-08-08 | Stop reason: HOSPADM

## 2020-07-29 RX ORDER — ATORVASTATIN CALCIUM 80 MG/1
80 TABLET, FILM COATED ORAL NIGHTLY
Status: CANCELLED | OUTPATIENT
Start: 2020-07-29

## 2020-07-29 RX ORDER — SODIUM CHLORIDE 9 MG/ML
INJECTION, SOLUTION INTRAVENOUS
Status: DISCONTINUED
Start: 2020-07-29 | End: 2020-07-29 | Stop reason: HOSPADM

## 2020-07-29 RX ORDER — HYDROCHLOROTHIAZIDE 25 MG/1
25 TABLET ORAL DAILY
Status: DISCONTINUED | OUTPATIENT
Start: 2020-07-29 | End: 2020-07-29

## 2020-07-29 RX ORDER — ACETAMINOPHEN 325 MG/1
650 TABLET ORAL EVERY 4 HOURS PRN
Status: DISCONTINUED | OUTPATIENT
Start: 2020-07-29 | End: 2020-08-08 | Stop reason: HOSPADM

## 2020-07-29 RX ORDER — NIFEDIPINE 60 MG/1
60 TABLET, EXTENDED RELEASE ORAL DAILY
Status: CANCELLED | OUTPATIENT
Start: 2020-07-30

## 2020-07-29 RX ORDER — ATORVASTATIN CALCIUM 80 MG/1
80 TABLET, FILM COATED ORAL NIGHTLY
Status: DISCONTINUED | OUTPATIENT
Start: 2020-07-29 | End: 2020-08-08 | Stop reason: HOSPADM

## 2020-07-29 RX ORDER — SENNA AND DOCUSATE SODIUM 50; 8.6 MG/1; MG/1
2 TABLET, FILM COATED ORAL 2 TIMES DAILY
Status: CANCELLED | OUTPATIENT
Start: 2020-07-29

## 2020-07-29 RX ORDER — ISOSORBIDE MONONITRATE 30 MG/1
30 TABLET, EXTENDED RELEASE ORAL DAILY
Status: CANCELLED | OUTPATIENT
Start: 2020-07-30

## 2020-07-29 RX ORDER — BISACODYL 10 MG
10 SUPPOSITORY, RECTAL RECTAL DAILY PRN
Status: CANCELLED | OUTPATIENT
Start: 2020-07-29

## 2020-07-29 RX ADMIN — CLOPIDOGREL BISULFATE 75 MG: 75 TABLET ORAL at 08:41

## 2020-07-29 RX ADMIN — ATORVASTATIN CALCIUM 80 MG: 80 TABLET, FILM COATED ORAL at 20:38

## 2020-07-29 RX ADMIN — LOSARTAN POTASSIUM 100 MG: 25 TABLET, FILM COATED ORAL at 08:41

## 2020-07-29 RX ADMIN — NIFEDIPINE 60 MG: 30 TABLET, EXTENDED RELEASE ORAL at 08:41

## 2020-07-29 RX ADMIN — ISOSORBIDE MONONITRATE 30 MG: 30 TABLET, EXTENDED RELEASE ORAL at 08:41

## 2020-07-29 RX ADMIN — SODIUM CHLORIDE 500 ML: 9 INJECTION, SOLUTION INTRAVENOUS at 13:26

## 2020-07-29 RX ADMIN — DOCUSATE SODIUM 50 MG AND SENNOSIDES 8.6 MG 2 TABLET: 8.6; 5 TABLET, FILM COATED ORAL at 20:37

## 2020-07-29 RX ADMIN — PANTOPRAZOLE SODIUM 40 MG: 40 TABLET, DELAYED RELEASE ORAL at 06:35

## 2020-07-29 RX ADMIN — POLYETHYLENE GLYCOL 3350 17 G: 17 POWDER, FOR SOLUTION ORAL at 18:25

## 2020-07-29 RX ADMIN — Medication 10 ML: at 20:37

## 2020-07-29 RX ADMIN — HEPARIN SODIUM 5000 UNITS: 5000 INJECTION INTRAVENOUS; SUBCUTANEOUS at 20:38

## 2020-07-29 RX ADMIN — CARVEDILOL 25 MG: 12.5 TABLET, FILM COATED ORAL at 18:25

## 2020-07-29 RX ADMIN — Medication 10 ML: at 08:47

## 2020-07-29 RX ADMIN — HEPARIN SODIUM 5000 UNITS: 5000 INJECTION INTRAVENOUS; SUBCUTANEOUS at 03:50

## 2020-07-29 RX ADMIN — HEPARIN SODIUM 5000 UNITS: 5000 INJECTION INTRAVENOUS; SUBCUTANEOUS at 13:29

## 2020-07-29 RX ADMIN — ASPIRIN 81 MG: 81 TABLET, CHEWABLE ORAL at 08:41

## 2020-07-29 RX ADMIN — CARVEDILOL 25 MG: 25 TABLET, FILM COATED ORAL at 08:41

## 2020-07-29 ASSESSMENT — PAIN SCALES - GENERAL: PAINLEVEL_OUTOF10: 0

## 2020-07-29 NOTE — PROGRESS NOTES
Patient alert and orientated. VSS, BP well controlled. Neuro status remains stable. NIH 0. Patient in bed overnight. Voiding appropriately. Fall precautions in place. Call light within reach. Will continue to assess and monitor.

## 2020-07-29 NOTE — PROGRESS NOTES
Pt NIH is 0, Pt is up X1 with walker and gaitbelt. Pt states he feels dizzy when standing up, orthostatics were taken and in flowsheets. Pt denies pain at this time. Fall precautions in place.  Will continue to monitor

## 2020-07-29 NOTE — PROGRESS NOTES
NUTRITION NOTE   Admission Date: 7/21/2020     Type and Reason for Visit: Initial    NUTRITION RECOMMENDATIONS:   1. PO Diet: Continue current general diet   2. ONS: Not indicated at this point    NUTRITION ASSESSMENT:  RD assessment for LOS. Pt indicated excellent appetite and PO intakes. Weight has been stable per pt. The patient will still be monitored per nutrition standards of care. Consult dietitian if nutrition interventions essential to patient care is needed. MALNUTRITION ASSESSMENT  Context of Malnutrition: Acute Illness   Malnutrition Status: No malnutrition    NUTRITION DIAGNOSIS No nutrition diagnosis at this time.      NUTRITION INTERVENTION  Food and/or Nutrient Delivery:Continue Current diet   Nutrition education/counseling/coordination of care: Continue Inpatient Monitoring     NUTRITION RISK LEVEL: Risk Level: Darcy Burris LD  Ruiz:  [phone]  Office:  [phone]

## 2020-07-29 NOTE — PROGRESS NOTES
Nephrology Progress Note          CC: We are following this patient for HTN and CKD. Admitted for acute cerebellar CVA    Admitted with headaches and dizziness; he was found to have acute ischemic stroke: MRI  showed bilateral cerebellar infarcts, small on right, and moderately sized on the left( also had CT and CTA).  Stroke team was consulted and recommended against tPA.      I had seen him in 2019 at [facility] but has not kept OP follow up. He  has a significant past medical history of HTN known for several years; he stopped BP meds at some point in 2018 ( started on an exercise regimen and weight loss). He was admitted in 2019 with  profound BP elevation 230's/120's.    The patient has elevated creatinine of 1.4 in 2019. SUBJECTIVE:    Dizziness improved  BP worse today  Feels much better    No dyspnea, CP. No cough or wheezing. No N/V, abd pain, or diarrhea. No F/C. No visitors      Physical Exam:    VITALS:  BP (!) 159/100   Pulse 83   Temp 98.6 °F (37 °C) (Oral)   Resp 18   Ht 6' 2\" (1.88 m)   Wt 226 lb 10.1 oz (102.8 kg)   SpO2 96%   BMI 29.10 kg/m²   TEMPERATURE:  Current - Temp: 98.6 °F (37 °C); Max - Temp  Av.4 °F (36.9 °C)  Min: 98.1 °F (36.7 °C)  Max: 98.8 °F (37.1 °C)  RESPIRATIONS RANGE: Resp  Av.2  Min: 16  Max: 18  PULSE RANGE: Pulse  Av.3  Min: 78  Max: 84  BLOOD PRESSURE RANGE:  Systolic (63MCM), QXN:134 , Min:121 , CNA:944   ; Diastolic (86SJK), YZE:88, Min:61, Max:100    PULSE OXIMETRY RANGE: SpO2  Av.6 %  Min: 94 %  Max: 97 %    Constitutional:  NAD  HEENT:  No oral lesions  Lungs:  clear  Cardiovascular:  RRR, no murmur  Abd:  Soft, NT  Ext:  No edema  Skin:  No rash  Neuro: no asterixis.  No focal loss of strength      DATA:    CBC:   Recent Labs     20  0418 20  0454 20  0451   WBC 7.3 8.2 9.9   RBC 4.24 4.14* 4.14*   HGB 12.4* 12.2* 12.2*   HCT 37.2* 35.9* 36.1*    654 943     BMP:    Recent Labs 07/27/20  0418 07/28/20  0454 07/29/20  0451    136 138   K 4.3 4.4 4.6    101 102   CO2 25 24 25   BUN 19 19 18   CREATININE 1.4* 1.4* 1.3   CALCIUM 9.7 9.4 9.9   GLUCOSE 113* 114* 110*     Phosphorus:  No results for input(s): PHOS in the last 72 hours. Magnesium:  No results for input(s): MG in the last 72 hours. IMPRESSION/RECOMMENDATIONS:      1. Uncontrolled HTN - most likely essential HTn with non-compliance  Secondary workup: plasma metanephrines & catecholamines - pnding  Renin/aldosterone - renin 3, adi 14, ratio 4.7 - not indicative of aldosteronism  Renal arterial Doppler: normal kidneys, preliminary no LEIGHTON but renal arteries not well visualized  BP fair but still elevated  Add thiazide  2. CKD stage 2 - baseline creatinine near 1.5  3. Cerebellar infarcts  4. Bilateral cerebrovascular disease: CTA head and neck showed Right V4 segment of vertebral artery occlusion, Left V4 segment is severely stenotic. Right intracranial ICA cavernous and paraclinoid segments 70% stenosis. Left intracranial ICA cavernous segment 50% stenosis.   No intervention for now  5.  Rehab - plans for acute rehab    Kamala Berger MD

## 2020-07-29 NOTE — PROGRESS NOTES
(L) 4.20 - 5.90 M/uL    Hemoglobin 12.2 (L) 13.5 - 17.5 g/dL    Hematocrit 36.1 (L) 40.5 - 52.5 %    MCV 87.2 80.0 - 100.0 fL    MCH 29.4 26.0 - 34.0 pg    MCHC 33.7 31.0 - 36.0 g/dL    RDW 13.1 12.4 - 15.4 %    Platelets 941 758 - 236 K/uL    MPV 10.7 (H) 5.0 - 10.5 fL    Neutrophils % 59.1 %    Lymphocytes % 30.6 %    Monocytes % 8.9 %    Eosinophils % 0.8 %    Basophils % 0.6 %    Neutrophils Absolute 5.8 1.7 - 7.7 K/uL    Lymphocytes Absolute 3.0 1.0 - 5.1 K/uL    Monocytes Absolute 0.9 0.0 - 1.3 K/uL    Eosinophils Absolute 0.1 0.0 - 0.6 K/uL    Basophils Absolute 0.1 0.0 - 0.2 K/uL         Plan: We will plan to admit to our rehabilitation unit tonight. We'll place his admission orders now.       Dr. Marla Londono

## 2020-07-29 NOTE — PLAN OF CARE
ARU PATIENT TREATMENT PLAN  The [address], [address] [place] [address] Ave  [phone]      Gloria Mack    : 1975  Acct #: [de-identified]  MRN: [id number]  PHYSICIAN:  Ileana Lozano MD  Primary Problem    Patient Active Problem List   Diagnosis    Hypertensive urgency    Hypertension    Acute CVA (cerebrovascular accident) ([facility] [address]    Hypertensive emergency    Cerebellar infarction [facility])       Rehabilitation Diagnosis:  Stroke, 1.3, Bilateral Involvement   ADMIT DATE:2020    Patient Goals: Patient looks forward to regaining his independence and going home.   Admitting Impairments: Decrease strength, balance, coordination, impaired gait, transfers, and ADLs  Activity Restrictions: None  Participation Limitations: None   CARE PLAN     NURSING:  Gloria Mack while on this unit will:  [] Be continent of bowel and bladder     [x] Have an adequate number of bowel movements  [] Urinate with no urinary retention >300ml in bladder  [] Complete bladder protocol with myers removal  [] Maintain O2 SATs at ___%  [] Have pain managed while on ARU       [x] Be pain free by discharge   [x] Have no skin breakdown while on ARU  [] Have improved skin integrity via wound measurements  [] Have no signs/symptoms of infection at the wound site  [] Be free from injury during hospitalization   [] Complete education with patient/family with understanding demonstrated for:  [] Adjustment   [] Other:     Nursing Interventions will include:  [x] bowel/bladder training   [x] education for medical assistive devices   [x] medication education   [] O2 saturation management   [x] energy conservation   [] stress management techniques   [x] fall prevention   [x] alarms protocol   [x] seating and positioning   [x] skin/wound care   [] pressure relief instruction   [] dressing changes     [x] infection protection   [] DVT prophylaxis  [x] assistance with in room safety with transfers to bed, toilet, Education: Pt verbalized understanding    SPEECH THERAPY: Goals will be left blank if speech is not following this patient. Goals:                                                                               Plan of Care:  Pt to be seen 5 out of 7 days per week per ARU protocol (0 minutes with SLP)    Therapy Treatments will include:  [x]  therapeutic exercises    [x]  gait training     [x]  neuromuscular re-ed                            [x]  transfer training             [] community reintegration    [x] bed mobility                          []  w/c mobility and training  [x]  self care    [x]home mgmt    []  cognitive training            [x]  energy conservation        []  dysphagia tx    []  speech/language/communication therapy   []  group therapy    [x]  patient/family education    [] Other:    CASE MANAGEMENT:  Goals:   Assist patient/family with discharge planning, patient/family counseling,   and coordination with insurance during ARU stay.     Admission Period/Goal QM SCORES  QM Admit/Goal Score   Eating CARE Score: 6 / Discharge Goal: Independent   Oral Hygiene CARE Score: 4 / Discharge Goal: Independent   Shower/Bathing CARE Score: 4 / Discharge Goal: Independent   UB Dressing CARE Score: 5 / Discharge Goal: Independent   LB Dressing CARE Score: 4 / Discharge Goal: Independent   Putting on/off Footwear CARE Score: 5 / Discharge Goal: Independent   Toileting Hygiene CARE Score: 4 / Discharge Goal: Independent   Bladder Continence Bladder Continence: Always continent    Bowel Continence Bowel Continence: Always continent    Toilet Transfers CARE Score: 4 / Discharge Goal: Independent   Shower/Bathe Self  CARE Score: 4 / Discharge Goal: Independent   Rolling Left and Right CARE Score: 4 / Discharge Goal: Independent   Sit to Lying CARE Score: 4 / Discharge Goal: Independent   Lying to Sitting on Bedside CARE Score: 4 / Discharge Goal: Independent   Sit to Stand CARE Score: 3 / Discharge Goal: Independent Chair/Bed to Chair Transfer CARE Score: 3 / Discharge Goal: Independent   Car Transfers CARE Score: 88 / Discharge Goal: Independent   Walk 10 Feet CARE Score: 3 / Discharge Goal: Independent   Walk 50 Feet with Two Turns CARE Score: 3 / Discharge Goal: Independent   Walk 150 Feet CARE Score: 3 / Discharge Goal: Independent   Walk 10 Feet on Uneven Surfaces CARE Score: 3 / Discharge Goal: Independent   1 Step (Curb) CARE Score: 3 / Discharge Goal: Independent   4 Steps CARE Score: 3 / Discharge Goal: Independent   12 Steps CARE Score: 88 / Discharge Goal: Independent   Picking up Object from Floor CARE Score: 3 / Discharge Goal: Independent   Wheel 50 Feet with 2 Turns   /     Type         [] Manual        [] Motorized        [] N/A   Wheel 150 Feet   /     Type         [] Manual        [] Motorized        [] N/A        Nj Zavaleta will be seen a minimum of 3 hours of therapy per day, a minimum of 5 out of 7 days per week (please see above for specific treatment plan per PT/OT/SLP). [] In this rare instance due to the nature of this patient's medical involvement, this patient will be seen 15 hours per week (900 minutes within a 7day period). In addition, dietician/nutritionist may monitor calorie count as well as intake and collaboratively work with SLP on dietary upgrades. Neuropsychology/Psychology may evaluate and provide necessary support.     Medical issues being managed closely and that require 24hour availability of a physician:  [] Swallowing Precautions  [] Bowel/Bladder Fx  [] Weight bearing precautions  [] Wound Care    [x] Pain Mgmt   [x] Infection Protection  [x] DVT Prophylaxis   [x] Fall Precautions  [x] Fluid/Electrolyte/Nutrition Balance  [] Voice Protection   [] Respiratory  [] Other:    Medical Prognosis: [] Good  [x] Fair    [] Guarded   Total expected IRF days 12  Anticipated discharge destination: Home  [] Home Independently   [x] Home Modified Independent  [] Home with supervision

## 2020-07-29 NOTE — DISCHARGE SUMMARY
INTERNAL MEDICINE    RESIDENT DISCHARGE SUMMARY   Discharge Summaries      Patient ID: Charu Palomares   Gender: male      :  1975  AGE: 39 y.o. MRN:  [id number]  Code Status: Full Code   PCP: Lex Barclay MD    Admit date:  2020      Discharge date:  No discharge date for patient encounter. Admitting Physician:  Manisha Moore MD    Discharge Physician: Leighann Quijano MD     Admission Condition:  unstable  Discharged Condition:  stable    Discharge Diagnoses: Active Hospital Problems    Diagnosis Date Noted    Hypertensive emergency [I16.1] 2020       The patient was seen and examined on day of discharge and this discharge summary is in conjunction with any daily progress note from day of discharge. In the event that the patient is not discharged on the daily of the discharge summary being filed, it is to serve as the progress note for that day. Hospital Course:   Mr. Charu Palomares is a 40 yo M PMH HTN, hyperlipidemia who presented to [facility] ED early  AM with complaint of HA and dizziness. which began on  AM.   In the ED was found to be hypertensive () and CTA revealed severe stenosis of both ICAs, VAs. MRI revealed small right and moderate left cerebellar infarcts.  stroke team consulted and recommended against tPA. Patient was placed on Cardene gtt. ECHO w/bubble study showed EF of 55%, negative for R to L shunting. Secondary HTN workup not indicative of aldosteronism; renal u/s negative for LEIGHTON. Plasma fractionated NE and dopamine were found to be elevated; MRI abdomen was ordered to assess for adrenal mass. At time of discharge, patient was stable and HTN well-controlled. He has no extremity weakness. Ataxia (left-sided lean) and dysmetria remain but much improved since initial presentation.     Disposition:  Discharge to acute rehab unit    Physical Exam Performed:     /71   Pulse 77   Temp 98.5 °F (36.9 °C) (Oral)   Resp 16   Ht 6' 2\" (1.88 m)   Wt 226 lb 10.1 oz (102.8 kg)   SpO2 97%   BMI 29.10 kg/m²     Physical Exam  Const: Well nourished man working with occupational therapist  HENT: Normocephalic, atraumatic  Neck: Supple, no JVD  CV: RRR, no murmurs, 2+ pulses peripherally  RESP: CTAB, no wheezing, rhonchi, rales  GI: soft, nontender, nondistended  MSK/Extremities: No LE edema  Neuro: AAOx3.  left lateral lean on ambulation, FTN test positive but much improved since admission  Psych: cooperative with exam, restricted affect  Labs: For convenience and continuity at follow-up the following most recent labs are provided:      CBC:    Lab Results   Component Value Date    WBC 9.9 07/29/2020    HGB 12.2 07/29/2020    HCT 36.1 07/29/2020     07/29/2020       Renal:    Lab Results   Component Value Date     07/29/2020    K 4.6 07/29/2020     07/29/2020    CO2 25 07/29/2020    BUN 18 07/29/2020    CREATININE 1.3 07/29/2020    CALCIUM 9.9 07/29/2020    PHOS 3.8 03/10/2019         Significant Diagnostic Studies    Radiology:   US RENAL COMPLETE   Final Result      No hydronephrosis. 48 mm simple cyst left kidney. VL Renal Arterial Duplex Complete         MRI BRAIN W WO CONTRAST   Final Result      1. Small right and moderate left cerebellar infarcts. No acute hemorrhage. 2.  Mild chronic small vessel ischemic disease. Chronic right basal ganglia lacunar infarct. MRI ABDOMEN WO CONTRAST    (Results Pending)          Consults:     IP CONSULT TO NEUROLOGY  IP CONSULT TO NEPHROLOGY  IP CONSULT TO PHYSICAL MEDICINE REHAB  IP CONSULT TO DIETITIAN  IP CONSULT TO SOCIAL WORK      Discharge Instructions/Follow-up:  Please f/u MRI abdomen and urine metanephrine studies.   F/u with PCP after discharge from ARU    Activity: activity as tolerated    Diet: general    Discharge Medications:     Current Discharge Medication List           Details   atorvastatin (LIPITOR) 80 MG tablet

## 2020-07-29 NOTE — PROGRESS NOTES
Physical Therapy  Facility/Department: [facility] 5T ORTHO/NEURO  Daily Treatment Note  NAME: Francie Brar  : 1975  MRN: [id number]    Date of Service: 2020    Discharge Recommendations:   Francie Brar scored a 17/24 on the AM-PAC short mobility form. Current research shows that an AM-PAC score of 18 or greater is typically associated with a discharge to the patient's home setting. Based on the patient's AM-PAC score and their current functional mobility deficits, it is recommended that the patient have 2-3 sessions per week of Physical Therapy at d/c to increase the patient's independence. At this time, this patient demonstrates the endurance and safety to discharge home with 24hr A and HHPT and a follow up treatment frequency of 2-3x/wk. Please see assessment section for further patient specific details. If patient discharges prior to next session this note will serve as a discharge summary. Please see below for the latest assessment towards goals. PT Equipment Recommendations  Equipment Needed: Yes  Mobility Devices: Deleta Fried: Rolling    Assessment   Body structures, Functions, Activity limitations: Decreased functional mobility ; Decreased safe awareness;Decreased balance;Decreased endurance  Assessment: Today's session limited by increased dizziness, light headedness and nausea (BP 93/49 in stance) with sup>sit and sit>stand transfers which prevented treatment/assessment of mobility. Pt mod I for bed mobility and CGA for transfers. Per OT and pt report, pt is feeling and moving much better than previous session. Will attempt to work with pt later today as appropriate. RN aware. Will continue to follow.   Treatment Diagnosis: impaired mobility associated with hypertensive emergency  Prognosis: Good  Patient Education: role of PT, use of call light, d/c planning; pt verb understanding  Barriers to Learning: none  REQUIRES PT FOLLOW UP: Yes  Activity Tolerance  Activity Tolerance: Treatment limited secondary to medical complications (free text)  Activity Tolerance: Pt orthostatic this session. Pt reporting dizziness/lightheadedness sitting EOB: /68 after sitting up EOB, 113/70 2nd attempt sitting EOB. BP 93/49 aftrr standing ~1min with increased symptoms as well as nausea. RN notified. /71 after being supine in bed for a few minutes. Patient Diagnosis(es): There were no encounter diagnoses. has a past medical history of Hyperlipidemia and Hypertension. has no past surgical history on file. Restrictions  Position Activity Restriction  Other position/activity restrictions: activity as tolerated  Subjective   General  Chart Reviewed: Yes  Additional Pertinent Hx: Patient is a 40 y/o male admitted 7/21 with dizziness, vertigo and headache. Patient found to be hypertensive in the emergency room. MRI brain (+) for Small right and moderate left cerebellar infarcts. No acute hemorrhage. CT head (+) for Subtle apparent infarct within the lateral aspect of the right cerebellar hemisphere. PMH significant for HTN, HLD. Family / Caregiver Present: No  Referring Practitioner: MARY Stevens  Subjective  Subjective: Pt found supine in bed upon arrival, denying pain and agreeable to therapy. Orientation  Orientation  Overall Orientation Status: Within Functional Limits  Objective   Bed mobility  Supine to Sit: Modified independent(HOB slightly raised and us of rail)  Sit to Supine: Modified independent  Scooting: Modified independent  Transfers  Sit to Stand: Contact guard assistance(from EOB x 2 trials)  Stand to sit: Contact guard assistance      Exercises  Comments: standing marches with RW - x5 each LE then pt reporting increased dizziness.          Goals  Short term goals  Time Frame for Short term goals: discharge  Short term goal 1: patient will perform bed mobility with modified independence MET 7/29  Short term goal 2: patient will perform transfers sit<>stand with SBA ongoing  Short term goal 3: patient will ambulate 48' with FWW and min assist x 1 (met 7/24). new goal: amb 150 ft with/without AAD CGA. ongoing  Patient Goals   Patient goals : to be independent    Plan    Plan  Times per week: 5-7  Current Treatment Recommendations: Strengthening, Transfer Training, Endurance Training, Patient/Caregiver Education & Training, Balance Training, Gait Training, Functional Mobility Training, Safety Education & Training  Safety Devices  Type of devices: Bed alarm in place, Call light within reach, Nurse notified, Left in bed     Therapy Time   Individual Concurrent Group Co-treatment   Time In 1125         Time Out 1150         Minutes 25           Timed Code Treatment Minutes:  25    Total Treatment Minutes:  25    Sasha Mom, PT, DPT [id number]     Attempted 2nd session in PM however per RN pt still orthostatic despite bolus. Will continue per POC.

## 2020-07-29 NOTE — PROGRESS NOTES
37.2* 35.9* 36.1*    276 276   MCV 87.6 86.5 87.2     Renal:    Recent Labs     07/27/20 0418 07/28/20 0454 07/29/20 0451    136 138   K 4.3 4.4 4.6    101 102   CO2 25 24 25   BUN 19 19 18   CREATININE 1.4* 1.4* 1.3   GLUCOSE 113* 114* 110*   CALCIUM 9.7 9.4 9.9   ANIONGAP 13 11 11     Hepatic:   Recent Labs     07/27/20 0418 07/28/20 0454 07/29/20 0451   AST 13* 21 18   ALT 14 20 20   BILITOT 0.6 0.6 0.4   PROT 7.4 7.4 7.6   LABALBU 4.3 4.2 4.4   ALKPHOS 75 68 79     Troponin: No results for input(s): TROPONINI in the last 72 hours. BNP: No results for input(s): BNP in the last 72 hours. Lipids: No results for input(s): CHOL, HDL in the last 72 hours. Invalid input(s): LDLCALCU, TRIGLYCERIDE  ABGs:  No results for input(s): PHART, PVX5XJJ, PO2ART, VZA9AXN, BEART, THGBART, Q1TLNTGR, CIJ9ZCS in the last 72 hours. INR: No results for input(s): INR in the last 72 hours. Lactate: No results for input(s): LACTATE in the last 72 hours. Cultures:  -----------------------------------------------------------------  RAD:   US RENAL COMPLETE   Final Result      No hydronephrosis. 48 mm simple cyst left kidney. VL Renal Arterial Duplex Complete         MRI BRAIN W WO CONTRAST   Final Result      1. Small right and moderate left cerebellar infarcts. No acute hemorrhage. 2.  Mild chronic small vessel ischemic disease. Chronic right basal ganglia lacunar infarct. Assessment/Plan:   Jenn Chakraborty is a 38 yo M who presented with headache and dizziness. In the eD was found to be hypertensive  and CTA revealed severe stenosis of both ICAs, VAs. CT showed a right cerebellar infarct. MRI confirmed.   Awaiting precertification for placement to rehab facility.     Dizziness/vertigo 2/2 bilateral cerebellar infarcts L>R - Improving:  - ECHO negative for bubble  - Neurology following, recommend continuing DAPT x 3 months followed by Plavix monotherapy  - Continue statin  - q4h neuro checks     Severe stenosis of ICA, VA bilaterally- CTA head and neck showed Right V4 segment of vertebral artery occlusion, Left V4 segment is severely stenotic. Right intracranial ICA cavernous and paraclinoid segments 70% stenosis. Left intracranial ICA cavernous segment 50% stenosis.   - neurosurgery following, no intervention at this time  - Aggressive risk factor management; Goals: BP <140/90, LDL < 70, HA1C <7.0     Hypertensive emergency - resolved:   - nephrology consulted: feel his presentation likely due to essential hypertension w/ noncompliance of medications.  However, continuing to working up secondary HTN  - F/U on TSH, plasma meatanephrines, plasma catecholamines  - renin/aldosterone levels pending  - Renal US and renal arterial doppler pending  - Continue carvedilol 25 mg BID, Losartan 100mg, Nifedipine 60mg, and Imdur 30mg  - Recommend patient consider getting sleep study outpatient  - Patient will need follow-up with Dr. Dotty Benedict after d/c     Hyperlipidemia  - Atorvastatin 80 mg    Code Status: Full Code  FEN: DIET GENERAL;  PPX:  subq heparin  DISPO: d/c to acute rehab facility    Carin Arias MD, PGY-1  07/29/20  8:57 AM    This patient has been staffed and discussed with Mele Scott MD.

## 2020-07-29 NOTE — PROGRESS NOTES
Pt was working with therapy and started to become dizzy. Physical therapist took BP and is was 93/79. Pt is laying in the bed now. Last BP was 109/71. Pt states he is no longer dizzy  MD notified.

## 2020-07-29 NOTE — PROGRESS NOTES
4 Eyes Admission Assessment     I agree as the admission nurse that 2 RN's have performed a thorough Head to Toe Skin Assessment on the patient. ALL assessment sites listed below have been assessed on admission. Areas assessed by both nurses: Laural Mac   [x]   Head, Face, and Ears   [x]   Shoulders, Back, and Chest  [x]   Arms, Elbows, and Hands   [x]   Coccyx, Sacrum, and Ischum  [x]   Legs, Feet, and Heels       Patient has no skin issues   Does the Patient have Skin Breakdown?   No         Mike Prevention initiated:  No   Wound Care Orders initiated:  No      WOC nurse consulted for Pressure Injury (Stage 3,4, Unstageable, DTI, NWPT, and Complex wounds):  No      Nurse 1 eSignature: Electronically signed by Trina Garnett RN on 7/29/20 at 6:13 PM EDT    **SHARE this note so that the co-signing nurse is able to place an eSignature**    Nurse 2 eSignature: Electronically signed by Hermila Dumas RN on 7/29/20 at 6:29 PM EDT

## 2020-07-29 NOTE — PLAN OF CARE
Problem: Falls - Risk of:  Goal: Will remain free from falls  Description: Will remain free from falls  Outcome: Ongoing  Note: Patient will remain free from falls. Patient alert and orientated and uses call light appropriately. Patient up x1 with walker, tolerates ambulation fairly well. Fall precautions in place. Bed locked and in lowest position, bed alarm on, nonskid socks on. Call light within reach. Problem: Skin Integrity:  Goal: Absence of new skin breakdown  Description: Absence of new skin breakdown  Outcome: Ongoing  Note: No new signs of skin breakdown. Patient able to turn and reposition self. Will continue to assess and monitor.

## 2020-07-29 NOTE — PROGRESS NOTES
Occupational Therapy  Facility/Department: [facility] 5T ORTHO/NEURO  Daily Treatment Note  NAME: Radha Shore  : 1975  MRN: [id number]    Date of Service: 2020    Assessment: Pt progressing with therapy. Pt with no report of dizziness during OT session this date and actively participated in therapy session. Functional mobility and transfers CGA, pt with slight left lateral lean and able to correct with vcs. Pt performed UB exercises with theraband and wall push ups. Pt will benefit from continued OT services at d/c. If pt d/c home, recommend 24 hr assist Level 3 HHOT. Will continue to assess pending progress and how pt tolerates PT session today (also needs to be able to manage steps to enter his apt). Continue with POC. Discharge Recommendations:  Radha Shore scored a 21/24 on the AM-PAC ADL Inpatient form. Current research shows that an AM-PAC score of 18 or greater is typically associated with a discharge to the patient's home setting. Based on the patient's AM-PAC score, and their current ADL deficits, it is recommended that the patient have 2-3 sessions per week of Occupational Therapy at d/c to increase the patient's independence. At this time, this patient demonstrates the endurance and safety to discharge home with 24 hr assist and HHOT and a follow up treatment frequency of 2-3x/wk. Please see assessment section for further patient specific details. If patient discharges prior to next session this note will serve as a discharge summary. Please see below for the latest assessment towards goals.    HOME HEALTH CARE: LEVEL 3 SAFETY     - Initial home health evaluation to occur within 24-48 hours, in patient home   - Therapy evaluations in home within 24-48 hours of discharge; including DME and home safety   - Frontload therapy 5 days, then 3x a week   - Therapy to evaluate if patient has [address] needs for personal care   -  evaluation within 24-48 hours, includes evaluation of resources and insurance to determine [place], [place], LTC, and Medicaid options   If patient discharges prior to next session this note will serve as a discharge summary. Please see below for the latest assessment towards goals. OT Equipment Recommendations  Other: defer recommendations to discharge faciltiy    Assessment   Performance deficits / Impairments: Decreased functional mobility ; Decreased ADL status; Decreased balance;Decreased coordination  Treatment Diagnosis: impaired ADLs/transfers, impaired LUE coordination  Prognosis: Good  OT Education: OT Role;Plan of Care;Home Exercise Program  Patient Education: pt demonstrated understanding  REQUIRES OT FOLLOW UP: Yes  Activity Tolerance  Activity Tolerance: Patient Tolerated treatment well  Safety Devices  Safety Devices in place: Yes  Type of devices: Nurse notified;Call light within reach; All fall risk precautions in place; Left in chair;Chair alarm in place         Patient Diagnosis(es): There were no encounter diagnoses. has a past medical history of Hyperlipidemia and Hypertension. has no past surgical history on file. Restrictions  Position Activity Restriction  Other position/activity restrictions: activity as tolerated  Subjective   General  Chart Reviewed: Yes  Patient assessed for rehabilitation services?: Yes  Additional Pertinent Hx: 39 y.o. M presented to the ED with headache and dizziness that started on 7/20. 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 Patient was transferred to [facility] ICU for further neurosurgical follow up; Mima Rangel Neurovascular surgeon reviewed CTA Head/Neck and stated no neurovascular intervention indicated. PMH: HTN, Hyperlipidemia.   Response to previous treatment: Patient with no complaints from previous session  Family / Caregiver Present: No  Referring Practitioner: ANYA Swann CNP  Diagnosis: Hypertensive Urgency  Subjective  Subjective: Pt supine in bed upon entry, very pleasant and agreeable to therapy session. (RN approval for OOB)  General Comment  Comments: Pt supine to long sitting Mod I and donned socks at setup. Pt long sitting to EOB Mod I. Pt sit EOB Mod I. Pt stating \"I don't feel dizzy anymore. \" Pt sit to stand CGA, pt ambulated to bathroom ~15 ft to sink with rw at [facility]. Pt in stance (CGA) at sink ~6 min for oral care/wash face with vcs/visual cues mirror to correct slight left lateral lean. Pt ambulated to chair ~20 ft at [facility] with rw. Pt stand to sit SBA. Pt with yellow theraband perforned the following bilateral UB exercises: 15 bicep curls x 3 sets, 15 chest pulls x 3 sets, 15 rows x 3 sets and 15 lateral pull downs x 3 sets. Pt stating \"That feels good. \" At baseline pt works out at home with pull up bar and doing sit ups and pulls ups. Pt sit to stand CGA, pt ambulated with rw at CGA ~10 ft to wall and then pt performed 10 wall push ups at CGA. Pt ambulated back to chair and stand to sit CGA. Call light in reach and chair alarm on. Vital Signs  Patient Currently in Pain: Denies   Orientation  Orientation  Overall Orientation Status: Within Normal Limits  Objective    ADL  Grooming: Contact guard assistance(in stance at sink for oral care/wash face)  LE Dressing: Setup(socks long sitting)        Balance  Sitting Balance: Supervision  Standing Balance: Contact guard assistance  Standing Balance  Time: ~<10 min total  Activity: wall push ups, to/from wall and to/from bathroom, in stance at sink for grooming  Functional Mobility  Functional - Mobility Device: Rolling Walker  Activity: To/from bathroom; Other  Assist Level: Contact guard assistance  Bed mobility  Supine to Sit: Modified independent  Scooting: Modified independent  Transfers  Stand to sit: Contact guard assistance                       Cognition  Overall Cognitive Status:

## 2020-07-29 NOTE — PLAN OF CARE
Problem: Falls - Risk of:  Goal: Will remain free from falls  Description: Will remain free from falls  Outcome: Ongoing  Note: Patient alert and oriented X4, non-skid socks on, bed in lowest position and locked, side rails up X2, call light and belongings within reach, bed alarm on for safety, and fall sign posted. Will continue to monitor.

## 2020-07-30 ENCOUNTER — APPOINTMENT (OUTPATIENT)
Dept: MRI IMAGING | Age: 45
DRG: 057 | End: 2020-07-30
Attending: PHYSICAL MEDICINE & REHABILITATION
Payer: COMMERCIAL

## 2020-07-30 LAB
ANION GAP SERPL CALCULATED.3IONS-SCNC: 11 MMOL/L (ref 3–16)
BUN BLDV-MCNC: 14 MG/DL (ref 7–20)
CALCIUM SERPL-MCNC: 9.7 MG/DL (ref 8.3–10.6)
CHLORIDE BLD-SCNC: 103 MMOL/L (ref 99–110)
CO2: 25 MMOL/L (ref 21–32)
CREAT SERPL-MCNC: 1.3 MG/DL (ref 0.9–1.3)
GFR AFRICAN AMERICAN: >60
GFR NON-AFRICAN AMERICAN: 60
GLUCOSE BLD-MCNC: 113 MG/DL (ref 70–99)
HCT VFR BLD CALC: 34.1 % (ref 40.5–52.5)
HEMOGLOBIN: 11.7 G/DL (ref 13.5–17.5)
MAGNESIUM: 2 MG/DL (ref 1.8–2.4)
MCH RBC QN AUTO: 29.6 PG (ref 26–34)
MCHC RBC AUTO-ENTMCNC: 34.4 G/DL (ref 31–36)
MCV RBC AUTO: 86.1 FL (ref 80–100)
METANEPH/PLASMA INTERP: ABNORMAL
METANEPHRINE FREE PLASMA: 0.4 NMOL/L (ref 0–0.49)
NORMETANEPHRINE FREE PLASMA: 1.08 NMOL/L (ref 0–0.89)
PDW BLD-RTO: 13.4 % (ref 12.4–15.4)
PLATELET # BLD: 278 K/UL (ref 135–450)
PMV BLD AUTO: 10.4 FL (ref 5–10.5)
POTASSIUM REFLEX MAGNESIUM: 4.2 MMOL/L (ref 3.5–5.1)
RBC # BLD: 3.96 M/UL (ref 4.2–5.9)
SODIUM BLD-SCNC: 139 MMOL/L (ref 136–145)
WBC # BLD: 8.7 K/UL (ref 4–11)

## 2020-07-30 PROCEDURE — 97162 PT EVAL MOD COMPLEX 30 MIN: CPT

## 2020-07-30 PROCEDURE — 36415 COLL VENOUS BLD VENIPUNCTURE: CPT

## 2020-07-30 PROCEDURE — 97116 GAIT TRAINING THERAPY: CPT

## 2020-07-30 PROCEDURE — 97535 SELF CARE MNGMENT TRAINING: CPT

## 2020-07-30 PROCEDURE — 6370000000 HC RX 637 (ALT 250 FOR IP): Performed by: PHYSICAL MEDICINE & REHABILITATION

## 2020-07-30 PROCEDURE — 6360000002 HC RX W HCPCS: Performed by: PHYSICAL MEDICINE & REHABILITATION

## 2020-07-30 PROCEDURE — 83735 ASSAY OF MAGNESIUM: CPT

## 2020-07-30 PROCEDURE — 74181 MRI ABDOMEN W/O CONTRAST: CPT

## 2020-07-30 PROCEDURE — 85027 COMPLETE CBC AUTOMATED: CPT

## 2020-07-30 PROCEDURE — 97110 THERAPEUTIC EXERCISES: CPT

## 2020-07-30 PROCEDURE — 2580000003 HC RX 258: Performed by: PHYSICAL MEDICINE & REHABILITATION

## 2020-07-30 PROCEDURE — 97530 THERAPEUTIC ACTIVITIES: CPT

## 2020-07-30 PROCEDURE — 1280000000 HC REHAB R&B

## 2020-07-30 PROCEDURE — 97166 OT EVAL MOD COMPLEX 45 MIN: CPT

## 2020-07-30 PROCEDURE — 80048 BASIC METABOLIC PNL TOTAL CA: CPT

## 2020-07-30 RX ORDER — HYDRALAZINE HYDROCHLORIDE 25 MG/1
25 TABLET, FILM COATED ORAL EVERY 6 HOURS PRN
Status: DISCONTINUED | OUTPATIENT
Start: 2020-07-30 | End: 2020-08-08 | Stop reason: HOSPADM

## 2020-07-30 RX ADMIN — PANTOPRAZOLE SODIUM 40 MG: 40 TABLET, DELAYED RELEASE ORAL at 05:12

## 2020-07-30 RX ADMIN — ISOSORBIDE MONONITRATE 30 MG: 30 TABLET, EXTENDED RELEASE ORAL at 07:47

## 2020-07-30 RX ADMIN — CARVEDILOL 25 MG: 12.5 TABLET, FILM COATED ORAL at 16:31

## 2020-07-30 RX ADMIN — POLYETHYLENE GLYCOL 3350 17 G: 17 POWDER, FOR SOLUTION ORAL at 07:48

## 2020-07-30 RX ADMIN — LOSARTAN POTASSIUM 100 MG: 100 TABLET, FILM COATED ORAL at 07:48

## 2020-07-30 RX ADMIN — HEPARIN SODIUM 5000 UNITS: 5000 INJECTION INTRAVENOUS; SUBCUTANEOUS at 20:59

## 2020-07-30 RX ADMIN — HEPARIN SODIUM 5000 UNITS: 5000 INJECTION INTRAVENOUS; SUBCUTANEOUS at 12:01

## 2020-07-30 RX ADMIN — ATORVASTATIN CALCIUM 80 MG: 80 TABLET, FILM COATED ORAL at 20:58

## 2020-07-30 RX ADMIN — HEPARIN SODIUM 5000 UNITS: 5000 INJECTION INTRAVENOUS; SUBCUTANEOUS at 05:11

## 2020-07-30 RX ADMIN — Medication 10 ML: at 07:51

## 2020-07-30 RX ADMIN — DOCUSATE SODIUM 50 MG AND SENNOSIDES 8.6 MG 2 TABLET: 8.6; 5 TABLET, FILM COATED ORAL at 07:47

## 2020-07-30 RX ADMIN — NIFEDIPINE 60 MG: 60 TABLET, FILM COATED, EXTENDED RELEASE ORAL at 07:47

## 2020-07-30 RX ADMIN — CLOPIDOGREL BISULFATE 75 MG: 75 TABLET ORAL at 07:47

## 2020-07-30 RX ADMIN — ASPIRIN 81 MG: 81 TABLET, CHEWABLE ORAL at 07:47

## 2020-07-30 RX ADMIN — CARVEDILOL 25 MG: 12.5 TABLET, FILM COATED ORAL at 07:47

## 2020-07-30 ASSESSMENT — 9 HOLE PEG TEST
TESTTIME_SECONDS: 36
TEST_RESULT: FUNCTIONAL
TEST_RESULT: IMPAIRED
TESTTIME_SECONDS: 24

## 2020-07-30 ASSESSMENT — PAIN SCALES - GENERAL
PAINLEVEL_OUTOF10: 0
PAINLEVEL_OUTOF10: 0

## 2020-07-30 NOTE — PLAN OF CARE
Problem: Pain Control  Goal: Maintain pain level at or below patient's acceptable level (or 5 if patient is unable to determine acceptable level)  Outcome: Ongoing  Goal: Improvement in pain related behaviors BP/HR WNL  Outcome: Ongoing     Problem: Neurological  Goal: Maximum potential motor/sensory/cognitive function  Outcome: Ongoing     Problem: Falls - Risk of:  Goal: Will remain free from falls  Description: Will remain free from falls  Outcome: Ongoing  Goal: Absence of physical injury  Description: Absence of physical injury  Outcome: Ongoing

## 2020-07-30 NOTE — FLOWSHEET NOTE
Patient is alert and oriented. VVS. Assessment complete. Patient denies pain. Patient resting comfortably in bed call light in reach. All needs met at this time. Will continue to monitor.        07/30/20 0742   Vital Signs   Temp 98.4 °F (36.9 °C)   Temp Source Oral   Pulse 73   Heart Rate Source Monitor   Resp 18   BP (!) 153/100   BP Location Left upper arm   BP Upper/Lower Upper   MAP (mmHg) 118   Patient Position Lying right side

## 2020-07-30 NOTE — PROGRESS NOTES
Occupational Therapy   Occupational Therapy Initial Assessment / Treatment Session  Date: 2020   Patient Name: Joan Patel  MRN: [id number]     : 1975    Date of Service: 2020    Discharge Recommendations:  Home with Home health OT, 24 hour supervision or assist  OT Equipment Recommendations  Equipment Needed: Yes  Mobility Devices: ADL Assistive Devices  ADL Assistive Devices: Transfer Tub Bench;Grab Bars - shower;Grab Bars - toilet  Other: Pt will likely need grab bars in shower and by toilet and a tub transfer bench. Continue to assess pending progress    Assessment   Performance deficits / Impairments: Decreased functional mobility ; Decreased ADL status; Decreased balance;Decreased coordination;Decreased high-level IADLs;Decreased fine motor control;Decreased endurance;Decreased strength  Assessment: Pt is a 39 y.o. M who presents below baseline d/t L and R cerebellar infarctions. Pt was previously independent with all ADLs and IADLs and working full time. Pt currently requires increased assistance for ADLs and functional mobility d/t decreased balance, strength, and LUE coordination. Pt benefits from skilled OT to maximize independence and safety with functional tasks, begin OT per POC  Treatment Diagnosis: impaired ADLs and FM coordination d/t cerebellar infarcts  Prognosis: Good  Decision Making: Medium Complexity  OT Education: Plan of Care;OT Role;Transfer Training  Patient Education: Pt verbalized understanding  REQUIRES OT FOLLOW UP: Yes  Activity Tolerance  Activity Tolerance: Patient Tolerated treatment well  Activity Tolerance: Pt reporting minimal dizziness upon sitting EOB -- /110. BP has been running high lately; RN notified. Safety Devices  Safety Devices in place: Yes  Type of devices: Bed alarm in place;Call light within reach; Left in bed           Patient Diagnosis(es): There were no encounter diagnoses.      has a past medical history of Hyperlipidemia and Hypertension. has no past surgical history on file. Treatment Diagnosis: impaired ADLs and FM coordination d/t cerebellar infarcts      Restrictions  Position Activity Restriction  Other position/activity restrictions: activity as tolerated    Subjective   General  Chart Reviewed: Yes  Patient assessed for rehabilitation services?: Yes  Additional Pertinent Hx: Pt is a 39 y.o. M presented to [facility] ED w/ headache and dizziness that started on 7/20. CT Head- acute right cerebellar stroke. 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 Patient was transferred to [facility] ICU for further neurosurgical follow up. Neurovascular surgeon reviewed CTA Head/Neck and stated no neurovascular intervention indicated. PMH: HTN, Hyperlipidemia  Family / Caregiver Present: No  Referring Practitioner: DO Radha  Diagnosis: cerebellar infarction  Subjective  Subjective: Pt supine in bed upon entry, very pleasant and agreeable to OT eval / tx. General Comment  Comments: . Social/Functional History  Social/Functional History  Lives With: Son(16 y.o. son)  Type of Home: Apartment  Home Layout: One level  Home Access: Stairs to enter with rails  Entrance Stairs - Number of Steps: 4 ALEX w/ rail to lower level apartment  Entrance Stairs - Rails: Left  Bathroom Shower/Tub: Tub/Shower unit  Bathroom Toilet: Standard  Bathroom Equipment: (none)  Bathroom Accessibility: Accessible  Home Equipment: (none)  ADL Assistance: Independent  Homemaking Assistance: Independent  Homemaking Responsibilities: Yes  Ambulation Assistance: Independent  Transfer Assistance: Independent  Active : Yes  Occupation: Full time employment  Type of occupation: customer service  Leisure & Hobbies: playing the piano; playing chess  Additional Comments: Pt reports no recent falls. Son can provide assistance at d/c if necessary.           Objective   Vision: Impaired  Vision Exceptions: Wears glasses for reading(only wears them at work)  Hearing: Within functional limits    Orientation  Overall Orientation Status: Within Normal Limits  Observation/Palpation  Observation: IV L upper arm  Balance  Sitting Balance: Supervision  Standing Balance: Contact guard assistance  Standing Balance  Time: ~10 minutes total  Activity: Functional mobility to/from room, stance for ADLs  Comment: No LOB during static stance for ADLs  Functional Mobility  Functional - Mobility Device: Rolling Walker  Activity: To/from bathroom  Assist Level: Contact guard assistance  Functional Mobility Comments: Primarily CGA during ambulation, two minor L lateral LOB when turning w/RW requiring Min A to correct. Slow ambulation. Shower Transfers  Shower - Transfer From: [employer] - Transfer To: Transfer tub bench  Shower - Technique: Ambulating  Shower Transfers: Contact Guard  Shower Transfers Comments: Min cues for safety     ADL  Feeding: Independent(Pt provided with breakfast tray at end of session and demo'd opening various containers)  UE Bathing: Supervision(Pt washed all components of UE bathing seated on TTB)  LE Bathing: Contact guard assistance(Pt washed/dried BLEs seated on TTB and washed juanita area by weightshifting L and R while seated - declined wanting to stand d/t poor balance. Pt stood at grab bars to dry bottom w/CGA.)  UE Dressing: Setup(Pt donned t-shirt seated on TTB w/setup)  LE Dressing: Contact guard assistance;Setup(Pt threaded boxers seated on TTB and pulled up in stance w/CGA; Pt declined wanting to wear shorts; Pt donned/doffed socks seated on TTB w/setup)  Additional Comments: Pt completed above ADLs with listed levels of assistance. Pt previously independent with all ADLs at baseline. Pt required increased time for ADLs this date w/eyes closed occasionally d/t fatigue.   Tone RUE  RUE Tone: Normotonic  Tone LUE  LUE Tone: Normotonic  Coordination  Movements Are Fluid And Coordinated: No  Coordination and Movement description: Fine motor impairments;Gross motor impairments;Decreased accuracy; Left UE          Bed mobility  Supine to Sit: Supervision(HOB slightly elevated, use of rail)  Sit to Supine: Supervision(HOB slightly elevated, use of rail)  Scooting: Supervision  Comment: Pt requesting to go back to bed at EOS d/t fatigue and not sleeping well last night  Transfers  Sit to stand: Contact guard assistance  Stand to sit: Contact guard assistance  Transfer Comments: Min cues for hand placement  Vision - Basic Assessment  Prior Vision: Wears glasses only for reading  Patient Visual Report: No visual complaint reported. Cognition  Overall Cognitive Status: WNL  Cognition Comment: Pt very pleasant and engaged in appropriate conversation throughout session. Perception  Overall Perceptual Status: WFL     Sensation  Overall Sensation Status: WFL            LUE AROM (degrees)  LUE AROM : WFL  Left Hand AROM (degrees)  Left Hand AROM: WFL  RUE AROM (degrees)  RUE AROM : WFL  Right Hand AROM (degrees)  Right Hand AROM: WFL  LUE Strength  Gross LUE Strength: WFL  RUE Strength  Gross RUE Strength: WFL          Hand Dominance  Hand Dominance: Right  Left Hand Strength -  (lbs)  Handle Setting 2: Pt completed  strength testing using dynamometer. L hand  strength measurements were as follows: 38, 40, 40. Pt's L  strength is in the 25th percentile for his age group. Pt endorsed weakness in L hand since stroke. Left Hand Strength - Pinch (lbs)  Lateral: Pt completed lateral pinch w/pinch guage and results were as follows: 18, 16, 16. Pt's pinch strength is in the 25th percentile for his age group; Pt feels his L pinch strength is WFL. Left 9-Hole Peg Test  Left 9-Hole Peg Test: Impaired  Right Hand Strength -  (lbs)  Handle Setting 2: R hand  strength measurements were as follows: 46, 46, 46. Pt's R  strength is in the 50th percentile for his age group and WFL.   Right Hand Strength - Pinch (lbs)  Lateral: Pt completed lateral pinch w/pinch guage and results were as follows: 16, 18, 16. Pt's pinch strength is in the 25th percentile for his age group; Pt feels his R pinch strength is WFL. Right 9-Hole Peg Test  Right 9-Hole Peg Test: Functional  Fine Motor Skills  Left 9-Hole Peg Test: Impaired  Left 9 Hole Peg Test Time (secs): 36  Right 9-Hole Peg Test: Functional  Right 9 Hole Peg Test Time (secs): 24  Fine Motor Comment: Pt completed 9HPT d/t observations of decreased fine motor coordination in LUE. Pt is in the less than 10th percentile for his age group for this assessment. Pt endorsed decreased coordination in L hand since stroke (requiring increased time to open containers and fasten belt). Dysmetria also observed as pt was overshooting pegs and holes while completing the assessment. Second Session: Pt asleep in bed upon OT arrival, reporting fatigue but agreeable to therapy. Pt's sister present. Pt required increased time to \"wake up\" from nap d/t fatigue from not sleeping well last night. Pt supine > sit w/spvn and STS to w/CGA. Pt requesting to use the bathroom. Pt ambulated to toilet w/CGA and one minor L lateral LOB requiring Min A to correct. Pt completed toilet transfer w/CGA and use of R grab bar. Pt spent significantly increased time seated on toilet attempting to have BM. Pt eventually successful and completed rear pericare by leaning forward while seated on toilet w/setup and spvn. Pt completed pants management in stance w/CGA and increased time to fasten belt. Pt completed hand hygiene and oral care in stance at sink w/CGA. Pt washed face w/washcloth in stance at sink w/CGA. Pt took several standing rest breaks at sink, readjusting posture to upright position with shoulders back and taking several deep breaths. Pt ambulated back to bed w/RW and CGA and stand to sit w/CGA. Pt sit > supine w/spvn.  Pt left supine in bed w/call light w/in reach and chair alarm

## 2020-07-30 NOTE — CONSULTS
NUTRITION NOTE   Admission Date: 7/29/2020     Type and Reason for Visit: Initial, Consult    NUTRITION RECOMMENDATIONS:   1. PO Diet: Continue general diet   2. Encourage po intakes vs need for ONS     NUTRITION ASSESSMENT:  New ARU admission, acute CVA. Pt known to nutritional services and was evaluated by RD 7/29. Pt has been consuming >50% of meals per EMR. Reported good appetite. No changes to weight. Unable to see pt today d/t working with other medical staff x2. Will monitor nutritional status this admission. MALNUTRITION ASSESSMENT  Context of Malnutrition: Acute Illness   Malnutrition Status: No malnutrition    NUTRITION DIAGNOSIS No nutrition diagnosis at this time.      NUTRITION INTERVENTION  Food and/or Nutrient Delivery:Continue Current diet   Nutrition education/counseling/coordination of care: Continue Inpatient Monitoring     NUTRITION RISK LEVEL: Risk Level: [address] Neisha Sierra LD  Ruiz:  [phone]  Office:  [phone]

## 2020-07-30 NOTE — FLOWSHEET NOTE
07/30/20 1459   Encounter Summary   Services provided to: Patient   Referral/Consult From: Rounding   Continue Visiting   (7/30, ade )   Complexity of Encounter Moderate   Length of Encounter 15 minutes   Routine   Type Initial   Assessment Calm; Approachable; Hopeful   Intervention Active listening;Explored feelings, thoughts, concerns   Outcome Comfort;Expressed gratitude   Staff Nelsy Bui, [place]

## 2020-07-30 NOTE — H&P
[facility] of [facility]  Dr. Yunier Mcbride History & Physical      Patient Identification:  Margy Gonzalez  : 1975  Admit date: 2020  Dictation date: 20   Attending provider: Tre Mcgill MD        Primary care provider: Colby Maynard MD       Chief Complaint:   Patient Active Problem List   Diagnosis    Hypertensive urgency    Hypertension    Acute CVA (cerebrovascular accident) ([facility]    Hypertensive emergency    Cerebellar infarction ([facility] [address] [facility]       History of Present Illness/Hospital Course:  38 yo M who presented with headache and dizziness. Patient found to be hypertensive  ,and CTA revealed severe stenosis of both ICAs, VAs. CT showed both a left and right cerebellar infarcts, and this was MRI confirmed. The patient has been cleared by Medicine for admission to our rehabilitation unit today. Patient has been approved by his insurance for rehabilitation unit treatment. Patient is able to tolerate 3 hours of therapy 5 days per week and is medically appropriate for rehab at the Acute Rehabilitation Unit. Approved for [employer] pre-certification , which has been obtained. Patient lives in a 1 level apartment with his son. Prior Level of Function:  Independent in self care and ADLs prior to admission. Current Level of Function:  PT:   Bed mobility  Supine to Sit: Modified independent(HOB slightly raised and us of rail)  Sit to Supine: Modified independent  Scooting: Modified independent  Transfers  Sit to Stand: Contact guard assistance(from EOB x 2 trials)  Stand to sit: Contact guard assistance    OT:   Functional Mobility  Functional - Mobility Device: Rolling Walker  Activity: To/from bathroom; Other  Assist Level: Contact guard assistance    SLP:   No indications for speech therapy after their evaluation. has a past medical history of Hyperlipidemia and Hypertension. reports that he quit smoking about 2 years ago.  His smoking use included cigarettes. He smoked 1.00 pack per day. He has never used smokeless tobacco. He reports current alcohol use. He reports that he does not use drugs. family history includes Diabetes in his father, maternal grandfather, maternal grandmother, mother, paternal grandfather, and paternal grandmother. Prior to Admission medications    Medication Sig Start Date End Date Taking? Authorizing Provider   aspirin 81 MG chewable tablet Take 1 tablet by mouth daily 7/27/20 10/25/20 Yes Gi Herrera MD   atorvastatin (LIPITOR) 80 MG tablet Take 1 tablet by mouth nightly 7/27/20  Yes Gi Herrera MD   clopidogrel (PLAVIX) 75 MG tablet Take 1 tablet by mouth daily 7/28/20  Yes Gi Herrera MD   isosorbide mononitrate (IMDUR) 30 MG extended release tablet Take 1 tablet by mouth daily 7/28/20  Yes Gi Herrera MD   losartan (COZAAR) 100 MG tablet Take 1 tablet by mouth daily 7/28/20  Yes Gi Herrera MD   pantoprazole (PROTONIX) 40 MG tablet Take 1 tablet by mouth every morning (before breakfast) 7/28/20 10/26/20 Yes Gi Herrera MD   carvedilol (COREG) 25 MG tablet TAKE 1 TABLET BY MOUTH TWICE A DAY WITH MEALS 4/27/20  Yes Stefano St MD   NIFEdipine (PROCARDIA XL) 60 MG extended release tablet TAKE ONE TABLET IN THE EVENING 4/27/20  Yes Stefano St MD         REVIEW OF SYSTEMS:   CONSTITUTIONAL: negative for fevers, chills, diaphoresis, activity change, appetite change, fatigue, night sweats and unexpected weight change. EYES: negative for blurred vision, eye discharge, visual disturbance and icterus. HEENT: negative for hearing loss, tinnitus, ear drainage, sinus pressure, nasal congestion, epistaxis and snoring. RESPIRATORY: Negative for hemoptysis, cough, sputum production.    CARDIOVASCULAR: negative for chest pain, palpitations, exertional chest pressure/discomfort, edema, syncope. + HTN   GASTROINTESTINAL: negative for nausea, vomiting, diarrhea, constipation, blood in stool and abdominal pain.  GENITOURINARY: negative for frequency, dysuria, urinary incontinence, decreased urine volume, and hematuria. HEMATOLOGIC/LYMPHATIC: negative for easy bruising, bleeding and lymphadenopathy. ALLERGIC/IMMUNOLOGIC: negative for recurrent infections, angioedema, anaphylaxis and drug reactions. ENDOCRINE: negative for weight changes and diabetic symptoms including polyuria, polydipsia and polyphagia. MUSCULOSKELETAL: negative for pain, joint swelling, decreased range of motion and muscle weakness. NEUROLOGICAL: negative for headaches, slurred speech, unilateral weakness. + Internal carotid artery, vertebral artery stenosis  PSYCHIATRIC/BEHAVIORAL: negative for hallucinations, behavioral problems, confusion and agitation. All pertinent positives are noted in the HPI. Physical Examination:  Vitals:   Patient Vitals for the past 24 hrs:   BP Temp Temp src Pulse Resp SpO2 Height Weight   07/30/20 0742 (!) 153/100 98.4 °F (36.9 °C) Oral 73 18 97 % -- --   07/29/20 2030 (!) 159/92 98.9 °F (37.2 °C) Oral 76 16 94 % -- --   07/29/20 1756 -- -- -- -- -- -- -- 226 lb 10.1 oz (102.8 kg)   07/29/20 1739 (!) 157/98 98.6 °F (37 °C) Oral 87 18 94 % 6' 2\" (1.88 m) --     Psych: Stable mood, normal judgement, normal affect   Const: No distress  Eyes: Conjunctiva noninjected, no icterus noted; pupils equal, round, and reactive to light. HENT: Atraumatic, normocephalic; Oral mucosa moist  Neck: Trachea midline, neck supple. No thyromegaly noted. CV: Regular rate and rhythm, no murmur rub or gallop noted  Resp: Lungs clear to auscultation bilaterally, no rales wheezes or ronchi, no retractions. Respirations unlabored. GI: Soft, nontender, nondistended. Normal bowel sounds. No palpable masses. Neuro: Alert, oriented, appropriate. No cranial nerve deficits appreciated. Motor examination reveals normal strength in all four limbs diffusely. Skin: Normal temperature and turgor  MSK: No joint abnormalities noted. Ext: No significant edema appreciated. No varicosities. Lab Results   Component Value Date    WBC 8.7 07/30/2020    HGB 11.7 (L) 07/30/2020    HCT 34.1 (L) 07/30/2020    MCV 86.1 07/30/2020     07/30/2020     Lab Results   Component Value Date    INR 1.02 07/21/2020    PROTIME 11.8 07/21/2020     Lab Results   Component Value Date    CREATININE 1.3 07/30/2020    BUN 14 07/30/2020     07/30/2020    K 4.2 07/30/2020     07/30/2020    CO2 25 07/30/2020     Lab Results   Component Value Date    ALT 20 07/29/2020    AST 18 07/29/2020    ALKPHOS 79 07/29/2020    BILITOT 0.4 07/29/2020       Ct Head Wo Contrast    Result Date: 7/21/2020  EXAMINATION: CT OF THE HEAD WITHOUT CONTRAST  7/21/2020 5:34 am TECHNIQUE: CT of the head was performed without the administration of intravenous contrast. Dose modulation, iterative reconstruction, and/or weight based adjustment of the mA/kV was utilized to reduce the radiation dose to as low as reasonably achievable. COMPARISON: 05/01/2019 HISTORY: ORDERING SYSTEM PROVIDED HISTORY: dizziness vertigo HTN TECHNOLOGIST PROVIDED HISTORY: Reason for exam:->dizziness vertigo HTN Has a \"code stroke\" or \"stroke alert\" been called? ->No Reason for Exam: vertigo Additional signs and symptoms: dizziness vertigo HTN Relevant Medical/Surgical History:  (Pt states that he started with a headache around 9 am then dizziness around 13, + n/v, denies sob or chest pain. ) FINDINGS: BRAIN/VENTRICLES: There is no acute intracranial hemorrhage, mass effect or midline shift. No abnormal extra-axial fluid collection. There is no evidence of hydrocephalus. There are stable chronic lacunar infarcts within the right basal ganglia and right internal capsule extending into the right caudate nucleus, unchanged from 05/01/2019. There is also stable mild chronic small vessel ischemic white matter disease.   There is a new subtle infarct within the lateral aspect of the right cerebellar hemisphere, which may be acute or chronic, not definitely seen on the study of 05/01/2019. No additional focus of abnormal brain attenuation is identified. ORBITS: The visualized portion of the orbits demonstrate no acute abnormality. SINUSES: There are multiple mucous retention cysts within the right maxillary sinus, stable and unchanged. There is mild mucosal thickening within the bilateral ethmoid sinus cellules. The remaining paranasal sinuses are clear. The mastoids are clear bilaterally. SOFT TISSUES/SKULL:  No acute abnormality of the visualized skull or soft tissues. 1. No acute intracranial hemorrhage or mass. 2. Subtle apparent infarct within the lateral aspect of the right cerebellar hemisphere, not definitely seen on the prior study of 05/01/2019, possibly acute or chronic. Suggest clinical correlation, and if concerning, consider further characterization with a follow-up brain MRI. 3. Stable chronic lacunar infarcts within the right basal ganglia, right internal capsule, extending into the right caudate nucleus. Us Renal Complete    Result Date: 7/27/2020  Renal ultrasound complete. HISTORY: Chronic kidney disease. Hypertension. The right kidney measures 11 cm. The left kidney measures 11 cm. No hydronephrosis. Incidental simple cyst noted involving the left renal cortex measuring 48 x 42 x 46 mm mid to inferior pole. The urinary bladder is unremarkable. No hydronephrosis. 48 mm simple cyst left kidney. Xr Chest Portable    Result Date: 7/21/2020  EXAMINATION: ONE XRAY VIEW OF THE CHEST 7/21/2020 4:59 am COMPARISON: 03/08/2019, 11/04/2013 HISTORY: ORDERING SYSTEM PROVIDED HISTORY: HTN dizzy TECHNOLOGIST PROVIDED HISTORY: Reason for exam:->HTN dizzy Reason for Exam: ELEVATED BLOOD PRESSURE, DIZZINESS AND VOMITING. FORMER SMOKER. HISTORY OF HTN Acuity: Acute Type of Exam: Initial FINDINGS: A single frontal view of the chest was obtained. There is no acute skeletal abnormality.   There is nonspecific elevation of the right hemidiaphragm, of questionable clinical significance. The heart size and mediastinal contours are within normal limits. There is mild right basilar atelectasis. The lungs are otherwise clear, without acute airspace consolidation. There is no evidence of a pneumothorax. New nonspecific elevation of the right hemidiaphragm, with mild right basilar atelectasis evident. There is no evidence of pneumonia. Vl Renal Arterial Duplex Complete    Result Date: 7/27/2020  Vascular Renal Procedure -- PRELIMINARY SONOGRAPHER REPORT --   Demographics   Patient Name        Mary Levy   Date of Study       07/27/2020    Gender                Male   Patient Number      [id number]    Date of Birth         1975   Visit Number        [id number]     Age                   39 year(s)   Accession Number    [id number]    Room Number           6180   Corporate ID        [id number]      Sonographer           Aminta Gabriel,                                                          RVT   Ordering Physician  Sania Owens, [address] [place] [address] Vascular                                    Physician             Readers  Procedure Type of Study:   Abdominal:Renal, VL RENAL ARTERIAL DUPLEX COMPLETE. Tech Comments Right Technically difficult study and limited visualization of the bilateral renal artery origins due to bowel gas and shadowing from the stomach (patient possibly ate or drank prior to test). The abdominal aorta reveals no evidence of aneurysmal dilatation. There is minimal atherosclerotic plaque noted in the abdominal aorta consistent with <50% stenosis by velocity criteria. There is no evidence to suggest renal artery stenosis. The right kidney measures 11.44 cm. The right renal vein is patent. Left The left renal artery was very poorly visualized (could only be seen from the flank). There is no evidence to suggest renal artery stenosis.  The left kidney measures 13.83 cm pharynx are unremarkable. No acute abnormality of the salivary and thyroid glands. BONES: No acute osseous abnormality. CTA HEAD: ANTERIOR CIRCULATION: On the right, there is approximately 70% segmental stenosis of the cavernous and paraclinoid segment of internal carotid artery is. MCA and CANELO are patent. On the left, there is 50% stenosis of the cavernous and paraclinoid segment of ICA, MCA and CANELO are patent. POSTERIOR CIRCULATION: Right V4 segment is occluded. Left beast 4 segment is is 75% narrowed multifocal moderate stenosis of the basilar artery. Right and left P segment segmental is moderately severe stenoses. .  Right PICA appears patent. Left PICA severely narrowed distally. OTHER: No dural venous sinus thrombosis on this non-dedicated study. BRAIN: No mass effect or midline shift. No extra-axial fluid collection. The gray-white differentiation is maintained. Both cervical ICAs are patent. Variant anatomy of right vertebral arteries, both diminutive. Right V4 segment of vertebral artery occlusion. Left V4 segment is severely stenotic. Basilar artery has multifocal moderate stenoses. Segmental moderately severe stenoses of both P2 segments. Left PICA is severely stenotic distally. Right intracranial ICA cavernous and paraclinoid segments 70% stenosis. Left intracranial ICA cavernous segment 50% stenosis. Both MCAs and ACAs are patent. Mri Brain W Wo Contrast    Result Date: 7/21/2020  PROCEDURE: Magnetic resonance imaging (MRI) of the brain without and with contrast INDICATION: Cerebellar stroke, possible craniotomy COMPARISON: CT head from the same day TECHNIQUE: MRI of the brain was performed prior to and following the uneventful administration of 20 mL ProHance intravenous gadolinium contrast according to standard protocol. FINDINGS: No evidence of acute or chronic hemorrhage is identified.  There is a moderate sized acute infarct in the inferior left cerebellar hemisphere and a small acute infarct of the right posterior cerebellum and vermis. The ventricles are nondilated. There is no midline shift. There is a chronic right putamen and internal capsule lacunar infarct. Mild periventricular white matter FLAIR hyperintensities likely represent sequelae of chronic small vessel ischemic disease. No enhancing lesions are identified. The  corpus callosum and sella appear normal. The brainstem and craniocervical junction appear normal. Small right maxillary sinus mucus retention cysts are noted. The orbits and mastoids appear normal. There is moderate to severe atherosclerosis involving the bilateral distal internal trocar arteries and bilateral distal vertebral arteries and basilar artery, better characterized on CT. The calvarium and visualized cervical spine appear normal.     1.  Small right and moderate left cerebellar infarcts. No acute hemorrhage. 2.  Mild chronic small vessel ischemic disease. Chronic right basal ganglia lacunar infarct. The above labs and diagnostic studies have been reviewed by myself upon admission to inpatient rehabilitation. Barriers to Discharge: Patient  has a past medical history of Hyperlipidemia and Hypertension. Patient lives in a 1 level apartment with his son. POST ADMISSION PHYSICIAN EVALUATION  The patient has agreed to being admitted to our comprehensive inpatient  rehabilitation facility consisting of at least 180 minutes of therapy a day,  5 out of 7 days a week. The patient/family has a good understanding of our discharge process. The  patient has potential to make improvement and is in need of at least two of  the following multidisciplinary therapies including but not limited to  physical, occupational, respiratory, and speech, nutritional services, wound care,   and prosthetics and orthotics.  Given the patients complex condition  and risk of further medical complications, rehabilitation services cannot be  safely provided at a lower level of care such as a skilled nursing facility. I have compared the patients medical and functional status at the time of the  preadmission screening and the same on this date, and there are no significant changes. By signing this document, I acknowledge that I have personally performed a  full physical examination on this patient within 24 hours of admission to  this inpatient rehabilitation facility and have determined the patient to be  able to tolerate the above course of treatment at an intensive level for a  reasonable period of time. I will be completing a detailed individualized  Plan of Care for this patient by day four of the patients stay based upon the  Preadmission Screen, this Post-Admission Evaluation, and the therapy  evaluations. Assessment and Plan:      Bilateral cerebellar infarcts L>R - Neurology, DAPT x 3 months, followed by Plavix monotherapy     Severe stenosis of ICA, VA bilaterally- neurosurgery consult, no intervention at this time     Hypertensive emergency - resolved, nephrology consulted,likely due to essential hypertension w/ noncompliance of medications, Continue carvedilol 25 mg BID, Losartan 100mg, Nifedipine 60mg, and Imdur 30mg, will need follow-up with Dr. Monica Dee after d/c     Hyperlipidemia-- Atorvastatin     Bowels: Schedule Miralax + Senna S. Follow bowel movements. Enema or suppository if needed. Bladder: Check PVR x 3. [address] if PVR > 200ml or if any volume is > 500 ml. Pain: Tylenol is ordered prn.        Christiano Diaz MD, 7/30/2020, 8:05 AM

## 2020-07-30 NOTE — PLAN OF CARE
Problem: Pain Control  Goal: Maintain pain level at or below patient's acceptable level (or 5 if patient is unable to determine acceptable level)  Outcome: Ongoing  Note: Patient denies pain      Problem: Falls - Risk of:  Goal: Will remain free from falls  Description: Will remain free from falls  Outcome: Ongoing  Note: Patient remains free from falls. Call light in reach. Patient calls out appropriately. Bed alarm activated.

## 2020-07-30 NOTE — PROGRESS NOTES
Nephrology Progress Note          CC: We are following this patient for HTN and CKD. Admitted for acute cerebellar CVA    Admitted with headaches and dizziness; he was found to have acute ischemic stroke: MRI  showed bilateral cerebellar infarcts, small on right, and moderately sized on the left( also had CT and CTA).  Stroke team was consulted and recommended against tPA.      I had seen him in 2019 at [facility] but has not kept OP follow up. He  has a significant past medical history of HTN known for several years; he stopped BP meds at some point in 2018 ( started on an exercise regimen and weight loss). He was admitted in 2019 with  profound BP elevation 230's/120's.    The patient has elevated creatinine of 1.4 in 2019. SUBJECTIVE:    Dizziness improved  Moved to Rehab  Results of fractionated serum catecholamines returned with high norepinephrine levels    I added HCTZ yesterday but reports of SBP in 90's late in day and this agent stopped  BP higher today    No dyspnea, CP. No cough or wheezing. No N/V, abd pain, or diarrhea. No F/C. No visitors      Physical Exam:    VITALS:  BP (!) 153/100   Pulse 73   Temp 98.4 °F (36.9 °C) (Oral)   Resp 18   Ht 6' 2\" (1.88 m)   Wt 226 lb 10.1 oz (102.8 kg)   SpO2 97%   BMI 29.10 kg/m²   TEMPERATURE:  Current - Temp: 98.4 °F (36.9 °C);  Max - Temp  Av.6 °F (37 °C)  Min: 98.4 °F (36.9 °C)  Max: 98.9 °F (37.2 °C)  RESPIRATIONS RANGE: Resp  Av  Min: 16  Max: 18  PULSE RANGE: Pulse  Av.3  Min: 73  Max: 87  BLOOD PRESSURE RANGE:  Systolic (58DXF), GUV:013 , Min:109 , :275   ; Diastolic (31XYL), WZX:20, Min:71, Max:100    PULSE OXIMETRY RANGE: SpO2  Av %  Min: 94 %  Max: 98 %    Constitutional:  NAD  HEENT:  No oral lesions  Lungs:  clear  Cardiovascular:  RRR, no murmur  Abd:  Soft, NT  Ext:  No edema  Skin:  No rash  Neuro: No focal loss of strength      DATA:    CBC:   Recent Labs     20  0454

## 2020-07-30 NOTE — PROGRESS NOTES
brain (+) for Small right and moderate left cerebellar infarcts. No acute hemorrhage. CT head (+) for Subtle apparent infarct within the lateral aspect of the right cerebellar hemisphere. PMH significant for HTN, HLD. Family / Caregiver Present: Yes(Sister)  Referring Practitioner: Dr. Carrington Neal: Within Functional Limits  General Comment  Comments: Pt was supine in bed upon arrival. Pt agreeable to PT evaluation  Subjective  Subjective: Pt states that he hasn't been doing much mobility since arriving to the hospital.  Pain Screening  Patient Currently in Pain: Denies  Vital Signs  Patient Currently in Pain: Denies       Orientation  Orientation  Overall Orientation Status: Within Normal Limits  Social/Functional History  Social/Functional History  Lives With: Son(16 y.o. son)  Type of Home: Apartment  Home Layout: One level  Home Access: Stairs to enter with rails  Entrance Stairs - Number of Steps: 4 ALEX w/ rail to lower level apartment  Entrance Stairs - Rails: Left  Bathroom Shower/Tub: Tub/Shower unit  Bathroom Toilet: Standard  Bathroom Equipment: (none)  Bathroom Accessibility: Accessible  Home Equipment: (none)  ADL Assistance: Independent  Homemaking Assistance: Independent  Homemaking Responsibilities: Yes  Ambulation Assistance: Independent  Transfer Assistance: Independent  Active : Yes  Occupation: Full time employment  Type of occupation: customer service  Leisure & Hobbies: playing the piano; playing chess  Additional Comments: Pt reports no recent falls. Son can provide assistance at d/c if necessary.   Cognition        Objective          AROM RLE (degrees)  RLE AROM: WFL  AROM LLE (degrees)  LLE AROM : WFL  Strength RLE  Strength RLE: WFL  Strength LLE  Strength LLE: WFL  Coordination  Rapid Alternating Movements: Normal  Finger to Nose: Dysmetric(L UE)  Heel to Cavazos: Ataxic(L LE)  Sensation  Overall Sensation Status: WFL(for B LEs intact to light touch)  Bed mobility  Sit to Supine: Stand by assistance(HOB elevated with the use of side rail)  Scooting: Supervision  Transfers  Sit to Stand: Minimal Assistance(From bed, chair, recliner, and barstool height chair; Pt needing VC for hand placement. 1 LOB noted when standing without an RW for support.)  Stand to sit: Minimal Assistance(VC for hand placement and B LE placement prior to transfer.)  Bed to Chair: Contact guard assistance(with RW)  Ambulation  Ambulation?: Yes  More Ambulation?: Yes  Ambulation 1  Surface: level tile  Device: Rolling Walker  Assistance: Minimal assistance  Quality of Gait: Fluctuating between a step to and a step through pattern. Ataxic on the L LE resulting in scissoring and multiple LOB. Pt with L lateral lean. Distance: 20', 140' (including up/down 75' ramp), 160', and short distances in the room  Ambulation 2  Surface - 2: level tile  Device 2: No device  Assistance 2: Moderate assistance(Fluctuating between CGA-MOD A 2/2 significant LOB during turning.)  Quality of Gait 2: Step through pattern with wide KAMALA, varied stance time, multiple LOB requiring facilitation to correct  Distance: 40'  Comments: Pt given VC for increased attention to LE positioning, increased frank to decrease stance time (pt with increased frequency of LOB when stance time was prolonged), and safety. Pt given facilitation through the pelvis to maintain CoM over KAMALA  Stairs/Curb  Stairs?: Yes  Stairs  # Steps : 9  Stairs Height: 6\"  Rails: Right ascending  Assistance: Minimal assistance  Comment: Reciprocal pattern with small LOB while descending (twice). 2nd session: Pt was supine in bed with HOB elevated upon arrival. Pt agreeable to PT intervention. Pt completed supine to sit transfer on the bed with HOB elevated and use of side rail with supervision. PT completed supine <> sit on the mat table with supervision. Pt completed multiple reps of sit <> stand transfers from bed, chair, and mat table with CGA-MIN A.  Pt again having intermittent LOB during initial stand requiring physical assistance to correct. Pt completed 2 x 15 reps of supine B bridges and B figure 4 bridges with a 3 second hold. Pt completed 15 reps of B prone hamstring curls. VC given for improved technique. Pt completed 5 minutes in quadraped alternating between single extremity lifts while maintaining core stability. Pt needing facilitation when extending LEs 2/2 significant wt shifts. Pt completed 10 reps of lateral trunk rotations to end range L and R and in tall kneeling. Pt ambulated distances of 36' x2 with CGA-MOD A (See above for gait deviations and interventions). Pt was able to retrieve an object from the floor with MIN A. Pt was supine in bed at the end of the session with bed alarm on, call light and all needs within reach. Plan   Plan  Times per week: 5x/wk for 90 minutes/day  Current Treatment Recommendations: Strengthening, Transfer Training, Endurance Training, Patient/Caregiver Education & Training, Balance Training, Gait Training, Functional Mobility Training, Safety Education & Training, Neuromuscular Re-education, Stair training  Safety Devices  Type of devices: Call light within reach, Chair alarm in place, Left in chair      Goals  Short term goals  Time Frame for Short term goals: STG=LTG  Long term goals  Time Frame for Long term goals : 10-14 Days  Long term goal 1: Pt will complete bed mobility independently  Long term goal 2: Pt will transfer sit <> stand independently  Long term goal 3: Pt will ambulate 200' with LRAD MOD I  Long term goal 4: Pt will ascend/descend 12 steps with 1 hand rail MOD I  Patient Goals   Patient goals :  To return to walking without an AD       Therapy Time   Individual Concurrent Group Co-treatment   Time In 1115         Time Out 1200         Minutes 39              Second Session Therapy Time:   Individual Concurrent Group Co-treatment   Time In 1505         Time Out 1550         Minutes 45           Timed Code Treatment Minutes:  33+16    Total Treatment Minutes:  [address], PT

## 2020-07-31 PROCEDURE — 97535 SELF CARE MNGMENT TRAINING: CPT

## 2020-07-31 PROCEDURE — 6360000002 HC RX W HCPCS: Performed by: PHYSICAL MEDICINE & REHABILITATION

## 2020-07-31 PROCEDURE — 97110 THERAPEUTIC EXERCISES: CPT

## 2020-07-31 PROCEDURE — 1280000000 HC REHAB R&B

## 2020-07-31 PROCEDURE — 97530 THERAPEUTIC ACTIVITIES: CPT

## 2020-07-31 PROCEDURE — 97116 GAIT TRAINING THERAPY: CPT

## 2020-07-31 PROCEDURE — 6370000000 HC RX 637 (ALT 250 FOR IP): Performed by: PHYSICAL MEDICINE & REHABILITATION

## 2020-07-31 RX ADMIN — ASPIRIN 81 MG: 81 TABLET, CHEWABLE ORAL at 09:34

## 2020-07-31 RX ADMIN — ATORVASTATIN CALCIUM 80 MG: 80 TABLET, FILM COATED ORAL at 20:40

## 2020-07-31 RX ADMIN — CARVEDILOL 25 MG: 12.5 TABLET, FILM COATED ORAL at 16:46

## 2020-07-31 RX ADMIN — LOSARTAN POTASSIUM 100 MG: 100 TABLET, FILM COATED ORAL at 09:35

## 2020-07-31 RX ADMIN — HEPARIN SODIUM 5000 UNITS: 5000 INJECTION INTRAVENOUS; SUBCUTANEOUS at 20:40

## 2020-07-31 RX ADMIN — ISOSORBIDE MONONITRATE 30 MG: 30 TABLET, EXTENDED RELEASE ORAL at 09:34

## 2020-07-31 RX ADMIN — HEPARIN SODIUM 5000 UNITS: 5000 INJECTION INTRAVENOUS; SUBCUTANEOUS at 04:47

## 2020-07-31 RX ADMIN — HEPARIN SODIUM 5000 UNITS: 5000 INJECTION INTRAVENOUS; SUBCUTANEOUS at 12:41

## 2020-07-31 RX ADMIN — CLOPIDOGREL BISULFATE 75 MG: 75 TABLET ORAL at 09:35

## 2020-07-31 RX ADMIN — PANTOPRAZOLE SODIUM 40 MG: 40 TABLET, DELAYED RELEASE ORAL at 06:33

## 2020-07-31 RX ADMIN — DOCUSATE SODIUM 50 MG AND SENNOSIDES 8.6 MG 2 TABLET: 8.6; 5 TABLET, FILM COATED ORAL at 20:40

## 2020-07-31 RX ADMIN — CARVEDILOL 25 MG: 12.5 TABLET, FILM COATED ORAL at 09:34

## 2020-07-31 RX ADMIN — NIFEDIPINE 60 MG: 60 TABLET, FILM COATED, EXTENDED RELEASE ORAL at 09:35

## 2020-07-31 RX ADMIN — POLYETHYLENE GLYCOL 3350 17 G: 17 POWDER, FOR SOLUTION ORAL at 09:34

## 2020-07-31 RX ADMIN — DOCUSATE SODIUM 50 MG AND SENNOSIDES 8.6 MG 2 TABLET: 8.6; 5 TABLET, FILM COATED ORAL at 09:35

## 2020-07-31 ASSESSMENT — PAIN SCALES - GENERAL
PAINLEVEL_OUTOF10: 0

## 2020-07-31 NOTE — PROGRESS NOTES
Patient resting in bed at this time. Sister at bedside. Transfers with 1 assist and rolling walker. Pleasant and cooperative with care. B/P elevated this am at 160/113. Am meds given and rechecked 1 hour later. B/P down to 137/88. Patient alert and oriented X4. Able to make needs known. Denies pain or discomfort this shift. Voiding per urinal. Bed in low position and call light within reach.

## 2020-07-31 NOTE — PROGRESS NOTES
Occupational Therapy  Facility/Department: [facility] [address] [facility]  Daily Treatment Note  NAME: Trevin Chavis  : 1975  MRN: [id number]    Date of Service: 2020    Discharge Recommendations:  Home with Home health OT, 24 hour supervision or assist  OT Equipment Recommendations  Equipment Needed: Yes  ADL Assistive Devices: Transfer Tub Bench;Grab Bars - shower;Grab Bars - toilet  Other: Pt will likely need grab bars in shower and by toilet and a tub transfer bench. Continue to assess pending progress    Assessment   Performance deficits / Impairments: Decreased functional mobility ; Decreased ADL status; Decreased balance;Decreased coordination;Decreased high-level IADLs;Decreased fine motor control;Decreased endurance;Decreased strength  Assessment: Pt demo'd good tolerance for balance exercises on airex mat today, requiring CGA-Min A to maintain balance. Pt completed standing level grooming w/SBA. Pt continues to demo L lateral LOB during functional mobility, particularly when turning w/RW, requiring Min A to correct. Pt benefits from continued skilled OT to maximize independence and safety with functional tasks, cont OT per POC  Treatment Diagnosis: impaired ADLs and FM coordination d/t cerebellar infarcts  Prognosis: Good  OT Education: Plan of Care;OT Role;Transfer Training  Patient Education: Pt verbalized understanding  REQUIRES OT FOLLOW UP: Yes  Activity Tolerance  Activity Tolerance: Patient Tolerated treatment well;Patient limited by fatigue  Activity Tolerance: Pt reporting increased fatigue and requested to go back to bed after OT session. Safety Devices  Safety Devices in place: Yes  Type of devices: Bed alarm in place;Call light within reach; Left in bed         Patient Diagnosis(es): There were no encounter diagnoses. has a past medical history of Hyperlipidemia and Hypertension. has no past surgical history on file.     Restrictions  Restrictions/Precautions  Restrictions/Precautions: Fall Risk  Position Activity Restriction  Other position/activity restrictions: activity as tolerated  Subjective   General  Chart Reviewed: Yes  Patient assessed for rehabilitation services?: Yes  Additional Pertinent Hx: Pt is a 39 y.o. M presented to [facility] ED w/ headache and dizziness that started on 7/20. CT Head- acute right cerebellar stroke. 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 Patient was transferred to [facility] ICU for further neurosurgical follow up. Neurovascular surgeon reviewed CTA Head/Neck and stated no neurovascular intervention indicated. PMH: HTN, Hyperlipidemia  Response to previous treatment: Patient with no complaints from previous session  Family / Caregiver Present: No  Referring Practitioner: DO Radha  Diagnosis: cerebellar infarction  Subjective  Subjective: Pt seated in recliner chair OT arrival, reporting fatigue but agreeable to therapy. General Comment  Comments: Kay Staggers Vital Signs  Patient Currently in Pain: Denies   Orientation  Orientation  Overall Orientation Status: Within Normal Limits  Objective    ADL  Grooming: Stand by assistance(Pt completed oral care and washed face in stance at sink w/SBA)        Balance  Sitting Balance: Modified independent   Standing Balance: Contact guard assistance(Primarily CGA; brief periods of Min A)  Standing Balance  Time: ~24 minutes total  Activity: Functional mobility to/from bathroom and therapy gym, stance for ADLs, and stance for balance activities  Comment: Minimal wavering but no overt LOB during static stance for ADLs. Pt completed the follow activities to address standing balance for functional tasks: 1) Maintaining stance w/out UE support to raise large exercise ball above head and back down to trunk 20x and rotate trunk while holding ball in extended arms 20x;  Pt demo'd minimal wavering and required CGA to maintain balance 2) Standing w/out UE support while bouncing large exercise ball w/2 hands 20x, w/R hand 20x, and w/L hand 20x; Pt demo'd moderate wavering when reaching downward anteriorly and laterally to regain control of bouncing ball, requiring Min A to maintain balance. 3) Completing the first activity (raising ball above head 20x and completing trunk rotation while holding ball 20x) while standing on airex mat; Pt demo'd minimal wavering and required CGA to maintain balance. 4) Standing on airex mat x1 minute with eyes closed; Pt demo'd 3 moderate anterior LOB requiring VCs and CGA-Min A to correct  Functional Mobility  Functional - Mobility Device: Rolling Walker  Activity: To/from bathroom; To/From therapy gym  Assist Level: Minimal assistance  Functional Mobility Comments: Primarily CGA for ambulation in room/bathroom, two minor L lateral LOB when turning w/RW requiring Min A to correct. Several periods of L lateral LOB during ambulation to therapy gym requiring Min A to maintain balance and VCs for slower ambulation. Bed mobility  Sit to Supine: Supervision  Scooting: Supervision  Transfers  Sit to stand: Contact guard assistance  Stand to sit: Contact guard assistance  Transfer Comments: Min cues for hand placement                       Cognition  Overall Cognitive Status: WNL                    Type of ROM/Therapeutic Exercise  Type of ROM/Therapeutic Exercise: Free weights(3#)  Comment: Pt completed the following UE therex to increase strength for ADLs / IADLs:  Exercises  Scapular Protraction: 2 x 10  Shoulder Flexion: 2 x 10  Shoulder ABduction: 2 x 10  Elbow Flexion: 2 x 10                    Second Session: Pt asleep in bed upon OT arrival, reporting fatigue but agreeable to therapy. Pt supine > sit w/spvn and STS w/CGA. Pt ambulated ~120ft to dining room w/RW and CGA-Min A d/t several minor L lateral LOB. Pt ambulating very quickly and cued to decrease speed for safety. Pt took shorted seated rest break upon reaching dining room. Pt completed the following light IADL laundry and item retrieval task: Pt instructed to ambulate around dining/activity room to retrieve various articles of clothing and transported them to washer by hanging over the front of RW. Pt w/3 minor lateral LOB when reaching outside KAMALA to retrieve items, requiring Min A to correct. Pt practiced putting clothes in/out of washer and dryer and completed w/CGA and minimal wavering but no overt LOB. Pt dropped one article of clothing and was able to retrieve from ground w/CGA and no LOB. Pt transported clothes to tabletop and folded in stance x3 minutes w/SBA. Pt took short seated rest break. Pt ambulated ~120ft to therapy gym w/RW and CGA-Min A. Pt completed the following activities to address L hand coordination: 1) Pt instructed to screw and unscrew 3 various sized nuts and bolts. Pt completed fairly quickly w/min difficulty. Pt instructed to screw and unscrew 3 various sized nuts and bolts with vision occluded. Pt completed with min-mod difficulty and slightly increased time d/t decreased coordination. 2) Pt completed palm to finger and finger to palm translation with nuts 5x each hand w/increased difficulty completing palm to finger. 3) Stringing and unstringing 15 paperclips. Pt completed fairly easily w/slightly increased time. Pt completed the following activity to address standing balance for ADLs / functional mobility: 1) Maintaining standing balance while throwing 4.4, 6.6, and 8.8 lb weighted balls at rebounder and catching. Pt completed 20 throws with each ball w/minimal wavering and CGA. Pt dropped heaviest ball twice and required assist to retrieve from ground. 2) Maintaining standing balance on airex mat while throwing 4.4, 6.6, and 8.8 lb weighted balls at rebounder and catching. Pt completed 20 throws with each ball w/min-mod wavering and CGA. Pt w/three minor LOB while throwing 8.8 lb ball requiring Min A to maintain balance.     Pt ambulated back to room w/CGA and two minor LOB requiring Min A to correct. Pt stand to sit on EOB w/CGA and sit > supine w/spvn. Lunch tray delivered; pt able to open all containers BETH. Pt left semi-supine in bed w/call light w/in reach and bed alarm on.       Plan   Plan  Times per week: 5x weekly, 90 mins daily  Times per day: Daily  Current Treatment Recommendations: Balance Training, Functional Mobility Training, Endurance Training, Safety Education & Training, Self-Care / ADL, Strengthening, Patient/Caregiver Education & Training, Equipment Evaluation, Education, & procurement, Home Management Training  G-Code     OutComes Score                                                  AM-PAC Score             Goals  Short term goals  Time Frame for Short term goals: STG = LTG  Long term goals  Time Frame for Long term goals : 10-14 days -- all goals ongoing  Long term goal 1: Pt will complete UB/LB bathing Mod I  Long term goal 2: Pt will complete UB/LB dressing Mod I  Long term goal 3: Pt will complete toilet and tub transfers Mod I  Long term goal 4: Pt will complete IADL task in stance x10 mins w/spvn  Long term goal 5: Pt will demo improved L hand coordination by decreasing [place] Peg Test time by 5 seconds  Patient Goals   Patient goals : to be independent; be able to play the piano       Therapy Time   First Session Second Session Group Co-treatment   Time In 1000  1045       Time Out 8082  6827       Minutes 30  60       Timed Code Treatment Minutes: 30 Minutes + 60 Minutes = 90 Minutes total       Nemo Hargrove

## 2020-07-31 NOTE — PLAN OF CARE
Problem: Neurological  Goal: Maximum potential motor/sensory/cognitive function  Outcome: Ongoing  Note: Patient encouraged to participate with therapies to obtain maximum potential of motor/sensory/cognitive function. Patient compliant      Problem: Falls - Risk of:  Goal: Will remain free from falls  Description: Will remain free from falls  Outcome: Ongoing  Note: Patient has remained free from falls this shift. Call light within reach and bed in low position. Problem: Pain Control  Goal: Improvement in pain related behaviors BP/HR WNL  Outcome: Ongoing  Note: Patient denies any pain or discomfort this shift. Will continue to monitor.

## 2020-07-31 NOTE — PROGRESS NOTES
Department of [facility]  Dr. Caty Rojas Progress Note    Patient Identification:  Artem Leiva  [id number]  : 1975  Admit date: 2020      Diagnosis:   Patient Active Problem List   Diagnosis    Hypertensive urgency    Hypertension    Acute CVA (cerebrovascular accident) ([facility] [place] [address]    Hypertensive emergency    Cerebellar infarction ([facility] [place] [address]           Subjective: Pt seen this AM.  Patient did well with his therapy evaluations yesterday. His balance continues to improve each day. His strength is normal in his arms and legs on testing. Repeat labs yesterday look good and are stable. He is able to ambulate over 100 feet with min assist using a rolling walker at this time. BP (!) 154/96   Pulse 73   Temp 99.1 °F (37.3 °C) (Oral)   Resp 18   Ht 6' 2\" (1.88 m)   Wt 226 lb 10.1 oz (102.8 kg)   SpO2 95%   BMI 29.10 kg/m²     Last 24 hour lab  No results found for this or any previous visit (from the past 24 hour(s)). Therapy progress:  PT  Position Activity Restriction  Other position/activity restrictions: activity as tolerated  Objective     Sit to Stand: Minimal Assistance(From bed, chair, recliner, and barstool height chair; Pt needing VC for hand placement.  1 LOB noted when standing without an RW for support.)  Stand to sit: Minimal Assistance(VC for hand placement and B LE placement prior to transfer.)  Bed to Chair: Contact guard assistance(with RW)  Device: Rolling Walker  Assistance: Minimal assistance  Distance: 20', 140' (including up/down 75' ramp), 160', and short distances in the room  OT  PT Equipment Recommendations  Equipment Needed: (Continue to assess pending progress)                       Assessment and Plan:        Bilateral cerebellar infarcts L>R - Neurology, DAPT x 3 months, followed by Plavix monotherapy     Severe stenosis of ICA, VA bilaterally- neurosurgery consult, no intervention at this time     Hypertensive emergency - resolved, nephrology consulted,likely due to essential hypertension w/ noncompliance of medications, Continue carvedilol 25 mg BID, Losartan 100mg, Nifedipine 60mg, and Imdur 30mg, will need follow-up with Dr. Alhaji Horn after d/c     Hyperlipidemia-- Atorvastatin      Bowels: Schedule Miralax + Senna S. Follow bowel movements. Enema or suppository if needed.      Bladder: Check PVR x 3.   [facility] if PVR > 200ml or if any volume is > 500 ml.      Pain: Tylenol is ordered prn.             Tammi Leung MD, 7/31/2020, 7:58 AM

## 2020-07-31 NOTE — PLAN OF CARE
Problem: Pain Control  Goal: Maintain pain level at or below patient's acceptable level (or 5 if patient is unable to determine acceptable level)  7/31/2020 0307 by Scott Peace RN  Outcome: Ongoing  Note: Patient has denied pain or discomfort. Instructed to call with any needs. Call light in reach     Problem: Falls - Risk of:  Goal: Absence of physical injury  Outcome: Ongoing  Note: Patient is alert and oriented. He has called appropriately for assistance. prior to getting up. Fall precautions in place. Bed in low and locked position. Bed alarm set. Call light and bedside table within reach. [email or web address] camera in use.  He has remained free from injury

## 2020-07-31 NOTE — PROGRESS NOTES
Physical Therapy  Facility/Department: [place] [address] [facility]UTE RE[facility]AB UNIT  Daily Treatment Note  NAME: Silvio Cunningham  : 1975  MRN: [id number]    Date of Service: 2020    Discharge Recommendations:  Outpatient PT, Home with assist PRN   PT Equipment Recommendations  Other: plan to continue to assess DME needs pending progress    Assessment   Body structures, Functions, Activity limitations: Decreased functional mobility ; Decreased safe awareness;Decreased balance;Decreased endurance;Decreased coordination;Decreased strength  Assessment: Patient demonstrates impaired functional mobility that presents well below his typically (I) baseline, following CVA. Patient left UE and LE coordination most notable during functional mobility. Patient is highly motivated to return to his prior level of function. Patient will continue to benefit from additional skilled PT intervention to facilitate safe mobility and to optimize (I) to promote return to prior level of function. Treatment Diagnosis: Decreased independence with functional mobility. Prognosis: Excellent  Patient Education: Patient educated on role of PT, use of call light, gait mechanics, and PT recommendations - patient verbalizes understanding. Barriers to Learning: none  REQUIRES PT FOLLOW UP: Yes     Patient Diagnosis(es): There were no encounter diagnoses. has a past medical history of Hyperlipidemia and Hypertension. has no past surgical history on file. Restrictions  Restrictions/Precautions  Restrictions/Precautions: (medium fall risk, (B) ANURAG hose)  Position Activity Restriction  Other position/activity restrictions: activity as tolerated  Subjective   General  Chart Reviewed: Yes  Additional Pertinent Hx: Patient is a 40 y/o male who was admitted to the ARU on  following an initial hospitalization where he was admitted  with dizziness, vertigo and headache. Patient found to be hypertensive in the emergency room.   MRI brain (+) for Small right and moderate left cerebellar infarcts. No acute hemorrhage. CT head (+) for Subtle apparent infarct within the lateral aspect of the right cerebellar hemisphere. PMH significant for HTN, HLD. Family / Caregiver Present: No  Referring Practitioner: Dr. Conklin Click: Patient reports tired today, increased fatigue. Patient reports difficulty with coordination during mobility. General Comment  Comments: Patient supine in bed upon arrival - agreeable to PT.           Orientation  Orientation  Overall Orientation Status: Within Normal Limits  Cognition   WFL  Objective   Bed mobility  Supine to Sit: Supervision(head of bed flat, with use of rail)  Scooting: Supervision(seated at edge of bed)  Transfers  Sit to Stand: Contact guard assistance(to RW)  Stand to sit: Contact guard assistance(from RW)  Stand Pivot Transfers: Contact guard assistance(with RW; min A without assistive device with PT support)  Ambulation  Ambulation?: Yes  Ambulation 1  Surface: level tile  Device: Rolling Walker  Assistance: Minimal assistance;Contact guard assistance(CGA with occasional min A for balance)  Quality of Gait: reciprocal gait but decreased left foot clearance and step length; increased LOB with increased left stance time; leans to left with cues and (A) for midline posture; cues for consistent gait speed - mechanics appear improved with slightly increased gait speed, decreasing left stance time  Distance: 165ft x 2  Ambulation 2  Surface - 2: level tile  Device 2: No device  Assistance 2: Minimal assistance  Quality of Gait 2: increased base of support, variable foot placement, fluctuating stance time; cues and (A) for balance awareness and correction strategies  Distance: 25ft     Balance  Posture: Good  Sitting - Static: Good  Sitting - Dynamic: Good  Standing - Static: Fair  Standing - Dynamic: Fair;-  Comments: tends to lean left during standing trials and mobility; cues and (A) to correct and maintain safe standing balance  Exercises  Comments: prone hip extension with 2# left LE, 3# right LE x 10 - 2 sets, prone knee flexion x 20 each LE 2# left 3# right; quadruped alternating opposite UE/LE lifts with emphasis on pattern, extension, and coordination x 5 reps x 3 sets; standing foot cone taps with minimal use UEs x 7 each LE x 3 reps - performed with min A to CGA, left knee buckling noted with min A and RW to correct; forward/backward/lateral walking with PT support at PT's forearms x 20ft x 4 reps each direction; reviewed seated hip flexion/cone taps for accuracy and coordination   Second Session:  Upon arrival, patient supine in bed - agreeable to PT. (S) for supine -> sit, sit -> stand with CGA and RW - c/o increased dizziness/room spinning after transition to standing, BP checked seated 128/83;  PT attempted gait trial with shoes on - CGA for gait with RW initially for 25ft then progressing to min A with 75ft with report of increased dizziness and notable increased unsteadiness. Patient returned to room, stand -> sit from RW with CGA and supine with (S). With supine rest, patient reports decreased dizziness, agreeable to trial standing mobility again. Supine -> sit with (S), sit -> stand with CGA and RW; ambulates 175ft with RW and CGA - increased unsteadiness with increased left stance time, no overt loss of balance but generalized deficits noted, especially with fatigue. Performed 12 6\" stairs with 2 rails and min A - ascends reciprocal pattern and descends step to pattern with cues for sequencing. Patient ambulated to return to room x 65ft with RW and CGA. Stand pivot to recliner with RW and CGA. Patient seated in recliner with needs in reach and chair alarm on at end of session. RN notified by this PT related to increased dizziness during first mobility trial this afternoon.    Goals  Short term goals  Time Frame for Short term goals: STG=LTG  Long term goals  Time Frame for Long term goals : 10-14 Days - all goals ongoing 7/31/2020  Long term goal 1: Pt will complete bed mobility independently  Long term goal 2: Pt will transfer sit <> stand independently  Long term goal 3: Pt will ambulate 200' with LRAD MOD I  Long term goal 4: Pt will ascend/descend 12 steps with 1 hand rail MOD I  Patient Goals   Patient goals : To return to walking without an AD    Plan    Plan  Times per week: 5x/wk for 90 minutes/day  Current Treatment Recommendations: Strengthening, Transfer Training, Endurance Training, Patient/Caregiver Education & Training, Balance Training, Gait Training, Functional Mobility Training, Safety Education & Training, Neuromuscular Re-education, Stair training, Equipment Evaluation, Education, & procurement, Home Exercise Program  Safety Devices  Type of devices: Call light within reach, Chair alarm in place, Gait belt, Left in chair(RN present at end of session)     Therapy Time   Individual Concurrent Group Co-treatment   Time In 0830         Time Out 0930         Minutes 60               Second Session Therapy Time:   Individual Concurrent Group Co-treatment   Time In 1330         Time Out 1400         Minutes 30           Timed Code Treatment Minutes:  90    Total Treatment Minutes:  90    If patient discharges prior to next treatment, this note will serve as discharge summary.     Shruthi Villa PT, DPT #[id number]

## 2020-07-31 NOTE — PROGRESS NOTES
Nephrology Progress Note          CC: We are following this patient for HTN and CKD. Admitted for acute cerebellar CVA    Admitted with headaches and dizziness; he was found to have acute ischemic stroke: MRI  showed bilateral cerebellar infarcts, small on right, and moderately sized on the left( also had CT and CTA).  Stroke team was consulted and recommended against tPA.      I had seen him in 2019 at [facility] but has not kept OP follow up. He  has a significant past medical history of HTN known for several years; he stopped BP meds at some point in 2018 ( started on an exercise regimen and weight loss). He was admitted in 2019 with  profound BP elevation 230's/120's.    The patient has elevated creatinine of 1.4 in 2019. SUBJECTIVE:    Dizziness improved  Moved to Rehab  Results of fractionated serum catecholamines returned with high norepinephrine levels  MRI abd with no adrenal lesion or abnormal masses  Plasma free metanephrines show free normetanephrine minimal elevation of 1.08, free metanephrine normal    BP still not ideal overnight, better now  He feels very tired - sleeping well and participating in PT  No meds that wouild induce somnolence but is on BB    No dyspnea, CP. No cough or wheezing. No N/V, abd pain, or diarrhea. No F/C. No visitors      Physical Exam:    VITALS:  /88   Pulse 82   Temp 97.4 °F (36.3 °C) (Oral)   Resp 18   Ht 6' 2\" (1.88 m)   Wt 226 lb 10.1 oz (102.8 kg)   SpO2 96%   BMI 29.10 kg/m²   TEMPERATURE:  Current - Temp: 97.4 °F (36.3 °C);  Max - Temp  Av.3 °F (36.8 °C)  Min: 97.4 °F (36.3 °C)  Max: 99.1 °F (37.3 °C)  RESPIRATIONS RANGE: Resp  Av  Min: 18  Max: 18  PULSE RANGE: Pulse  Av  Min: 73  Max: 92  BLOOD PRESSURE RANGE:  Systolic (91KVH), XTR:214 , Min:137 , VZU:760   ; Diastolic (50MRB), UES:896, Min:88, Max:113    PULSE OXIMETRY RANGE: SpO2  Av.5 %  Min: 95 %  Max: 96 %    Constitutional:  NAD  HEENT: No oral lesions  Lungs:  clear  Cardiovascular:  RRR, no murmur  Abd:  Soft, NT  Ext:  No edema  Skin:  No rash  Neuro: No focal loss of strength      DATA:    CBC:   Recent Labs     07/29/20  0451 07/30/20  0614   WBC 9.9 8.7   RBC 4.14* 3.96*   HGB 12.2* 11.7*   HCT 36.1* 34.1*    278     BMP:    Recent Labs     07/29/20  0451 07/30/20  0614    139   K 4.6 4.2    103   CO2 25 25   BUN 18 14   CREATININE 1.3 1.3   CALCIUM 9.9 9.7   GLUCOSE 110* 113*     Phosphorus:  No results for input(s): PHOS in the last 72 hours. Magnesium:    Recent Labs     07/30/20  0614   MG 2.00         IMPRESSION/RECOMMENDATIONS:      1. Uncontrolled HTN - most likely essential HTn with non-compliance  Secondary workup: plasma free metanephrines  - pnding  Renin/aldosterone - renin 3, adi 14, ratio 4.7 - not indicative of aldosteronism  Renal arterial Doppler: normal kidneys, preliminary no LEIGHTON but renal arteries not well visualized  Plasma norepinephrine = 1031 (nl ); epi normal, dopamine minimal elevation  Free metanephrine normal, free plasma normetanephrine 1.08 (0-0.89)  MRI adrenals negative  Patient does not have pheo or other evident secondary HTN  Will continue current regimen with prn hydralazine  Reconsider use of diuretic as thiazide  2. CKD stage 2 - baseline creatinine near 1.5  3. Cerebellar infarcts  4. Bilateral cerebrovascular disease: CTA head and neck showed Right V4 segment of vertebral artery occlusion, Left V4 segment is severely stenotic. Right intracranial ICA cavernous and paraclinoid segments 70% stenosis. Left intracranial ICA cavernous segment 50% stenosis.   No intervention for now  5.  Rehab - ongoing    Audrey Pham MD

## 2020-07-31 NOTE — PROGRESS NOTES
Patient is alert and oriented. Vital signs stable. He is resting in bed.quietly. He denies any pain or discomfort. Assessment completed. Instructed to call for any needs. Call light within reach.

## 2020-07-31 NOTE — CARE COORDINATION
Case Management Assessment           Initial Evaluation         Date / Time of Evaluation: 7/31/2020 2:32 PM  Assessment Completed by: Venecia Weaver    Patient is a 38 y/o male admitted with DX of Cerebellar infarction. Patient lives with his 13 y/o son in a 1 level apartment. Patient does not own any DME nor has he used home health care previously. SW reviewed discharge planning with patient and will continue to follow to update patient and assist with final d/c plan. Patient Name: Fam Guidry     YOB: 1975  Diagnosis: Cerebellar infarction [facility]) [I63.9]     Date / Time: 7/29/2020  5:30 PM    Patient Admission Status: Inpatient    If patient is discharged prior to next notation, then this note serves as note for discharge by case management. Current PCP: Linus Rivas MD  Clinic Patient: No    Chart Reviewed: Yes  Patient/ Family Interviewed: Yes    Initial assessment completed at bedside with: Patient and patient's sister.     Hospitalization in the last 30 days: No    Emergency Contacts:  Extended Emergency Contact Information  Primary Emergency Contact: Kourtney Pearson   [address] Phone: [phone]  Relation: Brother/Sister    Advance Directives:   Code Status: Full Code     Advance Directives (For Healthcare)  Pre-existing DNR Comfort Care/DNR Arrest/DNI Order: No  Healthcare Directive: No, patient does not have an advance directive for healthcare treatment  Information on Healthcare Directives Requested: No  Patient Requests Assistance: No  Advance Directives: Pt. not interested at this time  Values / Beliefs  Do you have any ethnic, cultural, sacramental, or spiritual Sikh needs you would like us to be aware of while you are in the hospital?: No      Financial  Payor: [facility] / Plan: [id number] / Product Type: *No Product type* /     Pre-cert required for SNF: Yes    Pharmacy    [id number] [facility], [facility] [address] [address]  Phone: [phone] Fax: [phone]    [facility] [address] [facility], [address] [phone] Olivia Melendez [phone]  [address] [place] [address]  Phone: [phone] Fax: [phone]      Potential assistance Purchasing Medications: Potential Assistance Purchasing Medications: No  Does Patient want to participate in local refill/ meds to beds program?: No    Meds To Beds General Rules:  1. Can ONLY be done Monday- Friday between 8:30am-5pm  2. Prescription(s) must be in pharmacy by 3pm to be filled same day  3. Copy of patient's insurance/ prescription drug card and patient face sheet must be sent along with the prescription(s)  4. Cost of Rx cannot be added to hospital bill. If financial assistance is needed, please contact unit  or ;  or  CANNOT provide pharmacy voucher for patients co-pays  5. Patients can then  the prescription on their way out of the hospital at discharge, or pharmacy can deliver to the bedside if staff is available. (payment due at time of pick-up or delivery - cash, check, or card accepted)     Able to afford home medications/ co-pay costs: Yes    ADLS  Support Systems: Children    PT AM-PAC:   17/24  OT AM-PAC:  21 /24    HOUSING  Home Environment: Lives with 13 y/o son. Steps: 4    Plans to RETURN to current housing: Yes  Barriers to RETURNING to current housing: None    DISCHARGE PLAN:  Disposition: Home with Home Health Care: TBD     Transportation PLAN for discharge: family     Factors facilitating achievement of predicted outcomes: Family support, Motivated and Cooperative    Barriers to discharge: Decreased endurance and Lower extremity weakness    Additional Case Management Notes: Patient plans to return home with 13 y/o son.     The Plan for Transition of Care is related to the following treatment goals of Cerebellar infarction [facility]) [I63.9]    The Patient and/or patient representative Chelsea Santos and his family were provided with a choice of provider and agrees with the discharge plan Yes    Freedom of choice list was provided with basic dialogue that supports the patient's individualized plan of care/goals and shares the quality data associated with the providers.  Yes    Care Transition patient: No    JAYA Burdick, Derrick Stephens  [facility]  Case Management Department  Ph: [phone]  Fax: [phone]

## 2020-08-01 PROCEDURE — 97530 THERAPEUTIC ACTIVITIES: CPT

## 2020-08-01 PROCEDURE — 97110 THERAPEUTIC EXERCISES: CPT

## 2020-08-01 PROCEDURE — 1280000000 HC REHAB R&B

## 2020-08-01 PROCEDURE — 97535 SELF CARE MNGMENT TRAINING: CPT

## 2020-08-01 PROCEDURE — 97116 GAIT TRAINING THERAPY: CPT

## 2020-08-01 PROCEDURE — 6370000000 HC RX 637 (ALT 250 FOR IP): Performed by: PHYSICAL MEDICINE & REHABILITATION

## 2020-08-01 PROCEDURE — 97112 NEUROMUSCULAR REEDUCATION: CPT

## 2020-08-01 PROCEDURE — 6360000002 HC RX W HCPCS: Performed by: PHYSICAL MEDICINE & REHABILITATION

## 2020-08-01 RX ADMIN — HEPARIN SODIUM 5000 UNITS: 5000 INJECTION INTRAVENOUS; SUBCUTANEOUS at 06:36

## 2020-08-01 RX ADMIN — POLYETHYLENE GLYCOL 3350 17 G: 17 POWDER, FOR SOLUTION ORAL at 08:58

## 2020-08-01 RX ADMIN — CARVEDILOL 25 MG: 12.5 TABLET, FILM COATED ORAL at 08:58

## 2020-08-01 RX ADMIN — DOCUSATE SODIUM 50 MG AND SENNOSIDES 8.6 MG 2 TABLET: 8.6; 5 TABLET, FILM COATED ORAL at 20:55

## 2020-08-01 RX ADMIN — HEPARIN SODIUM 5000 UNITS: 5000 INJECTION INTRAVENOUS; SUBCUTANEOUS at 20:55

## 2020-08-01 RX ADMIN — ATORVASTATIN CALCIUM 80 MG: 80 TABLET, FILM COATED ORAL at 20:55

## 2020-08-01 RX ADMIN — ISOSORBIDE MONONITRATE 30 MG: 30 TABLET, EXTENDED RELEASE ORAL at 08:58

## 2020-08-01 RX ADMIN — HEPARIN SODIUM 5000 UNITS: 5000 INJECTION INTRAVENOUS; SUBCUTANEOUS at 13:31

## 2020-08-01 RX ADMIN — DOCUSATE SODIUM 50 MG AND SENNOSIDES 8.6 MG 2 TABLET: 8.6; 5 TABLET, FILM COATED ORAL at 08:58

## 2020-08-01 RX ADMIN — ASPIRIN 81 MG: 81 TABLET, CHEWABLE ORAL at 08:58

## 2020-08-01 RX ADMIN — MAGNESIUM HYDROXIDE 30 ML: 400 SUSPENSION ORAL at 12:05

## 2020-08-01 RX ADMIN — CARVEDILOL 25 MG: 12.5 TABLET, FILM COATED ORAL at 17:03

## 2020-08-01 RX ADMIN — PANTOPRAZOLE SODIUM 40 MG: 40 TABLET, DELAYED RELEASE ORAL at 06:36

## 2020-08-01 RX ADMIN — CLOPIDOGREL BISULFATE 75 MG: 75 TABLET ORAL at 08:58

## 2020-08-01 RX ADMIN — LOSARTAN POTASSIUM 100 MG: 100 TABLET, FILM COATED ORAL at 08:58

## 2020-08-01 RX ADMIN — NIFEDIPINE 60 MG: 60 TABLET, FILM COATED, EXTENDED RELEASE ORAL at 08:58

## 2020-08-01 ASSESSMENT — PAIN SCALES - GENERAL
PAINLEVEL_OUTOF10: 0

## 2020-08-01 NOTE — PROGRESS NOTES
Nephrology Progress Note          CC: We are following this patient for HTN and CKD. Admitted for acute cerebellar CVA    Admitted with headaches and dizziness; he was found to have acute ischemic stroke: MRI  showed bilateral cerebellar infarcts, small on right, and moderately sized on the left( also had CT and CTA).  Stroke team was consulted and recommended against tPA.      I had seen him in 2019 at [facility] but has not kept OP follow up. He  has a significant past medical history of HTN known for several years; he stopped BP meds at some point in 2018 ( started on an exercise regimen and weight loss). He was admitted in 2019 with  profound BP elevation 230's/120's.    The patient has elevated creatinine of 1.4 in 2019. SUBJECTIVE:  -pt seen and examined  -PMSHx and meds reviewed  -No family at bedside    Moved to Rehab  Results of fractionated serum catecholamines returned with high norepinephrine levels  MRI abd with no adrenal lesion or abnormal masses  Plasma free metanephrines show free normetanephrine minimal elevation of 1.08, free metanephrine normal    -no acute events ON  -BP stable ON  -denies dizziness at this time    ROS: neg dyspnea, CP. No cough or wheezing. No N/V, abd pain, or diarrhea. No F/C. Physical Exam:    VITALS:  /72   Pulse 75   Temp 98.4 °F (36.9 °C) (Oral)   Resp 18   Ht 6' 2\" (1.88 m)   Wt 226 lb 10.1 oz (102.8 kg)   SpO2 95%   BMI 29.10 kg/m²   TEMPERATURE:  Current - Temp: 98.4 °F (36.9 °C);  Max - Temp  Av.9 °F (36.6 °C)  Min: 97.4 °F (36.3 °C)  Max: 98.4 °F (36.9 °C)  RESPIRATIONS RANGE: Resp  Av  Min: 18  Max: 18  PULSE RANGE: Pulse  Av.8  Min: 75  Max: 109  BLOOD PRESSURE RANGE:  Systolic (13QGU), OVQ:450 , Min:113 , QKD:099   ; Diastolic (58EKL), IAH:14, Min:72, Max:113    PULSE OXIMETRY RANGE: SpO2  Av.5 %  Min: 95 %  Max: 96 %    Constitutional:  NAD  HEENT:  No oral lesions  Lungs: clear  Cardiovascular:  RRR, no murmur  Abd:  Soft, NT  Ext:  No edema  Skin:  No rash  Neuro: No focal loss of strength      DATA:    CBC:   Recent Labs     07/30/20 0614   WBC 8.7   RBC 3.96*   HGB 11.7*   HCT 34.1*        BMP:    Recent Labs     07/30/20 0614      K 4.2      CO2 25   BUN 14   CREATININE 1.3   CALCIUM 9.7   GLUCOSE 113*     Phosphorus:  No results for input(s): PHOS in the last 72 hours. Magnesium:    Recent Labs     07/30/20 0614   MG 2.00         IMPRESSION/RECOMMENDATIONS:      Uncontrolled HTN - most likely essential HTn with non-compliance  Secondary workup: plasma free metanephrines  - pnding  Renin/aldosterone - renin 3, adi 14, ratio 4.7 - not indicative of aldosteronism  Renal arterial Doppler: normal kidneys, preliminary no LEIGHTON but renal arteries not well visualized  Plasma norepinephrine = 1031 (nl ); epi normal, dopamine minimal elevation  Free metanephrine normal, free plasma normetanephrine 1.08 (0-0.89)  MRI adrenals negative  Continue carvedilol, nifedipine, imdur, losartan, prn HDLZ  Patient does not have pheo or other evident secondary HTN  Reconsider use of diuretic as thiazide-currently no indicated    CKD stage 2 - baseline creatinine near 1.5  -stable    Cerebellar infarcts  Bilateral cerebrovascular disease: CTA head and neck showed Right V4 segment of vertebral artery occlusion, Left V4 segment is severely stenotic. Right intracranial ICA cavernous and paraclinoid segments 70% stenosis.  Left intracranial ICA cavernous segment 50% stenosis.   No intervention for now  -Rehab - ongoing    Peterson Paredes MD

## 2020-08-01 NOTE — PLAN OF CARE
Problem: Falls - Risk of:  Goal: Will remain free from falls  Description: Will remain free from falls  8/1/2020 1053 by Lizy Cueto RN  Outcome: Ongoing   Patient to remain free from falls this shift by having bed/chair alarm on at all times. Bed in lowest position. Call light in reach. Patient educated on call light use. Rounding completed to assess patients needs.      Problem: Falls - Risk of:  Goal: Absence of physical injury  Description: Absence of physical injury  Outcome: Ongoing

## 2020-08-01 NOTE — PROGRESS NOTES
Occupational Therapy  Facility/Department: [facility] ACUTE REHAB UNIT  Daily Treatment Note  NAME: Gui Alas  : 1975  MRN: [id number]    Date of Service: 2020    Discharge Recommendations:  Home with Home health OT, 24 hour supervision or assist  OT Equipment Recommendations  Equipment Needed: Yes  ADL Assistive Devices: Transfer Tub Bench;Grab Bars - shower;Grab Bars - toilet  Other: Pt will likely need grab bars in shower and by toilet and a tub transfer bench. Continue to assess pending progress    Assessment   Performance deficits / Impairments: Decreased functional mobility ; Decreased ADL status; Decreased balance;Decreased coordination;Decreased high-level IADLs;Decreased fine motor control;Decreased endurance;Decreased strength  Assessment: Pt progressing with therapy, SBA for functional transfers and mobility with rw, CGA without rw for functional mobility. Pt with dynamic standing balance activities. Pt continues to benefit from inpt intensive therapy. Continue with POC. Treatment Diagnosis: impaired ADLs and FM coordination d/t cerebellar infarcts  Prognosis: Good  OT Education: OT Role;Home Exercise Program;Transfer Training  Patient Education: Pt verbalized understanding  REQUIRES OT FOLLOW UP: Yes  Activity Tolerance  Activity Tolerance: Patient Tolerated treatment well  Safety Devices  Safety Devices in place: Yes  Type of devices: All fall risk precautions in place; Left in chair;Call light within reach;Nurse notified; Chair alarm in place         Patient Diagnosis(es): There were no encounter diagnoses. has a past medical history of Hyperlipidemia and Hypertension. has no past surgical history on file.     Restrictions  Restrictions/Precautions  Restrictions/Precautions: Fall Risk  Position Activity Restriction  Other position/activity restrictions: activity as tolerated  Subjective   General  Chart Reviewed: Yes  Patient assessed for rehabilitation services?: Yes  Additional Pertinent Pt then ambulated back to room and into bathroom with rw at SBA. Pt toilet transfer SBA, toileted SBA. Pt ambulated ~15 ft to recliner with rw at SBA, stand to sit SBA. Vital Signs  Patient Currently in Pain: Denies   Orientation  Orientation  Overall Orientation Status: Within Normal Limits  Objective    ADL  Grooming: Stand by assistance  Toileting: Stand by assistance        Balance  Sitting Balance: Modified independent   Standing Balance: Stand by assistance  Standing Balance  Time: ~30 min total  Activity: to/from bathroom x 2, to/from therapy gym, dynamic standing balance activities  Toilet Transfers  Toilet - Technique: Ambulating  Equipment Used: Standard toilet  Toilet Transfer: Stand by assistance  Bed mobility  Supine to Sit: Modified independent  Transfers  Sit to stand: Stand by assistance  Stand to sit: Stand by assistance                       Cognition  Overall Cognitive Status: WNL        Second Session: Pt supine in bed upon arrival with RN just leaving having helped him to bed. Pt demonstrated extreme fatigue with delayed responses to verbal cues with eyes closed requiring max verbal encouragement to participate in therapy session. Pt finally agreeable with + time and encouragement, but stating only agreeable to participate in activities within room d/t fatigue. (Pt remained somnolent throughout session). Pt supine<>sitting EOB with mod I using bed railing with HOB flat. Pt demo good sitting balance while EOB i'ly with midline orientation. STS from EOB to RW with SBA. Pt completed 2 x 20 cone taps initially with BUE supported at RW with SBA and no overt LOB. Pt then challenged to complete second trial with unilateral UE support with contralateral LE stepping on cone alternating with raising LUE when stepping with RLE and raising RUE when stepping with LLE req CGA and no overt LOB but with fatigue pt demo slight lateral lean to L which pt self-corrected. Pt stand<>Sit EOB with SBA.  Pt then completed the following BUE exercises using red theraband sitting EOB with demonstration: 2 x 12 elbow flexion, 2 x 12 sh diagonals up, 2 x 12 sh diagonals down, 2 x 12 sh h abd, 2 x 12 sh flexion. Pt maintained eyes closed for majority of these exercises but was able to respond to VCs, pt stating his biggest challenge being his constant dizziness. Sit<>supine with mod I. Pt left in bed with bed alarm engaged, call light within reach and all needs met.      Plan   Plan  Times per week: 5x weekly, 90 mins daily  Times per day: Daily  Current Treatment Recommendations: Balance Training, Functional Mobility Training, Endurance Training, Safety Education & Training, Self-Care / ADL, Strengthening, Patient/Caregiver Education & Training, Equipment Evaluation, Education, & procurement, Home Management Training  G-Code     OutComes Score                                                  AM-PAC Score             Goals  Short term goals  Time Frame for Short term goals: STG = LTG  Long term goals  Time Frame for Long term goals : 10-14 days -- all goals ongoing  Long term goal 1: Pt will complete UB/LB bathing Mod I  Long term goal 2: Pt will complete UB/LB dressing Mod I  Long term goal 3: Pt will complete toilet and tub transfers Mod I  Long term goal 4: Pt will complete IADL task in stance x10 mins w/spvn  Long term goal 5: Pt will demo improved L hand coordination by decreasing Nine Hole Peg Test time by 5 seconds  Patient Goals   Patient goals : to be independent; be able to play the piano       Therapy Time   Individual Concurrent Group Co-treatment   Time In 95 20 92         Time Out 0952         Minutes 60         Timed Code Treatment Minutes: [address] Time:   Individual Concurrent Group Co-treatment   Time In 1330         Time Out 1400         Minutes 30           Timed Code Treatment Minutes:  60 + 30    Total Treatment Minutes:  675 [facility], 320 [facility], OTR/L (second session only)

## 2020-08-01 NOTE — PLAN OF CARE
Problem: Pain Control  Goal: Improvement in pain related behaviors BP/HR WNL    Outcome: Ongoing  Note: Patient denies any pain  Problem: Falls - Risk of:  Goal: Will remain free from falls  Description: Will remain free from falls  8/1/2020 0307 by Ramón Flor RN  Note: Patient remains free from falls, fall prevention measures in place, pt rings call bell appropriately, will continue to monitor. Bed alarm engaged call bell, urinal and bedside table in reach.

## 2020-08-02 LAB
CREATININE 24 HOUR URINE: ABNORMAL MG/D (ref 1000–2500)
CREATININE URINE: 112 MG/DL
HOURS COLLECTED: ABNORMAL
METANEPHRINE INTREP URINE: ABNORMAL
METANEPHRINE UG/G CRE: 184 UG/G CRT (ref 0–300)
METANEPHRINE, UR-PER VOL: 206 UG/L
METANEPHRINES URINE: ABNORMAL UG/D (ref 55–320)
NORMETANEPHRINE 24 HOUR URINE: ABNORMAL UG/D (ref 114–865)
NORMETANEPHRINE, (G/CRT): 631 UG/G CRT (ref 0–400)
NORMETANEPHRINES, NMOL/L: 707 UG/L
URINE TOTAL VOLUME: 0

## 2020-08-02 PROCEDURE — 6360000002 HC RX W HCPCS: Performed by: PHYSICAL MEDICINE & REHABILITATION

## 2020-08-02 PROCEDURE — 6370000000 HC RX 637 (ALT 250 FOR IP): Performed by: PHYSICAL MEDICINE & REHABILITATION

## 2020-08-02 PROCEDURE — 1280000000 HC REHAB R&B

## 2020-08-02 RX ADMIN — ASPIRIN 81 MG: 81 TABLET, CHEWABLE ORAL at 08:53

## 2020-08-02 RX ADMIN — CARVEDILOL 25 MG: 12.5 TABLET, FILM COATED ORAL at 17:18

## 2020-08-02 RX ADMIN — HEPARIN SODIUM 5000 UNITS: 5000 INJECTION INTRAVENOUS; SUBCUTANEOUS at 06:11

## 2020-08-02 RX ADMIN — LOSARTAN POTASSIUM 100 MG: 100 TABLET, FILM COATED ORAL at 08:53

## 2020-08-02 RX ADMIN — CARVEDILOL 25 MG: 12.5 TABLET, FILM COATED ORAL at 08:53

## 2020-08-02 RX ADMIN — ISOSORBIDE MONONITRATE 30 MG: 30 TABLET, EXTENDED RELEASE ORAL at 08:53

## 2020-08-02 RX ADMIN — DOCUSATE SODIUM 50 MG AND SENNOSIDES 8.6 MG 2 TABLET: 8.6; 5 TABLET, FILM COATED ORAL at 08:53

## 2020-08-02 RX ADMIN — POLYETHYLENE GLYCOL 3350 17 G: 17 POWDER, FOR SOLUTION ORAL at 08:51

## 2020-08-02 RX ADMIN — NIFEDIPINE 60 MG: 60 TABLET, FILM COATED, EXTENDED RELEASE ORAL at 08:53

## 2020-08-02 RX ADMIN — HEPARIN SODIUM 5000 UNITS: 5000 INJECTION INTRAVENOUS; SUBCUTANEOUS at 20:47

## 2020-08-02 RX ADMIN — HEPARIN SODIUM 5000 UNITS: 5000 INJECTION INTRAVENOUS; SUBCUTANEOUS at 14:12

## 2020-08-02 RX ADMIN — DOCUSATE SODIUM 50 MG AND SENNOSIDES 8.6 MG 2 TABLET: 8.6; 5 TABLET, FILM COATED ORAL at 20:46

## 2020-08-02 RX ADMIN — ATORVASTATIN CALCIUM 80 MG: 80 TABLET, FILM COATED ORAL at 20:46

## 2020-08-02 RX ADMIN — PANTOPRAZOLE SODIUM 40 MG: 40 TABLET, DELAYED RELEASE ORAL at 06:10

## 2020-08-02 RX ADMIN — CLOPIDOGREL BISULFATE 75 MG: 75 TABLET ORAL at 08:53

## 2020-08-02 ASSESSMENT — PAIN SCALES - GENERAL
PAINLEVEL_OUTOF10: 0

## 2020-08-02 NOTE — PLAN OF CARE
Problem: Falls - Risk of:  Goal: Will remain free from falls  Description: Will remain free from falls  Outcome: Ongoing  Note: Pt with no history of falls. Up with contact guard assistance, gait belt and walker. Weakness to BLE. Fall precautions maintained. Non skid footwear on. Bed in lowest position. Bed alarm in use. Reminded pt to call for assistance before getting up or for any needs. Call light within reach. Pt uses call light appropriately. No falls thus far during shift.

## 2020-08-02 NOTE — PROGRESS NOTES
clear  Cardiovascular:  RRR, no murmur  Abd:  Soft, NT  Ext:  No edema  Skin:  No rash  Neuro: No focal loss of strength      DATA:    CBC:   No results for input(s): WBC, RBC, HGB, HCT, PLT in the last 72 hours. BMP:    No results for input(s): NA, K, CL, CO2, BUN, CREATININE, CALCIUM, GLUCOSE in the last 72 hours. Phosphorus:  No results for input(s): PHOS in the last 72 hours. Magnesium:    No results for input(s): MG in the last 72 hours. IMPRESSION/RECOMMENDATIONS:      Uncontrolled HTN - most likely essential HTn with non-compliance  Secondary workup: plasma free metanephrines  - pnding  Renin/aldosterone - renin 3, adi 14, ratio 4.7 - not indicative of aldosteronism  Renal arterial Doppler: normal kidneys, preliminary no LEIGHTON but renal arteries not well visualized  Plasma norepinephrine = 1031 (nl ); epi normal, dopamine minimal elevation  Free metanephrine normal, free plasma normetanephrine 1.08 (0-0.89)  MRI adrenals negative  -Continue carvedilol, nifedipine, imdur, losartan, prn HDLZ  -Patient does not have pheo or other evident secondary HTN  -Reconsider use of diuretic as thiazide-currently not indicated    CKD stage 2 - baseline creatinine near 1.5  -stable    Cerebellar infarcts  Bilateral cerebrovascular disease: CTA head and neck showed Right V4 segment of vertebral artery occlusion, Left V4 segment is severely stenotic. Right intracranial ICA cavernous and paraclinoid segments 70% stenosis.  Left intracranial ICA cavernous segment 50% stenosis.   No intervention for now  -Rehab - ongoing    Yani English MD

## 2020-08-02 NOTE — PLAN OF CARE
Problem: Falls - Risk of:  Goal: Will remain free from falls  Description: Will remain free from falls  8/2/2020 1023 by Tiana Kwong RN  Outcome: Ongoing  8/2/2020 0219 by Maninder Garvey RN  Outcome: Ongoing  Note: Pt with no history of falls. Up with contact guard assistance, gait belt and walker. Weakness to BLE. Fall precautions maintained. Non skid footwear on. Bed in lowest position. Bed alarm in use. Reminded pt to call for assistance before getting up or for any needs. Call light within reach. Pt uses call light appropriately. No falls thus far during shift.       Problem: Falls - Risk of:  Goal: Absence of physical injury  Description: Absence of physical injury  Outcome: Ongoing     Problem: Pain Control  Goal: Maintain pain level at or below patient's acceptable level (or 5 if patient is unable to determine acceptable level)  Outcome: Completed

## 2020-08-02 NOTE — PROGRESS NOTES
Shift assessment complete. VSS. A/Ox4. Fall precautions in place. Complaining of constipation but was able to have a large BM this AM. Call light in reach. Will continue to monitor.      Vitals:    08/02/20 0845   BP: 138/76   Pulse: 76   Resp: 16   Temp: 97.5 °F (36.4 °C)   SpO2: 97%

## 2020-08-02 NOTE — PROGRESS NOTES
Pt in bed, awake. No complaints. Vitals and assessment completed. Voiding per urinal. Nighttime medications given. Tolerated well. Reminded pt to call for assistance with any needs. Call light within reach. Safety measures in place. No needs at this time.

## 2020-08-03 LAB
ALBUMIN SERPL-MCNC: 4 G/DL (ref 3.4–5)
ANION GAP SERPL CALCULATED.3IONS-SCNC: 11 MMOL/L (ref 3–16)
BUN BLDV-MCNC: 16 MG/DL (ref 7–20)
CALCIUM SERPL-MCNC: 10.3 MG/DL (ref 8.3–10.6)
CHLORIDE BLD-SCNC: 104 MMOL/L (ref 99–110)
CO2: 25 MMOL/L (ref 21–32)
CREAT SERPL-MCNC: 1.4 MG/DL (ref 0.9–1.3)
GFR AFRICAN AMERICAN: >60
GFR NON-AFRICAN AMERICAN: 55
GLUCOSE BLD-MCNC: 120 MG/DL (ref 70–99)
HCT VFR BLD CALC: 32.8 % (ref 40.5–52.5)
HEMOGLOBIN: 11.2 G/DL (ref 13.5–17.5)
MAGNESIUM: 2.1 MG/DL (ref 1.8–2.4)
MCH RBC QN AUTO: 29.4 PG (ref 26–34)
MCHC RBC AUTO-ENTMCNC: 34.3 G/DL (ref 31–36)
MCV RBC AUTO: 85.9 FL (ref 80–100)
PDW BLD-RTO: 13 % (ref 12.4–15.4)
PHOSPHORUS: 4.3 MG/DL (ref 2.5–4.9)
PLATELET # BLD: 285 K/UL (ref 135–450)
PMV BLD AUTO: 10.6 FL (ref 5–10.5)
POTASSIUM SERPL-SCNC: 4.6 MMOL/L (ref 3.5–5.1)
RBC # BLD: 3.81 M/UL (ref 4.2–5.9)
SODIUM BLD-SCNC: 140 MMOL/L (ref 136–145)
WBC # BLD: 9.9 K/UL (ref 4–11)

## 2020-08-03 PROCEDURE — 97530 THERAPEUTIC ACTIVITIES: CPT

## 2020-08-03 PROCEDURE — 1280000000 HC REHAB R&B

## 2020-08-03 PROCEDURE — 6370000000 HC RX 637 (ALT 250 FOR IP): Performed by: PHYSICAL MEDICINE & REHABILITATION

## 2020-08-03 PROCEDURE — 80069 RENAL FUNCTION PANEL: CPT

## 2020-08-03 PROCEDURE — 85027 COMPLETE CBC AUTOMATED: CPT

## 2020-08-03 PROCEDURE — 83735 ASSAY OF MAGNESIUM: CPT

## 2020-08-03 PROCEDURE — 36415 COLL VENOUS BLD VENIPUNCTURE: CPT

## 2020-08-03 PROCEDURE — 6360000002 HC RX W HCPCS: Performed by: PHYSICAL MEDICINE & REHABILITATION

## 2020-08-03 PROCEDURE — 97116 GAIT TRAINING THERAPY: CPT

## 2020-08-03 PROCEDURE — 97110 THERAPEUTIC EXERCISES: CPT

## 2020-08-03 PROCEDURE — 6370000000 HC RX 637 (ALT 250 FOR IP): Performed by: INTERNAL MEDICINE

## 2020-08-03 RX ORDER — NIFEDIPINE 60 MG/1
60 TABLET, EXTENDED RELEASE ORAL DAILY
Status: DISCONTINUED | OUTPATIENT
Start: 2020-08-04 | End: 2020-08-06

## 2020-08-03 RX ORDER — SCOLOPAMINE TRANSDERMAL SYSTEM 1 MG/1
1 PATCH, EXTENDED RELEASE TRANSDERMAL
Status: DISCONTINUED | OUTPATIENT
Start: 2020-08-03 | End: 2020-08-08 | Stop reason: HOSPADM

## 2020-08-03 RX ORDER — NIFEDIPINE 60 MG/1
60 TABLET, EXTENDED RELEASE ORAL DAILY
Status: DISCONTINUED | OUTPATIENT
Start: 2020-08-04 | End: 2020-08-03

## 2020-08-03 RX ADMIN — ATORVASTATIN CALCIUM 80 MG: 80 TABLET, FILM COATED ORAL at 19:38

## 2020-08-03 RX ADMIN — NIFEDIPINE 60 MG: 60 TABLET, FILM COATED, EXTENDED RELEASE ORAL at 10:04

## 2020-08-03 RX ADMIN — CARVEDILOL 25 MG: 12.5 TABLET, FILM COATED ORAL at 17:02

## 2020-08-03 RX ADMIN — DOCUSATE SODIUM 50 MG AND SENNOSIDES 8.6 MG 2 TABLET: 8.6; 5 TABLET, FILM COATED ORAL at 19:38

## 2020-08-03 RX ADMIN — ISOSORBIDE MONONITRATE 30 MG: 30 TABLET, EXTENDED RELEASE ORAL at 10:04

## 2020-08-03 RX ADMIN — ASPIRIN 81 MG: 81 TABLET, CHEWABLE ORAL at 11:35

## 2020-08-03 RX ADMIN — HEPARIN SODIUM 5000 UNITS: 5000 INJECTION INTRAVENOUS; SUBCUTANEOUS at 21:48

## 2020-08-03 RX ADMIN — HEPARIN SODIUM 5000 UNITS: 5000 INJECTION INTRAVENOUS; SUBCUTANEOUS at 06:30

## 2020-08-03 RX ADMIN — CARVEDILOL 25 MG: 12.5 TABLET, FILM COATED ORAL at 10:04

## 2020-08-03 RX ADMIN — LOSARTAN POTASSIUM 100 MG: 100 TABLET, FILM COATED ORAL at 10:04

## 2020-08-03 RX ADMIN — DOCUSATE SODIUM 50 MG AND SENNOSIDES 8.6 MG 2 TABLET: 8.6; 5 TABLET, FILM COATED ORAL at 10:04

## 2020-08-03 RX ADMIN — CLOPIDOGREL BISULFATE 75 MG: 75 TABLET ORAL at 10:04

## 2020-08-03 RX ADMIN — PANTOPRAZOLE SODIUM 40 MG: 40 TABLET, DELAYED RELEASE ORAL at 06:30

## 2020-08-03 RX ADMIN — POLYETHYLENE GLYCOL 3350 17 G: 17 POWDER, FOR SOLUTION ORAL at 10:04

## 2020-08-03 RX ADMIN — HEPARIN SODIUM 5000 UNITS: 5000 INJECTION INTRAVENOUS; SUBCUTANEOUS at 13:42

## 2020-08-03 ASSESSMENT — PAIN SCALES - GENERAL
PAINLEVEL_OUTOF10: 0

## 2020-08-03 NOTE — PROGRESS NOTES
[facility]    Patient Identification:  Radha Shore  [id number]  : 1975  Admit date: 2020      Diagnosis:   Patient Active Problem List   Diagnosis    Hypertensive urgency    Hypertension    [facility]cerebrovascular accident) [facility] [address]    Hypertensive emergency    Cerebellar infarction ([facility] [address]           Subjective: Pt seen this AM.  Patient did well over the weekend. Patient states his balance and strength continues to improve each day. He still has complaints of dizziness with increased movement during his therapies. We will place him on a scopolamine patch to see if this helps his symptoms from his bilateral cerebellar infarcts. Repeat labs this morning show some dehydration, so I encouraged him to drink more fluids today, which he is agreeable to do.        /72   Pulse 81   Temp 99.3 °F (37.4 °C) (Oral)   Resp 16   Ht 6' 2\" (1.88 m)   Wt 226 lb 10.1 oz (102.8 kg)   SpO2 95%   BMI 29.10 kg/m²     Last 24 hour lab  Recent Results (from the past 24 hour(s))   Renal Function Panel    Collection Time: 20  5:41 AM   Result Value Ref Range    Sodium 140 136 - 145 mmol/L    Potassium 4.6 3.5 - 5.1 mmol/L    Chloride 104 99 - 110 mmol/L    CO2 25 21 - 32 mmol/L    Anion Gap 11 3 - 16    Glucose 120 (H) 70 - 99 mg/dL    BUN 16 7 - 20 mg/dL    CREATININE 1.4 (H) 0.9 - 1.3 mg/dL    GFR Non-African American 55 (A) >60    GFR African American >60 >60    Calcium 10.3 8.3 - 10.6 mg/dL    Phosphorus 4.3 2.5 - 4.9 mg/dL    Alb 4.0 3.4 - 5.0 g/dL   CBC    Collection Time: 20  5:41 AM   Result Value Ref Range    WBC 9.9 4.0 - 11.0 K/uL    RBC 3.81 (L) 4.20 - 5.90 M/uL    Hemoglobin 11.2 (L) 13.5 - 17.5 g/dL    Hematocrit 32.8 (L) 40.5 - 52.5 %    MCV 85.9 80.0 - 100.0 fL    MCH 29.4 26.0 - 34.0 pg    MCHC 34.3 31.0 - 36.0 g/dL    RDW 13.0 12.4 - 15.4 %    Platelets 429 143 - 450 K/uL    MPV 10.6 (H) 5.0 - 10.5 fL   Magnesium Collection Time: 08/03/20  5:41 AM   Result Value Ref Range    Magnesium 2.10 1.80 - 2.40 mg/dL       Therapy progress:  PT  Position Activity Restriction  Other position/activity restrictions: activity as tolerated  Objective     Sit to Stand: Contact guard assistance(to RW)  Stand to sit: Contact guard assistance(from RW)  Bed to Chair: Contact guard assistance(with RW)  Device: Rolling Walker  Assistance: Minimal assistance, Contact guard assistance(CGA with occasional min A for balance)  Distance: 165ft x 2  OT  PT Equipment Recommendations  Equipment Needed: (Continue to assess pending progress)  Other: plan to continue to assess DME needs pending progress  Toilet - Technique: Ambulating  Equipment Used: Standard toilet                    Assessment and Plan:        Bilateral cerebellar infarcts L>R - Neurology, DAPT x 3 months, followed by Plavix monotherapy     Severe stenosis of ICA, VA bilaterally- neurosurgery consult, no intervention at this time     Hypertensive emergency - resolved, nephrology consulted,likely due to essential hypertension w/ noncompliance of medications, Continue carvedilol 25 mg BID, Losartan 100mg, Nifedipine 60mg, and Imdur 30mg, will need follow-up with Dr. Jhon Maxwell after d/c     Hyperlipidemia-- Atorvastatin      Bowels: Schedule Miralax + Senna S. Follow bowel movements. Enema or suppository if needed.      Bladder: Check PVR x 3.   [facility] if PVR > 200ml or if any volume is > 500 ml.      Pain: Tylenol is ordered prn.             Bucky Salazar MD, 8/3/2020, 7:39 AM

## 2020-08-03 NOTE — PROGRESS NOTES
Nephrology Progress Note          CC: We are following this patient for HTN and CKD. Admitted for acute cerebellar CVA    Admitted with headaches and dizziness; he was found to have acute ischemic stroke: MRI  showed bilateral cerebellar infarcts, small on right, and moderately sized on the left( also had CT and CTA).  Stroke team was consulted and recommended against tPA.      I had seen him in 2019 at [facility] but has not kept OP follow up. He  has a significant past medical history of HTN known for several years; he stopped BP meds at some point in 2018 ( started on an exercise regimen and weight loss). He was admitted in 2019 with  profound BP elevation 230's/120's.    The patient has elevated creatinine of 1.4 in 2019. SUBJECTIVE:  -pt seen and examined  -PMSHx and meds reviewed  -No family at bedside    Results of fractionated serum catecholamines returned with high norepinephrine levels  MRI abd with no adrenal lesion or abnormal masses  Plasma free metanephrines show free normetanephrine minimal elevation of 1.08, free metanephrine normal    -no acute events ON  -BP stable ON  -denies dizziness at this time, reports fatigue and generalized weakness    ROS: neg dyspnea, CP. No cough or wheezing. No N/V, abd pain, or diarrhea. No F/C. Physical Exam:    VITALS:  /88   Pulse 80   Temp 98 °F (36.7 °C) (Oral)   Resp 16   Ht 6' 2\" (1.88 m)   Wt 226 lb 10.1 oz (102.8 kg)   SpO2 93%   BMI 29.10 kg/m²   TEMPERATURE:  Current - Temp: 98 °F (36.7 °C);  Max - Temp  Av.7 °F (37.1 °C)  Min: 98 °F (36.7 °C)  Max: 99.3 °F (37.4 °C)  RESPIRATIONS RANGE: Resp  Av  Min: 16  Max: 16  PULSE RANGE: Pulse  Av.3  Min: 76  Max: 81  BLOOD PRESSURE RANGE:  Systolic (07ZCV), YHE:192 , Min:129 , KCW:369   ; Diastolic (57LDP), KZU:63, Min:72, Max:96    PULSE OXIMETRY RANGE: SpO2  Av %  Min: 93 %  Max: 95 %    Constitutional:  NAD  HEENT:  No oral lesions  Lungs:  clear  Cardiovascular:  RRR, no murmur  Abd:  Soft, NT  Ext:  No edema  Skin:  No rash  Neuro: No focal loss of strength      DATA:    CBC:   Recent Labs     08/03/20 0541   WBC 9.9   RBC 3.81*   HGB 11.2*   HCT 32.8*        BMP:    Recent Labs     08/03/20 0541      K 4.6      CO2 25   BUN 16   CREATININE 1.4*   CALCIUM 10.3   GLUCOSE 120*     Phosphorus:    Recent Labs     08/03/20 0541   PHOS 4.3     Magnesium:    Recent Labs     08/03/20 0541   MG 2.10         IMPRESSION/RECOMMENDATIONS:      Uncontrolled HTN - most likely essential HTn with non-compliance  Secondary workup:  Renin/aldosterone - renin 3, adi 14, ratio 4.7 - not indicative of aldosteronism  Renal arterial Doppler: normal kidneys, preliminary no LEIGHTON but renal arteries not well visualized  Plasma norepinephrine = 1031 (nl ); epi normal, dopamine minimal elevation  Free metanephrine normal, free plasma normetanephrine 1.08 (0-0.89)  MRI adrenals negative  BP is reasonably well controlled now  -Continue carvedilol, nifedipine, imdur, losartan, prn HDLZ  -Patient does not have pheo or other evident secondary HTN  -Reconsider use of diuretic as thiazide-currently not indicated    CKD stage 2 - baseline creatinine near 1.5  -stable    Cerebellar infarcts  Bilateral cerebrovascular disease: CTA head and neck showed Right V4 segment of vertebral artery occlusion, Left V4 segment is severely stenotic. Right intracranial ICA cavernous and paraclinoid segments 70% stenosis.  Left intracranial ICA cavernous segment 50% stenosis.   No intervention for now  -Rehab - ongoing    Luba Blanco MD

## 2020-08-03 NOTE — PROGRESS NOTES
Attempted to stand patient states that his ears are ringing and he is nauseated, holdimg frontal lobe of head, c/o feeling like he is swimming.  Assisted patient back to supine position with therapist.

## 2020-08-03 NOTE — PLAN OF CARE
Problem: Falls - Risk of:  Goal: Will remain free from falls  Description: Will remain free from falls  8/2/2020 2348 by Siddhartha Perkins RN  Outcome: Ongoing  Note: Pt up with contact guard assistance, gait belt and walker. Weakness to BLE. Fall precautions maintained. Reminded pt to call for assistance before getting up or for any needs. Call light within reach. Pt uses call light appropriately. No falls thus far during shift.    8/2/2020 1023 by Rell Mendoza RN  Outcome: Ongoing

## 2020-08-03 NOTE — PROGRESS NOTES
Physical Therapy  Facility/Department: [facility] ACUTE REHAB UNIT  Daily Treatment Note  NAME: Silvio Cunningham  : 1975  MRN: [id number]    Date of Service: 8/3/2020    Discharge Recommendations:  Outpatient PT, Home with assist PRN   PT Equipment Recommendations  Other: plan to continue to assess DME needs pending progress    Assessment   Body structures, Functions, Activity limitations: Decreased functional mobility ; Decreased safe awareness;Decreased balance;Decreased endurance;Decreased coordination;Decreased strength  Assessment: Pt demonstrating improved gait mechanics and safety this session. Pt also demosntrating improved control during bridging exercises this session. Pt with orthostatic hypotension this session(RN notified), but states that this is the best that he has felt in standing since arriving to the ARU. Cont with Pt POC. Treatment Diagnosis: Decreased independence with functional mobility. Prognosis: Excellent  Barriers to Learning: none  REQUIRES PT FOLLOW UP: Yes  Activity Tolerance  Activity Tolerance: Patient Tolerated treatment well     Patient Diagnosis(es): There were no encounter diagnoses. has a past medical history of Hyperlipidemia and Hypertension. has no past surgical history on file. Restrictions  Restrictions/Precautions  Restrictions/Precautions: Fall Risk  Position Activity Restriction  Other position/activity restrictions: activity as tolerated  Subjective   General  Chart Reviewed: Yes  Additional Pertinent Hx: Patient is a 40 y/o male who was admitted to the ARU on  following an initial hospitalization where he was admitted  with dizziness, vertigo and headache. Patient found to be hypertensive in the emergency room. MRI brain (+) for Small right and moderate left cerebellar infarcts. No acute hemorrhage. CT head (+) for Subtle apparent infarct within the lateral aspect of the right cerebellar hemisphere. PMH significant for HTN, HLD.   Family / Caregiver Present: No  Referring Practitioner: Dr. Hare Burnham: Pt states that he stood up very quickly yesterday and that resulted in increased dizziness. General Comment  Comments: Patient supine in bed upon arrival - agreeable to PT. Pain Screening  Patient Currently in Pain: Denies  Vital Signs  Orthostatic B/P and Pulse?: Yes  Blood Pressure Lyin/90  Pulse Lyin PER MINUTE  Blood Pressure Sittin/88  Pulse Sittin PER MINUTE  Blood Pressure Standin/85  Pulse Standin PER MINUTE  Patient Currently in Pain: Denies  Orthostatic B/P and Pulse?: Yes       Orientation     Cognition      Objective   Bed mobility  Supine to Sit: Modified independent  Scooting: Supervision  Transfers  Sit to Stand: Contact guard assistance(Fluctuating between SBA and CGA; From bed, chair and mat table)  Stand to sit: Contact guard assistance(fluctuating between SBA and CGA)  Ambulation  Ambulation?: Yes  Ambulation 1  Surface: level tile  Device: No Device  Assistance: Minimal assistance(Fluctuating between CGA and MIN A)  Quality of Gait: Reciprocal pattern with varied step width and wt shift. Decreased frank. UEs positioned in low guard during ambulation. +LOB that intermittently required assistance to correct  Distance: 30', 50', and short distances in the gym. Comments: VC for LE positioning, UE positioning and safety. Exercises  Bridging: (2 x 15 reps of B fig 4 with a 3 sec hold, 15 reps of B supine bridges, 2 x 5 reps of B marching bridges.)         Comment: Pt completed seated dynamic reaching tasks to complete lower body dressing with supervision. Pt completed standing dynamic reaching tasks with CGA to complete lower body dressing. 2nd session: Pt was supine in bed upon arrival. Pt stating that he feels extremely fatigued right now. Pt ambulated distances of 2 x 60' with MIN A. Pt with increased LOB this session and needing cues to maintain his eyes open.  PT completed 10 reps of B standing hip flexion with MIN-MOD A 2/2 multiple LOB. Pt completed multiple reps of supine <> sit transfers independently. PT completed 2 x 15 reps of B supine bridges, and SLR. Pt with significant lethargy throughout the session with difficulty maintaining eyes open. Pt with BP of 144/90 with a HR of 81 in sitting, BP of 122/82 in standing with a HR of 84, Pt with a BP reading of 115/80 with a HR of 86 after 2 minutes in standing. Pt needing increased cuing and rest breaks for safety this session 2/2 extreme fatigue. Pt was supine in bed at the end of the session with bed alarm on, call light and all needs within reach. G-Code     OutComes Score                                                     AM-PAC Score             Goals  Short term goals  Time Frame for Short term goals: STG=LTG  Long term goals  Time Frame for Long term goals : 10-14 Days  Long term goal 1: Pt will complete bed mobility independently --Ongoing  Long term goal 2: Pt will transfer sit <> stand independently--Ongoing  Long term goal 3: Pt will ambulate 200' with LRAD MOD I--Ongoing  Long term goal 4: Pt will ascend/descend 12 steps with 1 hand rail MOD I--Ongoing  Patient Goals   Patient goals :  To return to walking without an AD    Plan    Plan  Times per week: 5x/wk for 60 minutes/day  Current Treatment Recommendations: Strengthening, Transfer Training, Endurance Training, Patient/Caregiver Education & Training, Balance Training, Gait Training, Functional Mobility Training, Safety Education & Training, Neuromuscular Re-education, Stair training, Equipment Evaluation, Education, & procurement, Home Exercise Program  Safety Devices  Type of devices: (Pt was supine on mat table in gym with OT present.)     Therapy Time   Individual Concurrent Group Co-treatment   Time In 0830         Time Out 0915         Minutes 39              Second Session Therapy Time:   Individual Concurrent Group Co-treatment   Time In 1050         Time Out

## 2020-08-03 NOTE — PROGRESS NOTES
Occupational Therapy  Facility/Department: [facility] ACUTE REHAB UNIT  Daily Treatment Note  NAME: Jessika Arndt  : 1975  MRN: [id number]    Date of Service: 8/3/2020    Discharge Recommendations:  Home with Home health OT, 24 hour supervision or assist  OT Equipment Recommendations  Equipment Needed: Yes  ADL Assistive Devices: Transfer Tub Bench;Grab Bars - shower;Grab Bars - toilet  Other: Pt will likely need grab bars in shower and by toilet and a tub transfer bench. Continue to assess pending progress    Assessment   Performance deficits / Impairments: Decreased functional mobility ; Decreased ADL status; Decreased balance;Decreased coordination;Decreased high-level IADLs;Decreased fine motor control;Decreased endurance;Decreased strength  Assessment: Pt completed IADL cooking task in stance ~15 minutes, primarily CGA w/brief periods of MIN A when ambulating w/out UE support on countertop. Pt required CGA-MIN A for dynamic standing balance activities this date. Pt continues to be limited by balance deficits and fatigue. Pt benefits from continued skilled OT to maximize independence and safety with functional tasks, cont OT per POC  Treatment Diagnosis: impaired ADLs and FM coordination d/t cerebellar infarcts  Prognosis: Good  OT Education: OT Role;IADL Safety  Patient Education: Pt verbalized understanding  REQUIRES OT FOLLOW UP: Yes  Activity Tolerance  Activity Tolerance: Patient Tolerated treatment well;Patient limited by fatigue  Activity Tolerance: Pt reporting increased fatigue and requested to go back to bed after OT session. Safety Devices  Safety Devices in place: Yes  Type of devices: All fall risk precautions in place;Call light within reach;Nurse notified; Bed alarm in place; Left in bed         Patient Diagnosis(es): There were no encounter diagnoses. has a past medical history of Hyperlipidemia and Hypertension.    has no past surgical history on file.    Restrictions  Restrictions/Precautions  Restrictions/Precautions: Fall Risk  Position Activity Restriction  Other position/activity restrictions: activity as tolerated     Subjective   General  Chart Reviewed: Yes  Patient assessed for rehabilitation services?: Yes  Additional Pertinent Hx: Pt is a 39 y.o. M presented to [facility] ED w/ headache and dizziness that started on 7/20. CT Head- acute right cerebellar stroke. 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 Patient was transferred to [facility] ICU for further neurosurgical follow up. Neurovascular surgeon reviewed CTA Head/Neck and stated no neurovascular intervention indicated. PMH: HTN, Hyperlipidemia  Response to previous treatment: Patient with no complaints from previous session  Family / Caregiver Present: No  Referring Practitioner: DO Radha  Diagnosis: cerebellar infarction  Subjective  Subjective: Pt supine on mat table in gym with PT upon OT arrival, agreeable to therapy session. Per PT, pt was orthostatic and dizzy this morning. Pt currently w/no report of dizziness, just fatigue. General Comment  Comments: [facility] Vital Signs  BP: 133/88  BP Location: Left upper arm  BP Upper/Lower: Upper  Patient Position: Lying right side  Patient Currently in Pain: Denies     Orientation  Orientation  Overall Orientation Status: Within Normal Limits     Objective    ADL  Additional Comments: Footwear: Pt doffed shoes seated at EOB BETH  Instrumental ADL's  Instrumental ADLs: Yes  Meal Prep  Meal Prep Level: Other(No AD)  Meal Prep Level of Assistance: Contact guard assistance  Meal Preparation: Pt completed the light IADL task of making pancakes. Pt retrieved all necessary items from cabinets above shoulder height and cabinets below knee height w/CGA. Pt required no cues to follow directions for preparing the pancakes.  Pt demo'd ability to carry heavy glass bowl to/from sink w/CGA. Pt w/intermittent UE support on countertop to maintain balance and one seated rest break while pancakes were cooking d/t fatigue. Minimal safety concerns regarding stovetop use d/t decreased balance, however pt set and turned off stove appropriately w/good safety awareness of hot pan and stovetop. Brief periods of MIN A required during ambulation to/from trash can where pt unable to use countertop for UE support. OT educated pt on gathering all necessary materials at beginning and sitting to prepare food if feeling fatigued. At this point, recommend close CGA/SBA for cooking tasks at home d/t balance deficits when ambulating w/out UE support on countertop. Balance  Sitting Balance: Modified independent   Standing Balance: Contact guard assistance  Standing Balance  Time: ~20 min total  Activity: Functional mobility, stance for IADL cooking task, stance for dynamic balance activity  Comment: Pt fluctuating CGA-MIN A for IADL task. Pt completed the following activity to address dynamic balance: Pt maintained stance w/out UE support while tapping a series of 4\" cones with either RLE or LLE. Pt required CGA when tapping a series of 1-2 cones in a row, and required MIN A when tapping 3-6 cones in a row. Pt w/minimal L and R lateral leans depending on which LE was raised to tap the cones. Pt cued to pause and return both feet to the ground to regain balance when beginning to feel unsteady. Functional Mobility  Functional - Mobility Device: No device  Activity: Other(From therapy gym to kitchen to room)  Assist Level: Moderate assistance(Fluctuating CGA-MOD A)  Functional Mobility Comments: Pt ambulated ~120 ft from therapy gym to kitchen and ~100 ft from kitchen to room w/no device. Pt intially required CGA-MIN A to maintain balance, however at end of session pt feeling very fatigued and required MOD A for ambulation from kitchen back to room d/t moderate L and R lateral leans.  Pt swaying pretty significantly at end of session during ambulation w/out RW -- RN and PT notified. Pt also observed to reach out for railing and wall for UE suport d/t unsteadiness. Bed mobility  Sit to Supine: Modified independent  Transfers  Sit to stand: Stand by assistance  Stand to sit: Stand by assistance                          Cognition  Overall Cognitive Status: [facility]                                           Second Session:  Pt asleep in bed upon OT arrival, reporting fatigue but agreeable to therapy. Supine > sit w/spvn. Pt donned socks and tennis shoes seated EOB. Pt began to c/o dizziness and feeling like he was going to pass out. RN notified immediately. Pt w/difficulty keeping eyes open and was answering questions slowly in soft voice, although remained A&O x4. Therapist took orthostatics: Seated 111/74, Standing (initially in stance then had to sit down d/t dizziness) 92/68, Prone 123/79. Pt required Min A x2 to stand from EOB d/t fatigue and was unable to keep eyes open. Pt also reported feeling nauseous and stated \"my head feels like it's swimming. \" Pt required increased time and effort for sit > supine w/SBA and scooting up in bed w/SBA. RN planning to notify physician regarding BP issues. Pt unable to participate in therapy d/t orthostatic BP and significant lethargy. Pt left supine in bed w/call light w/in reach and bed alarm on.       Plan   Plan  Times per week: 5x weekly, 90 mins daily  Times per day: Daily  Current Treatment Recommendations: Balance Training, Functional Mobility Training, Endurance Training, Safety Education & Training, Self-Care / ADL, Strengthening, Patient/Caregiver Education & Training, Equipment Evaluation, Education, & procurement, Home Management Training                                                    AM-PAC Score             Goals  Short term goals  Time Frame for Short term goals: STG = LTG  Long term goals  Time Frame for Long term goals : 10-14 days -- all goals ongoing  Long term goal 1: Pt will complete UB/LB bathing Mod I  Long term goal 2: Pt will complete UB/LB dressing Mod I  Long term goal 3: Pt will complete toilet and tub transfers Mod I  Long term goal 4: Pt will complete IADL task in stance x10 mins w/spvn  Long term goal 5: Pt will demo improved L hand coordination by decreasing Nine Hole Peg Test time by 5 seconds  Patient Goals   Patient goals : to be independent; be able to play the piano       Therapy Time   Individual Concurrent Group Co-treatment   Time In 0915         Time Out 1000         Minutes 45         Timed Code Treatment Minutes: 45 Minutes         Second Session Therapy Time:   Individual Concurrent Group Co-treatment   Time In 1350         Time Out 1410         Minutes 20           Timed Code Treatment Minutes:  20 minutes    Total Treatment Minutes:  45 + 20 = 65 minutes total    -25 minutes d/t BP issues    Nimco Cleary, OT

## 2020-08-03 NOTE — PATIENT CARE CONFERENCE
Inpatient Rehabilitation  Weekly Team Conference Note  The [address] [place] [address],[address]  [phone]  Patient Name: Meghana Romero        MRN: [id number]    : 1975  (39 y.o.)  Gender: male   Referring Practitioner: Dr. Nakul Hernandez       The team conference for this patient was held on 2020 at 10:30am by:  Faby Garcia DO    PHYSICAL THERAPY:  Bed Mobility: Scooting: Supervision    Transfers:  Sit to Stand: Contact guard assistance(to RW)  Stand to sit: Contact guard assistance(from RW)  Bed to Chair: Contact guard assistance(with RW)    Ambulation 1  Surface: level tile  Device: Rolling Walker  Assistance: Minimal assistance, Contact guard assistance(CGA with occasional min A for balance)  Quality of Gait: reciprocal gait but decreased left foot clearance and step length; increased LOB with increased left stance time; leans to left with cues and (A) for midline posture; cues for consistent gait speed - mechanics appear improved with slightly increased gait speed, decreasing left stance time  Distance: 165ft x 2    Stairs  # Steps : 9  Stairs Height: 6\"  Rails: Right ascending  Assistance: Minimal assistance  Comment: Reciprocal pattern with small LOB while descending (twice).     QM:  Roll Left and Right  Assistance Needed: Independent  CARE Score: 6  Discharge Goal: Independent  Sit to Lying  Assistance Needed: Supervision or touching assistance  CARE Score: 4  Discharge Goal: Independent  Lying to Sitting on Side of Bed  Assistance Needed: Supervision or touching assistance  CARE Score: 4  Discharge Goal: Independent  Sit to Stand  Assistance Needed: Supervision or touching assistance  CARE Score: 4  Discharge Goal: Independent  Chair/Bed-to-Chair Transfer  Assistance Needed: Supervision or touching assistance  CARE Score: 4  Discharge Goal: Independent  Car Transfer  Reason if not Attempted: Not attempted due to medical condition or safety concerns  CARE Score: 88  Discharge Goal: Independent  Walk 10 Feet  Assistance Needed: Partial/moderate assistance  CARE Score: 3  Discharge Goal: Independent  Walk 50 Feet with Two Turns  Assistance Needed: Partial/moderate assistance  CARE Score: 3  Discharge Goal: Independent  Walk 150 Feet  Assistance Needed: Supervision or touching assistance  CARE Score: 4  Discharge Goal: Independent  Walking 10 Feet on Uneven Surfaces  Assistance Needed: Partial/moderate assistance  CARE Score: 3  Discharge Goal: Independent  1 Step (Curb)  Assistance Needed: Partial/moderate assistance  CARE Score: 3  Discharge Goal: Independent  4 Steps  Assistance Needed: Partial/moderate assistance  CARE Score: 3  Discharge Goal: Independent  12 Steps  Assistance Needed: Partial/moderate assistance  Reason if not Attempted: Not attempted due to medical condition or safety concerns  CARE Score: 3  Discharge Goal: Independent  Picking Up Object  Assistance Needed: Partial/moderate assistance  CARE Score: 3  Discharge Goal: Independent       SPEECH THERAPY:  Diet Level:DIET GENERAL;    Assessment:      OCCUPATIONAL THERAPY:  ADL  Feeding: Independent(Pt provided with breakfast tray at end of session and demo'd opening various containers)  Grooming: Stand by assistance  UE Bathing: Supervision(Pt washed all components of UE bathing seated on TTB)  LE Bathing: Contact guard assistance(Pt washed/dried BLEs seated on TTB and washed juanita area by weightshifting L and R while seated - declined wanting to stand d/t poor balance. Pt stood at grab bars to dry bottom w/CGA.)  UE Dressing: Setup(Pt donned t-shirt seated on TTB w/setup)  LE Dressing: Contact guard assistance, Setup(Pt threaded boxers seated on TTB and pulled up in stance w/CGA; Pt declined wanting to wear shorts)  Toileting: Stand by assistance  Additional Comments: Pt completed above ADLs with listed levels of assistance. Pt previously independent with all ADLs at baseline.  Pt required increased time for ADLs this date w/eyes closed occasionally d/t fatigue. Toilet Transfers: Toilet Transfers  Toilet - Technique: Ambulating  Equipment Used: Standard toilet  Toilet Transfer: Stand by assistance    Tub/ShowerTransfers:     Shower Transfers  Shower - Transfer From: Dylan Lawler - Transfer To: Transfer tub bench  Shower - Technique: Ambulating  Shower Transfers: Contact Guard  Shower Transfers Comments: Min cues for safety    QM:  Eating  Assistance Needed: Independent  CARE Score: 6  Discharge Goal: Independent  Oral Hygiene  Assistance Needed: Supervision or touching assistance  CARE Score: 4  Discharge Goal: Independent  Toileting Hygiene  Assistance Needed: Supervision or touching assistance  CARE Score: 4  Discharge Goal: Independent  Toilet Transfer  Assistance Needed: Supervision or touching assistance  CARE Score: 4  Discharge Goal: Independent  Shower/Bathe Self  Assistance Needed: Supervision or touching assistance  CARE Score: 4  Discharge Goal: Independent  Upper Body Dressing  Assistance Needed: Setup or clean-up assistance  CARE Score: 5  Discharge Goal: Independent  Lower Body Dressing  Assistance Needed: Setup or clean-up assistance  CARE Score: 5  Discharge Goal: Independent  Putting On/Taking Off Footwear  Assistance Needed: Setup or clean-up assistance  CARE Score: 5  Discharge Goal: Independent    NUTRITION:  Please see nutrition note for details. Weight: 226 lb 10.1 oz (102.8 kg) / Body mass index is 29.1 kg/m².   Diet Order: DIET GENERAL;  Supplements:     NURSING:  QM:  Bladder Continence: Always continent  Bowel Continence: Always continent    Chahal Fall Risk Score: 40/medium  Wounds/Incisions/Ulcers: None  Medication Review: Completed with patient  Pain: Denying pain  Consultations/Labs/X-rays: N/A    Patient/Family Education provided by team:  Role of PT/OT, IADL safety, increase independence with ambulation    CASE MANAGEMENT:  Assessment:    Patient is a 38 y/o male admitted BAMBI Chance      I approve the established interdisciplinary plan of care as documented within the medical record of Osito Mason.     MD Adolfo Daniel D.O. M.P.H  PM&R  8/4/2020  12:18 PM

## 2020-08-03 NOTE — PROGRESS NOTES
Call out to Dr Muriel Umana for patient orthostatic, lethargic and symptomatic with ears ringing, nausea and head swimming. Awaiting return call.

## 2020-08-04 PROCEDURE — 6360000002 HC RX W HCPCS: Performed by: PHYSICAL MEDICINE & REHABILITATION

## 2020-08-04 PROCEDURE — 97112 NEUROMUSCULAR REEDUCATION: CPT

## 2020-08-04 PROCEDURE — 97116 GAIT TRAINING THERAPY: CPT

## 2020-08-04 PROCEDURE — 97535 SELF CARE MNGMENT TRAINING: CPT

## 2020-08-04 PROCEDURE — 97530 THERAPEUTIC ACTIVITIES: CPT

## 2020-08-04 PROCEDURE — 6370000000 HC RX 637 (ALT 250 FOR IP): Performed by: PHYSICAL MEDICINE & REHABILITATION

## 2020-08-04 PROCEDURE — 1280000000 HC REHAB R&B

## 2020-08-04 PROCEDURE — 97110 THERAPEUTIC EXERCISES: CPT

## 2020-08-04 PROCEDURE — 6370000000 HC RX 637 (ALT 250 FOR IP): Performed by: INTERNAL MEDICINE

## 2020-08-04 RX ORDER — GUAIFENESIN/DEXTROMETHORPHAN 100-10MG/5
5 SYRUP ORAL EVERY 6 HOURS PRN
Status: DISCONTINUED | OUTPATIENT
Start: 2020-08-04 | End: 2020-08-08 | Stop reason: HOSPADM

## 2020-08-04 RX ADMIN — HEPARIN SODIUM 5000 UNITS: 5000 INJECTION INTRAVENOUS; SUBCUTANEOUS at 14:35

## 2020-08-04 RX ADMIN — GUAIFENESIN AND DEXTROMETHORPHAN 5 ML: 100; 10 SYRUP ORAL at 17:08

## 2020-08-04 RX ADMIN — HEPARIN SODIUM 5000 UNITS: 5000 INJECTION INTRAVENOUS; SUBCUTANEOUS at 06:19

## 2020-08-04 RX ADMIN — PANTOPRAZOLE SODIUM 40 MG: 40 TABLET, DELAYED RELEASE ORAL at 06:00

## 2020-08-04 RX ADMIN — CARVEDILOL 25 MG: 12.5 TABLET, FILM COATED ORAL at 09:13

## 2020-08-04 RX ADMIN — CARVEDILOL 25 MG: 12.5 TABLET, FILM COATED ORAL at 17:08

## 2020-08-04 RX ADMIN — ATORVASTATIN CALCIUM 80 MG: 80 TABLET, FILM COATED ORAL at 20:09

## 2020-08-04 RX ADMIN — DOCUSATE SODIUM 50 MG AND SENNOSIDES 8.6 MG 2 TABLET: 8.6; 5 TABLET, FILM COATED ORAL at 20:09

## 2020-08-04 RX ADMIN — ASPIRIN 81 MG: 81 TABLET, CHEWABLE ORAL at 09:10

## 2020-08-04 RX ADMIN — ISOSORBIDE MONONITRATE 30 MG: 30 TABLET, EXTENDED RELEASE ORAL at 09:15

## 2020-08-04 RX ADMIN — CLOPIDOGREL BISULFATE 75 MG: 75 TABLET ORAL at 07:59

## 2020-08-04 RX ADMIN — POLYETHYLENE GLYCOL 3350 17 G: 17 POWDER, FOR SOLUTION ORAL at 15:33

## 2020-08-04 RX ADMIN — HEPARIN SODIUM 5000 UNITS: 5000 INJECTION INTRAVENOUS; SUBCUTANEOUS at 20:09

## 2020-08-04 RX ADMIN — NIFEDIPINE 60 MG: 60 TABLET, FILM COATED, EXTENDED RELEASE ORAL at 09:14

## 2020-08-04 RX ADMIN — DOCUSATE SODIUM 50 MG AND SENNOSIDES 8.6 MG 2 TABLET: 8.6; 5 TABLET, FILM COATED ORAL at 15:32

## 2020-08-04 ASSESSMENT — PAIN SCALES - GENERAL
PAINLEVEL_OUTOF10: 0
PAINLEVEL_OUTOF10: 0

## 2020-08-04 NOTE — CARE COORDINATION
SW spoke with patient and patient's sister in room to update them on team conference and informed them that the team anticipates discharge on 8/8 with outpatient PT/OT. SW also informed them that therapy will recommend DME prior to d/c to be ordered and delivered to patient's room prior to d/c. Sister asked about forms for short-term disability and SW informed her the she needs to contact patient's HR for FMLA and Short-term disab paperwork. SW provided SW fax number if they want it faxed here and have it completed by the doctor while patient is in ARU.     Electronically signed by JAYA Nicolas, CYNTHIA on 8/4/2020 at 11:52 AM

## 2020-08-04 NOTE — PROGRESS NOTES
Nephrology Progress Note          CC: We are following this patient for HTN and CKD. Admitted for acute cerebellar CVA    Admitted with headaches and dizziness; he was found to have acute ischemic stroke: MRI  showed bilateral cerebellar infarcts, small on right, and moderately sized on the left( also had CT and CTA).  Stroke team was consulted and recommended against tPA.      I had seen him in 2019 at [facility] but has not kept OP follow up. He  has a significant past medical history of HTN known for several years; he stopped BP meds at some point in 2018 ( started on an exercise regimen and weight loss). He was admitted in 2019 with  profound BP elevation 230's/120's.    The patient has elevated creatinine of 1.4 in 2019. SUBJECTIVE:  Results of fractionated serum catecholamines returned with high norepinephrine levels  MRI abd with no adrenal lesion or abnormal masses  Plasma free metanephrines show free normetanephrine minimal elevation of 1.08, free metanephrine normal    -no issues overnight. Working with rehab. ROS: neg dyspnea, CP. No cough or wheezing. No N/V, abd pain, or diarrhea. No F/C. No visitors this morning. Physical Exam:    VITALS:  BP (!) 146/98   Pulse 73   Temp 98.7 °F (37.1 °C) (Oral)   Resp 16   Ht 6' 2\" (1.88 m)   Wt 226 lb 10.1 oz (102.8 kg)   SpO2 96%   BMI 29.10 kg/m²   TEMPERATURE:  Current - Temp: 98.7 °F (37.1 °C); Max - Temp  Av.6 °F (37 °C)  Min: 98.4 °F (36.9 °C)  Max: 98.7 °F (37.1 °C)  RESPIRATIONS RANGE: Resp  Av  Min: 16  Max: 16  PULSE RANGE: Pulse  Av.2  Min: 72  Max: 85  BLOOD PRESSURE RANGE:  Systolic (69LSZ), JEP:495 , Min:92 , DMR:583   ; Diastolic (18IVN), QEM:82, Min:68, Max:98    PULSE OXIMETRY RANGE: SpO2  Av %  Min: 94 %  Max: 96 %    Constitutional:  NAD, in bed. HEENT:  No oral lesions, MMM  Lungs:  Clear bilaterally, no rales.   Cardiovascular:  RRR, no murmur  Abd:  Soft, NT  Ext:  No edema, no cyanosis. Skin:  No rash, warm. DATA:    CBC:   Recent Labs     08/03/20  0541   WBC 9.9   RBC 3.81*   HGB 11.2*   HCT 32.8*        BMP:    Recent Labs     08/03/20  0541      K 4.6      CO2 25   BUN 16   CREATININE 1.4*   CALCIUM 10.3   GLUCOSE 120*     Phosphorus:    Recent Labs     08/03/20  0541   PHOS 4.3     Magnesium:    Recent Labs     08/03/20  0541   MG 2.10         IMPRESSION/RECOMMENDATIONS:      Uncontrolled HTN - most likely essential HTn with non-compliance  Secondary workup:  Renin/aldosterone - renin 3, adi 14, ratio 4.7 - not indicative of aldosteronism  Renal arterial Doppler: normal kidneys, preliminary no LEIGHTON but renal arteries not well visualized  Plasma norepinephrine = 1031 (nl ); epi normal, dopamine minimal elevation  Free metanephrine normal, free plasma normetanephrine 1.08 (0-0.89)  MRI adrenals negative  BP is reasonably well controlled now  -Continue carvedilol, nifedipine, imdur, losartan, prn HDLZ  -Patient does not have pheo or other evident secondary HTN  -Monitor BP with current meds for a couple days.  -Reconsider use of diuretic as thiazide-currently not indicated    CKD stage 2 - baseline creatinine near 1.4-1.5  -Cr stable, monitor now that BP is under control. Cerebellar infarcts  Bilateral cerebrovascular disease: CTA head and neck showed Right V4 segment of vertebral artery occlusion, Left V4 segment is severely stenotic. Right intracranial ICA cavernous and paraclinoid segments 70% stenosis.  Left intracranial ICA cavernous segment 50% stenosis.   No intervention for now  -Rehab - ongoing    Anup Ordaz MD

## 2020-08-04 NOTE — PLAN OF CARE
Problem: Falls - Risk of:  Goal: Will remain free from falls  Description: Will remain free from falls  Outcome: Ongoing  Note: Nonskid socks on, bed alarm on, siderails up 2/4, call light and bed table within reach. Patient is medium fall risk, x1 assist with gait belt and walker for safe ambulation  Goal: Absence of physical injury  Description: Absence of physical injury  Outcome: Ongoing  Note: Fall precautions in place, room free of clutter, patient oriented to room and call light use.

## 2020-08-04 NOTE — PROGRESS NOTES
Pt c/o dizziness and weakness while working with OT. Ortho stats checked. Pt returned to bed from chair. Pt states he hasn't been drinking a lot of fluids between today and yesterday. PO fluids encouraged. 08/04/20 1142   Vital Signs   Orthostatic B/P and Pulse?  Yes   Blood Pressure Sitting 118/79   Blood Pressure Standing 87/56

## 2020-08-04 NOTE — PROGRESS NOTES
Occupational Therapy  Facility/Department: [facility] ACUTE REHAB UNIT  Daily Treatment Note  NAME: Radha Shore  : 1975  MRN: [id number]    Date of Service: 2020    Discharge Recommendations:  Home with Home health OT, 24 hour supervision or assist  OT Equipment Recommendations  Equipment Needed: Yes  ADL Assistive Devices: Transfer Tub Bench;Grab Bars - shower;Grab Bars - toilet  Other: Pt will likely need grab bars in shower and by toilet and a tub transfer bench. Continue to assess pending progress    Assessment   Performance deficits / Impairments: Decreased functional mobility ; Decreased ADL status; Decreased balance;Decreased coordination;Decreased high-level IADLs;Decreased fine motor control;Decreased endurance;Decreased strength  Assessment: Pt demo'd improvement with ADLs as evidenced by completing LB dressing w/SBA. Pt demo'd better static and dynamic balance during morning session, however pt w/orthostatic hypotension during second session and unable to continue participating in theray. Pt continues to be limited by dizziness and fatigue. Pt benefits from skilled OT to maximize independence and safety with functional tasks, cont OT per POC  Treatment Diagnosis: impaired ADLs and FM coordination d/t cerebellar infarcts  Prognosis: Good  OT Education: OT Role;Transfer Training  Patient Education: Pt verbalized understanding  REQUIRES OT FOLLOW UP: Yes  Activity Tolerance  Activity Tolerance: Patient Tolerated treatment well  Activity Tolerance: Pt c/o minimal dizziness consistently throughout session. BP seated: 132/95 BP standin/87 BP seated at end of session: 146/98 -- RN notified  Safety Devices  Safety Devices in place: Yes  Type of devices: Chair alarm in place;Nurse notified; Left in chair;Call light within reach; All fall risk precautions in place         Patient Diagnosis(es): There were no encounter diagnoses. has a past medical history of Hyperlipidemia and Hypertension.    has no past surgical history on file. Restrictions  Restrictions/Precautions  Restrictions/Precautions: Fall Risk  Position Activity Restriction  Other position/activity restrictions: activity as tolerated  Subjective   General  Chart Reviewed: Yes  Patient assessed for rehabilitation services?: Yes  Additional Pertinent Hx: Pt is a 39 y.o. M presented to [facility] ED w/ headache and dizziness that started on 7/20. CT Head- acute right cerebellar stroke. 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 Patient was transferred to [facility] ICU for further neurosurgical follow up. Neurovascular surgeon reviewed CTA Head/Neck and stated no neurovascular intervention indicated. PMH: HTN, Hyperlipidemia  Response to previous treatment: Patient with no complaints from previous session  Family / Caregiver Present: No  Referring Practitioner: DO Radha  Diagnosis: cerebellar infarction  Subjective  Subjective: Pt seated in bed upon OT arrival, pleasant and agreeable to therapy stating \"I'm feeling much better today, more awake and less dizzy. \"  General Comment  Comments: Darling Messing Vital Signs  BP: (!) 132/95  BP Location: Left upper arm  Patient Currently in Pain: Denies     Orientation  WFL     Objective    ADL  Grooming: Setup(Pt requested to complete oral care while seated on TTB in shower; Pt required setup assist)  UE Bathing: Modified independent (Pt washed all components of UE bathing seated on TTB MOD I)  LE Bathing: Stand by assistance(Pt washed/dried BLEs seated on TTB and washed juanita area by weightshifting L and R while seated - declined wanting to stand d/t balance deficits. Pt stood at grab bars to dry bottom w/CGA.)  UE Dressing: Setup;Modified independent (Pt donned t-shirt seated on TTB w/setup)  LE Dressing: Stand by assistance;Setup(Pt threaded boxers & shorts seated on TTB and pulled up in stance w/SBA.  Pt donned socks seated w/setup) required increased time to complete task and dropped 3 beads while stringing. 2) Finger-to-palm and palm-to-finger translation with 10 small beads; Pt completed with minimal difficulty and improved speed compared to previous attempt last week. At this point, pt observed to be very lethargic and difficulty keeping eyes open. Pt verbalized no change in level of dizziness and no head swimming or ear ringing, just fatigue. BP: 118/79 seated. Brief stance to assess orthostatics and BP 87/56 in standing. RN notified. Two minutes after returning to seated position BP was 129/92. Pt completed stand step transfer back to bed w/MIN A and stand to sit w/CGA. Pt sit > supine w/spvn. Pt left supine in bed w/RN present assessing pt. All needs met, bed alarm on, call light w/in reach. Third Session: Pt seated in recliner chair upon OT arrival, reporting fatigue but agreeable to therapy. Pt donned shoes seated in recliner w/setup. Pt STS w/CGA and ambulated to therapy gym w/RW and CGA. Pt took seated rest break in tall armchair. Pt completed UE therex in stance to address UE strength and balance for ADLs / functional mobility. Pt completed x20 reps of the following exercises w/2# free weights: shoulder flexion, shoulder protraction, horizontal ab/adduction, and shoulder abduction. Pt took seated rest break in tall armchair. Pt completed the following activity to address GM coordination and dynamic standing balance for functional tasks. Pt instructed to step forward w/RLE while raising LUE into flexion, step back to neutral stance, and step forward w/LLE while raising RUE into flexion. Pt completed 10 rounds of this activity primarily w/CGA and brief periods of MIN A d/t L lateral lean. Pt took short seated rest break. Pt ambulated back to room w/CGA-MIN A and stand to sit in recliner chair w/CGA. Pt doffed tennis shoes BETH.  Pt reporting fatigue throughout session but no dizziness other than \"the same little bit that's always present. \" Pt left seated in recliner chair w/call light w/in reach, chair alarm on, and RN present administering meds.         Plan   Plan  Times per week: 5x weekly, 90 mins daily  Times per day: Daily  Current Treatment Recommendations: Balance Training, Functional Mobility Training, Endurance Training, Safety Education & Training, Self-Care / ADL, Strengthening, Patient/Caregiver Education & Training, Equipment Evaluation, Education, & procurement, Home Management Training                                                    AM-PAC Score             Goals  Short term goals  Time Frame for Short term goals: STG = LTG  Long term goals  Time Frame for Long term goals : 10-14 days -- all goals ongoing  Long term goal 1: Pt will complete UB/LB bathing Mod I  Long term goal 2: Pt will complete UB/LB dressing Mod I  Long term goal 3: Pt will complete toilet and tub transfers Mod I  Long term goal 4: Pt will complete IADL task in stance x10 mins w/spvn  Long term goal 5: Pt will demo improved L hand coordination by decreasing Nine Hole Peg Test time by 5 seconds  Patient Goals   Patient goals : to be independent; be able to play the piano       Therapy Time   Individual Concurrent Group Co-treatment   Time In 0830         Time Out 0913         Minutes 43         Timed Code Treatment Minutes: 43 Minutes         Second Session Therapy Time:   Individual Concurrent Group Co-treatment   Time In 1100         Time Out 1140         Minutes 40           Timed Code Treatment Minutes: 40 minutes      Third Session Therapy Time:   Individual Concurrent Group Co-treatment   Time In 1415         Time Out 1436         Minutes 21           Timed Code Treatment Minutes:  21 minutes      Total Treatment Minutes:  43 + 40 + 21 = 104 minutes total    Lexie Medrano

## 2020-08-04 NOTE — PROGRESS NOTES
Pt A&Ox4. VSS. Up x1 w/ GB and walker. Pt continues to c/o dizziness. Bed side table and call light within reach. Fall precautions in place. Bed in lowest position. No further needs at this time. Will continue to monitor.

## 2020-08-04 NOTE — PROGRESS NOTES
[facility]    Patient Identification:  Radha Shore  [id number]  : 1975  Admit date: 2020      Diagnosis:   Patient Active Problem List   Diagnosis    Hypertensive urgency    Hypertension    [facility] [address]    Hypertensive emergency    Cerebellar infarction ([facility] [address]           Subjective: No new complaints overnight. He is showing himself today and completing ADLs. Improving in therapy. BP (!) 146/98   Pulse 73   Temp 98.7 °F (37.1 °C) (Oral)   Resp 16   Ht 6' 2\" (1.88 m)   Wt 226 lb 10.1 oz (102.8 kg)   SpO2 96%   BMI 29.10 kg/m²     Last 24 hour lab  No results found for this or any previous visit (from the past 24 hour(s)). Therapy progress:  PT  Position Activity Restriction  Other position/activity restrictions: activity as tolerated  Objective     Sit to Stand: Contact guard assistance(Fluctuating between SBA and CGA; From bed, chair and mat table)  Stand to sit: Contact guard assistance(fluctuating between SBA and CGA)  Bed to Chair: Contact guard assistance(with RW)  Device: No Device  Assistance: Minimal assistance(Fluctuating between CGA and MIN A)  Distance: 30', 50', and short distances in the gym.   OT  PT Equipment Recommendations  Equipment Needed: (Continue to assess pending progress)  Other: plan to continue to assess DME needs pending progress  Toilet - Technique: Ambulating  Equipment Used: Standard toilet                    Assessment and Plan:        Bilateral cerebellar infarcts L>R - Neurology, DAPT x 3 months, followed by Plavix monotherapy     Severe stenosis of ICA, VA bilaterally- neurosurgery consult, no intervention at this time     Hypertensive emergency - resolved, nephrology consulted,likely due to essential hypertension w/ noncompliance of medications, Continue carvedilol 25 mg BID, Losartan 100mg, Nifedipine 60mg, and Imdur 30mg, will need follow-up with Dr. Anyi Naranjo after d/c     Hyperlipidemia-- Atorvastatin      Bowels: Schedule Miralax + Senna S. Follow bowel movements. Enema or suppository if needed.      Bladder: Check PVR x 3.   [facility] if PVR > 200ml or if any volume is > 500 ml.      Pain: Tylenol is ordered prn.             Александр Garcia DO, 8/4/2020, 9:28 AM

## 2020-08-04 NOTE — PROGRESS NOTES
Physical Therapy  Facility/Department: [place] [address] ACUTE REHAB UNIT  Daily Treatment Note  NAME: Jessika Arndt  : 1975  MRN: [id number]    Date of Service: 2020    Discharge Recommendations:  Outpatient PT, Home with assist PRN   PT Equipment Recommendations  Other: plan to continue to assess DME needs pending progress    Assessment   Body structures, Functions, Activity limitations: Decreased functional mobility ; Decreased safe awareness;Decreased balance;Decreased endurance;Decreased coordination;Decreased strength  Assessment: Pt initially demonstrating improved mobility, but then suddenly became extremely letheragic towards the end of the session requiring significant assistance for safety with gait. Pt would benefit from continued therapy to increase his independence. Treatment Diagnosis: Decreased independence with functional mobility. Prognosis: Excellent  Barriers to Learning: none  REQUIRES PT FOLLOW UP: Yes  Activity Tolerance  Activity Tolerance: Patient limited by fatigue;Patient limited by endurance     Patient Diagnosis(es): There were no encounter diagnoses. has a past medical history of Hyperlipidemia and Hypertension. has no past surgical history on file. Restrictions  Restrictions/Precautions  Restrictions/Precautions: Fall Risk  Position Activity Restriction  Other position/activity restrictions: activity as tolerated  Subjective   General  Chart Reviewed: Yes  Additional Pertinent Hx: Patient is a 40 y/o male who was admitted to the ARU on  following an initial hospitalization where he was admitted  with dizziness, vertigo and headache. Patient found to be hypertensive in the emergency room. MRI brain (+) for Small right and moderate left cerebellar infarcts. No acute hemorrhage. CT head (+) for Subtle apparent infarct within the lateral aspect of the right cerebellar hemisphere. PMH significant for HTN, HLD.   Family / Caregiver Present: No  Referring Practitioner:  Heis  Subjective  Subjective: Pt states that he is feeling a little better than yesterday. General Comment  Comments: Pt was sitting in the bedside chair upon arrival. Pt agreeable to PT intervention. Pain Screening  Patient Currently in Pain: Denies  Vital Signs  Patient Currently in Pain: Denies       Orientation     Cognition      Objective      Transfers  Sit to Stand: Contact guard assistance(Fluctuating between SBA and CGA; From recliner, chair and mat table)  Stand to sit: Contact guard assistance(fluctuating between SBA and CGA)  Ambulation  Ambulation?: Yes  Ambulation 1  Surface: level tile  Device: No Device  Assistance: Minimal assistance(Fluctuating between CGA and MIN A)  Quality of Gait: Reciprocal pattern with varied step width and wt shift. Decreased frank. UEs positioned in low guard during ambulation. +LOB. Increased lateral sway intermittently. Distance: 150' x 2, 30, 50' (Pt was very lethargic when completing this bout and had a significant LOB resulting in need of MOD A to correct) and short distances in the gym. Comments: VC for LE positioning, UE positioning and safety. Stairs/Curb  Stairs?: Yes  Stairs  # Steps : 18  Stairs Height: 6\"  Rails: Right ascending  Assistance: Contact guard assistance  Comment: Reciprocal pattern. VC for improved eccentric control. Exercises  Bridging: (2 x 15 reps of B fig 4 with a 3 sec hold, 15 reps of B supine bridges, 2 x 5 reps of B marching bridges.)         Comment: Pt completed 2 x 10 reps of consecutive sit <> stand transfers without the use of UEs for improved functional strength and endurance. PT compelted 10 reps of B alternating toe taps on a 6\" step with CGA-MIN A without use of UEs. 2nd session: Pt was supine in bed upon arrival. Pt initially very lethargic. Pt agreeable to therapy, but needing some time to fully awake. Pt supine to sit MOD I with the use of side rail.  Pt's BP in supine was 103/58 HR of 67, sitting at the EOB it was 119/72 HR 91, immediately in standing it was 97/64 HR 87, after 3 minutes of standing therex 103/69 HR 89, once returned to sitting 118/80 HR 68. Remaining BPs taken throhgout the session were all >120/70. Pt completed 2 x 10 reps of standing B marches without UE support with CGA-MIN A. Pt ambulated distances of 50', 4 x 150' and 100' with CGA-MIN A. Pt given VC for increased frank and demonstrated improved balance during. Pt completed multiple reps of sit <> stand transfers from bed, chair, mat table and recliner with SBA. Pt completed 3 x 30 seconds (B) of standing with 1 LE on the ground and opposite LE on a 12\" step with CGA-MIN A. Pt completed the following standing on the Airex: 1 minute static standing with CGA, 5 reps of bouncing a swiss ball to the L and R with trunk rotation with the bouncing. Pt fluctuating between CGA and mIN A during. Pt completed 10 reps of consecutive sit <> stand transfers without the use of UEs for improved functional strength and endurance. Pt was sitting in the bedside chair at the end of the session with chair alarm on, call light and all needs within reach. G-Code     OutComes Score                                                     AM-PAC Score             Goals  Short term goals  Time Frame for Short term goals: STG=LTG  Long term goals  Time Frame for Long term goals : 10-14 Days  Long term goal 1: Pt will complete bed mobility independently --Ongoing  Long term goal 2: Pt will transfer sit <> stand independently--Ongoing  Long term goal 3: Pt will ambulate 200' with LRAD MOD I--Ongoing  Long term goal 4: Pt will ascend/descend 12 steps with 1 hand rail MOD I--Ongoing  Patient Goals   Patient goals :  To return to walking without an AD    Plan    Plan  Times per week: 5x/wk for 90 minutes/day  Current Treatment Recommendations: Strengthening, Transfer Training, Endurance Training, Patient/Caregiver Education & Training, Balance Training, Gait Training,

## 2020-08-04 NOTE — PLAN OF CARE
Problem: Pain Control  Goal: Improvement in pain related behaviors BP/HR WNL  Outcome: Ongoing   Pt denies pain. PRN tylenol available as needed. Problem: Falls - Risk of:  Goal: Will remain free from falls  Description: Will remain free from falls  8/4/2020 1008 by Carrie Ortiz RN  Outcome: Ongoing     Problem: Falls - Risk of:  Goal: Absence of physical injury  Description: Absence of physical injury  8/4/2020 1008 by Carrie Ortiz RN  Outcome: Ongoing   Fall precautions in place. Bed/chair alarm and non-skid socks on. 3/4 bed rails up. Bed in lowest position. Call light within reach. Pt calls out appropriately. Will continue to monitor.

## 2020-08-05 PROCEDURE — 97530 THERAPEUTIC ACTIVITIES: CPT

## 2020-08-05 PROCEDURE — 1280000000 HC REHAB R&B

## 2020-08-05 PROCEDURE — 6370000000 HC RX 637 (ALT 250 FOR IP): Performed by: PHYSICAL MEDICINE & REHABILITATION

## 2020-08-05 PROCEDURE — 6360000002 HC RX W HCPCS: Performed by: PHYSICAL MEDICINE & REHABILITATION

## 2020-08-05 PROCEDURE — 6370000000 HC RX 637 (ALT 250 FOR IP): Performed by: INTERNAL MEDICINE

## 2020-08-05 PROCEDURE — 97116 GAIT TRAINING THERAPY: CPT

## 2020-08-05 PROCEDURE — 97110 THERAPEUTIC EXERCISES: CPT

## 2020-08-05 PROCEDURE — 97112 NEUROMUSCULAR REEDUCATION: CPT

## 2020-08-05 RX ADMIN — DOCUSATE SODIUM 50 MG AND SENNOSIDES 8.6 MG 2 TABLET: 8.6; 5 TABLET, FILM COATED ORAL at 09:14

## 2020-08-05 RX ADMIN — GUAIFENESIN AND DEXTROMETHORPHAN 5 ML: 100; 10 SYRUP ORAL at 20:57

## 2020-08-05 RX ADMIN — NIFEDIPINE 60 MG: 60 TABLET, FILM COATED, EXTENDED RELEASE ORAL at 09:14

## 2020-08-05 RX ADMIN — GUAIFENESIN AND DEXTROMETHORPHAN 5 ML: 100; 10 SYRUP ORAL at 09:13

## 2020-08-05 RX ADMIN — CARVEDILOL 25 MG: 12.5 TABLET, FILM COATED ORAL at 09:14

## 2020-08-05 RX ADMIN — HEPARIN SODIUM 5000 UNITS: 5000 INJECTION INTRAVENOUS; SUBCUTANEOUS at 06:08

## 2020-08-05 RX ADMIN — CARVEDILOL 25 MG: 12.5 TABLET, FILM COATED ORAL at 17:59

## 2020-08-05 RX ADMIN — POLYETHYLENE GLYCOL 3350 17 G: 17 POWDER, FOR SOLUTION ORAL at 09:14

## 2020-08-05 RX ADMIN — ATORVASTATIN CALCIUM 80 MG: 80 TABLET, FILM COATED ORAL at 20:58

## 2020-08-05 RX ADMIN — DOCUSATE SODIUM 50 MG AND SENNOSIDES 8.6 MG 2 TABLET: 8.6; 5 TABLET, FILM COATED ORAL at 20:58

## 2020-08-05 RX ADMIN — PANTOPRAZOLE SODIUM 40 MG: 40 TABLET, DELAYED RELEASE ORAL at 06:08

## 2020-08-05 RX ADMIN — ISOSORBIDE MONONITRATE 30 MG: 30 TABLET, EXTENDED RELEASE ORAL at 09:14

## 2020-08-05 RX ADMIN — CLOPIDOGREL BISULFATE 75 MG: 75 TABLET ORAL at 09:13

## 2020-08-05 RX ADMIN — ASPIRIN 81 MG: 81 TABLET, CHEWABLE ORAL at 09:14

## 2020-08-05 RX ADMIN — ENOXAPARIN SODIUM 40 MG: 40 INJECTION SUBCUTANEOUS at 11:21

## 2020-08-05 ASSESSMENT — PAIN SCALES - GENERAL
PAINLEVEL_OUTOF10: 0

## 2020-08-05 ASSESSMENT — 9 HOLE PEG TEST
TESTTIME_SECONDS: 31
TESTTIME_SECONDS: 24
TEST_RESULT: IMPAIRED
TEST_RESULT: FUNCTIONAL

## 2020-08-05 NOTE — PROGRESS NOTES
floor. Pt verbalized slightly increased dizziness during positional change when bending down to retrieve beanbags. Functional Mobility  Functional - Mobility Device: No device  Activity: To/From therapy gym  Assist Level: Minimal assistance  Functional Mobility Comments: Pt ambulated to therapy gym w/out AD and MIN A d/t wavering. Pt ambulated from therapy gym back to room w/RW and CGA. Bed mobility  Supine to Sit: Modified independent  Sit to Supine: Modified independent  Transfers  Sit to stand: Stand by assistance  Stand to sit: Stand by assistance             Coordination  Fine Motor: Pt completed the following FM activities to increase L hand coordination and function: 1) Stringing and unstringing 10 paperclips; Pt completed with increased speed and ease compared to last attempt 2) Playing peg solitaire, which involves picking up and placing pegs in small holes in a pattern so that only one peg is left in the end. Mild dysmetria observed w/pt occasionally overshooting and undershooting peg holes. Cognition  Overall Cognitive Status: WFL                         Type of ROM/Therapeutic Exercise  Type of ROM/Therapeutic Exercise: Cane/Wand(7# dowel mirlande)  Comment: Pt completed the following UE therex to increase strength for ADLs / IADLs:  Exercises  Scapular Protraction: 2 x 10  Shoulder Flexion: 2 x 10  Elbow Flexion: 2 x 10  Other: Forward rows 2 x 10, Backward rows 2 x 10             Left 9-Hole Peg Test  Left 9-Hole Peg Test: Impaired  Right 9-Hole Peg Test  Right 9-Hole Peg Test: Functional  Fine Motor Skills  Left 9-Hole Peg Test: Impaired  Left 9 Hole Peg Test Time (secs): 31  Right 9-Hole Peg Test: Functional  Right 9 Hole Peg Test Time (secs): 24  Fine Motor Comment: Pt completed NHPT to assess improvement in L hand coordination and compare to when first tested at initial eval. At eval, pt completed NHPT in 36 seconds and scored in the less than 10th percentile for his age group.  When Second Session Therapy Time:   Individual Concurrent Group Co-treatment   Time In  1300         Time Out  1400         Minutes  60           Timed Code Treatment Minutes:  60 minutes    Total Treatment Minutes:  30 + 60 = 90 minutes total    Lexie العراقي

## 2020-08-05 NOTE — DISCHARGE INSTR - COC
Continuity of Care Form    Patient Name: Frank Sr   :  1975  MRN:  [id number]    Admit date:  2020  Discharge date: 2020    Code Status Order: Full Code   Advance Directives:   [facility] Documentation     Date/Time Healthcare Directive Type of Healthcare Directive Copy in [facility] [address] Agent's Name Healthcare Agent's Phone Number    [phone]  No, patient does not have an advance directive for healthcare treatment -- -- -- -- --          Admitting Physician:  Meek Childs MD  PCP: Severiano Atlas, MD    Discharging Nurse:   [address] Unit/Room#: 3107/3107-01  Discharging Unit Phone Number:     Emergency Contact:   Extended Emergency Contact Information  Primary Emergency Contact:  [address] of [address] [place] [address] Phone: [phone]  Relation: Brother/Sister    Past Surgical History:  No past surgical history on file.     Immunization History:   Immunization History   Administered Date(s) Administered    Influenza 2011       Active Problems:  Patient Active Problem List   Diagnosis Code    Hypertensive urgency I16.0    Hypertension I10    Acute CVA (cerebrovascular accident) ([facility] [address] I63.9    Hypertensive emergency I16.1    Cerebellar infarction ([facility] [address] I63.9       Isolation/Infection:   Isolation          No Isolation        Patient Infection Status     Infection Onset Added Last Indicated Last Indicated By Review Planned Expiration Resolved Resolved By    None active    Resolved    COVID-19 Rule Out 20 COVID-19 (Ordered)   20 Kalli Geller RN    COVID-19 Rule Out 20 COVID-19 (Ordered)   20 Kalli Geller RN          Nurse Assessment:  Last Vital Signs: /77   Pulse 66   Temp 98.3 °F (36.8 °C) (Oral)   Resp 18   Ht 6' 2\" (1.88 m)   Wt 226 lb 10.1 oz (102.8 kg)   SpO2 95%   BMI 29.10 kg/m²     Last documented pain score (0-10 Therapy  · [address] [facility], [address]  · ZPFNA:[phone]  · CZF:[phone]    Faxed RX to [facility] and patient will call to schedule appt. Original RX given to patient. DME   · Name: [address]  · Address: Radha Workman  · Phone: [phone] [address] [facility] and Tub Transfer Bench    / signature: Electronically signed by JAYA Mendez, ROSIEW on 8/5/20 at 1:09 PM EDT    PHYSICIAN SECTION    Prognosis: Good    Condition at Discharge: Stable    Rehab Potential (if transferring to Rehab): Good    Recommended Labs or Other Treatments After Discharge: PT/OT and DME Rolling Walker and tub transfer bench. Physician Certification: I certify the above information and transfer of Joan Patel  is necessary for the continuing treatment of the diagnosis listed and that he requires [address] for greater 30 days.      Update Admission H&P: No change in H&P    PHYSICIAN SIGNATURE:  Electronically signed by Shashank Mai DO on 8/7/20 at 12:03 PM EDT

## 2020-08-05 NOTE — PROGRESS NOTES
Pt A&Ox4. VSS. Up x1 w/ GB and walker. Pt reports that his dizziness is better this morning. Bed side table and call light within reach. Fall precautions in place. Bed in lowest position. No further needs at this time. Will continue to monitor.

## 2020-08-05 NOTE — PLAN OF CARE
Problem: Neurological  Goal: Maximum potential motor/sensory/cognitive function  Outcome: Ongoing  Note: Patient having no cognitive deficits noted at this time. Pt remains with bilateral weakness. Problem: Falls - Risk of:  Goal: Will remain free from falls  Description: Will remain free from falls  8/4/2020 2106 by Snow Nye RN  Outcome: Ongoing  Note: Nonskid socks on, bed alarm on, siderails up 2/4, call light and bed table within reach. Patient is medium fall risk, x1 assist with gait belt and walker for safe ambulation. Problem: Falls - Risk of:  Goal: Absence of physical injury  Description: Absence of physical injury  8/4/2020 2106 by Snow Nye RN  Outcome: Ongoing  Note: Fall precautions in place, room free of clutter, patient oriented to room and call light use.

## 2020-08-05 NOTE — PROGRESS NOTES
Physical Therapy  Facility/Department: [facility] ACUTE REHAB UNIT  Daily Treatment Note  NAME: Margy Gonzalez  : 1975  MRN: [id number]    Date of Service: 2020    Discharge Recommendations:  Outpatient PT, Home with assist PRN   PT Equipment Recommendations  Other: plan to continue to assess DME needs pending progress    Assessment   Body structures, Functions, Activity limitations: Decreased functional mobility ; Decreased safe awareness;Decreased balance;Decreased endurance;Decreased coordination;Decreased strength  Assessment: Pt continues to show progress with his balance and strength during ambulation. Pt still with intermittent LOB that he is showing improvement with self correcting, but still occasionally needing assistance to correct. Pt with slightly improved activity tolerance this session, but still requiring prolonged rest breaks. Cont with PT POC. Treatment Diagnosis: Decreased independence with functional mobility. Prognosis: Excellent  Barriers to Learning: none  REQUIRES PT FOLLOW UP: Yes  Activity Tolerance  Activity Tolerance: Patient limited by fatigue;Patient limited by endurance     Patient Diagnosis(es): There were no encounter diagnoses. has a past medical history of Hyperlipidemia and Hypertension. has no past surgical history on file. Restrictions  Restrictions/Precautions  Restrictions/Precautions: Fall Risk  Position Activity Restriction  Other position/activity restrictions: activity as tolerated  Subjective   General  Chart Reviewed: Yes  Additional Pertinent Hx: Patient is a 38 y/o male who was admitted to the ARU on  following an initial hospitalization where he was admitted  with dizziness, vertigo and headache. Patient found to be hypertensive in the emergency room. MRI brain (+) for Small right and moderate left cerebellar infarcts. No acute hemorrhage. CT head (+) for Subtle apparent infarct within the lateral aspect of the right cerebellar hemisphere.   [facility] significant for HTN, HLD. Family / Caregiver Present: No  Referring Practitioner: Dr. Adonay Aguilera: Pt states that he slept well last night. General Comment  Comments: Pt was supine in bed upon arrival. Pt agreeable to PT intervention. Pain Screening  Patient Currently in Pain: Denies  Vital Signs  Patient Currently in Pain: Denies       Orientation     Cognition      Objective      Transfers  Sit to Stand: Contact guard assistance(Fluctuating between SBA and CGA; From recliner, chair and mat table)  Stand to sit: Contact guard assistance(fluctuating between SBA and CGA)  Ambulation  Ambulation?: Yes  Ambulation 1  Surface: level tile  Device: No Device  Assistance: Minimal assistance(Fluctuating between CGA and MIN A)  Quality of Gait: Reciprocal pattern with varied step width and wt shift. Decreased frank. UEs positioned in low guard during ambulation. +LOB. Increased lateral sway intermittently. Distance: 250' (including up/down a 76' ramp, retrowalking on ramp) , 240', 150', 300'  Comments: VC for LE positioning, UE positioning and safety. Pt continues to show improved balance when utilizing increased gait speed. Stairs/Curb  Stairs?: No                  Comment: Pt completed 10 reps of L step ups on a 6\" step without UE support and 10 reps of L step ups on a 12\" step with 1 UE A with CGA-MIN A. Pt given VC for positioning of L LE to prevent L knee hyperextension. Pt completed 240' of mobility using B LEs on the swivel stool to increase strength of B LEs and increase endurance. Pt completed 10 reps of consecutive sit <> stand transfers with L LE slightly posterior to the R to increase wt bearing through the L. Pt completed seated dynamic reaching at the EOB with supervision to complete donning of socks and shoes. Pt completed 4 total minutes of standing on the [employer] initially attempting with narrow KAMALA, then with normal KAMALA 2/2 difficulty with maintaining with narrow KAMALA.  Pt then given sustained perturbations in all planes to increase recruitment of righting reactions. Pt completed 30 seconds of standing with eyes closed. Pt fluctuating between needing CGA and MIN A during Airex tasks. 2nd session: Pt was supine in bed upon arrival. Pt agreeable to PT intervention. Pt completed supine to sit transfer independently. Pt completed multiple reps of sit <> stand transfers from bed, recliner, chair and barstool height chair with SBA. Pt ambulated distances of 300' (inlcuding up/down a 76' ramp retro-walking), 400', 150' and short distances in the gym with SBA-CGA (see above for gait deviations and interventions). Pt completed 10 reps of B toe taps alternating L and R on a 6\" step and then on a 12\" step with 0-1 UE A. Pt completed 10 reps of heel taps B alternating R and L on a 6\" step. Pt with intermittent LOB to the L when advancing R LE. Pt was sitting in the bedside chair at the end of the session with chair alarm on, call light and all needs within reach. Per RN abdominal binder to be placed on when up to help with orthostasis. S[place] Shola was placed on at the start of the session. G-Code     OutComes Score                                                     AM-PAC Score             Goals  Short term goals  Time Frame for Short term goals: STG=LTG  Long term goals  Time Frame for Long term goals : 10-14 Days  Long term goal 1: Pt will complete bed mobility independently --Ongoing  Long term goal 2: Pt will transfer sit <> stand independently--Ongoing  Long term goal 3: Pt will ambulate 200' with LRAD MOD I--Ongoing  Long term goal 4: Pt will ascend/descend 12 steps with 1 hand rail MOD I--Ongoing  Patient Goals   Patient goals :  To return to walking without an AD    Plan    Plan  Times per week: 5x/wk for 90 minutes/day  Current Treatment Recommendations: Strengthening, Transfer Training, Endurance Training, Patient/Caregiver Education & Training, Balance Training, Gait Training, Functional

## 2020-08-05 NOTE — CARE COORDINATION
SW made referral to [facility] for tub transfer bench with order, [facility] is not in network for this insurance.   Electronically signed by JAYA Mccoy, LSW on 8/5/2020 at 1:06 PM

## 2020-08-05 NOTE — PROGRESS NOTES
Nephrology Progress Note          CC: We are following this patient for HTN and CKD. Admitted for acute cerebellar CVA    Admitted with headaches and dizziness; he was found to have acute ischemic stroke: MRI  showed bilateral cerebellar infarcts, small on right, and moderately sized on the left( also had CT and CTA).  Stroke team was consulted and recommended against tPA.      I had seen him in 2019 at [facility] but has not kept OP follow up. He  has a significant past medical history of HTN known for several years; he stopped BP meds at some point in 2018 ( started on an exercise regimen and weight loss). He was admitted in 2019 with  profound BP elevation 230's/120's.    The patient has elevated creatinine of 1.4 in 2019. SUBJECTIVE:  Results of fractionated serum catecholamines returned with high norepinephrine levels  MRI abd with no adrenal lesion or abnormal masses  Plasma free metanephrines show free normetanephrine minimal elevation of 1.08, free metanephrine normal    -no issues overnight. Working with rehab. Possible discharge . ROS: neg dyspnea, CP. No cough or wheezing. No N/V, abd pain, or diarrhea. No F/C. No visitors this morning. Physical Exam:    VITALS:  /64   Pulse 75   Temp 98.8 °F (37.1 °C) (Oral)   Resp 18   Ht 6' 2\" (1.88 m)   Wt 226 lb 10.1 oz (102.8 kg)   SpO2 94%   BMI 29.10 kg/m²   TEMPERATURE:  Current - Temp: 98.8 °F (37.1 °C); Max - Temp  Av.8 °F (37.1 °C)  Min: 98.8 °F (37.1 °C)  Max: 98.8 °F (37.1 °C)  RESPIRATIONS RANGE: Resp  Av  Min: 18  Max: 18  PULSE RANGE: Pulse  Av.2  Min: 65  Max: 75  BLOOD PRESSURE RANGE:  Systolic (17TCC), VBA:200 , Min:116 , WXL:696   ; Diastolic (76PHD), EUD:01, Min:64, Max:98    PULSE OXIMETRY RANGE: SpO2  Av %  Min: 94 %  Max: 94 %    Constitutional:  NAD, in bed. HEENT:  No oral lesions, MMM  Lungs:  Clear bilaterally, no rales.   Cardiovascular:  RRR, no murmur  Abd: Soft, NT +BS  Ext:  No edema, no cyanosis. Skin:  No rash, warm. DATA:    CBC:   Recent Labs     08/03/20  0541   WBC 9.9   RBC 3.81*   HGB 11.2*   HCT 32.8*        BMP:    Recent Labs     08/03/20  0541      K 4.6      CO2 25   BUN 16   CREATININE 1.4*   CALCIUM 10.3   GLUCOSE 120*     Phosphorus:    Recent Labs     08/03/20  0541   PHOS 4.3     Magnesium:    Recent Labs     08/03/20  0541   MG 2.10         IMPRESSION/RECOMMENDATIONS:      Uncontrolled HTN - most likely essential HTn with non-compliance  Secondary workup:  Renin/aldosterone - renin 3, adi 14, ratio 4.7 - not indicative of aldosteronism  Renal arterial Doppler: normal kidneys, preliminary no LEIGHTON but renal arteries not well visualized  Plasma norepinephrine = 1031 (nl ); epi normal, dopamine minimal elevation  Free metanephrine normal, free plasma normetanephrine 1.08 (0-0.89)  MRI adrenals negative  BP is reasonably well controlled now  -Continue carvedilol, nifedipine, imdur, losartan, prn HDLZ  -Patient does not have pheo or other evident secondary HTN  -BP seems appropriate with current meds. -Reconsider use of diuretic as thiazide-currently not indicated    CKD stage 2 - baseline creatinine near 1.4-1.5  -Cr stable, monitor now that BP is under control. Cerebellar infarcts  Bilateral cerebrovascular disease: CTA head and neck showed Right V4 segment of vertebral artery occlusion, Left V4 segment is severely stenotic. Right intracranial ICA cavernous and paraclinoid segments 70% stenosis. Left intracranial ICA cavernous segment 50% stenosis.   No intervention for now  -Rehab - ongoing  -Anticipated discharge later this week?     Tutu Calvin MD

## 2020-08-05 NOTE — PLAN OF CARE
Problem: Falls - Risk of:  Goal: Will remain free from falls  Description: Will remain free from falls  Outcome: Ongoing  Goal: Absence of physical injury  Description: Absence of physical injury  Outcome: Ongoing  Fall precautions in place. Bed/chair alarm and non-skid socks on. 3/4 bed rails up. Bed in lowest position. Call light within reach. Pt calls out appropriately. Will continue to monitor.  ;

## 2020-08-06 LAB
ALBUMIN SERPL-MCNC: 4.1 G/DL (ref 3.4–5)
ANION GAP SERPL CALCULATED.3IONS-SCNC: 11 MMOL/L (ref 3–16)
BUN BLDV-MCNC: 12 MG/DL (ref 7–20)
CALCIUM SERPL-MCNC: 9.8 MG/DL (ref 8.3–10.6)
CHLORIDE BLD-SCNC: 101 MMOL/L (ref 99–110)
CO2: 26 MMOL/L (ref 21–32)
CREAT SERPL-MCNC: 1.2 MG/DL (ref 0.9–1.3)
GFR AFRICAN AMERICAN: >60
GFR NON-AFRICAN AMERICAN: >60
GLUCOSE BLD-MCNC: 111 MG/DL (ref 70–99)
HCT VFR BLD CALC: 31.7 % (ref 40.5–52.5)
HEMOGLOBIN: 10.8 G/DL (ref 13.5–17.5)
MCH RBC QN AUTO: 29.5 PG (ref 26–34)
MCHC RBC AUTO-ENTMCNC: 34.2 G/DL (ref 31–36)
MCV RBC AUTO: 86.4 FL (ref 80–100)
PDW BLD-RTO: 12.8 % (ref 12.4–15.4)
PHOSPHORUS: 4.4 MG/DL (ref 2.5–4.9)
PLATELET # BLD: 288 K/UL (ref 135–450)
PMV BLD AUTO: 10.4 FL (ref 5–10.5)
POTASSIUM SERPL-SCNC: 4.3 MMOL/L (ref 3.5–5.1)
RBC # BLD: 3.67 M/UL (ref 4.2–5.9)
SODIUM BLD-SCNC: 138 MMOL/L (ref 136–145)
WBC # BLD: 7.7 K/UL (ref 4–11)

## 2020-08-06 PROCEDURE — 6370000000 HC RX 637 (ALT 250 FOR IP): Performed by: PHYSICAL MEDICINE & REHABILITATION

## 2020-08-06 PROCEDURE — 85027 COMPLETE CBC AUTOMATED: CPT

## 2020-08-06 PROCEDURE — 97110 THERAPEUTIC EXERCISES: CPT

## 2020-08-06 PROCEDURE — 97530 THERAPEUTIC ACTIVITIES: CPT

## 2020-08-06 PROCEDURE — 6370000000 HC RX 637 (ALT 250 FOR IP): Performed by: INTERNAL MEDICINE

## 2020-08-06 PROCEDURE — 80069 RENAL FUNCTION PANEL: CPT

## 2020-08-06 PROCEDURE — 6360000002 HC RX W HCPCS: Performed by: PHYSICAL MEDICINE & REHABILITATION

## 2020-08-06 PROCEDURE — 97116 GAIT TRAINING THERAPY: CPT

## 2020-08-06 PROCEDURE — 97535 SELF CARE MNGMENT TRAINING: CPT

## 2020-08-06 PROCEDURE — 1280000000 HC REHAB R&B

## 2020-08-06 PROCEDURE — 97112 NEUROMUSCULAR REEDUCATION: CPT

## 2020-08-06 PROCEDURE — 36415 COLL VENOUS BLD VENIPUNCTURE: CPT

## 2020-08-06 RX ORDER — NIFEDIPINE 60 MG/1
60 TABLET, EXTENDED RELEASE ORAL EVERY EVENING
Status: DISCONTINUED | OUTPATIENT
Start: 2020-08-06 | End: 2020-08-08 | Stop reason: HOSPADM

## 2020-08-06 RX ADMIN — PANTOPRAZOLE SODIUM 40 MG: 40 TABLET, DELAYED RELEASE ORAL at 06:48

## 2020-08-06 RX ADMIN — CLOPIDOGREL BISULFATE 75 MG: 75 TABLET ORAL at 08:04

## 2020-08-06 RX ADMIN — NIFEDIPINE 60 MG: 60 TABLET, FILM COATED, EXTENDED RELEASE ORAL at 17:06

## 2020-08-06 RX ADMIN — DOCUSATE SODIUM 50 MG AND SENNOSIDES 8.6 MG 2 TABLET: 8.6; 5 TABLET, FILM COATED ORAL at 08:03

## 2020-08-06 RX ADMIN — ISOSORBIDE MONONITRATE 30 MG: 30 TABLET, EXTENDED RELEASE ORAL at 12:01

## 2020-08-06 RX ADMIN — ATORVASTATIN CALCIUM 80 MG: 80 TABLET, FILM COATED ORAL at 21:08

## 2020-08-06 RX ADMIN — POLYETHYLENE GLYCOL 3350 17 G: 17 POWDER, FOR SOLUTION ORAL at 08:03

## 2020-08-06 RX ADMIN — ENOXAPARIN SODIUM 40 MG: 40 INJECTION SUBCUTANEOUS at 08:03

## 2020-08-06 RX ADMIN — GUAIFENESIN AND DEXTROMETHORPHAN 5 ML: 100; 10 SYRUP ORAL at 17:06

## 2020-08-06 RX ADMIN — ASPIRIN 81 MG: 81 TABLET, CHEWABLE ORAL at 08:03

## 2020-08-06 RX ADMIN — DOCUSATE SODIUM 50 MG AND SENNOSIDES 8.6 MG 2 TABLET: 8.6; 5 TABLET, FILM COATED ORAL at 21:08

## 2020-08-06 RX ADMIN — CARVEDILOL 25 MG: 12.5 TABLET, FILM COATED ORAL at 08:03

## 2020-08-06 RX ADMIN — NIFEDIPINE 60 MG: 60 TABLET, FILM COATED, EXTENDED RELEASE ORAL at 08:03

## 2020-08-06 RX ADMIN — CARVEDILOL 25 MG: 12.5 TABLET, FILM COATED ORAL at 17:06

## 2020-08-06 ASSESSMENT — PAIN SCALES - GENERAL
PAINLEVEL_OUTOF10: 0

## 2020-08-06 NOTE — PROGRESS NOTES
Nephrology Progress Note          CC: We are following this patient for HTN and CKD. Admitted for acute cerebellar CVA    Admitted with headaches and dizziness; he was found to have acute ischemic stroke: MRI  showed bilateral cerebellar infarcts, small on right, and moderately sized on the left( also had CT and CTA).  Stroke team was consulted and recommended against tPA.      I had seen him in 2019 at [facility] but has not kept OP follow up. He  has a significant past medical history of HTN known for several years; he stopped BP meds at some point in 2018 ( started on an exercise regimen and weight loss). He was admitted in 2019 with  profound BP elevation 230's/120's.    The patient has elevated creatinine of 1.4 in 2019. SUBJECTIVE:  Results of fractionated serum catecholamines returned with high norepinephrine levels  MRI abd with no adrenal lesion or abnormal masses  Plasma free metanephrines show free normetanephrine minimal elevation of 1.08, free metanephrine normal    -pt seen and examined  -PMSHx and meds reviewed  -No family at bedside    No acute events ON    ROS: neg dyspnea, CP. No cough or wheezing. No N/V, abd pain, or diarrhea. No F/C. No visitors this morning. Physical Exam:    VITALS:  BP (!) 148/98   Pulse 72   Temp 99.4 °F (37.4 °C) (Oral)   Resp 18   Ht 6' 2\" (1.88 m)   Wt 226 lb 10.1 oz (102.8 kg)   SpO2 97%   BMI 29.10 kg/m²   TEMPERATURE:  Current - Temp: 99.4 °F (37.4 °C); Max - Temp  Av.3 °F (37.4 °C)  Min: 99.2 °F (37.3 °C)  Max: 99.4 °F (37.4 °C)  RESPIRATIONS RANGE: Resp  Av  Min: 18  Max: 18  PULSE RANGE: Pulse  Av.3  Min: 66  Max: 76  BLOOD PRESSURE RANGE:  Systolic (13PBM), QME:208 , Min:118 , XDZ:253   ; Diastolic (01ZGL), T, Min:75, Max:98    PULSE OXIMETRY RANGE: SpO2  Av %  Min: 91 %  Max: 97 %    Constitutional:  NAD, in bed.   HEENT:  No oral lesions, MMM  Lungs:  Clear bilaterally, no rales. Cardiovascular:  RRR, no murmur  Abd:  Soft, NT +BS  Ext:  No edema, no cyanosis. Skin:  No rash, warm. DATA:    CBC:   Recent Labs     08/06/20 0617   WBC 7.7   RBC 3.67*   HGB 10.8*   HCT 31.7*        BMP:    Recent Labs     08/06/20 0617      K 4.3      CO2 26   BUN 12   CREATININE 1.2   CALCIUM 9.8   GLUCOSE 111*     Phosphorus:    Recent Labs     08/06/20 0617   PHOS 4.4     Magnesium:    No results for input(s): MG in the last 72 hours. IMPRESSION/RECOMMENDATIONS:      Uncontrolled HTN - most likely essential HTn with non-compliance  Secondary workup:  Renin/aldosterone - renin 3, adi 14, ratio 4.7 - not indicative of aldosteronism  Renal arterial Doppler: normal kidneys, preliminary no LEIGHTON but renal arteries not well visualized  Plasma norepinephrine = 1031 (nl ); epi normal, dopamine minimal elevation  Free metanephrine normal, free plasma normetanephrine 1.08 (0-0.89)  MRI adrenals negative  BP is reasonably well controlled now  -Continue carvedilol, nifedipine, imdur, losartan, prn HDLZ  -Patient does not have pheo or other evident secondary HTN  -BP seems appropriate with current meds. -Reconsider use of diuretic as thiazide-currently not indicated    CKD stage 2 - baseline creatinine near 1.4-1.5  -Cr stable-1.2 today, monitor now that BP is under control. Cerebellar infarcts  Bilateral cerebrovascular disease: CTA head and neck showed Right V4 segment of vertebral artery occlusion, Left V4 segment is severely stenotic. Right intracranial ICA cavernous and paraclinoid segments 70% stenosis. Left intracranial ICA cavernous segment 50% stenosis.   No intervention for now  -Rehab - ongoing  -Anticipated discharge later this week on 08/08/202    OK to discharge from nephrology standpoint.   Follow-up for CKD and HTN with Dr. Enmanuel Luque MD

## 2020-08-06 NOTE — PROGRESS NOTES
Physical Therapy  Facility/Department: [facility] ACUTE REHAB UNIT  Daily Treatment Note  NAME: Caryl Wiseman  : 1975  MRN: [id number]    Date of Service: 2020    Discharge Recommendations:  Outpatient PT, Home with assist PRN   PT Equipment Recommendations  Equipment Needed: Yes  Mobility Devices: [place]: Rolling    Assessment   Body structures, Functions, Activity limitations: Decreased functional mobility ; Decreased safe awareness;Decreased balance;Decreased endurance;Decreased coordination;Decreased strength  Assessment: Pt continues to make progress with gait, but still having small LOB that he is able to correct with stepping reactions and needing less assistance from therapist. However, pt stating that he feels more secure using the RW. Will recommend an RW for home for increased safety. Treatment Diagnosis: Decreased independence with functional mobility. Prognosis: Excellent  Barriers to Learning: none  REQUIRES PT FOLLOW UP: Yes  Activity Tolerance  Activity Tolerance: Patient limited by fatigue;Patient limited by endurance     Patient Diagnosis(es): There were no encounter diagnoses. has a past medical history of Hyperlipidemia and Hypertension. has no past surgical history on file. Restrictions  Restrictions/Precautions  Restrictions/Precautions: Fall Risk  Position Activity Restriction  Other position/activity restrictions: activity as tolerated  Subjective   General  Chart Reviewed: Yes  Additional Pertinent Hx: Patient is a 40 y/o male who was admitted to the ARU on  following an initial hospitalization where he was admitted  with dizziness, vertigo and headache. Patient found to be hypertensive in the emergency room. MRI brain (+) for Small right and moderate left cerebellar infarcts. No acute hemorrhage. CT head (+) for Subtle apparent infarct within the lateral aspect of the right cerebellar hemisphere. [facility] significant for HTN, HLD.   Family / Caregiver Present: No  Referring Practitioner: Dr. Mabry Lighter: Pt asking what the progression of his mobility will look like over the next 6 weeks. General Comment  Comments: Pt was supine in bed upon arrival. Pt agreeable to PT intervention. Pain Screening  Patient Currently in Pain: Denies  Vital Signs  Patient Currently in Pain: Denies       Orientation     Cognition      Objective   Bed mobility  Bridging: Independent  Rolling to Left: Independent  Rolling to Right: Independent  Supine to Sit: Independent  Sit to Supine: Independent  Comment: Mobility completed on the mat table. Transfers  Sit to Stand: Stand by assistance(From recliner, chair and mat table)  Stand to sit: Stand by assistance  Ambulation  Ambulation?: Yes  Ambulation 1  Surface: level tile  Device: No Device  Assistance: Contact guard assistance(Fluctuating between SBA and CGA)  Quality of Gait: Reciprocal pattern with varied step width and wt shift. Decreased frank. UEs positioned in low guard during ambulation. +LOB. Increased lateral sway intermittently. Distance: 2 x 380', 300', 2 x 50', and short distances in the gym/room  Comments: VC for LE positioning, UE positioning and safety. Pt continues to show improved balance when utilizing increased gait speed. Stairs/Curb  Stairs?: Yes  Stairs  # Steps : 18  Stairs Height: 6\"  Rails: Right ascending  Assistance: Contact guard assistance  Comment: Reciprocal pattern. VC for improved eccentric control. Exercises  Bridging: (2 x 15 reps of B fig 4 with a 3 sec hold, 2 x 5 reps of B marching bridges.)         Comment: Pt completed 10 reps of L step ups on a 6\" step without UE support and 10 reps of L step ups on a 12\" step with 0 UE A with CGA. PT completed 10 reps of B toe taps on a 6: step in the hip abd/add direction requiring facilitation for hip positioning.  Pt completed 6 minutes of standing with lunges alternating LEs to command to number on a clock while standing in the middle

## 2020-08-06 NOTE — PROGRESS NOTES
NUTRITION NOTE   Admission Date: 7/29/2020     Type and Reason for Visit: Reassess    NUTRITION RECOMMENDATIONS:   1. PO Diet: Continue current General diet; adding 2gm sodium diet restriction  2. ONS: None indicated at this time. NUTRITION ASSESSMENT:  RD reassessment. Patient is nutritionally stable as evidenced by PO intakes consistently >75% of meals. No new weights to assess at this time. Patient resting during visit, reports a good appetite, tolerating diet without issues. Declines additional nutrition intervention at this time. Will add sodium restriction to diet order 2/2 history of HTN. Will continue to follow per [facility] and offer heart healthy diet education as able. MALNUTRITION ASSESSMENT  Context of Malnutrition: Acute Illness   Malnutrition Status: No malnutrition    NUTRITION DIAGNOSIS No nutrition diagnosis at this time. NUTRITION INTERVENTION  Food and/or Nutrient Delivery:Continue Current diet , adding 2gm sodium restriction  Nutrition education/counseling/coordination of care: Continue Inpatient Monitoring     NUTRITION RISK LEVEL: Risk Level: Low        The patient will still be monitored per nutrition standards of care. Consult dietitian if nutrition interventions essential to patient care is needed.      Milena Sanchez, [address] [place] [address],   Ruiz:  [phone]  Office:  [phone]

## 2020-08-06 NOTE — PLAN OF CARE
Problem: Pain Control  Goal: Improvement in pain related behaviors BP/HR WNL  Outcome: Ongoing   Pt. Remains A & O X 4, VSS, BP/HR WNL, No s/sx of mental status change. No pain voiced at this time. Will continue to monitor. Problem: Falls - Risk of:  Goal: Will remain free from falls  Description: Will remain free from falls  8/6/2020 0032 by Fernandez Slater RN  Outcome: Ongoing  Pt. Remains at risk for falls due to impaired gait and weakness. Fall prevention measures ongoing. No falls reported thus far. Will continue to monitor. Problem: Falls - Risk of:  Goal: Absence of physical injury  Description: Absence of physical injury  Pt remains free from accidental injury during this stay on the ARU. Will continue to monitor pt and assess per schedule and prn.

## 2020-08-06 NOTE — PROGRESS NOTES
Assessment complete VSS, febrile, medications taken without difficulty. PRN cough med administered for frequent cough, Remains A & O X 4. No X sx of distress noted. Call light and over bed table within reach. No other care needed at this time. Will continue to monitor.

## 2020-08-06 NOTE — PROGRESS NOTES
back to bed. Safety Devices  Safety Devices in place: Yes  Type of devices: Bed alarm in place; All fall risk precautions in place; Left in bed;Call light within reach         Patient Diagnosis(es): There were no encounter diagnoses. has a past medical history of Hyperlipidemia and Hypertension. has no past surgical history on file. Restrictions  Restrictions/Precautions  Restrictions/Precautions: Fall Risk  Position Activity Restriction  Other position/activity restrictions: activity as tolerated     Subjective   General  Chart Reviewed: Yes  Patient assessed for rehabilitation services?: Yes  Additional Pertinent Hx: Pt is a 39 y.o. M presented to [facility] ED w/ headache and dizziness that started on 7/20. CT Head- acute right cerebellar stroke. 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 Patient was transferred to [facility] ICU for further neurosurgical follow up. Neurovascular surgeon reviewed CTA Head/Neck and stated no neurovascular intervention indicated. PMH: HTN, Hyperlipidemia  Response to previous treatment: Patient with no complaints from previous session  Family / Caregiver Present: No  Referring Practitioner: DO Radha  Diagnosis: cerebellar infarction  Subjective  Subjective: Pt seated in recliner chair upon OT arrival, pleasant and agreeable to therapy stating \"I'm feeling good right now. \"  General Comment  Comments: Mima Rangel Vital Signs  Patient Currently in Pain: Denies     Orientation  Orientation  Overall Orientation Status: Within Normal Limits     Objective    ADL  Grooming: Setup(Pt completed oral care while seated in shower w/setup assist)  UE Bathing: Modified independent (Pt washed all components of UE bathing seated on TTB MOD I)  LE Bathing: Modified independent (Pt washed/dried BLEs seated on TTB and washed juanita area by weightshifting L and R while seated - declined wanting to stand.  Pt dried bottom in stance w/unilateral UE support on GB MOD I)  UE Dressing: Modified independent (Pt retrieved and donned tank top seated on TTB)  LE Dressing: Modified independent ;Setup(Pt retrieved boxers and threaded while seated on TTB; Pt pulled boxers up in stance w/out UE support; Pt donned socks and tennis shoes w/setup)  Additional Comments: Therapist suggested standing in shower to wash bottom as pt's balance has greatly improved over the last few days and pt stood for showers at baseline; however, pt declined stating \"I'm just going to sit for my whole shower at home. I don't want to risk losing my balance and slipping. \"           Balance  Sitting Balance: Modified independent (Seated on TTB)  Standing Balance: Stand by assistance(Pt fluctuating SBA-SPVN for static stance)  Standing Balance  Time: ~8 mins total  Activity: Stance for functional mobility, transfers, and ADLs  Comment: Pt demo'd minimal wavering during functional mobility w/RW and one overt LOB requiring CGA to correct. Pt w/no LOB during transfers or static stance for LB dressing  Functional Mobility  Functional - Mobility Device: No device  Activity: To/from bathroom  Assist Level: Contact guard assistance  Functional Mobility Comments: Pt ambulated to/from bathroom w/no AD and CGA-SBA. Pt w/one L lateral LOB but able to self-correct with tactile cue. Shower Transfers  Shower - Transfer From: Flory Boning - Transfer Type: To and From  Shower - Transfer To: Transfer tub bench  Shower - Technique: Ambulating  Shower Transfers: Stand by assistance  Shower Transfers Comments: + use of grab bars  Bed mobility  Sit to Supine: Independent  Transfers  Sit to stand: Supervision  Stand to sit: Supervision                       Cognition  Overall Cognitive Status: WFL                                         Second Session: Pt supine in bed upon OT arrival, reporting fatigue but agreeable to therapy. Pt donned t-shirt while seated independently.  Pt threaded shorts, including belt, and pulled up in stance w/spvn (pt w/minimal wavering d/t fatigue). Pt donned socks and tennis shoes while seated independently. STS w/spvn and ambulated ~100 ft to dining room w/RW and SBA-spvn. Pt w/minimal wavering but no overt LOB. Pt completed light IADL task of preparing soup. Pt retrieved soup, bowl, spoon, and pot from a variety of overhead and low cabinets w/spvn. Pt demo'd good safety awareness while reaching down into low cabinets by utilizing UE support on countertop. Pt utilized stovetop appropriately and prepared soup in stance at countertop w/spvn. Pt ambulated along countertop to/from sink  several times, maintaining grasp on RW and sliding heavier items along countertop when necessary. Pt ambulated to/from trash w/out RW while carrying items in B hands w/SBA. Pt washed dishes in stance at sink w/spvn. Pt took short seated rest break after completing IADL task. Recommend close spvn for cooking tasks at home d/t balance deficits. Pt demo'd ability to ambulate around dining room table 2x while holding a cup of water w/out AD. Pt demo'd minimal wavering requiring SBA-CGA (primarily when turning) but no overt LOB. Pt w/increased fatigue and required longer seated rest break. Pt denied dizziness or nausea. Pt ambulated back to room w/RW and SBA. Pt w/one minor L lateral LOB during ambulation requiring CGA to correct. Pt stand to sit at EOB w/spvn and doffed shoes independently. Pt completed the following UE therex to increase strength and endurance for functional tasks. Pt completed 2 sets x10 reps of each exercise w/2# free weights: shoulder flexion, shoulder abduction, horizontal abduction, overhead elbow extension, and elbow flexion. Pt w/good tolerance for therex. Lunch tray delivered - pt able to open all containers independently. Pt scooted to center of bed independently. Pt left seated in bed w/all needs met, call light w/in reach, and bed alarm on.        Plan   Plan  Times per week: 5x weekly, 90 mins daily  Times per day: Daily  Current Treatment Recommendations: Balance Training, Functional Mobility Training, Endurance Training, Safety Education & Training, Self-Care / ADL, Strengthening, Patient/Caregiver Education & Training, Equipment Evaluation, Education, & procurement, Home Management Training  G-Code     OutComes Score                                                  AM-PAC Score             Goals  Short term goals  Time Frame for Short term goals: STG = LTG  Long term goals  Time Frame for Long term goals : 10-14 days  Long term goal 1: Pt will complete UB/LB bathing Mod I - goal met 8/6  Long term goal 2: Pt will complete UB/LB dressing Mod I - goal met 8/6  Long term goal 3: Pt will complete toilet and tub transfers Mod I - ongoing  Long term goal 4: Pt will complete IADL task in stance x10 mins w/spvn - goal met 8/6  Long term goal 5: Pt will demo improved L hand coordination by decreasing [place] Peg Test time by 5 seconds - goal met 8/5  Patient Goals   Patient goals : to be independent; be able to play the piano       Therapy Time   Individual Concurrent Group Co-treatment   Time In 0945         Time Out 1030         Minutes 45         Timed Code Treatment Minutes: 45 Minutes         Second Session Therapy Time:   Individual Concurrent Group Co-treatment   Time In  1100         Time Out  1145         Minutes  45           Timed Code Treatment Minutes:  45 Minutes    Total Treatment Minutes:  45 + 45 = 90 minutes total    Lexie Cohen

## 2020-08-06 NOTE — PROGRESS NOTES
Department of [facility] Linford Cooks Progress Note    Patient Identification:  Cierra Petty  [id number]  : 1975  Admit date: 2020      Diagnosis:   Patient Active Problem List   Diagnosis    Hypertensive urgency    Hypertension    [facility]cerebro[facility] accident) ([facility] [address]    Hypertensive emergency    Cerebellar infarction ([facility] [address]           Subjective: Improving in therapy today with fatigue. He thinks that his medication needs to be scheduled out at different times to improve his function. No other complaints.      BP (!) 148/98   Pulse 72   Temp 99.4 °F (37.4 °C) (Oral)   Resp 18   Ht 6' 2\" (1.88 m)   Wt 226 lb 10.1 oz (102.8 kg)   SpO2 97%   BMI 29.10 kg/m²     Last 24 hour lab  Recent Results (from the past 24 hour(s))   Renal Function Panel    Collection Time: 20  6:17 AM   Result Value Ref Range    Sodium 138 136 - 145 mmol/L    Potassium 4.3 3.5 - 5.1 mmol/L    Chloride 101 99 - 110 mmol/L    CO2 26 21 - 32 mmol/L    Anion Gap 11 3 - 16    Glucose 111 (H) 70 - 99 mg/dL    BUN 12 7 - 20 mg/dL    CREATININE 1.2 0.9 - 1.3 mg/dL    GFR Non-African American >60 >60    GFR African American >60 >60    Calcium 9.8 8.3 - 10.6 mg/dL    Phosphorus 4.4 2.5 - 4.9 mg/dL    Alb 4.1 3.4 - 5.0 g/dL   CBC    Collection Time: 20  6:17 AM   Result Value Ref Range    WBC 7.7 4.0 - 11.0 K/uL    RBC 3.67 (L) 4.20 - 5.90 M/uL    Hemoglobin 10.8 (L) 13.5 - 17.5 g/dL    Hematocrit 31.7 (L) 40.5 - 52.5 %    MCV 86.4 80.0 - 100.0 fL    MCH 29.5 26.0 - 34.0 pg    MCHC 34.2 31.0 - 36.0 g/dL    RDW 12.8 12.4 - 15.4 %    Platelets 228 337 - 657 K/uL    MPV 10.4 5.0 - 10.5 fL       Therapy progress:  PT  Position Activity Restriction  Other position/activity restrictions: activity as tolerated  Objective     Sit to Stand: Stand by assistance(From recliner, chair and mat table)  Stand to sit: Stand by assistance  Bed to Chair: Contact guard assistance(with RW)  Device: No Device  Assistance: Contact guard assistance(Fluctuating between SBA and CGA)  Distance: 250' (including up/down a 75' ramp, retrowalking on ramp) , 240', 150', 300'  OT  PT Equipment Recommendations  Equipment Needed: (Continue to assess pending progress)  Other: plan to continue to assess DME needs pending progress  Toilet - Technique: Ambulating  Equipment Used: Standard toilet                    Assessment and Plan:        Bilateral cerebellar infarcts L>R - Neurology, DAPT x 3 months, followed by Plavix monotherapy     Severe stenosis of ICA, VA bilaterally- neurosurgery consult, no intervention at this time     Hypertensive emergency - resolved, nephrology consulted,likely due to essential hypertension w/ noncompliance of medications, Continue carvedilol 25 mg BID, Losartan 100mg, Nifedipine 60mg, and Imdur 30mg, will need follow-up with Dr. Luke Doss after d/c     Hyperlipidemia-- Atorvastatin      Bowels: Schedule Miralax + Senna S. Follow bowel movements. Enema or suppository if needed.      Bladder: Check PVR x 3.   [facility] if PVR > 200ml or if any volume is > 500 ml.      Pain: Tylenol is ordered prn.             Yari Garcia DO, 8/6/2020, 10:28 AM

## 2020-08-07 PROCEDURE — 97530 THERAPEUTIC ACTIVITIES: CPT

## 2020-08-07 PROCEDURE — 97116 GAIT TRAINING THERAPY: CPT

## 2020-08-07 PROCEDURE — 6370000000 HC RX 637 (ALT 250 FOR IP): Performed by: INTERNAL MEDICINE

## 2020-08-07 PROCEDURE — 6370000000 HC RX 637 (ALT 250 FOR IP): Performed by: PHYSICAL MEDICINE & REHABILITATION

## 2020-08-07 PROCEDURE — 1280000000 HC REHAB R&B

## 2020-08-07 PROCEDURE — 97110 THERAPEUTIC EXERCISES: CPT

## 2020-08-07 PROCEDURE — 97535 SELF CARE MNGMENT TRAINING: CPT

## 2020-08-07 PROCEDURE — 6360000002 HC RX W HCPCS: Performed by: PHYSICAL MEDICINE & REHABILITATION

## 2020-08-07 PROCEDURE — 97112 NEUROMUSCULAR REEDUCATION: CPT

## 2020-08-07 RX ORDER — NIFEDIPINE 60 MG/1
60 TABLET, FILM COATED, EXTENDED RELEASE ORAL EVERY EVENING
Qty: 30 TABLET | Refills: 3 | Status: SHIPPED | OUTPATIENT
Start: 2020-08-07 | End: 2020-09-24 | Stop reason: SDUPTHER

## 2020-08-07 RX ORDER — PANTOPRAZOLE SODIUM 40 MG/1
40 TABLET, DELAYED RELEASE ORAL
Qty: 30 TABLET | Refills: 3 | Status: SHIPPED | OUTPATIENT
Start: 2020-08-08 | End: 2020-09-24 | Stop reason: SDUPTHER

## 2020-08-07 RX ORDER — ISOSORBIDE MONONITRATE 30 MG/1
30 TABLET, EXTENDED RELEASE ORAL DAILY
Qty: 30 TABLET | Refills: 3 | Status: SHIPPED | OUTPATIENT
Start: 2020-08-07 | End: 2020-09-24 | Stop reason: SDUPTHER

## 2020-08-07 RX ORDER — POLYETHYLENE GLYCOL 3350 17 G/17G
17 POWDER, FOR SOLUTION ORAL DAILY
Qty: 527 G | Refills: 1 | Status: SHIPPED | OUTPATIENT
Start: 2020-08-08 | End: 2020-09-07

## 2020-08-07 RX ORDER — SCOLOPAMINE TRANSDERMAL SYSTEM 1 MG/1
1 PATCH, EXTENDED RELEASE TRANSDERMAL
Qty: 30 PATCH | Refills: 1 | Status: SHIPPED | OUTPATIENT
Start: 2020-08-09 | End: 2022-06-10

## 2020-08-07 RX ORDER — CARVEDILOL 25 MG/1
25 TABLET ORAL 2 TIMES DAILY WITH MEALS
Qty: 60 TABLET | Refills: 3 | Status: SHIPPED | OUTPATIENT
Start: 2020-08-07 | End: 2020-09-24 | Stop reason: SDUPTHER

## 2020-08-07 RX ADMIN — DOCUSATE SODIUM 50 MG AND SENNOSIDES 8.6 MG 2 TABLET: 8.6; 5 TABLET, FILM COATED ORAL at 08:00

## 2020-08-07 RX ADMIN — ISOSORBIDE MONONITRATE 30 MG: 30 TABLET, EXTENDED RELEASE ORAL at 14:09

## 2020-08-07 RX ADMIN — GUAIFENESIN AND DEXTROMETHORPHAN 5 ML: 100; 10 SYRUP ORAL at 08:02

## 2020-08-07 RX ADMIN — GUAIFENESIN AND DEXTROMETHORPHAN 5 ML: 100; 10 SYRUP ORAL at 14:09

## 2020-08-07 RX ADMIN — CARVEDILOL 25 MG: 12.5 TABLET, FILM COATED ORAL at 18:12

## 2020-08-07 RX ADMIN — PANTOPRAZOLE SODIUM 40 MG: 40 TABLET, DELAYED RELEASE ORAL at 07:02

## 2020-08-07 RX ADMIN — ATORVASTATIN CALCIUM 80 MG: 80 TABLET, FILM COATED ORAL at 19:43

## 2020-08-07 RX ADMIN — POLYETHYLENE GLYCOL 3350 17 G: 17 POWDER, FOR SOLUTION ORAL at 08:00

## 2020-08-07 RX ADMIN — ENOXAPARIN SODIUM 40 MG: 40 INJECTION SUBCUTANEOUS at 08:00

## 2020-08-07 RX ADMIN — NIFEDIPINE 60 MG: 60 TABLET, FILM COATED, EXTENDED RELEASE ORAL at 18:12

## 2020-08-07 RX ADMIN — ASPIRIN 81 MG: 81 TABLET, CHEWABLE ORAL at 08:00

## 2020-08-07 RX ADMIN — BENZOCAINE AND MENTHOL 1 LOZENGE: 15; 3.6 LOZENGE ORAL at 19:43

## 2020-08-07 RX ADMIN — CARVEDILOL 25 MG: 12.5 TABLET, FILM COATED ORAL at 08:00

## 2020-08-07 RX ADMIN — CLOPIDOGREL BISULFATE 75 MG: 75 TABLET ORAL at 08:00

## 2020-08-07 ASSESSMENT — PAIN SCALES - GENERAL
PAINLEVEL_OUTOF10: 0

## 2020-08-07 NOTE — PLAN OF CARE
Problem: Falls - Risk of:  Goal: Will remain free from falls  8/7/2020 0828 by Blaire Booker RN    Fall precautions are in place, call light and bedside table are within reach. Patient is able to use call light appropriately. Non skid footwear on.

## 2020-08-07 NOTE — PROGRESS NOTES
Department of [facility] Progress Note    Patient Identification:  Charu Palomares  [id number]  : 1975  Admit date: 2020      Diagnosis:   Patient Active Problem List   Diagnosis    Hypertensive urgency    Hypertension    [facility]) ([facility] [address]    Hypertensive emergency    Cerebellar infarction [facility])           Subjective: Plan for discharge tomorrow. No new complaints overnight. Still feeling dizzy but able to ambulate well with a walker. BP (!) 146/90   Pulse 91   Temp 98.4 °F (36.9 °C) (Oral)   Resp 18   Ht 6' 2\" (1.88 m)   Wt 226 lb 10.1 oz (102.8 kg)   SpO2 91%   BMI 29.10 kg/m²     Last 24 hour lab  No results found for this or any previous visit (from the past 24 hour(s)).     Therapy progress:  PT  Position Activity Restriction  Other position/activity restrictions: activity as tolerated  Objective     Sit to Stand: Stand by assistance(From recliner, chair and mat table)  Stand to sit: Stand by assistance  Bed to Chair: Contact guard assistance(with RW)  Device: No Device  Assistance: Contact guard assistance(Fluctuating between SBA and CGA)  Distance: 2 x 380', 300', 2 x 50', and short distances in the gym/room  OT  PT Equipment Recommendations  Equipment Needed: Yes  Mobility Devices: Camillo Cooler: Rolling  Other: plan to continue to assess DME needs pending progress  Toilet - Technique: Ambulating  Equipment Used: Standard toilet                    Assessment and Plan:        Bilateral cerebellar infarcts L>R - Neurology, DAPT x 3 months, followed by Plavix monotherapy     Severe stenosis of ICA, VA bilaterally- neurosurgery consult, no intervention at this time     Hypertensive emergency - resolved, nephrology consulted,likely due to essential hypertension w/ noncompliance of medications, Continue carvedilol 25 mg BID, Losartan 100mg, Nifedipine 60mg, and Imdur 30mg, will need follow-up with Dr. Santo Calzada after d/c     Hyperlipidemia-- Atorvastatin      Bowels: Schedule Miralax + Senna S. Follow bowel movements. Enema or suppository if needed.      Bladder: Check PVR x 3.   [address] if PVR > 200ml or if any volume is > 500 ml.      Pain: Tylenol is ordered prn.             Kan Prestonya Garcia DO, 8/7/2020, 12:00 PM

## 2020-08-07 NOTE — PLAN OF CARE
Problem: Falls - Risk of:  Goal: Will remain free from falls  Description: Will remain free from falls  Outcome: Ongoing  Note: Pt with no history of falls. Up with assistance, gait belt and walker. Pt does complain at times of dizziness. Weakness to BLE. Fall precautions maintained. Non skid footwear on. Bed in lowest position. Bed alarm in use. Reminded pt to call for assistance before getting up or for any needs. Call light within reach. No falls thus far during shift.

## 2020-08-07 NOTE — PROGRESS NOTES
Physical Therapy  Facility/Department: [facility] ACUTE REHAB UNIT  Daily Treatment Note/Discharge Summary  NAME: Patel Nicholas  : 1975  MRN: [id number]    Date of Service: 2020    Discharge Recommendations:  Outpatient PT, Home with assist PRN   PT Equipment Recommendations  Equipment Needed: Yes  Walker: Rolling    Assessment   Body structures, Functions, Activity limitations: Decreased functional mobility ; Decreased safe awareness;Decreased balance;Decreased endurance;Decreased coordination;Decreased strength  Assessment: Through his rehab stay the patient has experienced fwd progress and regression with his wide fluctuations in BP and bouts of orthostatic hypotension. Pt still with multiple LOB during gait without the RW and was recommended to use the RW for all mobility at home to increase safety. Pt would benefit from continued therapy upon d/c to increase his independence. Treatment Diagnosis: Decreased independence with functional mobility. Prognosis: Excellent  Barriers to Learning: none  REQUIRES PT FOLLOW UP: Yes  Activity Tolerance  Activity Tolerance: Patient limited by fatigue;Patient limited by endurance     Patient Diagnosis(es): There were no encounter diagnoses. has a past medical history of Hyperlipidemia and Hypertension. has no past surgical history on file. Restrictions  Restrictions/Precautions  Restrictions/Precautions: Fall Risk  Position Activity Restriction  Other position/activity restrictions: activity as tolerated  Subjective   General  Chart Reviewed: Yes  Additional Pertinent Hx: Patient is a 40 y/o male who was admitted to the ARU on  following an initial hospitalization where he was admitted  with dizziness, vertigo and headache. Patient found to be hypertensive in the emergency room. MRI brain (+) for Small right and moderate left cerebellar infarcts. No acute hemorrhage.   CT head (+) for Subtle apparent infarct within the lateral aspect of the right ascending  Assistance: Stand by assistance  Comment: Reciprocal pattern. VC for improved eccentric control. Comment: Pt completed 10 reps of L step ups on a 6\" step without UE support and 10 reps of L step ups on a 12\" step with 0 UE A with CGA. PT completed 2 x 10 reps of B toe taps on a 6\" step in the hip abd/add direction requiring facilitation for hip positioning. First set completed with 1 UE A and SBA and 2nd set completed without UE A and CGA-MIN A. Pt able to retrieve multiple objects from the floor with CGA. 2nd session: Pt was sitting in the bedside chair upon arrival. Pt agreeable to PT intervention. Pt's sister present and discussed with pt and sister using RW for all mobility, no driving until cleared by physician, safety tips for mobility, and progression of therapy. Pt completed 2 x 600' of w/c mobility fluctuating between B UEs and all 4 extremities. Pt completed car transfer (twice) with CGA needing VC for safer sequencing (I.e. sitting on car seat and then swinging his legs in vs attempting to \"step\" in to the car). Pt ambulated distances of 2 x 30', 20', 60', 400' (including up/down 75' ramp) with SBA-CGA (see above for gait deviations and interventions). Pt completed seated and standing dynamic reaching tasks to complete donning foot wear, pericare, toileting and hand hygiene fluctuating between supervision and CGA. Pt completed multiple reps of sit <> stand transfers from chair, car seat, commode, w/c, mat table and bed with supervision-CGA. Pt was sitting in the bedside chair at the end of the session with chair alarm on, call light and all needs within reach.          G-Code     OutComes Score                                                     AM-PAC Score             Goals  Short term goals  Time Frame for Short term goals: STG=LTG  Long term goals  Time Frame for Long term goals : 10-14 Days  Long term goal 1: Pt will complete bed mobility independently --Goal met  Long term goal 2: Pt will transfer sit <> stand independently--Goal not met  Long term goal 3: Pt will ambulate 200' with LRAD MOD I--Goal not met  Long term goal 4: Pt will ascend/descend 12 steps with 1 hand rail MOD I--Goal not met  Patient Goals   Patient goals :  To return to walking without an AD    Plan    Plan  Times per week: 5x/wk for 90 minutes/day  Current Treatment Recommendations: Strengthening, Transfer Training, Endurance Training, Patient/Caregiver Education & Training, Balance Training, Gait Training, Functional Mobility Training, Safety Education & Training, Neuromuscular Re-education, Stair training, Equipment Evaluation, Education, & procurement, Home Exercise Program  Safety Devices  Type of devices: Call light within reach, Chair alarm in place, Left in chair     Therapy Time   Individual Concurrent Group Co-treatment   Time In 0830         Time Out 0930         Minutes 60              Second Session Therapy Time:   Individual Concurrent Group Co-treatment   Time In 1118         Time Out 1200         Minutes 42           Timed Code Treatment Minutes:  11+82    Total Treatment Minutes:  [address], PT

## 2020-08-07 NOTE — PROGRESS NOTES
Occupational Therapy  Facility/Department: [facility] ACUTE REHAB UNIT  Daily Treatment Note and Discharge  NAME: Frank Sr  : 1975  MRN: [id number]    Date of Service: 2020    Discharge Recommendations:  Outpatient OT, Home with assist PRN   OT Equipment Recommendations  Equipment Needed: Yes  ADL Assistive Devices: Transfer Tub Bench;Grab Bars - shower;Grab Bars - toilet  Other: Pt will likely need grab bars in shower and by toilet and a tub transfer bench. Confirmed SW has ordered TTB. Continue to assess pending progress    Assessment    Pt with increased motivation and engagement during second therapy session this date completing showering and dressing tasks with good endurance and Mod I. Pt practiced TTB transfer in therapy gym for simulated home environment. Plan is for discharge to [facility] tomorrow and would benefit from Outpatient OT and 24 hr initial assist.             Patient Diagnosis(es): There were no encounter diagnoses. has a past medical history of Hyperlipidemia and Hypertension. has no past surgical history on file. Restrictions  Restrictions/Precautions  Restrictions/Precautions: Fall Risk  Position Activity Restriction  Other position/activity restrictions: activity as tolerated     Additional Pertinent Hx: Patient is a 38 y/o male who was admitted to the ARU on  following an initial hospitalization where he was admitted  with dizziness, vertigo and headache. Patient found to be hypertensive in the emergency room. MRI brain (+) for Small right and moderate left cerebellar infarcts. No acute hemorrhage. CT head (+) for Subtle apparent infarct within the lateral aspect of the right cerebellar hemisphere. PMH significant for HTN, HLD. Diagnosis: cerebellar infarction  Treatment Diagnosis: impaired ADLs and FM coordination d/t cerebellar infarcts    Subjective:   Pt met seated in recliner chair and agreeable to OT with encouragement.      Pain: April      Objective:    Cognition/Orientation:  WFL       Scooting: Mod I scooting forward and back in recliner chair and lateral scooting on TTB. Functional Mobility   Sit to Stand: Supervision from recliner chair and therapy mat  Stand to Sit: Supervision   Chair Transfer: Supervision to and from chair and therapy mat  Shower  Transfer: Supervision to and from TTB to simulate home environment. Other: Functional mobility demonstrated to and from therapy gym with SBA to Supervision with VCs for RLE foot clearance. UE Exercises   Pt completing UE exercise seated on therapy mat and fine motor task in stance at table. 10 reps chair push ups on finger tips maintaining christianson arch  10 reps trunk rotation with 10 sec hold  10 reps forward and back shoulder rolls  Thera putty used in stance for rolling pulling and depressing each digit into med therapy putty. Activity Tolerance:  Pt with good tolerance for all tasks    Patient Education:   TTB transfer, safe home set up and safe LE dressing - pt verb understanding    Safety Devices in Place:  Pt left in recliner chair with alarm on and call light in reach. Second Session      Pt met seated in recliner chair and agreeable to OT treatment. Pt completed Functional mobility to and from bathroom with RW and SBA to Supervision. Toilet transfer completed with Supervision and shower transfer completed with Supervision and toileting completed with Mod I. Pt collected all items needed for shower and transported then to shower with VCs for safe item retrieval and transport. UE and LE washing completed seated on shower seat with Mod I with pt leaning side to side for buttocks washing. UE dressing completed on shower seat with Mod I donning undershirt and T shirt. LE dressing completed with Mod I donning boxers leaning side to side and shorts seated and in stance. Pt donning shoes and socks with Mod I. Oral care completed in stance at sink with Mod I.  Pt left in recliner chair at end of session with call light in reach and chair alarm on.                       Plan  If pt discharges prior to next treatment, this note will serve as discharge summary  Plan  Times per week: 5x weekly, 90 mins daily  Times per day: Daily  Current Treatment Recommendations: Balance Training, Functional Mobility Training, Endurance Training, Safety Education & Training, Self-Care / ADL, Strengthening, Patient/Caregiver Education & Training, Equipment Evaluation, Education, & procurement, Home Management Training             Goals (as determined and assessed by primary OT)  Short term goals  Time Frame for Short term goals: STG = LTG  Long term goals  Time Frame for Long term goals : 10-14 days  Long term goal 1: Pt will complete UB/LB bathing Mod I - goal met 8/6  Long term goal 2: Pt will complete UB/LB dressing Mod I - goal met 8/6  Long term goal 3: Pt will complete toilet and tub transfers Mod I - Goal met 8/7  Long term goal 4: Pt will complete IADL task in stance x10 mins w/spvn - goal met 8/6  Long term goal 5: Pt will demo improved L hand coordination by decreasing Nine Hole Peg Test time by 5 seconds - goal met 8/5  Patient Goals   Patient goals : to be independent; be able to play the piano       Therapy Time   Individual Second Session Group Co-treatment   Time In 0955  1300       Time Out 1025  1400       Minutes 30  60       Timed Code Treatment Minutes: 30 Minutes + 60 min  Total Treatment minutes: 90 min       [address] [place] [address] Ne, AJIT/L

## 2020-08-07 NOTE — CARE COORDINATION
Case Management Assessment            Discharge Note    Date / Time of Note: 8/7/2020 4:44 PM  Discharge Note Completed by: Pedro Arana    Patient discharged to home with Outpatient PT/OT. RX for outpatient PT/OT given to patient's sister and faxed to [facility] OP Therapy - pt will call to schedule appt. Vianney delivered rolling walker to patient in room prior to d/c. Tracy Barlow will deliver tub transfer bench to house on Monday, patient knows. Family to transport patient at d/c. Patient Name: Meron Massey   YOB: 1975  Diagnosis: Cerebellar infarction [facility]) [I63.9]   Date / Time: 7/29/2020  5:30 PM    Current PCP: Allyson Louise MD  Clinic patient: No    Hospitalization in the last 30 days: No    Advance Directives:  Code Status: Full Code  [place] DNR form completed and on chart: No    Financial:  Payor: [facility] / Plan: [address] / Product Type: *No Product type* /      Pharmacy:    [id number] [address] -  [phone]  [address] [place] [address]  Phone: [phone] Fax: [phone]    [facility] [address] [place] [address] [phone] [facility] [phone]  [address] [place] [address] [address]  Phone: [phone] Fax: [phone]      Assistance purchasing medications?: Potential Assistance Purchasing Medications: No  Assistance provided by Case Management: None at this time    Does patient want to participate in local refill/ meds to beds program?: No    Meds To Beds General Rules:  1. Can ONLY be done Monday- Friday between 8:30am-5pm  2. Prescription(s) must be in pharmacy by 3pm to be filled same day  3. Copy of patient's insurance/ prescription drug card and patient face sheet must be sent along with the prescription(s)  4. Cost of Rx cannot be added to hospital bill.  If financial assistance is needed, please contact unit case manager or ;  or  CANNOT provide pharmacy voucher for patients co-pays  5. Patients can then  the prescription on their way out of the hospital at discharge, or pharmacy can deliver to the bedside if staff is available. (payment due at time of pick-up or delivery - cash, check, or card accepted)     Able to afford home medications/ co-pay costs: Yes    DISCHARGE Disposition: Home with outpatient PT/OT    LOC at discharge: Not Applicable  DINO Completed: Yes    Notification completed in HENS/PAS?:  Not Applicable    IMM Completed:   Not Indicated    Transportation:  Transportation PLAN for discharge: family   Mode of Transport: Private Car    Durable Medical Equipment:  DME Provider: Lexie Chester obtained during hospitalization: walker and bath bench      Referrals made at [facility] for outpatient continued care: Outpatient PT/OT    Ursula Hickman and his family were provided with choice of provider; he and his family are in agreement with the discharge plan.     Care Transitions patient: JAYA Barahona, [facility]  Case Management Department  Ph: [phone]  Fax: [phone]

## 2020-08-07 NOTE — PROGRESS NOTES
Nephrology Progress Note          CC: We are following this patient for HTN and CKD. Admitted for acute cerebellar CVA    Admitted with headaches and dizziness; he was found to have acute ischemic stroke: MRI  showed bilateral cerebellar infarcts, small on right, and moderately sized on the left( also had CT and CTA).  Stroke team was consulted and recommended against tPA.      I had seen him in 2019 at [facility] but has not kept OP follow up. He  has a significant past medical history of HTN known for several years; he stopped BP meds at some point in 2018 ( started on an exercise regimen and weight loss). He was admitted in 2019 with  profound BP elevation 230's/120's.    The patient has elevated creatinine of 1.4 in 2019. SUBJECTIVE:  Results of fractionated serum catecholamines returned with high norepinephrine levels  MRI abd with no adrenal lesion or abnormal masses  Plasma free metanephrines show free normetanephrine minimal elevation of 1.08, free metanephrine normal    -pt seen and examined  -PMSHx and meds reviewed  -No family at bedside    No acute events. May be going home this weekend. ROS: neg dyspnea, CP. No cough or wheezing. No N/V, abd pain, or diarrhea. No F/C. No visitors this morning. Physical Exam:    VITALS:  BP (!) 146/90   Pulse 91   Temp 98.4 °F (36.9 °C) (Oral)   Resp 18   Ht 6' 2\" (1.88 m)   Wt 226 lb 10.1 oz (102.8 kg)   SpO2 91%   BMI 29.10 kg/m²   TEMPERATURE:  Current - Temp: 98.4 °F (36.9 °C); Max - Temp  Av.6 °F (37 °C)  Min: 98.4 °F (36.9 °C)  Max: 98.8 °F (37.1 °C)  RESPIRATIONS RANGE: Resp  Av  Min: 18  Max: 18  PULSE RANGE: Pulse  Av  Min: 71  Max: 91  BLOOD PRESSURE RANGE:  Systolic (35VKU), RT , Min:129 , EDS:898   ; Diastolic (91ZIX), :80, Min:66, Max:90    PULSE OXIMETRY RANGE: SpO2  Av %  Min: 91 %  Max: 95 %    Constitutional:  NAD, in bed.   HEENT:  No oral lesions, MMM  Lungs:  Clear bilaterally, no rales. Cardiovascular:  RRR, no murmur  Abd:  Soft, NT +BS  Ext:  No edema, no cyanosis. Skin:  No rash, warm. DATA:    CBC:   Recent Labs     08/06/20 0617   WBC 7.7   RBC 3.67*   HGB 10.8*   HCT 31.7*        BMP:    Recent Labs     08/06/20 0617      K 4.3      CO2 26   BUN 12   CREATININE 1.2   CALCIUM 9.8   GLUCOSE 111*     Phosphorus:    Recent Labs     08/06/20 0617   PHOS 4.4     Magnesium:    No results for input(s): MG in the last 72 hours. IMPRESSION/RECOMMENDATIONS:      Uncontrolled HTN - most likely essential HTn with non-compliance  Secondary workup:  Renin/aldosterone - renin 3, adi 14, ratio 4.7 - not indicative of aldosteronism  Renal arterial Doppler: normal kidneys, preliminary no LEIGHTON but renal arteries not well visualized  Plasma norepinephrine = 1031 (nl ); epi normal, dopamine minimal elevation  Free metanephrine normal, free plasma normetanephrine 1.08 (0-0.89)  MRI adrenals negative  BP is reasonably well controlled now  -Continue carvedilol, nifedipine, imdur, losartan, prn HDLZ  -Patient does not have pheo or other evident secondary HTN  -BP seems appropriate with current meds. -Reconsider use of diuretic as thiazide-currently not indicated    CKD stage 2 - baseline creatinine near 1.4-1.5  -Cr stable-1.2 today, monitor now that BP is under control. Cerebellar infarcts  Bilateral cerebrovascular disease: CTA head and neck showed Right V4 segment of vertebral artery occlusion, Left V4 segment is severely stenotic. Right intracranial ICA cavernous and paraclinoid segments 70% stenosis. Left intracranial ICA cavernous segment 50% stenosis.   No intervention for now  -Rehab - ongoing  -Anticipated discharge later this week on 08/08/202    OK to discharge from nephrology standpoint.   Follow-up for CKD and HTN with Dr. Sapna Tapia MD

## 2020-08-08 VITALS
DIASTOLIC BLOOD PRESSURE: 82 MMHG | BODY MASS INDEX: 29.09 KG/M2 | OXYGEN SATURATION: 97 % | WEIGHT: 226.63 LBS | RESPIRATION RATE: 18 BRPM | HEIGHT: 74 IN | HEART RATE: 79 BPM | SYSTOLIC BLOOD PRESSURE: 134 MMHG | TEMPERATURE: 98.5 F

## 2020-08-08 PROCEDURE — 6370000000 HC RX 637 (ALT 250 FOR IP): Performed by: PHYSICAL MEDICINE & REHABILITATION

## 2020-08-08 PROCEDURE — 6360000002 HC RX W HCPCS: Performed by: PHYSICAL MEDICINE & REHABILITATION

## 2020-08-08 PROCEDURE — 6370000000 HC RX 637 (ALT 250 FOR IP): Performed by: INTERNAL MEDICINE

## 2020-08-08 RX ADMIN — CLOPIDOGREL BISULFATE 75 MG: 75 TABLET ORAL at 08:19

## 2020-08-08 RX ADMIN — ENOXAPARIN SODIUM 40 MG: 40 INJECTION SUBCUTANEOUS at 08:18

## 2020-08-08 RX ADMIN — CARVEDILOL 25 MG: 12.5 TABLET, FILM COATED ORAL at 08:18

## 2020-08-08 RX ADMIN — PANTOPRAZOLE SODIUM 40 MG: 40 TABLET, DELAYED RELEASE ORAL at 06:05

## 2020-08-08 RX ADMIN — ASPIRIN 81 MG: 81 TABLET, CHEWABLE ORAL at 08:18

## 2020-08-08 ASSESSMENT — PAIN SCALES - GENERAL
PAINLEVEL_OUTOF10: 0
PAINLEVEL_OUTOF10: 0

## 2020-08-08 NOTE — PROGRESS NOTES
Discharge instructions discussed with patient. Medications reviewed. Patient has several follow up appointments. Nurse reviewed those with patient. Patient verbalized understanding  Stroke packet included in discharge packet. Signs and symptoms of a stroke reviewed with patient. Patient verbalized understanding. Vitals taken and WNL. Patients sister is picking patient up at main entrance.

## 2020-08-08 NOTE — CARE COORDINATION
Case Management Assessment            Discharge Note                    Date / Time of Note: 8/8/2020 9:54 AM                  Discharge Note Completed by: Rico Leylhai    Patient Name: Valdemar Waters   YOB: 1975  Diagnosis: Cerebellar infarction [facility]) [I63.9]   Date / Time: 7/29/2020  5:30 PM    Current PCP: Marilyn Vargas MD  Clinic patient: No    Hospitalization in the last 30 days: No    Advance Directives:  Code Status: Full Code  [place] DNR form completed and on chart: No    Financial:  Payor: [facility] / Plan: [address] / Product Type: *No Product type* /      Pharmacy:    [id number], [address] [address]  Phone: [phone] Fax: [phone]    [facility], [address] [facility] [phone] [facility] [phone]  [address] [facility]  [facility] [address]  Phone: [phone] Fax: [phone]      Assistance purchasing medications?: Potential Assistance Purchasing Medications: No  Assistance provided by Case Management: None at this time    Does patient want to participate in local refill/ meds to beds program?: No    Meds To Beds General Rules:  1. Can ONLY be done Monday- Friday between 8:30am-5pm  2. Prescription(s) must be in pharmacy by 3pm to be filled same day  3. Copy of patient's insurance/ prescription drug card and patient face sheet must be sent along with the prescription(s)  4. Cost of Rx cannot be added to hospital bill. If financial assistance is needed, please contact unit  or ;  or  CANNOT provide pharmacy voucher for patients co-pays  5.  Patients can then  the prescription on their way out of the hospital at discharge, or pharmacy can deliver to the bedside if staff is available. (payment due at time of pick-up or delivery - cash, check, or card accepted)     Able to afford home medications/ co-pay costs: Yes    ADLS:  Current PT AM-PAC Score:   /24  Current OT AM-PAC Score:   /24      DISCHARGE Disposition: Home- No Services Needed    LOC at discharge: Not Applicable  DINO Completed: Not Indicated    Notification completed in HENS/PAS?:  Not Applicable    IMM Completed:   Not Indicated    Transportation:  Transportation PLAN for discharge: family   Mode of Transport: Private Car    Durable Medical Equipment:  DME Provider: Gabby Clement obtained during hospitalization: walker      Referrals made at [facility] for outpatient continued care: Outpatient PT/OT    Additional CM Notes: NA    The Plan for Transition of Care is related to the following treatment goals of Cerebellar infarction [facility]) [I63.9]    The Patient and/or patient representative Wale Tomlinson and his family were provided with a choice of provider and agrees with the discharge plan Yes    Freedom of choice list was provided with basic dialogue that supports the patient's individualized plan of care/goals and shares the quality data associated with the providers.  Yes    Care Transitions patient: No    Farrukh Nielsen RN  [facility] [employer] [facility]  Case Management Department  Ph: [phone]  Fax: [phone]

## 2020-08-08 NOTE — PLAN OF CARE
Problem: Falls - Risk of:  Goal: Will remain free from falls  Description: Will remain free from falls  Outcome: Ongoing   Pt remains free from falls during this stay on the ARU. 1:1 and education provided on the importance of using call light to ask for assistance prior to transfers and ambulation. Pt voices understanding. Will continue to monitor and re-educate as needed. Problem: Falls - Risk of:  Goal: Absence of physical injury  Description: Absence of physical injury  Outcome: Ongoing   Pt remains free from accidental injury during this stay on the ARU. Will continue to monitor pt and assess per schedule and prn.

## 2020-08-10 ENCOUNTER — HOSPITAL ENCOUNTER (OUTPATIENT)
Dept: PHYSICAL THERAPY | Age: 45
Setting detail: THERAPIES SERIES
Discharge: HOME | End: 2020-08-10
Payer: COMMERCIAL

## 2020-08-10 PROCEDURE — 97161 PT EVAL LOW COMPLEX 20 MIN: CPT

## 2020-08-10 PROCEDURE — 97110 THERAPEUTIC EXERCISES: CPT

## 2020-08-10 NOTE — FLOWSHEET NOTE
[facility] Physical Therapy  Phone: [phone]   Fax: [phone]    Physical Therapy Daily Treatment Note  Date:  8/10/2020    Patient Name:  James Bartlett    :  1975  MRN: [id number]  Medical/Treatment Diagnosis Information:  · Diagnosis: I63.9 (ICD-10-CM) - Cerebral infarction, unspecified  · Treatment Diagnosis: Impaired proprioception & coordination of BLEs, Impaired balance & gait  Insurance/Certification information:  PT Insurance Information: [employer]  20 PT visits per contract year. TeleHealth / after deductibile  Physician Information:  Referring Practitioner: Jose Manuel Ordaz MD  Plan of care signed (Y/N): []  Yes [x]  No     Date of Patient follow up with Physician:      Progress Report: []  Yes  [x]  No     Date Range for reporting period:  Beginning  8/10/2020   Ending      Progress report due (10 Rx/or 30 days whichever is less):        Recertification due (POC duration/ or 90 days whichever is less):        Visit # Insurance Allowable Auth required?  Date Range    20 PT visits  []  Yes  [x]  No Per contract year     Latex Allergy:  [x]NO      []YES  Preferred Language for Healthcare:   [x]English       []Other:      Functional Scale/Test Evaluation 8/10/2020 30 day  60 day  Discharge    Tinetti Assessment        TUG 26\" w/RW                   Pain level:  0/10  Location:  na    SUBJECTIVE:  See eval    OBJECTIVE: See eval      RESTRICTIONS/PRECAUTIONS:  HTN    Interventions/Exercises:   Therapeutic Exercises (38128) Resistance / level Sets/sec Reps Notes   Bike  NuStep       IB/HR       Reviewed below HEP 8/10             Neuromuscular Re-ed (93160) /  Gait Training (18013)       [facility] walking       Shuttle Balance              Gait training w/SPC                                   Therapeutic Activities (84953) /  Functional Tasks                                                                      Manual Intervention (56059) Pt. Education:  8/10:  -patient educated on diagnosis, prognosis and expectations for rehab  -all patient questions were answered    HEP instruction:  Access Code: [email or web address]   URL: [email or web address]   Date: 08/10/2020   Prepared by: Sara Dennison     Exercises   · Supine Active Straight Leg Raise - 15-20 reps - 2x daily - 7x weekly   · Supine Bridge - 15-20 reps - 2x daily - 7x weekly   · Sidelying Hip Abduction - 15-20 reps - 2x daily - 7x weekly   · Prone Hip Extension - 115-20 reps - 2x daily - 7x weekly   · Walking - 5 mins hold - 10x daily - 7x weekly     NMR and Therapeutic Activities:    [x] (20638 or 57416) Provided verbal/tactile cueing for activities related to improving balance, coordination, kinesthetic sense, posture, motor skill, proprioception and motor activation to allow for proper function of   [x] LE / Core, hip and LE with self care and ADLs  [] UE / Shoulder complex: cervical, postural, scapular, scapulothoracic and UE control with self care, carrying, lifting, driving, computer work.   [] (48530) Gait Re-education- Provided training and instruction to the patient for proper LE, core and hip recruitment, positioning, and eccentric body weight control with ambulation re-education, including ascending & descending stairs     Home Management Training / Self Care:  [] (87560) Provided self-care/home management training related to activities of daily living and compensatory training, and/or use of adaptive equipment for improvement with: ADLs and compensatory training, meal preparation, safety procedures and instruction in use of adaptive equipment, including bathing, grooming, dressing, personal hygiene, basic household cleaning and chores.      Therapeutic Exercise and NMR EXR  [x] (66499) Provided verbal/tactile cueing for activities related to strengthening, flexibility, endurance, ROM for improvements in  [x] LE / Core stability: LE, hip, and core control with self care, mobility, lifting, ambulation. [] UE / Shoulder complex: cervical, postural, scapular, scapulothoracic and UE control with self care, reaching, carrying, lifting, house/yardwork, driving, computer work. [x] (15798) Provided verbal/tactile cueing for activities related to improving balance, coordination, kinesthetic sense, posture, motor skill, proprioception to assist with   [x] LE / Core stability: LE, hip, and core control in self care, mobility, lifting, ambulation and eccentric single leg control. [] UE / Shoulder complex: cervical, scapular, scapulothoracic and UE control with self care, reaching, carrying, lifting, house/yardwork, driving, computer work.   [] (82715) Therapist is in constant attendance of 2 or more patients providing skilled therapy interventions, but not providing any significant amount of measurable one-on-one time to either patient, for improvements in  [] LE / Core stability: LE, hip, and core control in self care, mobility, lifting, ambulation and eccentric single leg control. [] UE / Shoulder complex: cervical, scapular, scapulothoracic and UE control with self care, reaching, carrying, lifting, house/yardwork, driving, computer work.      Home Exercise Program:    [x] (11305) Reviewed/Progressed HEP activities related to strengthening, flexibility, endurance, ROM of   [x] LE / Core stability: core, hip and LE for functional self-care, mobility, lifting and ambulation/stair navigation   [] UE / Shoulder complex: cervical, postural, scapular, scapulothoracic and UE control with self care, reaching, carrying, lifting, house/yardwork, driving, computer work  [] (38064)Reviewed/Progressed HEP activities related to improving balance, coordination, kinesthetic sense, posture, motor skill, proprioception of   [] LE: core, hip and LE for self care, mobility, lifting, and ambulation/stair navigation    [] UE / Shoulder complex: cervical, postural, scapular, scapulothoracic and UE control with self care, reaching, carrying, lifting, house/yardwork, driving, computer work    Manual Treatments:  PROM / STM / Oscillations-Mobs:  G-I, II, III, IV (PA's, Inf., Post.)  [] (64874) Provided manual therapy to mobilize shoulder complex, hip, LE, and/or cervicothoracic/LS spine soft tissue/joints for the purpose of modulating pain, promoting relaxation,  increasing ROM, reducing/eliminating soft tissue swelling/inflammation/restriction, improving soft tissue extensibility and allowing for proper ROM for normal function with   [] LE / Core stability: self care, mobility, lifting and ambulation. [] UE / Shoulder complex: self care, reaching, carrying, lifting, house/yardwork, driving, computer work. Modalities:  [] (87566) Vasopneumatic compression: Utilized vasopneumatic compression to decrease edema / swelling for the purpose of improving mobility and quad tone / recruitment which will allow for increased overall function including but not limited to self-care, transfers, ambulation, and ascending / descending stairs. Modalities:        Charges:  Timed Code Treatment Minutes: 18   Total Treatment Minutes: 35     [x] EVAL - LOW (54734)   [] EVAL - MOD (66190)  [] EVAL - HIGH (87759)  [] RE-EVAL (56889)  [x] TE (60106) x      [] Ionto  [] NMR (14326) x      [] Vaso  [] Manual (07610) x      [] Ultrasound  [] TA x       [] Mech Traction (89844)  [] Gait Training x     [] ES (un) (57790):   [] Aquatic therapy x   [] Other:   [] Group:     GOALS:   Patient stated goal: walking better  [] Progressing: [] Met: [] Not Met: [] Adjusted    Therapist goals for Patient:   Short Term Goals: To be achieved in: 2 weeks  1. Independent in HEP and progression per patient tolerance, in order to prevent re-injury. [] Progressing: [] Met: [] Not Met: [] Adjusted  2.  Patient will have a decrease in pain to facilitate improvement in movement, function, and ADLs as indicated by Functional Deficits. [] Progressing: [] Met: [] Not Met: [] Adjusted    Long Term Goals: To be achieved in: 6 weeks  1. Patient to demonstrate TUG score <12 seconds to demonstrate improved fall risk to assist with reaching prior level of function. [] Progressing: [] Met: [] Not Met: [] Adjusted  2. Patient will demonstrate an increase in strength of B shoulder & hip complex to 4+/5 throughout and core activation to allow for proper functional mobility as indicated by patients Functional Deficits. [] Progressing: [] Met: [] Not Met: [] Adjusted  3. Patient to ambulate without ' x2 over uneven surfaces IND in order to safely return to community activities   [] Progressing: [] Met: [] Not Met: [] Adjusted  4. Patient will return to functional activities including navigating stairs without increased symptoms or restriction. [] Progressing: [] Met: [] Not Met: [] Adjusted  5. Patient to demonstrate Tinetti Assessment of 26/28 to safely resume workout routine unassisted   [] Progressing: [] Met: [] Not Met: [] Adjusted    Overall Progression Towards Functional goals/ Treatment Progress Update:  [] Patient is progressing as expected towards functional goals listed. [] Progression is slowed due to complexities/Impairments listed. [] Progression has been slowed due to co-morbidities.   [x] Plan just implemented, too soon to assess goals progression <30days   [] Goals require adjustment due to lack of progress  [] Patient is not progressing as expected and requires additional follow up with physician  [] Other    Persisting Functional Limitations/Impairments:  []Sleeping []Sitting               [x]Standing []Transfers        [x]Walking [x]Kneeling               [x]Stairs [x]Squatting / bending   []ADLs [x]Reaching  [x]Lifting  [x]Housework  [x]Driving [x]Job related tasks  []Sports/Recreation []Other:        ASSESSMENT:  See eval    Treatment/Activity Tolerance:  [] Patient able to complete tx  [x] Patient limited by fatigue  [] Patient limited by pain  [] Patient limited by other medical complications  [] Other:     Prognosis: [x] Good [] Fair  [] Poor    Patient Requires Follow-up: [x] Yes  [] No    Plan for next treatment session:    PLAN: See eval. PT 2x / week for 6 weeks. [] Continue per plan of care [] Alter current plan (see comments)  [x] Plan of care initiated [] Hold pending MD visit [] Discharge    Electronically signed by: Lenin Allen PT, DPT    Note: If patient does not return for scheduled/ recommended follow up visits, his note will serve as a discharge from care along with most recent update on progress.

## 2020-08-10 NOTE — PLAN OF CARE
[place], [address] [facility], [address]  Phone: [phone]   Fax: [phone]     Physical Therapy Certification    Dear Referring Practitioner: Ramu Oconnor MD,    We had the pleasure of evaluating the following patient for physical therapy services at [address] [place] [address]. A summary of our findings can be found in the initial assessment below. This includes our plan of care. If you have any questions or concerns regarding these findings, please do not hesitate to contact me at the office phone number checked above. Thank you for the referral.       Physician Signature:_______________________________Date:__________________  By signing above (or electronic signature), therapists plan is approved by physician      Patient: Henok Conner   : 1975   MRN: [id number]  Referring Physician: Referring Practitioner: Ramu Oconnor MD      Evaluation Date: 8/10/2020      Medical Diagnosis Information:  Diagnosis: I63.9 (ICD-10-CM) - Cerebral infarction, unspecified   Treatment Diagnosis: Impaired proprioception & coordination of BLEs, Impaired balance & gait                                         Insurance information: PT Insurance Information: [employer]  20 PT visits per contract year. TeleHealth 80/20 after deductibile     Precautions/ Contra-indications: NKDA  Latex Allergy:  [x]NO      []YES  Preferred Language for Healthcare:   [x]English       []other:    Relevant Medical History:   HTN      SUBJECTIVE:   Patient reports having CVA on 2020 effecting L side of body, causing weakness and increased fatigue levels, cognition and speech are unaffected. Pt states he was at the bank when a bad headache developed, drove back home to recover but dizziness developed and couldn't stop leaning to L side. About 10 hours later, had son take him to [facility] for assessment.   Transferred to [facility] for care and for rehab. Steadily improved balance and gait in therapies, using a RW effectively but reports continued increased fatigue and exhaustion limiting all activities. Returned home from [place]  and has been busy at home since, hasn't performed HEP but has been trying to walk regularly. Has not yet had follow-up w/PCP, plans to schedule apt later this date. Has increased difficulty navigating steps into home. 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 Expected to return to work .       Living Status/Prior Level of Function: Prior to this injury / incident, pt was independent with ADLs and IADLs, able to work full time unrestricted    Domicile:  []Single level house  []Multi-level house []Trailer/Mobile home        []Condo/Town home  [x]Apartment   []Other:          [x]Steps to enter home: 4-5   w/ [x]HR []No HR; []Level []Ramped entry/exit ; []Elevator access    Assistance in home:   []None  [x]Family -son  []Family/Friend/Neighbor outside of home    Available DME:  []SPC     []QC [x]RW     []4WW []Nish-walker       []Manual w/c []Powered w/c []Transport chair     []Shower bench []Transfer-tub bench    []Grab bars       []BSC  []RTS []Versa-frame              []Lift-chair  []Bed rail   []Hospital Bed  []Alert Button  []Other:       OBJECTIVE:   Functional Outcome: Tinetti Assessment:      TU\" w/RW    Posture:   Good trunk ext, slight list to left when standing and walking, none observed while seated    Transfers / Functional Mobility:  IND but transitions slowly due to fatigue this date    Gait: w/RW:    Ataxic BLEs during swing phase but demo's good B step length w/occasional flat foot bilaterally, lists to left        Myotomes/MMT LEFT RIGHT Comments   Neck flexion (C1-C2)      Neck sidebending (C3)      Shoulder elevation (C4)      Shoulder abduction (C5)      Elbow flexion/wrist extension (C6)      Elbow extension/wrist flexion (C7)      Thumb abduction (C8)      Finger abduction (T1)            Hip flexion (L1-L2)      Hip abduction      Hip extension      Knee extension (L2-L4)      Knee flexion      Ankle Dorsiflexion (L4-L5)      Ankle Plantar flexion (S1-S2)      Ankle Eversion (S1-S2)      Great Toe Ext (L5)            Core Strength           AROM LEFT RIGHT Comments   Cervical Flexion  [facility] [place]    Cervical Extension       Cervical Rotation  [facility] WFL    Cervical Side-bend  [facility] WFL    Shoulder Flexion  [facility]    Shoulder Abduction/scaption       Shoulder ER      Shoulder IR      Elbow Flexion  W[facility] WFL    Elbow Extension [facility] WFL    Wrist Flexion      Wrist Extension             Hip Flexion [facility] WFL    Hip Abduction      Hip ER      Hip IR      Hip Extension      Knee Extension [facility] WFL    Knee Flexion [facility] WFL    Hamstring 90/90      Ankle Dorsiflexion  decreased decreased    Ankle Plantarflexion           Balance/Functional Tests: Normal Abnormal N/A Comments   SLS       Feet Together       Romberg                 Toe walk   x     Heel Walk  x     Functional Gait Assessment       Tinetti Assessment  18/28     TUG Score  26\"  w/RW   Functional Reach             Cognitive Function:   [x]Normal []Impulsive []Flat-affect []Other:      Visual Deficit:  [x]None []Hemianopsia []Homonymous []Blindness []Other:     Speech:  [x]Normal []Dysphasic  []Aphasic []Dysarthria  []Low-volume    Preferred method of feedback:   [x]Immediate  []After task      Quality of Movement:     Coordination:  Rapid Alternation Movement:    Finger to Nose:   Heel to [facility]      []Normal     []Normal    [x]Normal     []Dysdiadokinesia   [x]Dysmetric   []Ataxic              []Abnormal    Tone:   RUE:  [x]Normotonic []Hypertonic []Hypotonic []Hyperreflexive []Hyporeflexive []Clonus  LUE:  [x]Normotonic  []Hypertonic []Hypotonic []Hyperreflexive []Hyporeflexive []Clonus  RLE:  [x]Normotonic  []Hypertonic []Hypotonic []Hyperreflexive []Hyporeflexive []Clonus  LLE:   [x]Normotonic  []Hypertonic []Hypotonic []Hyperreflexive []Hyporeflexive []Clonus    []R []L UE Synergy: []Flexion  []Extensor  [x]Other: na   []R []L LE Synergy: []Flexion  []Extensor  [x]Other:  na      [x] Patient history, allergies, meds reviewed. Medical chart reviewed. See intake form. Review Of Systems (ROS):  [x]Performed Review of systems (Integumentary, CardioPulmonary, Neurological) by intake and observation. Intake form has been scanned into medical record. Patient has been instructed to contact their primary care physician regarding ROS issues if not already being addressed at this time.       Co-morbidities/Complexities (which will affect course of rehabilitation):   []None           Arthritic conditions   []Rheumatoid arthritis (M05.9)  []Osteoarthritis (M19.91)   Cardiovascular conditions   [x]Hypertension (I10)  []Hyperlipidemia (E78.5)  []Angina pectoris (I20)  []Atherosclerosis (I70)   Musculoskeletal conditions   []Disc pathology   []Congenital spine pathologies   []Prior surgical intervention  []Osteoporosis (M81.8)  []Osteopenia (M85.8)   Endocrine conditions   []Hypothyroid (E03.9)  []Hyperthyroid Gastrointestinal conditions   []Constipation (C25.40)   Metabolic conditions   []Morbid obesity (E66.01)  []Diabetes type 1(E10.65) or 2 (E11.65)   []Neuropathy (G60.9)     Pulmonary conditions   []Asthma (J45)  []Coughing   []COPD (J44.9)   Psychological Disorders  []Anxiety (F41.9)  []Depression (F32.9)   []Other:   []Other:             Barriers to/and or personal factors that will affect rehab potential:              [x]Age  []Sex    []Smoker              []Motivation/Lack of Motivation                        []Co-Morbidities []Cognitive Function, education/learning barriers              []Environmental, home barriers              [x]profession/work barriers  []past PT/medical experience  []other:    Falls Risk Assessment (30 days):   [] Falls Risk assessed and no intervention required. [x] Falls Risk assessed and Patient requires intervention due to being higher risk   TUG score (>12s at risk):     [x] Falls education provided, including continued use of RW for balance at home      ASSESSMENT:  Patient is a 40 yo male who complains of L sided weakness and exhaustion following CVA 7/22/2020 affecting balance & gait. Pt presents w/impaired coordination & proprioception of BLEs and LUE, increased fatigue levels with all activities, and listing to left when standing and walking increasing difficulty with all ADLs and community activities. Patient can benefit from PT services to improve balance & gait to return pt to PLOF and normal work duties.         Functional Impairments:     []Noted scapular/hip hypomobility   []Noted scapular/hip hypermobility   [x]Assistance required with functional mobility/gait for safety   [x]Decreased core/proximal hip strength and neuromuscular control    [x]Decreased UE/LE functional strength    []Abnormal reflexes/sensation/myotomal/dermatomal deficits  []Hypertonic UE/LE   []Hypotonic UE/LE  []Dislocated//Hypermobile Glenohumeral joint  [x]Impaired balance/coordination/proprioceptive control    []other:      Functional Activity Limitations (from functional questionnaire and intake)   []Reduced ability to tolerate prolonged functional positions   [x]Reduced ability or difficulty with changes of positions or transfers between positions   []Reduced ability to maintain good posture and demonstrate good body mechanics with sitting, bending, and lifting   []Reduced ability to sleep   [x] Reduced ability or tolerance with driving and/or computer work   [x]Reduced ability to perform lifting, reaching, structures and functions (impairments), activity limitations, and/or participation restrictions;:  [x] a total of 1-2 or more elements   [] a total of 3 or more elements   [] a total of 4 or more elements   [x] A clinical presentation with:  [x] stable and/or uncomplicated characteristics   [] evolving clinical presentation with changing characteristics  [] unstable and unpredictable characteristics;   [x] Clinical decision making of [x] low, [] moderate, [] high complexity using standardized patient assessment instrument and/or measurable assessment of functional outcome. [x] EVAL (LOW) 78939 (typically 15 minutes face-to-face)  [] EVAL (MOD) 85810 (typically 30 minutes face-to-face)  [] EVAL (HIGH) 48903 (typically 45 minutes face-to-face)  [] RE-EVAL     PLAN: PT to begin focusing on: Activation of scapular and hip musculature, Transfer & Gait safety, Evenly distributed weight-bearing through extremities, Pain Management    Frequency/Duration:  2 days per week for 6 Weeks:  Interventions:  [x]  Therapeutic exercise including: strength training, ROM, for LE, Glutes and core   [x]  NMR activation and proprioception for shoulder complex, glutes, UE/LE, and Core   [x]  Manual therapy as indicated for Shoulder & Hip complex, LE and core activation to include: STM, manual cues to facilitate/inhibit muscle groups. [x]  Modalities as needed that may include: thermal agents, E-stim, Biofeedback, US as indicated  [x]  Patient education on joint protection, postural re-education, home/activity modification, progression of HEP. HEP instruction: Written HEP instructions provided and reviewed. Access Code: [email or web address]   URL: [email or web address]   Date: 08/10/2020   Prepared by: Jamison Durham   Supine Active Straight Leg Raise - 15-20 reps - 2x daily - 7x weekly   Supine Bridge - 15-20 reps - 2x daily - 7x weekly   Sidelying Hip Abduction - 15-20 reps - 2x daily - 7x weekly   Prone Hip

## 2020-08-12 ENCOUNTER — HOSPITAL ENCOUNTER (OUTPATIENT)
Dept: PHYSICAL THERAPY | Age: 45
Setting detail: THERAPIES SERIES
Discharge: HOME | End: 2020-08-12
Payer: COMMERCIAL

## 2020-08-12 ENCOUNTER — TELEPHONE (OUTPATIENT)
Dept: PRIMARY CARE CLINIC | Age: 45
End: 2020-08-12

## 2020-08-12 PROCEDURE — 97530 THERAPEUTIC ACTIVITIES: CPT

## 2020-08-12 PROCEDURE — 97110 THERAPEUTIC EXERCISES: CPT

## 2020-08-12 PROCEDURE — 97116 GAIT TRAINING THERAPY: CPT

## 2020-08-12 NOTE — FLOWSHEET NOTE
Neuromuscular Re-ed (19929) /  Gait Training (47679)       [facility] walking       Shuttle Balance              Gait training w/SPC  8/12: instructed pt on modified 3-point pattern SBA  160'           Mat table  · Bridge  · Pancho Nageotte on forearms   · Quadruped hip ext      x12\", 2x8\"   30    10 B                  Therapeutic Activities (00271) /  Functional Tasks       //bars  · Walking lunges  · Side stepping in squat position      3 laps  4 laps                                       8/12:  Discussed importance of improved protein intake to better develop muscle and encourage neural development                     Manual Intervention ([id number])                                                     Pt. Education:  8/10:  -patient educated on diagnosis, prognosis and expectations for rehab  -all patient questions were answered    HEP instruction:  Access Code: [email or web address]   URL: [email or web address]   Date: 08/10/2020   Prepared by: Zulma Crews     Exercises   · Supine Active Straight Leg Raise - 15-20 reps - 2x daily - 7x weekly   · Supine Bridge - 15-20 reps - 2x daily - 7x weekly   · Sidelying Hip Abduction - 15-20 reps - 2x daily - 7x weekly   · Prone Hip Extension - 115-20 reps - 2x daily - 7x weekly   · Walking - 5 mins hold - 10x daily - 7x weekly     NMR and Therapeutic Activities:    [x] (89244 or 61634) Provided verbal/tactile cueing for activities related to improving balance, coordination, kinesthetic sense, posture, motor skill, proprioception and motor activation to allow for proper function of   [x] LE / Core, hip and LE with self care and ADLs  [] UE / Shoulder complex: cervical, postural, scapular, scapulothoracic and UE control with self care, carrying, lifting, driving, computer work.   [] (43997) Gait Re-education- Provided training and instruction to the patient for proper LE, core and hip recruitment, positioning, and eccentric body weight control with ambulation re-education, including ascending & descending stairs     Home Management Training / Self Care:  [] (99023) Provided self-care/home management training related to activities of daily living and compensatory training, and/or use of adaptive equipment for improvement with: ADLs and compensatory training, meal preparation, safety procedures and instruction in use of adaptive equipment, including bathing, grooming, dressing, personal hygiene, basic household cleaning and chores. Therapeutic Exercise and NMR EXR  [x] (36048) Provided verbal/tactile cueing for activities related to strengthening, flexibility, endurance, ROM for improvements in  [x] LE / Core stability: LE, hip, and core control with self care, mobility, lifting, ambulation. [] UE / Shoulder complex: cervical, postural, scapular, scapulothoracic and UE control with self care, reaching, carrying, lifting, house/yardwork, driving, computer work. [x] (98709) Provided verbal/tactile cueing for activities related to improving balance, coordination, kinesthetic sense, posture, motor skill, proprioception to assist with   [x] LE / Core stability: LE, hip, and core control in self care, mobility, lifting, ambulation and eccentric single leg control. [] UE / Shoulder complex: cervical, scapular, scapulothoracic and UE control with self care, reaching, carrying, lifting, house/yardwork, driving, computer work.   [] (96964) Therapist is in constant attendance of 2 or more patients providing skilled therapy interventions, but not providing any significant amount of measurable one-on-one time to either patient, for improvements in  [] LE / Core stability: LE, hip, and core control in self care, mobility, lifting, ambulation and eccentric single leg control. [] UE / Shoulder complex: cervical, scapular, scapulothoracic and UE control with self care, reaching, carrying, lifting, house/yardwork, driving, computer work.      Home Exercise Program:    [x] (73903) Reviewed/Progressed HEP activities related to strengthening, flexibility, endurance, ROM of   [x] LE / Core stability: core, hip and LE for functional self-care, mobility, lifting and ambulation/stair navigation   [] UE / Shoulder complex: cervical, postural, scapular, scapulothoracic and UE control with self care, reaching, carrying, lifting, house/yardwork, driving, computer work  [] (57293)Reviewed/Progressed HEP activities related to improving balance, coordination, kinesthetic sense, posture, motor skill, proprioception of   [] LE: core, hip and LE for self care, mobility, lifting, and ambulation/stair navigation    [] UE / Shoulder complex: cervical, postural,  scapular, scapulothoracic and UE control with self care, reaching, carrying, lifting, house/yardwork, driving, computer work    Manual Treatments:  PROM / STM / Oscillations-Mobs:  G-I, II, III, IV (PA's, Inf., Post.)  [] (92537) Provided manual therapy to mobilize shoulder complex, hip, LE, and/or cervicothoracic/LS spine soft tissue/joints for the purpose of modulating pain, promoting relaxation,  increasing ROM, reducing/eliminating soft tissue swelling/inflammation/restriction, improving soft tissue extensibility and allowing for proper ROM for normal function with   [] LE / Core stability: self care, mobility, lifting and ambulation. [] UE / Shoulder complex: self care, reaching, carrying, lifting, house/yardwork, driving, computer work. Modalities:  [] (23301) Vasopneumatic compression: Utilized vasopneumatic compression to decrease edema / swelling for the purpose of improving mobility and quad tone / recruitment which will allow for increased overall function including but not limited to self-care, transfers, ambulation, and ascending / descending stairs.        Modalities:        Charges:  Timed Code Treatment Minutes: 43   Total Treatment Minutes: 43     [] EVAL - LOW (63573)   [] EVAL - MOD (46154)  [] EVAL - HIGH (57176)  [] RE-EVAL (42946)  [x] TE (32379) x 1    [] Ionto  [] NMR (56785) x      [] Vaso  [] Manual (89005) x      [] Ultrasound  [x] TA x   1    [] Mech Traction (54916)  [x] Gait Training x  1   [] ES (un) (85054):   [] Aquatic therapy x   [] Other:   [] Group:     GOALS:   Patient stated goal: walking better  [] Progressing: [] Met: [] Not Met: [] Adjusted    Therapist goals for Patient:   Short Term Goals: To be achieved in: 2 weeks  1. Independent in HEP and progression per patient tolerance, in order to prevent re-injury. [] Progressing: [] Met: [] Not Met: [] Adjusted  2. Patient will have a decrease in pain to facilitate improvement in movement, function, and ADLs as indicated by Functional Deficits. [] Progressing: [] Met: [] Not Met: [] Adjusted    Long Term Goals: To be achieved in: 6 weeks  1. Patient to demonstrate TUG score <12 seconds to demonstrate improved fall risk to assist with reaching prior level of function. [] Progressing: [] Met: [] Not Met: [] Adjusted  2. Patient will demonstrate an increase in strength of B shoulder & hip complex to 4+/5 throughout and core activation to allow for proper functional mobility as indicated by patients Functional Deficits. [] Progressing: [] Met: [] Not Met: [] Adjusted  3. Patient to ambulate without ' x2 over uneven surfaces IND in order to safely return to community activities   [] Progressing: [] Met: [] Not Met: [] Adjusted  4. Patient will return to functional activities including navigating stairs without increased symptoms or restriction. [] Progressing: [] Met: [] Not Met: [] Adjusted  5. Patient to demonstrate Tinetti Assessment of 26/28 to safely resume workout routine unassisted   [] Progressing: [] Met: [] Not Met: [] Adjusted    Overall Progression Towards Functional goals/ Treatment Progress Update:  [] Patient is progressing as expected towards functional goals listed. [] Progression is slowed due to complexities/Impairments listed.   [] Progression has been slowed due to co-morbidities. [x] Plan just implemented, too soon to assess goals progression <30days   [] Goals require adjustment due to lack of progress  [] Patient is not progressing as expected and requires additional follow up with physician  [] Other    Persisting Functional Limitations/Impairments:  []Sleeping []Sitting               [x]Standing []Transfers        [x]Walking [x]Kneeling               [x]Stairs [x]Squatting / bending   []ADLs [x]Reaching  [x]Lifting  [x]Housework  [x]Driving [x]Job related tasks  []Sports/Recreation []Other:        ASSESSMENT:   Patient continues to demo increased fatigue as session progresses but was able to complete each task. Pt was able to demo good modified 3-point pattern w/SPC but balance remains impaired and recommended pt continue to use RW at home. Treatment/Activity Tolerance:  [x] Patient able to complete tx  [x] Patient limited by fatigue  [] Patient limited by pain  [] Patient limited by other medical complications  [] Other:     Prognosis: [x] Good [] Fair  [] Poor    Patient Requires Follow-up: [x] Yes  [] No    Plan for next treatment session:    Core stability, gait training    PLAN: See fuad. PT 2x / week for 6 weeks. [x] Continue per plan of care [] Alter current plan (see comments)  [] Plan of care initiated [] Hold pending MD visit [] Discharge    Electronically signed by: Karma Nieto PT, DPT    Note: If patient does not return for scheduled/ recommended follow up visits, his note will serve as a discharge from care along with most recent update on progress.

## 2020-08-12 NOTE — TELEPHONE ENCOUNTER
[facility] Trans[facility] Initial Follow Up Call    Outreach made within 2 business days of discharge: Yes    Patient: Nj Zavaleta Patient : 1975   MRN: [id number]  Reason for Admission: There are no discharge diagnoses documented for the most recent discharge. Discharge Date: 20       Spoke with: patient has been in contact with [facility]. Today was 2nd PT appointment.     Discharge department/facility: [facility]  Scheduled appointment with PCP within 7-14 days    Follow Up  Future Appointments   Date Time Provider [place]   2020  9:00 AM Bharath Bolton[address] [facility]   2020  8:30 AM Micheal Seymour OT [facility]   2020  9:30 AM Luis F Meier, [address] [place] [address]

## 2020-08-17 ENCOUNTER — HOSPITAL ENCOUNTER (OUTPATIENT)
Dept: PHYSICAL THERAPY | Age: 45
Setting detail: THERAPIES SERIES
Discharge: HOME | End: 2020-08-17
Payer: COMMERCIAL

## 2020-08-17 PROCEDURE — 97112 NEUROMUSCULAR REEDUCATION: CPT

## 2020-08-17 PROCEDURE — 97116 GAIT TRAINING THERAPY: CPT

## 2020-08-17 PROCEDURE — 97110 THERAPEUTIC EXERCISES: CPT

## 2020-08-17 NOTE — FLOWSHEET NOTE
pl    [address]  10 [address]    Gait training w/SPC  8/12: instructed pt on modified 3-point pattern Sup  290' -improved stamina observed this date          Mat table  · Bridge  · Audrene Felicia on forearms  · Plank on hands   · Quadruped hip ext  · 1/2 kneeling      x12\", x8\"  X12\", x15\"    x45\" B                    -frequent manual cues for posture and pelvic stability required for each                 Therapeutic Activities (49647) /  Functional Tasks       //bars  · Walking lunges  · Side stepping in squat position                                             8/12:  Discussed importance of improved protein intake to better develop muscle and encourage neural development                     Manual Intervention ([id number])                                                     Pt. Education:  8/10:  -patient educated on diagnosis, prognosis and expectations for rehab  -all patient questions were answered    HEP instruction:       8/17: Added 3-way  Hip w/orange resistance band to HEP, handout provided  Access Code: [email or web address]   URL: [email or web address]   Date: 08/10/2020   Prepared by: Meghana Lopez     Exercises   · Supine Active Straight Leg Raise - 15-20 reps - 2x daily - 7x weekly   · Supine Bridge - 15-20 reps - 2x daily - 7x weekly   · Sidelying Hip Abduction - 15-20 reps - 2x daily - 7x weekly   · Prone Hip Extension - 115-20 reps - 2x daily - 7x weekly   · Walking - 5 mins hold - 10x daily - 7x weekly     NMR and Therapeutic Activities:    [x] (52443 or 82548) Provided verbal/tactile cueing for activities related to improving balance, coordination, kinesthetic sense, posture, motor skill, proprioception and motor activation to allow for proper function of   [x] LE / Core, hip and LE with self care and ADLs  [] UE / Shoulder complex: cervical, postural, scapular, scapulothoracic and UE control with self care, carrying, lifting, driving, computer work.   [] (73805) Gait Re-education- Provided training and instruction to the patient for proper LE, core and hip recruitment, positioning, and eccentric body weight control with ambulation re-education, including ascending & descending stairs     Home Management Training / Self Care:  [] (52320) Provided self-care/home management training related to activities of daily living and compensatory training, and/or use of adaptive equipment for improvement with: ADLs and compensatory training, meal preparation, safety procedures and instruction in use of adaptive equipment, including bathing, grooming, dressing, personal hygiene, basic household cleaning and chores. Therapeutic Exercise and NMR EXR  [x] (05305) Provided verbal/tactile cueing for activities related to strengthening, flexibility, endurance, ROM for improvements in  [x] LE / Core stability: LE, hip, and core control with self care, mobility, lifting, ambulation. [] UE / Shoulder complex: cervical, postural, scapular, scapulothoracic and UE control with self care, reaching, carrying, lifting, house/yardwork, driving, computer work. [x] (82246) Provided verbal/tactile cueing for activities related to improving balance, coordination, kinesthetic sense, posture, motor skill, proprioception to assist with   [x] LE / Core stability: LE, hip, and core control in self care, mobility, lifting, ambulation and eccentric single leg control. [] UE / Shoulder complex: cervical, scapular, scapulothoracic and UE control with self care, reaching, carrying, lifting, house/yardwork, driving, computer work.   [] (55339) Therapist is in constant attendance of 2 or more patients providing skilled therapy interventions, but not providing any significant amount of measurable one-on-one time to either patient, for improvements in  [] LE / Core stability: LE, hip, and core control in self care, mobility, lifting, ambulation and eccentric single leg control.    [] UE / Shoulder complex: cervical, scapular, scapulothoracic and UE control with self care, reaching, carrying, lifting, house/yardwork, driving, computer work. Home Exercise Program:    [x] (77316) Reviewed/Progressed HEP activities related to strengthening, flexibility, endurance, ROM of   [x] LE / Core stability: core, hip and LE for functional self-care, mobility, lifting and ambulation/stair navigation   [] UE / Shoulder complex: cervical, postural, scapular, scapulothoracic and UE control with self care, reaching, carrying, lifting, house/yardwork, driving, computer work  [] (79242)Reviewed/Progressed HEP activities related to improving balance, coordination, kinesthetic sense, posture, motor skill, proprioception of   [] LE: core, hip and LE for self care, mobility, lifting, and ambulation/stair navigation    [] UE / Shoulder complex: cervical, postural,  scapular, scapulothoracic and UE control with self care, reaching, carrying, lifting, house/yardwork, driving, computer work    Manual Treatments:  PROM / STM / Oscillations-Mobs:  G-I, II, III, IV (PA's, Inf., Post.)  [] (87854) Provided manual therapy to mobilize shoulder complex, hip, LE, and/or cervicothoracic/LS spine soft tissue/joints for the purpose of modulating pain, promoting relaxation,  increasing ROM, reducing/eliminating soft tissue swelling/inflammation/restriction, improving soft tissue extensibility and allowing for proper ROM for normal function with   [] LE / Core stability: self care, mobility, lifting and ambulation. [] UE / Shoulder complex: self care, reaching, carrying, lifting, house/yardwork, driving, computer work. Modalities:  [] (96525) Vasopneumatic compression: Utilized vasopneumatic compression to decrease edema / swelling for the purpose of improving mobility and quad tone / recruitment which will allow for increased overall function including but not limited to self-care, transfers, ambulation, and ascending / descending stairs.        Modalities:        Charges:  Timed Code Treatment Minutes: 52 Total Treatment Minutes: 49     [] EVAL - LOW (67613)   [] EVAL - MOD (04517)  [] EVAL - HIGH (45939)  [] RE-EVAL (72368)  [x] TE (23969) x  1    [] Ionto  [x] NMR (06753) x  1    [] Vaso  [] Manual (86759) x      [] Ultrasound  [] TA x       [] Mech Traction (37305)  [x] Gait Training x  1   [] ES (un) (66410):   [] Aquatic therapy x   [] Other:   [] Group:     GOALS:   Patient stated goal: walking better  [] Progressing: [] Met: [] Not Met: [] Adjusted    Therapist goals for Patient:   Short Term Goals: To be achieved in: 2 weeks  1. Independent in HEP and progression per patient tolerance, in order to prevent re-injury. [] Progressing: [] Met: [] Not Met: [] Adjusted  2. Patient will have a decrease in pain to facilitate improvement in movement, function, and ADLs as indicated by Functional Deficits. [] Progressing: [] Met: [] Not Met: [] Adjusted    Long Term Goals: To be achieved in: 6 weeks  1. Patient to demonstrate TUG score <12 seconds to demonstrate improved fall risk to assist with reaching prior level of function. [] Progressing: [] Met: [] Not Met: [] Adjusted  2. Patient will demonstrate an increase in strength of B shoulder & hip complex to 4+/5 throughout and core activation to allow for proper functional mobility as indicated by patients Functional Deficits. [] Progressing: [] Met: [] Not Met: [] Adjusted  3. Patient to ambulate without ' x2 over uneven surfaces IND in order to safely return to community activities   [] Progressing: [] Met: [] Not Met: [] Adjusted  4. Patient will return to functional activities including navigating stairs without increased symptoms or restriction. [] Progressing: [] Met: [] Not Met: [] Adjusted  5.  Patient to demonstrate Tinetti Assessment of 26/28 to safely resume workout routine unassisted   [] Progressing: [] Met: [] Not Met: [] Adjusted    Overall Progression Towards Functional goals/ Treatment Progress Update:  [] Patient is progressing as expected towards functional goals listed. [] Progression is slowed due to complexities/Impairments listed. [] Progression has been slowed due to co-morbidities. [x] Plan just implemented, too soon to assess goals progression <30days   [] Goals require adjustment due to lack of progress  [] Patient is not progressing as expected and requires additional follow up with physician  [] Other    Persisting Functional Limitations/Impairments:  []Sleeping []Sitting               [x]Standing []Transfers        [x]Walking [x]Kneeling               [x]Stairs [x]Squatting / bending   []ADLs [x]Reaching  [x]Lifting  [x]Housework  [x]Driving [x]Job related tasks  []Sports/Recreation []Other:        ASSESSMENT:       Patient steadily progressing, difficulties remain with high level core stability activity. Treatment/Activity Tolerance:  [x] Patient able to complete tx  [x] Patient limited by fatigue  [] Patient limited by pain  [] Patient limited by other medical complications  [] Other:     Prognosis: [x] Good [] Fair  [] Poor    Patient Requires Follow-up: [x] Yes  [] No    Plan for next treatment session:    Core stability, gait training    PLAN: See beth PT 2x / week for 6 weeks. [x] Continue per plan of care [] Alter current plan (see comments)  [] Plan of care initiated [] Hold pending MD visit [] Discharge    Electronically signed by: Sara Dennison PT, DPT    Note: If patient does not return for scheduled/ recommended follow up visits, his note will serve as a discharge from care along with most recent update on progress.

## 2020-08-20 ENCOUNTER — HOSPITAL ENCOUNTER (OUTPATIENT)
Dept: PHYSICAL THERAPY | Age: 45
Setting detail: THERAPIES SERIES
Discharge: HOME | End: 2020-08-20
Payer: COMMERCIAL

## 2020-08-20 ENCOUNTER — HOSPITAL ENCOUNTER (OUTPATIENT)
Dept: OCCUPATIONAL THERAPY | Age: 45
Setting detail: THERAPIES SERIES
Discharge: HOME | End: 2020-08-20
Payer: COMMERCIAL

## 2020-08-20 PROCEDURE — 97110 THERAPEUTIC EXERCISES: CPT

## 2020-08-20 PROCEDURE — 97112 NEUROMUSCULAR REEDUCATION: CPT

## 2020-08-20 PROCEDURE — 97530 THERAPEUTIC ACTIVITIES: CPT

## 2020-08-20 PROCEDURE — 97165 OT EVAL LOW COMPLEX 30 MIN: CPT

## 2020-08-20 NOTE — PROGRESS NOTES
improvements in scapular, scapulothoracic and UE control with self care, reaching, carrying, lifting, house/yardwork, driving/computer work.    [] (27078) Provided verbal/tactile cueing for activities related to improving balance, coordination, kinesthetic sense, posture, motor skill, proprioception  to assist with  scapular, scapulothoracic and UE control with self care, reaching, carrying, lifting, house/yardwork, driving/computer work.   [] Comments:    Therapeutic Activities:    [] (19505 or 05408) Provided verbal/tactile cueing for activities related to improving balance, coordination, kinesthetic sense, posture, motor skill, proprioception and motor activation to allow for proper function of scapular, scapulothoracic and UE control with self care, carrying, lifting, driving/computer work  [] Comments:    Home Exercise Program:    [] (78443) Reviewed/Progressed HEP activities related to strengthening, flexibility, endurance, ROM of scapular, scapulothoracic and UE control with self care, reaching, carrying, lifting, house/yardwork, driving/computer work  [] (65647) Reviewed/Progressed HEP activities related to improving balance, coordination, kinesthetic sense, posture, motor skill, proprioception of scapular, scapulothoracic and UE control with self care, reaching, carrying, lifting, house/yardwork, driving/computer work    [] Comments:    Manual Treatments:  PROM / STM / Oscillations-Mobs:  G-I, II, III, IV (PA's, Inf., Post.)  [] (80806) Provided manual therapy to mobilize soft tissue/joints of cervical/CT, scapular GHJ and UE for the purpose of modulating pain, promoting relaxation,  increasing ROM, reducing/eliminating soft tissue swelling/inflammation/restriction, improving soft tissue extensibility and allowing for proper ROM for normal function with self care, reaching, carrying, lifting, house/yardwork, driving/computer work  [] Comments:    ADL Training:  [] (42143) Provided self-care/home management training related to activities of daily living and compensatory training, and/or use of adaptive equipment   [] Comments:     Splinting:  [] Fabrication of:   [] (98685) Orthotic/Prosthetic Management, subsequent encounter  [] (77232) Orthotic management and training (fitting and assessment)  [] Comments:    Modalities:     Charges:  Timed Code Treatment Minutes: 30   Total Treatment Minutes: 45     [x] EVAL (LOW) 92929   [] OT Re-eval (28547)  [] EVAL (MOD) 69664   [] EVAL (HIGH) 04608       [x] Ivon (00195) x   1  [] XAOEZ(44689)  [x] NMR (85801) x    1 [] Estim (attended) (01905)   [] Manual ([id number]) x     [] US (82246)  [] TA () x     [] Paraffin (74647)  [] ADL  ([phone] [id number]) x    [] Splint/L code:    [] Estim (unattended) (22 [id number])  [] Fluidotherapy (49292)  [] Other:    GOALS:  Therapist goals for Patient:   Short Term Goals: To be achieved in: 30 days  1. Pt will be  Independent with functional mobility in community based pool and locker room to be able to return to swimming  []? Progressing: []? Met: []? Not Met: []? Adjusted  2. Pt will be independent with self care in community based pool locker room to be able [facility]  3. Patient will be independent swimming laps for 20 minutes for wellness and avocational pursuit of swimming  []? Progressing: []? Met: []? Not Met: []? Adjusted     Long Term Goals: To be achieved by betty  1. Pt will will report a QuickDASH Symptom Severity Scale score of 15% or less indicating increased safety and functional independence in desired occupational pursuits by discharge. []? Progressing: []? Met: []? Not Met: []? Adjusted    Progression Towards Functional goals:  [] Patient is progressing as expected towards functional goals listed. [] Progression is slowed due to complexities listed. [] Progression has been slowed due to co-morbidities.   [x] Plan just implemented, too soon to assess goals progression  [] All goals are met  [] Other: ASSESSMENT:  See eval    Treatment/Activity Tolerance:  [x] Patient tolerated treatment well [] Patient limited by fatique  [] Patient limited by pain  [] Patient limited by other medical complications  [] Other:     Prognosis: [x] Good [] Fair  [] Poor    Patient Requires Follow-up: [x] Yes  [] No    PLAN: See eval  [] Continue per plan of care [] Alter current plan (see comments)  [x] Plan of care initiated [] Hold pending MD visit [] Discharge    Electronically signed by: Josue Mccarthy        Note: If patient does not return for scheduled/ recommended follow up visits, this note will serve as a discharge from care along with most recent update on progress.

## 2020-08-20 NOTE — PROGRESS NOTES
[address] 54, [address] [facility], [address]  Phone: [phone]   Fax: [phone]                                                     Occupational Therapy Certification    Dear Dr. Nusrat Galicia  We had the pleasure of evaluating the following patient for occupational therapy services at [facility]. A summary of our findings can be found in the initial assessment below. This includes our plan of care. If you have any questions or concerns regarding these findings, please do not hesitate to contact me at the office phone number checked above. Thank you for the referral.       Referring Practitioner Signature:_______________________________Date:__________________  By signing above (or electronic signature), therapists plan is approved by the referring practicioner      Patient: Gui Alas   : 1975   MRN: [id number]  Referring Practicioner:  Dr. Nusrat Galicia     Evaluation Date: 2020      Medical Diagnosis Information:cerebellar cva                                                   Insurance information:   meritein 20 visits per calendar year    Precautions/ Contra-indications:  no falls since home  Latex Allergy:  [x]NO      []YES  Preferred Language for Healthcare:   [x]English       []other:  OCCUPATIONAL PROFILE  Reason for Seeking OT Services and Patient Goals: walk, swimming,     Personal Interests and Values: swimming, reading, listening to music, playing chess    Occupational History (Life Experiences Including Prior Level of Function):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       Personal, Temporal, and Virtual Contexts (which aide or inhibit performance in desired occupations):  Drove PTA, patient's sons are driving him,       SUBJECTIVE: Patient stated complaint:     Pain Scale:0/10  Easing factors:    Provocative factors:      Type: []Constant   []Intermittent  []Radiating []Localized []other:       OBJECTIVE:   Hand dominance:  Right handed    Inspection     Palpation:     Bandages/Dressings/Incisions:     ROM WFL: [x]Yes []No (if checked, see below)     PROM AROM    L R L R   Shoulder Flexion    wnl wnl throughout   Shoulder Abduction        Shoulder External Rotation        Shoulder Internal Rotation        Elbow Flexion        Elbow Extension        Pronation        Supination        Wrist Flexion        Wrist Extension        Radial Deviation        Ulnar Deviation       CMC Abduction       5th Digit MCP Extension-Flexion       4th Digit MCP Extension-Flexion       3rd Digit MCP Extension-Flexion       2nd Digit MCP Extension-Flexion       1st Digit MCP Extension-Flexion       5th Digit PIP Extension- Flexion       4th Digit PIP Extension-Flexion       3rd Digit PIP Extension-Flexion       2nd Digit PIP Extension-Flexion       1st Digit IP Extension-Flexion       5th Digit DIP Extension-Flexion       4th Digit DIP Extension-Flexion       3rd Digit DIP Extension-Flexion       2nd Digit DIP Extension-Flexion       Composite Flexion (Tip of 3rd Digit to Distal Palmar Crease (cm))         Joint mobility:   [x]Normal    []Hypo   []Hyper    Strength WFL: []Yes []No (if checked, see below)    Strength (0-5) Left Right    Shoulder Flex 4    Shoulder Abd 4    Shoulder ER 4    Shoulder IR     Biceps     Triceps 4    Pronation      Supination      Wrist Flex     Wrist Ext     Wrist Radial Deviation         Orthopaedic Special Tests Positive  Negative  NT Comments    Shoulder        Empty Can              Elbow        Cozen's       Tinel's               Hand/Wrist       Finkelstein's       Tinel's Phalen's       Froment's       [place] Sign       [place] Deformity       [place] Neck Deformity       [place]'s Nodes (DIP)       Felicia's Nodes (PIP)       Mallet Finger       Grind Test Shadia Mock)                      Hand Assessment Left  Right  Comments    9 Hole Peg Test (s)       Strength (lbs)      Lateral Pinch Strength (lbs)      Palmar Pinch Strength (lbs)      Tip Pinch Strength (lbs)      Opposition (Y/N)      Functional Outcome Measures:           quick dash raw score 29 40% disability                               Functional Mobility/Transfers: modified independent with rw, fatigues with standing and ambulation     Posture: slight flexion over walker with decreased heel strike    Synergy/Muscle tone:mild proximal hypotonia    Sensation: :wnl    Coordination:  Slightly slow on left    Visual Acuity wears glasses for reading    Perception wnl    MVPT:  Not tested    Trail making A: 24 seconds    Trail making B: 48 seconds    Visual field cut: mild impaired visual saccades/ pusuits with mild midline skips    Cognition: wnl                   [x] Patient history, allergies, meds reviewed. Medical chart reviewed. See intake form. Review Of Systems (ROS):  [x]Performed Review of systems (Integumentary, CardioPulmonary, Neurological) by intake and observation. Intake form has been scanned into medical record. Patient has been instructed to contact their primary care physician regarding ROS issues if not already being addressed at this time.       Co-morbidities/Complexities (which will affect course of rehabilitation):   []None           Arthritic conditions   []Rheumatoid arthritis (M05.9)  []Osteoarthritis (M19.91)   Cardiovascular conditions   [x]Hypertension (I10)  [x]Hyperlipidemia (E78.5)  []Angina pectoris (I20)  []Atherosclerosis (I70)   Musculoskeletal conditions   []Disc pathology   []Congenital spine pathologies   []Prior surgical intervention  []Osteoporosis (M81.8)  []Osteopenia (M85.8) Endocrine conditions   []Hypothyroid (E03.9)  []Hyperthyroid Gastrointestinal conditions   []Constipation (V11.26)   Metabolic conditions   []Morbid obesity (E66.01)  []Diabetes type 1(E10.65) or 2 (E11.65)   []Neuropathy (G60.9)     Pulmonary conditions   []Asthma (J45)  []Coughing   []COPD (J44.9)   Psychological Disorders  []Anxiety (F41.9)  []Depression (F32.9)   []Other:   []Other:          Barriers to/and or personal factors that will affect rehab potential:              [x]Age  []Sex    []Smoker              [x]Motivation/Lack of Motivation                        []Co-Morbidities              []Cognitive Function, education/learning barriers              []Environmental, home barriers              []profession/work barriers  []past PT/medical experience  []other:  Justification: Patient demonstrates decreased independence with ADL vocation, avocational pursuits due to impaired balance and mild left ue incoordination with visual motor changes from cerebellar CVA. He will benefit from OT 1-2 times a week for 6 weeks     Falls Risk Assessment (30 days)   [] Falls risk assessed and no intervention required. [x] Falls risk assessed (via clinical reasoning) and patient requires intervention due to being higher risk for falls  [x] Falls education provided, including       ASSESSMENT: The pt has chief complaints of  Decreased activity tolerance, balance, coordination, left side weakness  These symptoms as well as client factor and performance skill deficits create barriers to the patient engaging in desired occupational pursuits. Therefore, the patient is in need of skilled occupational therapy services to mitigate functional deficits in order to allow the patient to return to baseline, prevent further decline, and/or compensate for decreased functional abilities.       Functional Impairments and Activity Limitations (from functional questionnaire, intake, and interview)    [x]Reduced participation in functional mobility   []Reduced cognition   [x]Reduced participation in high level IADLs   [x]Reduced participation ADLs   [x]Reduced endurance  [x]Reduced fine motor control   []Reduced ROM   []Reduced sensation   [x]Reduced participation ADLs   [x]Reduced coordination  []Reduced strength   [x]Reduced balance   []Reduced posture   []Reduced safety awareness   [x]Reduced functional vision      Participation Restrictions   none   Prognosis/Rehab Potential:      []Excellent   [x]Good    []Fair   []Poor    Tolerance of evaluation/treatment: fatigues with standing and dynamic balance   []Excellent   [x]Good    []Fair   []Poor    Occupational Therapy Evaluation Complexity Justification   [x] A Occupational Profile/Medical and Therapy History  [] no personal factors and/or comorbidities that impact the plan of care; brief history relating to presenting problem  [x]1-2 personal factors and/or comorbidities that impact the plan of care; additional review of physical, cognitive, or psychosocial performance  []3 personal factors and/or comorbidities that impact the plan of care; extensive review of physical cognitive, psychosocial performance  [x] Patient Assessment:  [x] a total of 1-3 performance deficits relating to physical, cognitive, psychosocial limitations/restrictions  [] a total of 3-5 performance deficits relating to physical, cognitive, psychosocial limitations/restrictions  [] a total of 5 or more performance deficits relating to physical, cognitive, psychosocial limitations/restrictions  [x] A clinical presentation with:  [x] low complexity, limited amount of treatment options no assessment modification, no co-morbidities  [] moderate analytical complexity, detailed assessments, minimal to moderate modification of assessments, may have co-morbidities  [] high analytic complexity, comprehensive assessments, multiple treatment options, significant modifications of assessment, co-morbidities affecting performance  [x] will be independent with self care in community based pool locker room to be able to [facility]  3. Patient will be independent swimming laps for 20 minutes for wellness and avocational pursuit of swimming  [] Progressing: [] Met: [] Not Met: [] Adjusted    Long Term Goals: To be achieved by betty  1. Pt will will report a QuickDASH Symptom Severity Scale score of 15% or less indicating increased safety and functional independence in desired occupational pursuits by discharge.   [] Progressing: [] Met: [] Not Met: [] Adjusted  Electronically signed by:  Ramon Simmons, OT

## 2020-08-20 NOTE — FLOWSHEET NOTE
[address] [facility]  Phone: [phone]   Fax: [phone]    Physical Therapy Daily Treatment Note  Date:  2020    Patient Name:  Raul Solis    :  1975  MRN: [id number]  Medical/Treatment Diagnosis Information:  · Diagnosis: I63.9 (ICD-10-CM) - Cerebral infarction, unspecified  · Treatment Diagnosis: Impaired proprioception & coordination of BLEs, Impaired balance & gait  Insurance/Certification information:  PT Insurance Information: [employer]  20 PT visits per contract year. TeleHealth  after deductibile  Physician Information:  Referring Practitioner: Austen Fermin MD  Plan of care signed (Y/N): []  Yes [x]  No     Date of Patient follow up with Physician:      Progress Report: []  Yes  [x]  No     Date Range for reporting period:  Beginning  8/10/2020   Ending      Progress report due (10 Rx/or 30 days whichever is less):        Recertification due (POC duration/ or 90 days whichever is less):        Visit # Insurance Allowable Auth required? Date Range    20 PT visits  []  Yes  [x]  No Per contract year     Latex Allergy:  [x]NO      []YES  Preferred Language for Healthcare:   [x]English       []Other:      Functional Scale/Test Evaluation 8/10/2020 30 day  60 day  Discharge    Tinetti Assessment        TUG 26\" w/RW                   Pain level:  0/10  Location:  na    SUBJECTIVE:     Pt reports he is tired today, but not having any pain. L sided strength is coming back, but still lacking. Balance is still challenging.        OBJECTIVE: See eval      RESTRICTIONS/PRECAUTIONS:  HTN    Interventions/Exercises:   Therapeutic Exercises (50866) Resistance / level Sets/sec Reps Notes   Bike  NuStep  Step one    1.0   5 min      IB gastroc stretch   HR/TR     squats  1 x10 B UE support, VCs for slow and controlled                                       Neuromuscular Re-ed (89500) /  Gait Training (55856)       [facility] walking Shuttle Balance       Cable Column   · 4-way walkouts  · 3-way hip                  Mat table  · Bridge  · Plank on forearms  · Plank on hands   · Quadruped hip ext  · 1/2 kneeling            -frequent manual cues for posture and pelvic stability required for each   Tandem stance  NBOS with head nods and turns  Stagger stance with arm swings  Stagger stance with EC  SLS  30 sec  x1  30 sec  20 sec  5 sec x3 B  x10 B  x2 B  X 2 B  X 4 B Most challenging, VCs for shift R   Square wobble board   DF/PF taps  DF/PF balance     2  1 min   X 10 B  x1 B No UE support                                Therapeutic Activities (77726) /  Functional Tasks       //bars  · Walking lunges  · Side stepping in squat position          Gait training w/SPC  8/12: instructed pt on modified 3-point pattern -improved stamina observed this date   Gait with heavy SPC    X 140 feet, x 30 feet,   x 40 feet Good step length and frank, VCs for L toe out slightly to improve KAMALA and stability    Forward step ups  Lateral step ups  4 inch  4 inch 1  1 x10 L  x10 L No UE support, CGA  No UE support, CGA   Walking on grey mats   Forward  laterally    8 feet  8 feet   x2  x2   No UE support. CGA  No UE support, supervision                                 8/12:  Discussed importance of improved protein intake to better develop muscle and encourage neural development                     Manual Intervention ([id number])                                                     Pt. Education:  8/10:  -patient educated on diagnosis, prognosis and expectations for rehab  -all patient questions were answered    HEP instruction:       8/17: Added 3-way  Hip w/orange resistance band to HEP, handout provided  Access Code: [email or web address]   URL: [email or web address]   Date: 08/10/2020   Prepared by: Jeffrey Art     Exercises   · Supine Active Straight Leg Raise - 15-20 reps - 2x daily - 7x weekly   · Supine Bridge - 15-20 reps - 2x daily - 7x weekly   · Sidelying swelling/inflammation/restriction, improving soft tissue extensibility and allowing for proper ROM for normal function with   [] LE / Core stability: self care, mobility, lifting and ambulation. [] UE / Shoulder complex: self care, reaching, carrying, lifting, house/yardwork, driving, computer work. Modalities:  [] (02140) Vasopneumatic compression: Utilized vasopneumatic compression to decrease edema / swelling for the purpose of improving mobility and quad tone / recruitment which will allow for increased overall function including but not limited to self-care, transfers, ambulation, and ascending / descending stairs. Modalities:        Charges:  Timed Code Treatment Minutes: 42   Total Treatment Minutes: 42     [] EVAL - LOW (26184)   [] EVAL - MOD (62189)  [] EVAL - HIGH (61600)  [] RE-EVAL (36765)  [x] TE (30970) x  1    [] Ionto  [x] NMR (93637) x  1    [] Vaso  [] Manual (93778) x      [] Ultrasound  [x] TA x 1      [] Mech Traction (73542)  [] Gait Training x     [] ES (un) (82598):   [] Aquatic therapy x   [] Other:   [] Group:     GOALS:   Patient stated goal: walking better  [] Progressing: [] Met: [] Not Met: [] Adjusted    Therapist goals for Patient:   Short Term Goals: To be achieved in: 2 weeks  1. Independent in HEP and progression per patient tolerance, in order to prevent re-injury. [] Progressing: [] Met: [] Not Met: [] Adjusted  2. Patient will have a decrease in pain to facilitate improvement in movement, function, and ADLs as indicated by Functional Deficits. [] Progressing: [] Met: [] Not Met: [] Adjusted    Long Term Goals: To be achieved in: 6 weeks  1. Patient to demonstrate TUG score <12 seconds to demonstrate improved fall risk to assist with reaching prior level of function. [] Progressing: [] Met: [] Not Met: [] Adjusted  2.  Patient will demonstrate an increase in strength of B shoulder & hip complex to 4+/5 throughout and core activation to allow for proper functional mobility as indicated by patients Functional Deficits. [] Progressing: [] Met: [] Not Met: [] Adjusted  3. Patient to ambulate without ' x2 over uneven surfaces IND in order to safely return to community activities   [] Progressing: [] Met: [] Not Met: [] Adjusted  4. Patient will return to functional activities including navigating stairs without increased symptoms or restriction. [] Progressing: [] Met: [] Not Met: [] Adjusted  5. Patient to demonstrate Tinetti Assessment of 26/28 to safely resume workout routine unassisted   [] Progressing: [] Met: [] Not Met: [] Adjusted    Overall Progression Towards Functional goals/ Treatment Progress Update:  [] Patient is progressing as expected towards functional goals listed. [] Progression is slowed due to complexities/Impairments listed. [] Progression has been slowed due to co-morbidities. [x] Plan just implemented, too soon to assess goals progression <30days   [] Goals require adjustment due to lack of progress  [] Patient is not progressing as expected and requires additional follow up with physician  [] Other    Persisting Functional Limitations/Impairments:  []Sleeping []Sitting               [x]Standing []Transfers        [x]Walking [x]Kneeling               [x]Stairs [x]Squatting / bending   []ADLs [x]Reaching  [x]Lifting  [x]Housework  [x]Driving [x]Job related tasks  []Sports/Recreation []Other:        ASSESSMENT:   Pt fatigues quickly - also just came from OT. Plan to reduce to only one therapy each day so he doesn't get as tired. Challenged with high level balance - tends to weight shift towards R as he has diminished feeling in L side. Able to maintain neutral for a few seconds with altered KAMALA or dynamic surface without UE support. Improved gait mechanics with slight L toe out. Continue to progress midline, balance and stability, and gait.      Treatment/Activity Tolerance:  [x] Patient able to complete tx  [x] Patient limited by fatigue  [] Patient limited by pain  [] Patient limited by other medical complications  [] Other:     Prognosis: [x] Good [] Fair  [] Poor    Patient Requires Follow-up: [x] Yes  [] No    Plan for next treatment session:    Core stability, gait training    PLAN: See fuad. PT 2x / week for 6 weeks. [x] Continue per plan of care [] Alter current plan (see comments)  [] Plan of care initiated [] Hold pending MD visit [] Discharge    Electronically signed by: Marilee Mallory PT, DPT    Note: If patient does not return for scheduled/ recommended follow up visits, his note will serve as a discharge from care along with most recent update on progress.

## 2020-08-24 ENCOUNTER — HOSPITAL ENCOUNTER (OUTPATIENT)
Dept: OCCUPATIONAL THERAPY | Age: 45
Setting detail: THERAPIES SERIES
Discharge: HOME | End: 2020-08-24
Payer: COMMERCIAL

## 2020-08-24 ENCOUNTER — APPOINTMENT (OUTPATIENT)
Dept: PHYSICAL THERAPY | Age: 45
End: 2020-08-24
Payer: COMMERCIAL

## 2020-08-24 PROCEDURE — 97110 THERAPEUTIC EXERCISES: CPT

## 2020-08-24 PROCEDURE — 97112 NEUROMUSCULAR REEDUCATION: CPT

## 2020-08-24 PROCEDURE — 97530 THERAPEUTIC ACTIVITIES: CPT

## 2020-08-24 NOTE — PROGRESS NOTES
vertical dowel as base of support while reaching with opposite hand   10 each ue           Seated on dennis disc patient worked on over head and forward reach and then added reciprical march and over head reach   10 each Contact guard for midline awareness                                      Neuromuscular Re-ed / Therapeutic Activities       Patient introduced to supine exc based on LSVT big to increase strength, endurance bilateral coordination and proprioceptive awareness to complete each exc times 8 reps. Patient given verbal and demonstrated directions as well as written with photos. Patient provided with practice time with theapist and demonstrated good technique. Plan to progress to seated in next session. Manual Intervention                                                     Patient education:  OT evaluation and plan of care    Home Exercise Program:   HEP instruction: Written and verbal HEP instructions provided and reviewed:   Patient to work on eccentric control with sit to 1/2 stand with forward reach sit to full stand with over head reach and sit to stand with forward step and forward reach 5-10 reps each 2 times a day to increase activity tolerance, patient able to complete all with one lOB and was independent regaining balance     Patient introduced to supine exc based on LSVT big to increase strength, endurance bilateral coordination and proprioceptive awareness to complete each exc times 8 reps. Patient given verbal and demonstrated directions as well as written with photos. Patient provided with practice time with theapist and demonstrated good technique. Plan to progress to seated in next session.     Therapeutic Exercise and NMR:  [x] (51940) Provided verbal/tactile cueing for activities related to strengthening, flexibility, endurance, ROM  for improvements in scapular, scapulothoracic and UE control with self care, reaching, carrying, lifting, house/yardwork, driving/computer work.    [] (28942) Provided verbal/tactile cueing for activities related to improving balance, coordination, kinesthetic sense, posture, motor skill, proprioception  to assist with  scapular, scapulothoracic and UE control with self care, reaching, carrying, lifting, house/yardwork, driving/computer work.   [] Comments:    Therapeutic Activities:    [] (83908 or 06984) Provided verbal/tactile cueing for activities related to improving balance, coordination, kinesthetic sense, posture, motor skill, proprioception and motor activation to allow for proper function of scapular, scapulothoracic and UE control with self care, carrying, lifting, driving/computer work  [] Comments:    Home Exercise Program:    [] (27572) Reviewed/Progressed HEP activities related to strengthening, flexibility, endurance, ROM of scapular, scapulothoracic and UE control with self care, reaching, carrying, lifting, house/yardwork, driving/computer work  [] (33339) Reviewed/Progressed HEP activities related to improving balance, coordination, kinesthetic sense, posture, motor skill, proprioception of scapular, scapulothoracic and UE control with self care, reaching, carrying, lifting, house/yardwork, driving/computer work    [] Comments:    Manual Treatments:  PROM / STM / Oscillations-Mobs:  G-I, II, III, IV (PA's, Inf., Post.)  [] (52827) Provided manual therapy to mobilize soft tissue/joints of cervical/CT, scapular GHJ and UE for the purpose of modulating pain, promoting relaxation,  increasing ROM, reducing/eliminating soft tissue swelling/inflammation/restriction, improving soft tissue extensibility and allowing for proper ROM for normal function with self care, reaching, carrying, lifting, house/yardwork, driving/computer work  [] Comments:    ADL Training:  [] (36458) Provided self-care/home management training related to activities of daily living and compensatory training, and/or use of adaptive equipment   [] Comments:     Splinting:  [] Fabrication of:   [] (77176) Orthotic/Prosthetic Management, subsequent encounter  [] (51105) Orthotic management and training (fitting and assessment)  [] Comments:    Modalities:     Charges:  Timed Code Treatment Minutes: 40   Total Treatment Minutes: 40      [] EVAL (LOW) 87156   [] OT Re-eval (38165)  [] EVAL (MOD) 70233   [] EVAL (HIGH) 81930       [x] Ivon (56027) x   2  [] MTDOX(66725)  [x] NMR (64612) x    1 [] Estim (attended) (57602)   [] Manual ([id number]) x     [] US (31999)  [] TA () x     [] Paraffin (08392)  [] ADL  (88 649 24 60) x    [] Splint/L code:    [] Estim (unattended) (22 274457)  [] Fluidotherapy (83779)  [] Other:    GOALS:  Therapist goals for Patient:   Short Term Goals: To be achieved in: 30 days  1. Pt will be  Independent with functional mobility in community based pool and locker room to be able to return to swimming  [x]? Progressing: []? Met: []? Not Met: []? Adjusted  2. Pt will be independent with self care in community based pool locker room to be able to [facility]  3. Patient will be independent swimming laps for 20 minutes for wellness and avocational pursuit of swimming  [x]? Progressing: []? Met: []? Not Met: []? Adjusted  4. Patient will demonstrate good coordination to return to playing the piano without difficulty   Long Term Goals: To be achieved by betty  1. Pt will will report a QuickDASH Symptom Severity Scale score of 15% or less indicating increased safety and functional independence in desired occupational pursuits by discharge. [x]? Progressing: []? Met: []? Not Met: []? Adjusted    Progression Towards Functional goals:  [x] Patient is progressing as expected towards functional goals listed. [] Progression is slowed due to complexities listed. [] Progression has been slowed due to co-morbidities.   [] Plan just implemented, too soon to assess goals progression  [] All goals are met  [] Other: ASSESSMENT:  See eval    Treatment/Activity Tolerance:  [x] Patient tolerated treatment well [] Patient limited by fatique  [] Patient limited by pain  [] Patient limited by other medical complications  [] Other:     Prognosis: [x] Good [] Fair  [] Poor    Patient Requires Follow-up: [x] Yes  [] No    PLAN: See eval  [x] Continue per plan of care [] Alter current plan (see comments)  [] Plan of care initiated [] Hold pending MD visit [] Discharge    Electronically signed by: Cecile Hogue        Note: If patient does not return for scheduled/ recommended follow up visits, this note will serve as a discharge from care along with most recent update on progress.

## 2020-08-31 ENCOUNTER — HOSPITAL ENCOUNTER (OUTPATIENT)
Dept: OCCUPATIONAL THERAPY | Age: 45
Setting detail: THERAPIES SERIES
Discharge: HOME | End: 2020-08-31
Payer: COMMERCIAL

## 2020-08-31 ENCOUNTER — APPOINTMENT (OUTPATIENT)
Dept: PHYSICAL THERAPY | Age: 45
End: 2020-08-31
Payer: COMMERCIAL

## 2020-08-31 PROCEDURE — 97110 THERAPEUTIC EXERCISES: CPT

## 2020-08-31 PROCEDURE — 97112 NEUROMUSCULAR REEDUCATION: CPT

## 2020-08-31 NOTE — PROGRESS NOTES
[facility] - Outpatient Occupational Therapy      Hand Therapy Daily Treatment Note  Date:  2020    Patient: Lucretia Del Rosario   : 1975   MRN: [id number]  Referring Physician:  Latrice Horan       Medical Diagnosis Information: bilateral cerebellar cva                                                 Insurance information:  [employer] choice plus         Visit # Insurance Allowable Date Range   3/12 3/20      Date of Patient follow up with Physician:george    Progress Note: []  Yes  [x]  No  Next due by: Visit #10      Latex Allergy:  []No      []Yes  Pacemaker:  [] No       [] Yes     Preferred Language for Healthcare:   [x]English       []other:    Pain level: 0/10     SUBJECTIVE:  Patient reports he would like to have good coordination to play piano again and be [facility]    Patient in better spirits    Functional Disability Index: Quick DASH: raw score = 29; dysfunction =40    OBJECTIVE: See eval      RESTRICTIONS/PRECAUTIONS:     Exercises/Interventions Noted patient improved frank and gait speed ambulating in from waiting area with walker. Warmed up with justen chi based exc      Occupational Therapy Justen Chi Flowsheet    Date:  2020        Exercises/Poses: Objective    Warm up with justen chi based exc.      Beginning Movement  Midline with shoulder width stance, shoulder flexion to 90 degrees, elbow flexion, shoulder extension, knee flexion   10 reps   [employer]  Shoulder Abduction, Elbow flexion with shoulder extension, knee flexion   10 reps   Forward Stepping   Midline with shoulder width stance, alternating forward and backward stepping with shoulder height forward reaching   10 reps   Back and Forth Weight Shifts   Focusing on coordination between lower extremities with anterior and posterior weight shifting, unweighting front foot with posterior weight shift, and shoulder height forward reaching with anterior weight shift 10 reps   [facility]   Midline with shoulder width stance, elbows flexed, trunk flexion and lateral rotations back to midline, alternating sides       Parting the Horse[employer] [place]    Lower extremity lateral weight shifts to both sides, incorporating bilateral upper extremity lateral extension with lateral trunk flexion    10 reps   [address] [facility] [employer] shifting with trunk rotation and bilateral upper extremity coordination      Wave Hands Like Clouds   Lateral weight shifts with horizontal bilateral upper extremity coordinated movement           Patient then worked on dynamic balance reaching in all planes and squatting , changing lower body base of support to play giant jenga, independent throughout game and then able to put blocks away following game. Patient then worked on motor planning and dynamic balance carrying frying pan with ball in pan through clinic and ambulating to locker room, patient instructed in use of keyless locker and plan to start working on swimming on 9/14. Patient is receptive to plan , noted decreased step length and decreased stance time with fatigue without rolling walker at end of session.     Timed Code Treatment Minutes:  45    Total Treatment Minutes:  45  Treatment/Activity Tolerance:     [x]  Patient tolerated treatment well []  Patient limited by fatique    []  Patient limited by pain []  Patient limited by other medical complications   []  Other:     Prognosis: [x]  Good []  Fair  []  Poor    Patient Requires Follow-up:  []  Yes  []  No    Plan: [x]  Continue per plan of care []  Alter current plan (see comments)   []  Plan of care initiated []  Hold pending MD visit []  Discharge  Plan for Next Session: initiate aquatics on 9/14/20     Electronically signed by:  Marine Aly,             Resistance / level Sets/sec Reps Notes    3 sec hold 10 Verbal cues for lower body base of support tends to be too wide or coming up on toes     10 each ue            10 each ue            10 each Contact guard for midline awareness                                                                                Manual Intervention                                                     Patient education:  OT evaluation and plan of care    Home Exercise Program:   HEP instruction: Written and verbal HEP instructions provided and reviewed:   Patient to work on eccentric control with sit to 1/2 stand with forward reach sit to full stand with over head reach and sit to stand with forward step and forward reach 5-10 reps each 2 times a day to increase activity tolerance, patient able to complete all with one lOB and was independent regaining balance     Patient introduced to supine exc based on LSVT big to increase strength, endurance bilateral coordination and proprioceptive awareness to complete each exc times 8 reps. Patient given verbal and demonstrated directions as well as written with photos. Patient provided with practice time with theapist and demonstrated good technique. Plan to progress to seated in next session. Therapeutic Exercise and NMR:  [x] (36203) Provided verbal/tactile cueing for activities related to strengthening, flexibility, endurance, ROM  for improvements in scapular, scapulothoracic and UE control with self care, reaching, carrying, lifting, house/yardwork, driving/computer work.    [] (37555) Provided verbal/tactile cueing for activities related to improving balance, coordination, kinesthetic sense, posture, motor skill, proprioception  to assist with  scapular, scapulothoracic and UE control with self care, reaching, carrying, lifting, house/yardwork, driving/computer work.   [] Comments:    Therapeutic Activities:    [] (77418 or 95786) Provided verbal/tactile cueing for activities related to improving balance, coordination, kinesthetic sense, posture, motor skill, proprioception and motor activation to allow for proper function of scapular, scapulothoracic and UE control with self care, carrying, lifting, driving/computer work  [] Comments:    Home Exercise Program:    [] (59143) Reviewed/Progressed HEP activities related to strengthening, flexibility, endurance, ROM of scapular, scapulothoracic and UE control with self care, reaching, carrying, lifting, house/yardwork, driving/computer work  [] (22961) Reviewed/Progressed HEP activities related to improving balance, coordination, kinesthetic sense, posture, motor skill, proprioception of scapular, scapulothoracic and UE control with self care, reaching, carrying, lifting, house/yardwork, driving/computer work    [] Comments:    Manual Treatments:  PROM / STM / Oscillations-Mobs:  G-I, II, III, IV (PA's, Inf., Post.)  [] (31682) Provided manual therapy to mobilize soft tissue/joints of cervical/CT, scapular GHJ and UE for the purpose of modulating pain, promoting relaxation,  increasing ROM, reducing/eliminating soft tissue swelling/inflammation/restriction, improving soft tissue extensibility and allowing for proper ROM for normal function with self care, reaching, carrying, lifting, house/yardwork, driving/computer work  [] Comments:    ADL Training:  [] (89065) Provided self-care/home management training related to activities of daily living and compensatory training, and/or use of adaptive equipment   [] Comments:     Splinting:  [] Fabrication of:   [] (12905) Orthotic/Prosthetic Management, subsequent encounter  [] (01890) Orthotic management and training (fitting and assessment)  [] Comments:    Modalities:     Charges:  Timed Code Treatment Minutes: 40   Total Treatment Minutes: 40      [] EVAL (LOW) 25107   [] OT Re-eval (70336)  [] EVAL (MOD) 89383   [] EVAL (HIGH) 41286       [x] Ivon (93154) x   2  [] BYIKI(91884)  [x] NMR (26237) x    1 [] Estim (attended) (82290)   [] Manual ([facility]) x     [] US (95121)  [] TA (30888) x     [] Paraffin (71418)  [] ADL  (30 439 24 60) x    [] Splint/L

## 2020-09-03 ENCOUNTER — HOSPITAL ENCOUNTER (OUTPATIENT)
Dept: PHYSICAL THERAPY | Age: 45
Setting detail: THERAPIES SERIES
Discharge: HOME | End: 2020-09-03
Payer: COMMERCIAL

## 2020-09-03 ENCOUNTER — APPOINTMENT (OUTPATIENT)
Dept: OCCUPATIONAL THERAPY | Age: 45
End: 2020-09-03
Payer: COMMERCIAL

## 2020-09-03 PROCEDURE — 97112 NEUROMUSCULAR REEDUCATION: CPT

## 2020-09-03 PROCEDURE — 97116 GAIT TRAINING THERAPY: CPT

## 2020-09-03 NOTE — FLOWSHEET NOTE
Neuromuscular Re-ed (43625) /  Gait Training (46928)       [facility] walking       Shuttle Balance       Cable Column   · 4-way walkouts  · 3-way hip                  Mat table  · Bridge  · Marvene Monk on forearms  · Plank on hands   · Quadruped hip ext  · 1/2 kneeling  ·         Hi->low chops R to L  x60\" R15 B    4            -incr fatigue so stopped   Tandem stance  NBOS with head nods and turns  Stagger stance with arm swings  Stagger stance with EC  SLS  Most challenging, VCs for shift R   Square wobble board   DF/PF taps  DF/PF balance   No UE support                                Therapeutic Activities (54905) /  Functional Tasks       //bars  · Walking lunges  · Side stepping in squat position          Gait training w/SPC  8/12: instructed pt on modified 3-point pattern -improved stamina observed this date   Gait with heavy SPC   Good step length and frank, VCs for L toe out slightly to improve KAMALA and stability    Forward step ups  Lateral step ups  4 inch  4 inch No UE support, CGA  No UE support, CGA   Walking on grey mats   Forward  laterally    No UE support. CGA  No UE support, supervision     Gait training w/walking stick in L hand:  [facility]:  Descended ramp -> 20' -> ascended 3 steps -> 10' -> descended 3 steps then return    Blue line to & around center [place], navy line to perpendicular navy line to perpendicular blue line to starting point     SBA ->mod A        Normal gait  Feet on line      x3        x1  x1                         8/12:  Discussed importance of improved protein intake to better develop muscle and encourage neural development                     Manual Intervention ([address] [facility])                                                     Pt. Education:     9/3:  Discussed using RW and obtaining night light when having to get up in the middle of the night. Encouraged pt to remain seated and to transition slowly from sit to stand to minimize dizziness.     8/10:  -patient educated on diagnosis, prognosis and expectations for rehab  -all patient questions were answered    HEP instruction:       8/17: Added 3-way  Hip w/orange resistance band to HEP, handout provided  Access Code: [email or web address]   URL: [email or web address]   Date: 08/10/2020   Prepared by: Mariposa Lynn     Exercises   · Supine Active Straight Leg Raise - 15-20 reps - 2x daily - 7x weekly   · Supine Bridge - 15-20 reps - 2x daily - 7x weekly   · Sidelying Hip Abduction - 15-20 reps - 2x daily - 7x weekly   · Prone Hip Extension - 115-20 reps - 2x daily - 7x weekly   · Walking - 5 mins hold - 10x daily - 7x weekly     NMR and Therapeutic Activities:    [x] (85623 or 84828) Provided verbal/tactile cueing for activities related to improving balance, coordination, kinesthetic sense, posture, motor skill, proprioception and motor activation to allow for proper function of   [x] LE / Core, hip and LE with self care and ADLs  [] UE / Shoulder complex: cervical, postural, scapular, scapulothoracic and UE control with self care, carrying, lifting, driving, computer work.   [] (82894) Gait Re-education- Provided training and instruction to the patient for proper LE, core and hip recruitment, positioning, and eccentric body weight control with ambulation re-education, including ascending & descending stairs     Home Management Training / Self Care:  [] (80979) Provided self-care/home management training related to activities of daily living and compensatory training, and/or use of adaptive equipment for improvement with: ADLs and compensatory training, meal preparation, safety procedures and instruction in use of adaptive equipment, including bathing, grooming, dressing, personal hygiene, basic household cleaning and chores.      Therapeutic Exercise and NMR EXR  [x] (04865) Provided verbal/tactile cueing for activities related to strengthening, flexibility, endurance, ROM for improvements in  [x] LE / Core stability: LE, hip, and core control with self care, mobility, lifting, ambulation. [] UE / Shoulder complex: cervical, postural, scapular, scapulothoracic and UE control with self care, reaching, carrying, lifting, house/yardwork, driving, computer work. [x] (91849) Provided verbal/tactile cueing for activities related to improving balance, coordination, kinesthetic sense, posture, motor skill, proprioception to assist with   [x] LE / Core stability: LE, hip, and core control in self care, mobility, lifting, ambulation and eccentric single leg control. [] UE / Shoulder complex: cervical, scapular, scapulothoracic and UE control with self care, reaching, carrying, lifting, house/yardwork, driving, computer work.   [] (77875) Therapist is in constant attendance of 2 or more patients providing skilled therapy interventions, but not providing any significant amount of measurable one-on-one time to either patient, for improvements in  [] LE / Core stability: LE, hip, and core control in self care, mobility, lifting, ambulation and eccentric single leg control. [] UE / Shoulder complex: cervical, scapular, scapulothoracic and UE control with self care, reaching, carrying, lifting, house/yardwork, driving, computer work.      Home Exercise Program:    [] (49731) Reviewed/Progressed HEP activities related to strengthening, flexibility, endurance, ROM of   [] LE / Core stability: core, hip and LE for functional self-care, mobility, lifting and ambulation/stair navigation   [] UE / Shoulder complex: cervical, postural, scapular, scapulothoracic and UE control with self care, reaching, carrying, lifting, house/yardwork, driving, computer work  [] (96101)Reviewed/Progressed HEP activities related to improving balance, coordination, kinesthetic sense, posture, motor skill, proprioception of   [] LE: core, hip and LE for self care, mobility, lifting, and ambulation/stair navigation    [] UE / Shoulder complex: cervical, postural,  scapular, scapulothoracic and Progressing: [] Met: [] Not Met: [] Adjusted    Long Term Goals: To be achieved in: 6 weeks  1. Patient to demonstrate TUG score <12 seconds to demonstrate improved fall risk to assist with reaching prior level of function. [] Progressing: [] Met: [] Not Met: [] Adjusted  2. Patient will demonstrate an increase in strength of B shoulder & hip complex to 4+/5 throughout and core activation to allow for proper functional mobility as indicated by patients Functional Deficits. [] Progressing: [] Met: [] Not Met: [] Adjusted  3. Patient to ambulate without ' x2 over uneven surfaces IND in order to safely return to community activities   [] Progressing: [] Met: [] Not Met: [] Adjusted  4. Patient will return to functional activities including navigating stairs without increased symptoms or restriction. [] Progressing: [] Met: [] Not Met: [] Adjusted  5. Patient to demonstrate Tinetti Assessment of 26/28 to safely resume workout routine unassisted   [] Progressing: [] Met: [] Not Met: [] Adjusted    Overall Progression Towards Functional goals/ Treatment Progress Update:  [] Patient is progressing as expected towards functional goals listed. [] Progression is slowed due to complexities/Impairments listed. [] Progression has been slowed due to co-morbidities. [x] Plan just implemented, too soon to assess goals progression <30days   [] Goals require adjustment due to lack of progress  [] Patient is not progressing as expected and requires additional follow up with physician  [] Other    Persisting Functional Limitations/Impairments:  []Sleeping []Sitting               [x]Standing []Transfers        [x]Walking [x]Kneeling               [x]Stairs [x]Squatting / bending   []ADLs [x]Reaching  [x]Lifting  [x]Housework  [x]Driving [x]Job related tasks  []Sports/Recreation []Other:        ASSESSMENT:    Gait is steadily progressing, continues to list to L or R at times which increases as fatigue levels increase. Patient requested to end treatment early due to increased fatigue levels, fatigue rapidly increased during gait training. Treatment/Activity Tolerance:  [x] Patient able to complete tx  [x] Patient limited by fatigue  [] Patient limited by pain  [] Patient limited by other medical complications  [] Other:     Prognosis: [x] Good [] Fair  [] Poor    Patient Requires Follow-up: [x] Yes  [] No    Plan for next treatment session:    Core stability, gait training    PLAN: See eval. PT 2x / week for 6 weeks. [x] Continue per plan of care [] Alter current plan (see comments)  [] Plan of care initiated [] Hold pending MD visit [] Discharge    Electronically signed by: Mariposa Lynn PT, DPT    Note: If patient does not return for scheduled/ recommended follow up visits, his note will serve as a discharge from care along with most recent update on progress.

## 2020-09-10 ENCOUNTER — APPOINTMENT (OUTPATIENT)
Dept: PHYSICAL THERAPY | Age: 45
End: 2020-09-10
Payer: COMMERCIAL

## 2020-09-10 ENCOUNTER — HOSPITAL ENCOUNTER (OUTPATIENT)
Dept: OCCUPATIONAL THERAPY | Age: 45
Setting detail: THERAPIES SERIES
Discharge: HOME | End: 2020-09-10
Payer: COMMERCIAL

## 2020-09-10 ENCOUNTER — TELEPHONE (OUTPATIENT)
Dept: PHYSICAL THERAPY | Age: 45
End: 2020-09-10

## 2020-09-10 PROCEDURE — 97530 THERAPEUTIC ACTIVITIES: CPT

## 2020-09-10 PROCEDURE — 97112 NEUROMUSCULAR REEDUCATION: CPT

## 2020-09-10 PROCEDURE — 97110 THERAPEUTIC EXERCISES: CPT

## 2020-09-10 NOTE — TELEPHONE ENCOUNTER
Heavenly mar/Gricelda at [facility], requested a date extension due to only using 5/10 visits. Rita Toledo agreed and extended date to 10/9/2020 for 10 visits.   If requesting more visits, either call and/or fax reassessment and request to Rita Toledo.     Phone#  [phone]                          Ext [phone]     Fax # [phone]     Denny Philip PT

## 2020-09-10 NOTE — FLOWSHEET NOTE
[facility] - Outpatient Occupational Therapy      Occupational Therapy Daily Treatment Note  Date:  9/10/2020    Patient: Jenn Chakraborty   : 1975   MRN: [id number]  Referring Physician:  Fausto Arriola Diagnosis Information: bilateral cerebellar cva                                                 Insurance information:  hSer lubin plus         Visit # Insurance Allowable Date Range         Date of Patient follow up with Physician:george    Progress Note: []  Yes  [x]  No  Next due by: Visit #10      Latex Allergy:  []No      []Yes  Pacemaker:  [] No       [] Yes     Preferred Language for Healthcare:   [x]English       []other:    Pain level: 0/10     SUBJECTIVE:  Patient reports no new information on this date. Functional Disability Index: Quick DASH: raw score = 29; dysfunction =40    OBJECTIVE: See eval      RESTRICTIONS/PRECAUTIONS:     Exercises/Interventions: Session initiated with 5 minutes UBE for cardiovascular and ROM benefits with pt reporting good stretch through back and shoulders. Pt transferred to modified plank position on wedge at elevated mat to complete forearm<>hand KAMALA changes, along with alternating reach with opposing knee lift x10 each with verbal cues for pelvic and trunk alignment. Pt then completed squats holding wedge and lifting into overhead press x8 for additional strengthening, trunk lengthening, and balance. Pt completed alternating knee lifts to kneel on mat with wedge placed in front of pt in case of instability with pt able to maintain UEs in 90 degrees shoulder flexion x10. Pt then sat edge of mat for FM coordination activity using pincer grasp on clothespin tree for in-hand manipulation with pt dropping 1/12 clothespins during palm>finger translation. Pt used tool to retrieve pom poms with assistance for initial positioning for grasp.  Session concluded with finger extension and eccentric control task using rubber band board with pt dropping 3/12 rubber bands during in-hand manipulation with increased difficulty noted on L side. Resistance / level Sets/sec Reps Notes    3 sec hold 10 Verbal cues for lower body base of support tends to be too wide or coming up on toes     10 each ue            10 each ue            10 each Contact guard for midline awareness                                                                                Manual Intervention                                                     Patient education:  OT evaluation and plan of care    Home Exercise Program:   HEP instruction: Written and verbal HEP instructions provided and reviewed:   Patient to work on eccentric control with sit to 1/2 stand with forward reach sit to full stand with over head reach and sit to stand with forward step and forward reach 5-10 reps each 2 times a day to increase activity tolerance, patient able to complete all with one lOB and was independent regaining balance     Patient introduced to supine exc based on LSVT big to increase strength, endurance bilateral coordination and proprioceptive awareness to complete each exc times 8 reps. Patient given verbal and demonstrated directions as well as written with photos. Patient provided with practice time with theapist and demonstrated good technique. Plan to progress to seated in next session.     Therapeutic Exercise and NMR:  [x] (99951) Provided verbal/tactile cueing for activities related to strengthening, flexibility, endurance, ROM  for improvements in scapular, scapulothoracic and UE control with self care, reaching, carrying, lifting, house/yardwork, driving/computer work.    [] (48574) Provided verbal/tactile cueing for activities related to improving balance, coordination, kinesthetic sense, posture, motor skill, proprioception  to assist with  scapular, scapulothoracic and UE control with self care, reaching, carrying, lifting, house/yardwork, driving/computer work.   [] Comments:    Therapeutic Activities:    [x] (91235 or 82675) Provided verbal/tactile cueing for activities related to improving balance, coordination, kinesthetic sense, posture, motor skill, proprioception and motor activation to allow for proper function of scapular, scapulothoracic and UE control with self care, carrying, lifting, driving/computer work  [] Comments:    Home Exercise Program:    [] (76978) Reviewed/Progressed HEP activities related to strengthening, flexibility, endurance, ROM of scapular, scapulothoracic and UE control with self care, reaching, carrying, lifting, house/yardwork, driving/computer work  [] (97584) Reviewed/Progressed HEP activities related to improving balance, coordination, kinesthetic sense, posture, motor skill, proprioception of scapular, scapulothoracic and UE control with self care, reaching, carrying, lifting, house/yardwork, driving/computer work    [] Comments:    Manual Treatments:  PROM / STM / Oscillations-Mobs:  G-I, II, III, IV (PA's, Inf., Post.)  [] (60034) Provided manual therapy to mobilize soft tissue/joints of cervical/CT, scapular GHJ and UE for the purpose of modulating pain, promoting relaxation,  increasing ROM, reducing/eliminating soft tissue swelling/inflammation/restriction, improving soft tissue extensibility and allowing for proper ROM for normal function with self care, reaching, carrying, lifting, house/yardwork, driving/computer work  [] Comments:    ADL Training:  [] (80377) Provided self-care/home management training related to activities of daily living and compensatory training, and/or use of adaptive equipment   [] Comments:     Splinting:  [] Fabrication of:   [] (10839) Orthotic/Prosthetic Management, subsequent encounter  [] (12686) Orthotic management and training (fitting and assessment)  [] Comments:    Modalities:     Charges:  Timed Code Treatment Minutes: 45   Total Treatment Minutes: 45     [] LEANN (LOW) 78164   [] OT Re-eval (03992)  [] EVAL (MOD) 95786   [] EVAL (HIGH) 39272       [x] Ivon (08365) x   1  [] CFYTY(78673)  [x] NMR (79285) x    1 [] Estim (attended) (75313)   [] Manual ([id number]) x     [] US (74305)  [x] TA (85538) x    1 [] Paraffin (07389)  [] ADL  (20260) x    [] Splint/L code:    [] Estim (unattended) (22 [id number])  [] Fluidotherapy (08641)  [] Other:    GOALS:  Therapist goals for Patient:   Short Term Goals: To be achieved in: 30 days  1. Pt will be  Independent with functional mobility in community based pool and locker room to be able to return to swimming  [x]? Progressing: []? Met: []? Not Met: []? Adjusted  2. Pt will be independent with self care in community based pool locker room to be able [facility]  3. Patient will be independent swimming laps for 20 minutes for wellness and avocational pursuit of swimming  [x]? Progressing: []? Met: []? Not Met: []? Adjusted  4. Patient will demonstrate good coordination to return to playing the piano without difficulty   Long Term Goals: To be achieved by betty  1. Pt will will report a QuickDASH Symptom Severity Scale score of 15% or less indicating increased safety and functional independence in desired occupational pursuits by discharge. [x]? Progressing: []? Met: []? Not Met: []? Adjusted    Progression Towards Functional goals:  [x] Patient is progressing as expected towards functional goals listed. [] Progression is slowed due to complexities listed. [] Progression has been slowed due to co-morbidities.   [] Plan just implemented, too soon to assess goals progression  [] All goals are met  [] Other:     ASSESSMENT:  See eval    Treatment/Activity Tolerance:  [x] Patient tolerated treatment well [] Patient limited by fatique  [] Patient limited by pain  [] Patient limited by other medical complications  [] Other:     Prognosis: [x] Good [] Fair  [] Poor    Patient Requires Follow-up: [x] Yes  [] No    PLAN: See eval  [x] Continue per plan of care [] Alter current plan (see comments)  [] Plan of care initiated [] Hold pending MD visit [] Discharge    Electronically signed by: JAQUELINE Fry/L [id number]      Note: If patient does not return for scheduled/ recommended follow up visits, this note will serve as a discharge from care along with most recent update on progress.

## 2020-09-14 ENCOUNTER — HOSPITAL ENCOUNTER (OUTPATIENT)
Dept: PHYSICAL THERAPY | Age: 45
Setting detail: THERAPIES SERIES
Discharge: HOME | End: 2020-09-14
Payer: COMMERCIAL

## 2020-09-14 ENCOUNTER — HOSPITAL ENCOUNTER (OUTPATIENT)
Dept: OCCUPATIONAL THERAPY | Age: 45
Setting detail: THERAPIES SERIES
Discharge: HOME | End: 2020-09-14
Payer: COMMERCIAL

## 2020-09-14 PROCEDURE — 97112 NEUROMUSCULAR REEDUCATION: CPT

## 2020-09-14 PROCEDURE — 97110 THERAPEUTIC EXERCISES: CPT

## 2020-09-14 NOTE — FLOWSHEET NOTE
[facility] - Outpatient Occupational Therapy      Occupational Therapy Daily Treatment Note  Date:  2020    Patient: Fam Guidry   : 1975   MRN: [id number]  Referring Physician:  Zeenat Peterson       Medical Diagnosis Information: bilateral cerebellar cva                                                 Insurance information:  Diane lubin plus         Visit # Insurance Allowable Date Range         Date of Patient follow up with Physician:george    Progress Note: []  Yes  [x]  No  Next due by: Visit #10      Latex Allergy:  []No      []Yes  Pacemaker:  [] No       [] Yes     Preferred Language for Healthcare:   [x]English       []other:    Pain level: 0/10     SUBJECTIVE:  Patient reports no new information on this date. Functional Disability Index: Quick DASH: raw score = 29; dysfunction =40    OBJECTIVE: See eval      RESTRICTIONS/PRECAUTIONS:     Exercises/Interventions: Patient seen in pool on this date in preparation for previous wellness pursuit of swimming. Patient ambulated with water shoes and rw with supervision to pool nahun. Prepared for pool changing clothes independently. Patient able to transfer to short sit at end of pool nahun with stand by assist.  Initiated swim with freestyle however patient unable to engage left ue to stay straight. Therapist instructed patient in ue catch up drill to promote bilateral coordination with swimming, still having difficulty staying straight . Patient then given resistance dumb bell in water for proprioceptive input during drill and patient now able to complete free style and stay straight in pool. Patient completed 5 laps in pool. Patient then worked on forward jumping progression one lap in pool finishing with freestyle one lap demonstrating improved bilateral coordination with swimming.   Patient swam to ladder and was able to exit pool with stand by assist,  Ambulated about 50 feet to bench and instructed to rest prior to changing clothing. Patient then changed clothing independently. Patient had very positive alert state following treatment. Resistance / level Sets/sec Reps Notes    3 sec hold 10 Verbal cues for lower body base of support tends to be too wide or coming up on toes     10 each ue            10 each ue            10 each Contact guard for midline awareness                                                                                Manual Intervention                                                     Patient education:  OT evaluation and plan of care    Home Exercise Program:   HEP instruction: Written and verbal HEP instructions provided and reviewed:   Patient to work on eccentric control with sit to 1/2 stand with forward reach sit to full stand with over head reach and sit to stand with forward step and forward reach 5-10 reps each 2 times a day to increase activity tolerance, patient able to complete all with one lOB and was independent regaining balance     Patient introduced to supine exc based on LSVT big to increase strength, endurance bilateral coordination and proprioceptive awareness to complete each exc times 8 reps. Patient given verbal and demonstrated directions as well as written with photos. Patient provided with practice time with theapist and demonstrated good technique. Plan to progress to seated in next session. Therapeutic Exercise and NMR:  [x] (88788) Provided verbal/tactile cueing for activities related to strengthening, flexibility, endurance, ROM  for improvements in scapular, scapulothoracic and UE control with self care, reaching, carrying, lifting, house/yardwork, driving/computer work.     [x] (65115) Provided verbal/tactile cueing for activities related to improving balance, coordination, kinesthetic sense, posture, motor skill, proprioception  to assist with  scapular, scapulothoracic and UE control with self care, reaching, carrying, lifting, house/yardwork, driving/computer work.   [] Comments:    Therapeutic Activities:    [x] (43035 or 03067) Provided verbal/tactile cueing for activities related to improving balance, coordination, kinesthetic sense, posture, motor skill, proprioception and motor activation to allow for proper function of scapular, scapulothoracic and UE control with self care, carrying, lifting, driving/computer work  [] Comments:    Home Exercise Program:    [] (67718) Reviewed/Progressed HEP activities related to strengthening, flexibility, endurance, ROM of scapular, scapulothoracic and UE control with self care, reaching, carrying, lifting, house/yardwork, driving/computer work  [] (40519) Reviewed/Progressed HEP activities related to improving balance, coordination, kinesthetic sense, posture, motor skill, proprioception of scapular, scapulothoracic and UE control with self care, reaching, carrying, lifting, house/yardwork, driving/computer work    [] Comments:    Manual Treatments:  PROM / STM / Oscillations-Mobs:  G-I, II, III, IV (PA's, Inf., Post.)  [] (07426) Provided manual therapy to mobilize soft tissue/joints of cervical/CT, scapular GHJ and UE for the purpose of modulating pain, promoting relaxation,  increasing ROM, reducing/eliminating soft tissue swelling/inflammation/restriction, improving soft tissue extensibility and allowing for proper ROM for normal function with self care, reaching, carrying, lifting, house/yardwork, driving/computer work  [] Comments:    ADL Training:  [] (97756) Provided self-care/home management training related to activities of daily living and compensatory training, and/or use of adaptive equipment   [] Comments:     Splinting:  [] Fabrication of:   [] (53373) Orthotic/Prosthetic Management, subsequent encounter  [] (86137) Orthotic management and training (fitting and assessment)  [] Comments:    Modalities:     Charges:  Timed Code Treatment Minutes: 45   Total Treatment Minutes: Yes  [] No    PLAN: See eval  [x] Continue per plan of care [] Alter current plan (see comments)  [] Plan of care initiated [] Hold pending MD visit [] Discharge    Electronically signed by:           Note: If patient does not return for scheduled/ recommended follow up visits, this note will serve as a discharge from care along with most recent update on progress.

## 2020-09-14 NOTE — FLOWSHEET NOTE
[facility]  Phone: [phone]   Fax: [phone]    Physical Therapy Daily Treatment Note  Date:  2020    Patient Name:  Mulugeta Golden    :  1975  MRN: [id number]  Medical/Treatment Diagnosis Information:  · Diagnosis: I63.9 (ICD-10-CM) - Cerebral infarction, unspecified  · Treatment Diagnosis: Impaired proprioception & coordination of BLEs, Impaired balance & gait  Insurance/Certification information:  PT Insurance Information: [employer]  20 PT visits per contract year. TeleHealth  after deductibile  Physician Information:  Referring Practitioner: Jennifer Angel MD  Plan of care signed (Y/N): []  Yes [x]  No     Date of Patient follow up with Physician:      Progress Report: []  Yes  [x]  No     Date Range for reporting period:  Beginning  8/10/2020   Ending      Progress report due (10 Rx/or 30 days whichever is less):   2501     Recertification due (POC duration/ or 90 days whichever is less):        Visit # Insurance Allowable Auth required? Date Range   6/10 10 visits approved  20 PT visits  []  Yes  [x]  No 8/10 to , extended to 10/9/2020     Latex Allergy:  [x]NO      []YES  Preferred Language for Healthcare:   [x]English       []Other:      Functional Scale/Test Evaluation 8/10/2020 30 day  60 day  Discharge    Tinetti Assessment        TUG 26\" w/RW                   Pain level:  0/10  Location:  na    SUBJECTIVE:   Pt reports he is tired and a little dizzy from swimming. Just finished with OT in pool. Wasn't too busy over the weekend. Still feels like his balance is way off, but getting better.          OBJECTIVE:   : pt with L LOB while walking with RW into clinic, PT able to correct without fall; pt reports he was tired from the pool and didn't eat breakfast - /103      RESTRICTIONS/PRECAUTIONS:  HTN    Interventions/Exercises:   Therapeutic Exercises (33917) Resistance / level Sets/sec Reps Notes Bike  NuStep  Step one      IB gastroc stretch   HR/TR     squats  1 x10 B UE support, VCs for slow and controlled    Standing LE strength:  Ham curl  Hip abd   Hip ext     7.5#  7.5#  7.5#   1  1  1   x10 B  x10 B  x10 B                                Neuromuscular Re-ed (77994) /  Gait Training (88100)       [facility] walking       Shuttle Balance       Cable Column   · 4-way walkouts  · 3-way hip 4 pl  3 ea way                 Mat table  · Bridge  · Plank on forearms  · Plank on hands   · Quadruped hip ext  · 1/2 kneeling  ·         Hi->low chops R to L              -incr fatigue so stopped   Tandem stance  NBOS with head nods and turns  Stagger stance with arm swings  Stagger stance with EC  SLS  Most challenging, VCs for shift R   Square wobble board   DF/PF taps  DF/PF balance   No UE support    Airex  Stagger stance with EO  Stagger stance with EC  NBOS balance  NBOS with OH ball lift  NBOS with thor rot holding ball     30 sec  10 sec  30 sec  1  1   x2  x2 B  x2  x10  x10 CGA, TC for pushing into R foot - also wearing 7.5# weight on ankles for increased compression                         Therapeutic Activities (65832) /  Functional Tasks       //bars  · Walking lunges  · Side stepping in squat position          Gait training w/SPC  8/12: instructed pt on modified 3-point pattern -improved stamina observed this date   Gait with heavy SPC         Gait training with SBQC and 7.5# ankle weights  x145 feetGood step length and frank, VCs for L toe out slightly to improve KAMALA and stability       Increased input improved balance   Forward step ups  Lateral step ups   No UE support, CGA  No UE support, CGA   Walking on grey mats   Forward  laterally    No UE support.  CGA  No UE support, supervision     Gait training w/walking stick in L hand:  [facility]:  Descended ramp -> 20' -> ascended 3 steps -> 10' -> descended 3 steps then return    Blue line to & around center [place] [employer] [place] [employer] [place], navy line to perpendicular [facility] Handshake to perpendicular blue line to starting point     SBA ->mod A        Normal gait  Feet on line                               8/12:  Discussed importance of improved protein intake to better develop muscle and encourage neural development                     Manual Intervention ([id number])                                                     Pt. Education:     9/3:  Discussed using RW and obtaining night light when having to get up in the middle of the night. Encouraged pt to remain seated and to transition slowly from sit to stand to minimize dizziness. 8/10:  -patient educated on diagnosis, prognosis and expectations for rehab  -all patient questions were answered    HEP instruction:       8/17: Added 3-way  Hip w/orange resistance band to HEP, handout provided  Access Code: [email or web address]   URL: [email or web address]   Date: 08/10/2020   Prepared by: Mariposa Lynn     Exercises   · Supine Active Straight Leg Raise - 15-20 reps - 2x daily - 7x weekly   · Supine Bridge - 15-20 reps - 2x daily - 7x weekly   · Sidelying Hip Abduction - 15-20 reps - 2x daily - 7x weekly   · Prone Hip Extension - 115-20 reps - 2x daily - 7x weekly   · Walking - 5 mins hold - 10x daily - 7x weekly     NMR and Therapeutic Activities:    [x] (62322 or 36691) Provided verbal/tactile cueing for activities related to improving balance, coordination, kinesthetic sense, posture, motor skill, proprioception and motor activation to allow for proper function of   [x] LE / Core, hip and LE with self care and ADLs  [] UE / Shoulder complex: cervical, postural, scapular, scapulothoracic and UE control with self care, carrying, lifting, driving, computer work.   [] (50365) Gait Re-education- Provided training and instruction to the patient for proper LE, core and hip recruitment, positioning, and eccentric body weight control with ambulation re-education, including ascending & descending stairs     Home Management Training / Self Care:  [] (95428) stability: core, hip and LE for functional self-care, mobility, lifting and ambulation/stair navigation   [] UE / Shoulder complex: cervical, postural, scapular, scapulothoracic and UE control with self care, reaching, carrying, lifting, house/yardwork, driving, computer work  [] (07587)Reviewed/Progressed HEP activities related to improving balance, coordination, kinesthetic sense, posture, motor skill, proprioception of   [] LE: core, hip and LE for self care, mobility, lifting, and ambulation/stair navigation    [] UE / Shoulder complex: cervical, postural,  scapular, scapulothoracic and UE control with self care, reaching, carrying, lifting, house/yardwork, driving, computer work    Manual Treatments:  PROM / STM / Oscillations-Mobs:  G-I, II, III, IV (PA's, Inf., Post.)  [] (91343) Provided manual therapy to mobilize shoulder complex, hip, LE, and/or cervicothoracic/LS spine soft tissue/joints for the purpose of modulating pain, promoting relaxation,  increasing ROM, reducing/eliminating soft tissue swelling/inflammation/restriction, improving soft tissue extensibility and allowing for proper ROM for normal function with   [] LE / Core stability: self care, mobility, lifting and ambulation. [] UE / Shoulder complex: self care, reaching, carrying, lifting, house/yardwork, driving, computer work. Modalities:  [] (07105) Vasopneumatic compression: Utilized vasopneumatic compression to decrease edema / swelling for the purpose of improving mobility and quad tone / recruitment which will allow for increased overall function including but not limited to self-care, transfers, ambulation, and ascending / descending stairs.        Modalities:        Charges:  Timed Code Treatment Minutes: 45   Total Treatment Minutes: 45     [] EVAL - LOW (92762)   [] EVAL - MOD (46180)  [] EVAL - HIGH (19994)  [] RE-EVAL (77937)  [x] TE (57023) x 1     [] Ionto  [x] NMR (24930) x  2    [] Vaso  [] Manual (57392) x      [] progression <30days   [] Goals require adjustment due to lack of progress  [] Patient is not progressing as expected and requires additional follow up with physician  [] Other    Persisting Functional Limitations/Impairments:  []Sleeping []Sitting               [x]Standing []Transfers        [x]Walking [x]Kneeling               [x]Stairs [x]Squatting / bending   []ADLs [x]Reaching  [x]Lifting  [x]Housework  [x]Driving [x]Job related tasks  []Sports/Recreation []Other:        ASSESSMENT:  Pt avoids full weight bearing on R, however is able to maintain neutral with cues. Also improved stability with weights on ankles - advised pt to purchase weighted vest to facilitate compression at spine which will help him with proprioception and kinesthesia and avoid LOB. Treatment/Activity Tolerance:  [x] Patient able to complete tx  [x] Patient limited by fatigue  [] Patient limited by pain  [] Patient limited by other medical complications  [] Other:     Prognosis: [x] Good [] Fair  [] Poor    Patient Requires Follow-up: [x] Yes  [] No    Plan for next treatment session:    Core stability, gait training    PLAN: See fuad. PT 2x / week for 6 weeks. [x] Continue per plan of care [] Alter current plan (see comments)  [] Plan of care initiated [] Hold pending MD visit [] Discharge    Electronically signed by: Huber Campbell PT, DPT    Note: If patient does not return for scheduled/ recommended follow up visits, his note will serve as a discharge from care along with most recent update on progress.

## 2020-09-17 ENCOUNTER — HOSPITAL ENCOUNTER (OUTPATIENT)
Dept: PHYSICAL THERAPY | Age: 45
Setting detail: THERAPIES SERIES
Discharge: HOME | End: 2020-09-17
Payer: COMMERCIAL

## 2020-09-17 NOTE — PROGRESS NOTES
Physical Therapy  Cancellation/No-show Note  Patient Name:  Gloria Mack  :  1975   Date:  2020  Cancelled visits to date: 1  No-shows to date: 1    Patient status for today's appointment patient:  [x]  Cancelled   []  Rescheduled appointment  []  No-show 2020     Reason given by patient:  []  Patient ill  []  Conflicting appointment  [x]  No transportation    []  Conflict with work  []  No reason given  []  Other:     Comments:      Phone call information:   []  Phone call made today to patient at 8:20am at number provided:      []  Patient answered, conversation as follows: missed apt this morning, pt thought it was at 9:45am, wanted to add it on to the end of his episode, will be present for apts next week on mon and Thursday - reviewed times    []  Patient did not answer, message left as follows:  [x]  Phone call not made today    Electronically signed by:  Elba Nunez, PT, DPT

## 2020-09-21 ENCOUNTER — APPOINTMENT (OUTPATIENT)
Dept: OCCUPATIONAL THERAPY | Age: 45
End: 2020-09-21
Payer: COMMERCIAL

## 2020-09-24 ENCOUNTER — OFFICE VISIT (OUTPATIENT)
Dept: PRIMARY CARE CLINIC | Age: 45
End: 2020-09-24
Payer: COMMERCIAL

## 2020-09-24 ENCOUNTER — HOSPITAL ENCOUNTER (OUTPATIENT)
Dept: PHYSICAL THERAPY | Age: 45
Setting detail: THERAPIES SERIES
Discharge: HOME | End: 2020-09-24
Payer: COMMERCIAL

## 2020-09-24 ENCOUNTER — HOSPITAL ENCOUNTER (OUTPATIENT)
Dept: OCCUPATIONAL THERAPY | Age: 45
Setting detail: THERAPIES SERIES
Discharge: HOME | End: 2020-09-24
Payer: COMMERCIAL

## 2020-09-24 VITALS
TEMPERATURE: 97.2 F | SYSTOLIC BLOOD PRESSURE: 110 MMHG | BODY MASS INDEX: 26.18 KG/M2 | HEIGHT: 74 IN | OXYGEN SATURATION: 98 % | HEART RATE: 76 BPM | WEIGHT: 204 LBS | RESPIRATION RATE: 16 BRPM | DIASTOLIC BLOOD PRESSURE: 70 MMHG

## 2020-09-24 PROCEDURE — 97112 NEUROMUSCULAR REEDUCATION: CPT

## 2020-09-24 PROCEDURE — 97116 GAIT TRAINING THERAPY: CPT

## 2020-09-24 PROCEDURE — 99204 OFFICE O/P NEW MOD 45 MIN: CPT | Performed by: INTERNAL MEDICINE

## 2020-09-24 PROCEDURE — 97113 AQUATIC THERAPY/EXERCISES: CPT

## 2020-09-24 RX ORDER — LOSARTAN POTASSIUM 50 MG/1
50 TABLET ORAL DAILY
Qty: 90 TABLET | Refills: 3 | Status: SHIPPED | OUTPATIENT
Start: 2020-09-24 | End: 2021-10-18

## 2020-09-24 RX ORDER — ATORVASTATIN CALCIUM 80 MG/1
80 TABLET, FILM COATED ORAL NIGHTLY
Qty: 30 TABLET | Refills: 3 | Status: SHIPPED | OUTPATIENT
Start: 2020-09-24 | End: 2021-01-21

## 2020-09-24 RX ORDER — NIFEDIPINE 60 MG/1
60 TABLET, FILM COATED, EXTENDED RELEASE ORAL EVERY EVENING
Qty: 90 TABLET | Refills: 3 | Status: SHIPPED | OUTPATIENT
Start: 2020-09-24 | End: 2021-10-17

## 2020-09-24 RX ORDER — PANTOPRAZOLE SODIUM 40 MG/1
40 TABLET, DELAYED RELEASE ORAL
Qty: 90 TABLET | Refills: 3 | Status: SHIPPED | OUTPATIENT
Start: 2020-09-24 | End: 2020-10-16 | Stop reason: SDUPTHER

## 2020-09-24 RX ORDER — CLOPIDOGREL BISULFATE 75 MG/1
75 TABLET ORAL DAILY
Qty: 90 TABLET | Refills: 3 | Status: SHIPPED | OUTPATIENT
Start: 2020-09-24 | End: 2022-06-10 | Stop reason: SDUPTHER

## 2020-09-24 RX ORDER — ASPIRIN 81 MG/1
81 TABLET ORAL DAILY
Qty: 90 TABLET | Refills: 5 | Status: SHIPPED | OUTPATIENT
Start: 2020-09-24 | End: 2022-06-10 | Stop reason: SDUPTHER

## 2020-09-24 RX ORDER — CARVEDILOL 25 MG/1
25 TABLET ORAL 2 TIMES DAILY WITH MEALS
Qty: 180 TABLET | Refills: 3 | Status: SHIPPED | OUTPATIENT
Start: 2020-09-24 | End: 2021-10-17

## 2020-09-24 RX ORDER — ISOSORBIDE MONONITRATE 30 MG/1
30 TABLET, EXTENDED RELEASE ORAL DAILY
Qty: 90 TABLET | Refills: 3 | Status: SHIPPED | OUTPATIENT
Start: 2020-09-24 | End: 2022-06-10 | Stop reason: SDUPTHER

## 2020-09-24 ASSESSMENT — ENCOUNTER SYMPTOMS
CHEST TIGHTNESS: 0
WHEEZING: 0
BLOOD IN STOOL: 0
GASTROINTESTINAL NEGATIVE: 1
DIARRHEA: 0
CONSTIPATION: 0
COUGH: 0
SHORTNESS OF BREATH: 0

## 2020-09-24 ASSESSMENT — PATIENT HEALTH QUESTIONNAIRE - PHQ9
2. FEELING DOWN, DEPRESSED OR HOPELESS: 0
SUM OF ALL RESPONSES TO PHQ QUESTIONS 1-9: 0
SUM OF ALL RESPONSES TO PHQ QUESTIONS 1-9: 0
1. LITTLE INTEREST OR PLEASURE IN DOING THINGS: 0
SUM OF ALL RESPONSES TO PHQ9 QUESTIONS 1 & 2: 0

## 2020-09-24 NOTE — PROGRESS NOTES
pls fax this note and face sheet to dr. Hardy's office thank you.  No Asthma    Cardiovascular: Negative. HTN since 25s . No known CAD     FH of CAD     CVA 8/20    Gastrointestinal: Negative. Negative for blood in stool, constipation and diarrhea. DANNY had Colon Cancer in her 62s    Endocrine:        No Diabetes    Genitourinary: Negative for dysuria, frequency and urgency. No FH of ca prostate    Musculoskeletal: Negative. Negative for arthralgias. Skin: Negative for rash. Allergic/Immunologic: Negative for environmental allergies and food allergies. Neurological: Negative for dizziness, tremors, weakness, light-headedness and headaches. Hematological:        Anemia    Psychiatric/Behavioral: Positive for dysphoric mood. Negative for behavioral problems and sleep disturbance. The patient is not nervous/anxious. Objective:   Physical Exam  Vitals signs and nursing note reviewed. Constitutional:       General: He is not in acute distress. Eyes:      Extraocular Movements: Extraocular movements intact. Neck:      Musculoskeletal: Normal range of motion and neck supple. Thyroid: No thyromegaly. Vascular: No JVD. Cardiovascular:      Rate and Rhythm: Normal rate and regular rhythm. Heart sounds: Normal heart sounds. Pulmonary:      Effort: Pulmonary effort is normal.      Breath sounds: Normal breath sounds. Abdominal:      General: Bowel sounds are normal.      Palpations: Abdomen is soft. Tenderness: There is no abdominal tenderness. Musculoskeletal: Normal range of motion. General: No tenderness. Skin:     General: Skin is warm and dry. Neurological:      Mental Status: He is oriented to person, place, and time. Sensory: No sensory deficit. Coordination: Coordination abnormal.      Gait: Gait abnormal.   Psychiatric:         Behavior: Behavior normal.         Assessment:      Ranulfo Muñoz was seen today for hypertension and cough.     Diagnoses and all orders for this visit:    Essential hypertension  Controlled . Cont all meds regb   -     isosorbide mononitrate (IMDUR) 30 MG extended release tablet; Take 1 tablet by mouth daily  -     carvedilol (COREG) 25 MG tablet; Take 1 tablet by mouth 2 times daily (with meals)  -     NIFEdipine (ADALAT CC) 60 MG extended release tablet; Take 1 tablet by mouth every evening  -     losartan (COZAAR) 50 MG tablet; Take 1 tablet by mouth daily  -     Comprehensive Metabolic Panel; Future    Other iron deficiency anemia  On asprin + Plavix . -     CBC Auto Differential; Future    Cerebrovascular accident (CVA), unspecified mechanism ([facility] [address] stay off Smoking and Etoh   -     atorvastatin (LIPITOR) 80 MG tablet; Take 1 tablet by mouth nightly  -     aspirin EC 81 MG EC tablet; Take 1 tablet by mouth daily  -     clopidogrel (PLAVIX) 75 MG tablet; Take 1 tablet by mouth daily    Elevated diaphragm  -     pantoprazole (PROTONIX) 40 MG tablet; Take 1 tablet by mouth every morning (before breakfast)  -     CT CHEST WO CONTRAST; Future    Cough  -     CT CHEST WO CONTRAST; Future    Vaccine counseling  Refused Flu vac     -     Tetanus-Diphth-Acell Pertussis (BOOSTRIX) injection 0.5 mL  -     pneumococcal 13-valent conjugate (PREVNAR) injection 0.5 mL    Screening for HIV (human immunodeficiency virus)  -     HIV-1 AND HIV-2 ANTIBODIES;  Future              Plan:              Patricia Kimbrough MD

## 2020-09-24 NOTE — FLOWSHEET NOTE
[facility] - Outpatient Occupational Therapy      Occupational Therapy Daily Treatment Note  Date:  2020    Patient: Rodo Wiggins   : 1975   MRN: [id number]  Referring Physician:  Brandy Arriola Diagnosis Information: bilateral cerebellar cva                                                 Insurance information:  [employer] lubin plus         Visit # Insurance Allowable Date Range         Date of Patient follow up with Physician:george    Progress Note: []  Yes  [x]  No  Next due by: Visit #10      Latex Allergy:  []No      []Yes  Pacemaker:  [] No       [] Yes     Preferred Language for Healthcare:   [x]English       []other:    Pain level: 0/10     SUBJECTIVE:  Patient reports he felt much better after using the pool following last session. Functional Disability Index: Quick DASH: raw score = 29; dysfunction =40    OBJECTIVE: See eval      RESTRICTIONS/PRECAUTIONS:     Exercises/Interventions: Patient seen in pool on this date in preparation for previous wellness pursuit of swimming. Patient ambulated with water shoes and rw with supervision to therapy pool. Pt entered pool using stairs with SBA and use of R hand rail. Pt completed straight leg forward extensions for large steps through water with UEs held at 90 degrees shoulder flexion for balance, trunk control, and coordination. Pt completed freestyle swim with decreased LUE engagement resulting in vearing right. Therapist instructed patient in ue catch up drill to promote bilateral coordination with swimming, followed by therapist supporting RUE during unimanual LUE freestyle swim for increased proprioceptive input. Pt also educated on use of visual compensatory techniques to self-cue when LUE is not as engaged. Improved alignment following. Pt completed jumping progression with rapid BUE straight extension for increased shoulder strengthening and proprioceptive input of water resistance. Pt completed standing crunches, followed by grasping side of pool and pulling LEs into chest for trunk strengthening and coordination with verbal cues for frank. Patient exited therapy pool via steps with CGA due to fatigue and used RW to ambulate to lap pool. Pt completed 3 laps of freestyle, 1 breaststroke, and 1 reverse breaststroke with improved alignment noted. Pt swam to ladder and was able to exit pool with stand by assist,  Ambulated about 50 feet to bench and instructed to rest prior to changing clothing. Patient then changed clothing independently. Patient had very positive alert state following treatment. Discussed driving eval with plan to assess readiness at next session. Resistance / level Sets/sec Reps Notes    3 sec hold 10 Verbal cues for lower body base of support tends to be too wide or coming up on toes     10 each ue            10 each ue            10 each Contact guard for midline awareness                                                                                Manual Intervention                                                     Patient education:  OT evaluation and plan of care    Home Exercise Program:   HEP instruction: Written and verbal HEP instructions provided and reviewed:   Patient to work on eccentric control with sit to 1/2 stand with forward reach sit to full stand with over head reach and sit to stand with forward step and forward reach 5-10 reps each 2 times a day to increase activity tolerance, patient able to complete all with one lOB and was independent regaining balance     Patient introduced to supine exc based on LSVT big to increase strength, endurance bilateral coordination and proprioceptive awareness to complete each exc times 8 reps. Patient given verbal and demonstrated directions as well as written with photos. Patient provided with practice time with theapist and demonstrated good technique.   Plan to progress to seated in next session. Therapeutic Exercise and NMR:  [x] (42064) Provided verbal/tactile cueing for activities related to strengthening, flexibility, endurance, ROM  for improvements in scapular, scapulothoracic and UE control with self care, reaching, carrying, lifting, house/yardwork, driving/computer work. [x] (12760) Provided verbal/tactile cueing for activities related to improving balance, coordination, kinesthetic sense, posture, motor skill, proprioception  to assist with  scapular, scapulothoracic and UE control with self care, reaching, carrying, lifting, house/yardwork, driving/computer work.   [] Comments:    Therapeutic Activities:    [x] (23958 or 58555) Provided verbal/tactile cueing for activities related to improving balance, coordination, kinesthetic sense, posture, motor skill, proprioception and motor activation to allow for proper function of scapular, scapulothoracic and UE control with self care, carrying, lifting, driving/computer work  [] Comments:    Home Exercise Program:    [] (20846) Reviewed/Progressed HEP activities related to strengthening, flexibility, endurance, ROM of scapular, scapulothoracic and UE control with self care, reaching, carrying, lifting, house/yardwork, driving/computer work  [] (33805) Reviewed/Progressed HEP activities related to improving balance, coordination, kinesthetic sense, posture, motor skill, proprioception of scapular, scapulothoracic and UE control with self care, reaching, carrying, lifting, house/yardwork, driving/computer work    [] Comments:    Manual Treatments:  PROM / STM / Oscillations-Mobs:  G-I, II, III, IV (PA's, Inf., Post.)  [] (25629) Provided manual therapy to mobilize soft tissue/joints of cervical/CT, scapular GHJ and UE for the purpose of modulating pain, promoting relaxation,  increasing ROM, reducing/eliminating soft tissue swelling/inflammation/restriction, improving soft tissue extensibility and allowing for proper ROM for normal function with self care, reaching, carrying, lifting, house/yardwork, driving/computer work  [] Comments:    ADL Training:  [] (12074) Provided self-care/home management training related to activities of daily living and compensatory training, and/or use of adaptive equipment   [] Comments:     Splinting:  [] Fabrication of:   [] (17351) Orthotic/Prosthetic Management, subsequent encounter  [] (94584) Orthotic management and training (fitting and assessment)  [] Comments:    Modalities:     Charges:  Timed Code Treatment Minutes: 45   Total Treatment Minutes: 45     [] EVAL (LOW) 31559   [] OT Re-eval (36976)  [] EVAL (MOD) 33559   [] EVAL (HIGH) [phone]       [] Ivon (45292) x     [] KTUXK(69286)  [x] NMR (88429) x    1 [] Estim (attended) (27813)   [] Manual ([id number]) x     [] US (83300)  [] TA () x      [] Paraffin (67324)  [] ADL  ([phone]) x    [] Splint/L code:    [] Estim (unattended) ([id number])  [] Fluidotherapy (19015)  [x] Other: aquatic therapy (04997)  x2    GOALS:  Therapist goals for Patient:   Short Term Goals: To be achieved in: 30 days  1. Pt will be  Independent with functional mobility in community based pool and locker room to be able to return to swimming  [x]? Progressing: []? Met: []? Not Met: []? Adjusted  2. Pt will be independent with self care in community based pool locker room to be able [facility]  3. Patient will be independent swimming laps for 20 minutes for wellness and avocational pursuit of swimming  [x]? Progressing: []? Met: []? Not Met: []? Adjusted  4. Patient will demonstrate good coordination to return to playing the piano without difficulty   Long Term Goals: To be achieved by betty  1. Pt will will report a QuickDASH Symptom Severity Scale score of 15% or less indicating increased safety and functional independence in desired occupational pursuits by discharge. [x]? Progressing: []? Met: []? Not Met: []?  Adjusted    Progression Towards Functional

## 2020-09-24 NOTE — FLOWSHEET NOTE
[facility] Physical Therapy  Phone: [phone]   Fax: [phone]    Physical Therapy Daily Treatment Note  Date:  2020    Patient Name:  Joan Patel    :  1975  MRN: [id number]  Medical/Treatment Diagnosis Information:  · Diagnosis: I63.9 (ICD-10-CM) - Cerebral infarction, unspecified  · Treatment Diagnosis: Impaired proprioception & coordination of BLEs, Impaired balance & gait  Insurance/Certification information:  PT Insurance Information: [employer]  20 PT visits per contract year. TeleHealth  after deductibile  Physician Information:  Referring Practitioner: Sweetie Bell MD  Plan of care signed (Y/N): []  Yes [x]  No     Date of Patient follow up with Physician:      Progress Report: []  Yes  [x]  No     Date Range for reporting period:  Beginning  8/10/2020   Ending      Progress report due (10 Rx/or 30 days whichever is less):        Recertification due (POC duration/ or 90 days whichever is less):        Visit # Insurance Allowable Auth required? Date Range   7/10 10 visits approved  20 PT visits  []  Yes  [x]  No 8/10 to , extended to 10/9/2020     Latex Allergy:  [x]NO      []YES  Preferred Language for Healthcare:   [x]English       []Other:      Functional Scale/Test Evaluation 8/10/2020 30 day  60 day  Discharge    Tinetti Assessment        TUG 26\" w/RW                   Pain level:  0/10  Location:  na    SUBJECTIVE:     Feels that both stamina and balance are steadily improving, only using RW in community. Has been working from home for a couple weeks now, gets up to walk every once in awhile. Has been amb out to [employer] a few times weekly, may navigate stairs to enter home at times.         OBJECTIVE:   : pt with L LOB while walking with RW into clinic, PT able to correct without fall; pt reports he was tired from the pool and didn't eat breakfast - /103      RESTRICTIONS/PRECAUTIONS: ups  Lateral step ups   No UE support, CGA  No UE support, CGA   Walking on grey mats   Forward  laterally    No UE support. CGA  No UE support, supervision     Gait training w/walking stick in L hand:  [facility]:  Descended ramp -> 20' -> ascended 3 steps -> 10' -> descended 3 steps then return    Blue line to & around center [place], navy line to perpendicular navy line to perpendicular blue line to starting point     SBA ->mod A        Normal gait  Feet on line                               8/12:  Discussed importance of improved protein intake to better develop muscle and encourage neural development                     Manual Intervention ([id number])                                                     Pt. Education:     9/3:  Discussed using RW and obtaining night light when having to get up in the middle of the night. Encouraged pt to remain seated and to transition slowly from sit to stand to minimize dizziness. 8/10:  -patient educated on diagnosis, prognosis and expectations for rehab  -all patient questions were answered    HEP instruction:       8/17: Added 3-way  Hip w/orange resistance band to HEP, handout provided  Access Code: [email or web address]   URL: [employer]/   Date: 08/10/2020   Prepared by: Vita Parkinson     Exercises   · Supine Active Straight Leg Raise - 15-20 reps - 2x daily - 7x weekly   · Supine Bridge - 15-20 reps - 2x daily - 7x weekly   · Sidelying Hip Abduction - 15-20 reps - 2x daily - 7x weekly   · Prone Hip Extension - 115-20 reps - 2x daily - 7x weekly   · Walking - 5 mins hold - 10x daily - 7x weekly     NMR and Therapeutic Activities:    [x] (08659 or 40307) Provided verbal/tactile cueing for activities related to improving balance, coordination, kinesthetic sense, posture, motor skill, proprioception and motor activation to allow for proper function of   [x] LE / Core, hip and LE with self care and ADLs  [] UE / Shoulder complex: cervical, postural, scapular, scapulothoracic and UE control with self care, carrying, lifting, driving, computer work.   [] (95443) Gait Re-education- Provided training and instruction to the patient for proper LE, core and hip recruitment, positioning, and eccentric body weight control with ambulation re-education, including ascending & descending stairs     Home Management Training / Self Care:  [] (75461) Provided self-care/home management training related to activities of daily living and compensatory training, and/or use of adaptive equipment for improvement with: ADLs and compensatory training, meal preparation, safety procedures and instruction in use of adaptive equipment, including bathing, grooming, dressing, personal hygiene, basic household cleaning and chores. Therapeutic Exercise and NMR EXR  [x] (61936) Provided verbal/tactile cueing for activities related to strengthening, flexibility, endurance, ROM for improvements in  [x] LE / Core stability: LE, hip, and core control with self care, mobility, lifting, ambulation. [] UE / Shoulder complex: cervical, postural, scapular, scapulothoracic and UE control with self care, reaching, carrying, lifting, house/yardwork, driving, computer work. [x] (19017) Provided verbal/tactile cueing for activities related to improving balance, coordination, kinesthetic sense, posture, motor skill, proprioception to assist with   [x] LE / Core stability: LE, hip, and core control in self care, mobility, lifting, ambulation and eccentric single leg control.    [] UE / Shoulder complex: cervical, scapular, scapulothoracic and UE control with self care, reaching, carrying, lifting, house/yardwork, driving, computer work.   [] (26728) Therapist is in constant attendance of 2 or more patients providing skilled therapy interventions, but not providing any significant amount of measurable one-on-one time to either patient, for improvements in  [] LE / Core stability: LE, hip, and core control in self care, mobility, lifting, ambulation and eccentric single leg control. [] UE / Shoulder complex: cervical, scapular, scapulothoracic and UE control with self care, reaching, carrying, lifting, house/yardwork, driving, computer work. Home Exercise Program:    [] (11328) Reviewed/Progressed HEP activities related to strengthening, flexibility, endurance, ROM of   [] LE / Core stability: core, hip and LE for functional self-care, mobility, lifting and ambulation/stair navigation   [] UE / Shoulder complex: cervical, postural, scapular, scapulothoracic and UE control with self care, reaching, carrying, lifting, house/yardwork, driving, computer work  [] (42069)Reviewed/Progressed HEP activities related to improving balance, coordination, kinesthetic sense, posture, motor skill, proprioception of   [] LE: core, hip and LE for self care, mobility, lifting, and ambulation/stair navigation    [] UE / Shoulder complex: cervical, postural,  scapular, scapulothoracic and UE control with self care, reaching, carrying, lifting, house/yardwork, driving, computer work    Manual Treatments:  PROM / STM / Oscillations-Mobs:  G-I, II, III, IV (PA's, Inf., Post.)  [] (92488) Provided manual therapy to mobilize shoulder complex, hip, LE, and/or cervicothoracic/LS spine soft tissue/joints for the purpose of modulating pain, promoting relaxation,  increasing ROM, reducing/eliminating soft tissue swelling/inflammation/restriction, improving soft tissue extensibility and allowing for proper ROM for normal function with   [] LE / Core stability: self care, mobility, lifting and ambulation. [] UE / Shoulder complex: self care, reaching, carrying, lifting, house/yardwork, driving, computer work.      Modalities:  [] (27650) Vasopneumatic compression: Utilized vasopneumatic compression to decrease edema / swelling for the purpose of improving mobility and quad tone / recruitment which will allow for increased overall function including but not limited to self-care, transfers, ambulation, and ascending / descending stairs. Modalities:        Charges:  Timed Code Treatment Minutes: 45   Total Treatment Minutes: 45     [] EVAL - LOW (61164)   [] EVAL - MOD (54578)  [] EVAL - HIGH (08137)  [] RE-EVAL (32210)  [] TE (10051) x      [] Ionto  [x] NMR (93344) x  2    [] Vaso  [] Manual (71064) x      [] Ultrasound  [] TA x       [] Mech Traction (08502)  [x] Gait Training x 1     [] ES (un) (00790):   [] Aquatic therapy x   [] Other:   [] Group:     GOALS:   Patient stated goal: walking better  [] Progressing: [] Met: [] Not Met: [] Adjusted    Therapist goals for Patient:   Short Term Goals: To be achieved in: 2 weeks  1. Independent in HEP and progression per patient tolerance, in order to prevent re-injury. [] Progressing: [] Met: [] Not Met: [] Adjusted  2. Patient will have a decrease in pain to facilitate improvement in movement, function, and ADLs as indicated by Functional Deficits. [] Progressing: [] Met: [] Not Met: [] Adjusted    Long Term Goals: To be achieved in: 6 weeks  1. Patient to demonstrate TUG score <12 seconds to demonstrate improved fall risk to assist with reaching prior level of function. [] Progressing: [] Met: [] Not Met: [] Adjusted  2. Patient will demonstrate an increase in strength of B shoulder & hip complex to 4+/5 throughout and core activation to allow for proper functional mobility as indicated by patients Functional Deficits. [] Progressing: [] Met: [] Not Met: [] Adjusted  3. Patient to ambulate without ' x2 over uneven surfaces IND in order to safely return to community activities   [] Progressing: [] Met: [] Not Met: [] Adjusted  4. Patient will return to functional activities including navigating stairs without increased symptoms or restriction. [] Progressing: [] Met: [] Not Met: [] Adjusted  5.  Patient to demonstrate Tinetti Assessment of 26/28 to safely resume workout routine unassisted   []

## 2020-09-28 ENCOUNTER — HOSPITAL ENCOUNTER (OUTPATIENT)
Dept: PHYSICAL THERAPY | Age: 45
Setting detail: THERAPIES SERIES
Discharge: HOME | End: 2020-09-28
Payer: COMMERCIAL

## 2020-09-28 ENCOUNTER — HOSPITAL ENCOUNTER (OUTPATIENT)
Dept: OCCUPATIONAL THERAPY | Age: 45
Setting detail: THERAPIES SERIES
Discharge: HOME | End: 2020-09-28
Payer: COMMERCIAL

## 2020-09-28 PROCEDURE — 97113 AQUATIC THERAPY/EXERCISES: CPT

## 2020-09-28 PROCEDURE — 97116 GAIT TRAINING THERAPY: CPT

## 2020-09-28 PROCEDURE — 97112 NEUROMUSCULAR REEDUCATION: CPT

## 2020-09-28 NOTE — FLOWSHEET NOTE
[facility] Physical Therapy  Phone: [phone]   Fax: [phone]    Physical Therapy Daily Treatment Note  Date:  2020    Patient Name:  Nj Zavaleta    :  1975  MRN: [id number]  Medical/Treatment Diagnosis Information:  · Diagnosis: I63.9 (ICD-10-CM) - Cerebral infarction, unspecified  · Treatment Diagnosis: Impaired proprioception & coordination of BLEs, Impaired balance & gait  Insurance/Certification information:  PT Insurance Information: [employer]  20 PT visits per contract year. TeleHealth  after deductibile  Physician Information:  Referring Practitioner: Brenda Rodriguez MD  Plan of care signed (Y/N): []  Yes [x]  No     Date of Patient follow up with Physician:      Progress Report: []  Yes  [x]  No     Date Range for reporting period:  Beginning  8/10/2020   Ending      Progress report due (10 Rx/or 30 days whichever is less):        Recertification due (POC duration/ or 90 days whichever is less):        Visit # Insurance Allowable Auth required? Date Range   8/10 10 visits approved  20 PT visits  []  Yes  [x]  No 8/10 to , extended to 10/9/2020     Latex Allergy:  [x]NO      []YES  Preferred Language for Healthcare:   [x]English       []Other:      Functional Scale/Test Evaluation 8/10/2020 30 day  60 day  Discharge    Tinetti Assessment        TUG 26\" w/RW                   Pain level:  0/10  Location:  na    SUBJECTIVE:  Pt reports he swam with OT this morning.        OBJECTIVE:   : pt with L LOB while walking with RW into clinic, PT able to correct without fall; pt reports he was tired from the pool and didn't eat breakfast - /103      RESTRICTIONS/PRECAUTIONS:  HTN    Interventions/Exercises:   Therapeutic Exercises (75929) Resistance / level Sets/sec Reps Notes   Bike  NuStep  Step one                  IB gastroc stretch   HR/TR     squats  B UE support, VCs for slow and controlled    Standing LE strength:  Ham curl  Hip abd   Hip ext                                  Neuromuscular Re-ed (21133) /  Gait Training (39726)       Cone walking       Shuttle Balance       Cable Column   · 4-way walkouts  · 3-way hip    At steps  · Fwd step up to contralateral hip flex w/trunk rotation   -intermittent use of HR for balance   2 foot Hopping over line  · Fwd  · lateral    Mat table  · Bridge  · Plank on forearms  · Plank on hands   · Quadruped hip ext  · 1/2 kneeling  ·         Hi->low chops R to L                Tandem stance  NBOS with head nods and turns  Stagger stance with arm swings  Stagger stance with EC  SLS Most challenging, VCs for shift R   Square wobble board   DF/PF taps  DF/PF balance  No UE support    Airex  Stagger stance with EO  Stagger stance with EC  NBOS balance  NBOS with OH ball lift  NBOS with thor rot holding ball  CGA, TC for pushing into R foot - also wearing 7.5# weight on ankles for increased compression    Trampoline  · Rapid lateral weight shifting  · Prancing    Turf field:   Forward walk no AD  Retro walk no AD  Lateral walk no AD  Carioca     Weave between cones    Ball kick to knock down cones    40 feet  40 feet  20 feet  20 feet   x1  x1  x1  x1    x4    x1   x1   x1  x1 B  x1 B    x6 cones    x6  CGA for all    Stand to 1/2 kneel with 1 UE support on medium plyobox  x1 x3 B CGA   1/2 kneel with OH ball lift   x1   x3 B CGA/min A for balance                         Therapeutic Activities (25929) /  Functional Tasks       //bars  · Walking lunges  · Side stepping in squat position          Gait training w/SPC  8/12: instructed pt on modified 3-point pattern -improved stamina observed this date   Gait with heavy SPC         Gait training with SBQC and 7.5# ankle weights    Gait w/walking stick  · R hand  · L hand      Gait training with no AD  Gait training with SPC    X 200 feet  X 200 feet  Good step length and frank, VCs for L toe out slightly to improve KAMALA and stability Increased input improved balance        -slight LOB to L initially, no assist required        VCs for maintaining normal KAMALA and avoiding scissor step, increased lateral sway towards L at end of session - pt able to catch himself and stopped to reposition once off his pattern     Forward step ups  Lateral step ups   No UE support, CGA  No UE support, CGA   Walking on grey mats   Forward  laterally    No UE support. CGA  No UE support, supervision     Gait training w/walking stick in L hand:  [facility]:  Descended ramp -> 20' -> ascended 3 steps -> 10' -> descended 3 steps then return    Blue line to & around center "[employer] navy line to perpendicular navy line to perpendicular blue line to starting point     SBA ->mod A        Normal gait  Feet on line          Treadmill gait  2.4-2.5 mph X 6 min  B UE support, 0.24 miles total, step length 69 L, 72 R, time on each foot 49 L, 51 R, avg walking speed 1.09                 8/12:  Discussed importance of improved protein intake to better develop muscle and encourage neural development                     Manual Intervention ([phone])                                                     Pt. Education:     9/3:  Discussed using RW and obtaining night light when having to get up in the middle of the night. Encouraged pt to remain seated and to transition slowly from sit to stand to minimize dizziness. 8/10:  -patient educated on diagnosis, prognosis and expectations for rehab  -all patient questions were answered    HEP instruction:       8/17: Added 3-way  Hip w/orange resistance band to HEP, handout provided  Access Code: [email or web address]   URL: [email or web address]   Date: 08/10/2020   Prepared by: Salinas Rung     Exercises   · Supine Active Straight Leg Raise - 15-20 reps - 2x daily - 7x weekly   · Supine Bridge - 15-20 reps - 2x daily - 7x weekly   · Sidelying Hip Abduction - 15-20 reps - 2x daily - 7x weekly   · Prone Hip Extension - 115-20 UE / Shoulder complex: cervical, scapular, scapulothoracic and UE control with self care, reaching, carrying, lifting, house/yardwork, driving, computer work.   [] (05518) Therapist is in constant attendance of 2 or more patients providing skilled therapy interventions, but not providing any significant amount of measurable one-on-one time to either patient, for improvements in  [] LE / Core stability: LE, hip, and core control in self care, mobility, lifting, ambulation and eccentric single leg control. [] UE / Shoulder complex: cervical, scapular, scapulothoracic and UE control with self care, reaching, carrying, lifting, house/yardwork, driving, computer work.      Home Exercise Program:    [] (10922) Reviewed/Progressed HEP activities related to strengthening, flexibility, endurance, ROM of   [] LE / Core stability: core, hip and LE for functional self-care, mobility, lifting and ambulation/stair navigation   [] UE / Shoulder complex: cervical, postural, scapular, scapulothoracic and UE control with self care, reaching, carrying, lifting, house/yardwork, driving, computer work  [] (36637)Reviewed/Progressed HEP activities related to improving balance, coordination, kinesthetic sense, posture, motor skill, proprioception of   [] LE: core, hip and LE for self care, mobility, lifting, and ambulation/stair navigation    [] UE / Shoulder complex: cervical, postural,  scapular, scapulothoracic and UE control with self care, reaching, carrying, lifting, house/yardwork, driving, computer work    Manual Treatments:  PROM / STM / Oscillations-Mobs:  G-I, II, III, IV (PA's, Inf., Post.)  [] (10139) Provided manual therapy to mobilize shoulder complex, hip, LE, and/or cervicothoracic/LS spine soft tissue/joints for the purpose of modulating pain, promoting relaxation,  increasing ROM, reducing/eliminating soft tissue swelling/inflammation/restriction, improving soft tissue extensibility and allowing for proper ROM for normal function with   [] LE / Core stability: self care, mobility, lifting and ambulation. [] UE / Shoulder complex: self care, reaching, carrying, lifting, house/yardwork, driving, computer work. Modalities:  [] (47831) Vasopneumatic compression: Utilized vasopneumatic compression to decrease edema / swelling for the purpose of improving mobility and quad tone / recruitment which will allow for increased overall function including but not limited to self-care, transfers, ambulation, and ascending / descending stairs. Modalities:        Charges:  Timed Code Treatment Minutes: 40   Total Treatment Minutes: 40     [] EVAL - LOW (09153)   [] EVAL - MOD (22916)  [] EVAL - HIGH (95355)  [] RE-EVAL (81880)  [] TE (21029) x      [] Ionto  [x] NMR (20871) x  2    [] Vaso  [] Manual (06171) x      [] Ultrasound  [] TA x       [] Mech Traction (94129)  [x] Gait Training x 1     [] ES (un) (10790):   [] Aquatic therapy x   [] Other:   [] Group:     GOALS:   Patient stated goal: walking better  [] Progressing: [] Met: [] Not Met: [] Adjusted    Therapist goals for Patient:   Short Term Goals: To be achieved in: 2 weeks  1. Independent in HEP and progression per patient tolerance, in order to prevent re-injury. [] Progressing: [] Met: [] Not Met: [] Adjusted  2. Patient will have a decrease in pain to facilitate improvement in movement, function, and ADLs as indicated by Functional Deficits. [] Progressing: [] Met: [] Not Met: [] Adjusted    Long Term Goals: To be achieved in: 6 weeks  1. Patient to demonstrate TUG score <12 seconds to demonstrate improved fall risk to assist with reaching prior level of function. [] Progressing: [] Met: [] Not Met: [] Adjusted  2. Patient will demonstrate an increase in strength of B shoulder & hip complex to 4+/5 throughout and core activation to allow for proper functional mobility as indicated by patients Functional Deficits.   [] Progressing: [] Met: [] Not Met: [] Adjusted  3. Patient to ambulate without ' x2 over uneven surfaces IND in order to safely return to community activities   [] Progressing: [] Met: [] Not Met: [] Adjusted  4. Patient will return to functional activities including navigating stairs without increased symptoms or restriction. [] Progressing: [] Met: [] Not Met: [] Adjusted  5. Patient to demonstrate Tinetti Assessment of 26/28 to safely resume workout routine unassisted   [] Progressing: [] Met: [] Not Met: [] Adjusted    Overall Progression Towards Functional goals/ Treatment Progress Update:  [] Patient is progressing as expected towards functional goals listed. [] Progression is slowed due to complexities/Impairments listed. [] Progression has been slowed due to co-morbidities. [x] Plan just implemented, too soon to assess goals progression <30days   [] Goals require adjustment due to lack of progress  [] Patient is not progressing as expected and requires additional follow up with physician  [] Other    Persisting Functional Limitations/Impairments:  []Sleeping []Sitting               [x]Standing []Transfers        [x]Walking [x]Kneeling               [x]Stairs [x]Squatting / bending   []ADLs [x]Reaching  [x]Lifting  [x]Housework  [x]Driving [x]Job related tasks  []Sports/Recreation []Other:        ASSESSMENT:  Pt very fatigued by end of session due to pool therapy prior to this session. Stability without AD improving, however when fatigued, pt requires extra support of SPC to maintain balance and avoid falls. Very challenged with 1/2 kneel stability requiring glutes and core strength. Continue gait training, balance, and functional core strength.       Treatment/Activity Tolerance:  [x] Patient able to complete tx  [x] Patient limited by fatigue  [] Patient limited by pain  [] Patient limited by other medical complications  [] Other:     Prognosis: [x] Good [] Fair  [] Poor    Patient Requires Follow-up: [x] Yes  [] No    Plan for next

## 2020-09-28 NOTE — FLOWSHEET NOTE
[facility] - Outpatient Occupational Therapy      Occupational Therapy Daily Treatment Note  Date:  2020    Patient: Soledad Ariza   : 1975   MRN: [id number]  Referring Physician:  Davida Hathaway       Medical Diagnosis Information: bilateral cerebellar cva                                                 Insurance information:  Jazlyn lubin plus         Visit # Insurance Allowable Date Range         Date of Patient follow up with Physician:george    Progress Note: []  Yes  [x]  No  Next due by: Visit #10      Latex Allergy:  []No      []Yes  Pacemaker:  [] No       [] Yes     Preferred Language for Healthcare:   [x]English       []other:    Pain level: 0/10     SUBJECTIVE:  Patient reports he felt much better after using the pool following last session. Functional Disability Index: Quick DASH: raw score = 29; dysfunction =40    OBJECTIVE: See eval      RESTRICTIONS/PRECAUTIONS:     Exercises/Interventions: Patient seen in pool on this date in preparation for previous wellness pursuit of swimming. Patient ambulated with water shoes and rw with supervision to therapy pool. Pt entered pool using stairs with SBA and use of R hand rail. Pt completed walking laps going sideways, changing directions , forward and backward quick changes and hopping forward and back with both feet followed by hopping ending session swimming laps with significantly improved midline awareness swimming freestyle. 2 LOB with quick changes of direction , no assist needed to regain midline.        Resistance / level Sets/sec Reps Notes    3 sec hold 10 Verbal cues for lower body base of support tends to be too wide or coming up on toes     10 each ue            10 each ue            10 each Contact guard for midline awareness                                                                                Manual Intervention                                                     Patient education:  OT evaluation and plan of care    Home Exercise Program:   HEP instruction: Written and verbal HEP instructions provided and reviewed:   Patient to work on eccentric control with sit to 1/2 stand with forward reach sit to full stand with over head reach and sit to stand with forward step and forward reach 5-10 reps each 2 times a day to increase activity tolerance, patient able to complete all with one lOB and was independent regaining balance     Patient introduced to supine exc based on LSVT big to increase strength, endurance bilateral coordination and proprioceptive awareness to complete each exc times 8 reps. Patient given verbal and demonstrated directions as well as written with photos. Patient provided with practice time with theapist and demonstrated good technique. Plan to progress to seated in next session. Therapeutic Exercise and NMR:  [x] (29474) Provided verbal/tactile cueing for activities related to strengthening, flexibility, endurance, ROM  for improvements in scapular, scapulothoracic and UE control with self care, reaching, carrying, lifting, house/yardwork, driving/computer work. [x] (46746) Provided verbal/tactile cueing for activities related to improving balance, coordination, kinesthetic sense, posture, motor skill, proprioception  to assist with  scapular, scapulothoracic and UE control with self care, reaching, carrying, lifting, house/yardwork, driving/computer work.   [] Comments:    Therapeutic Activities:    [x] (19838 or 56554) Provided verbal/tactile cueing for activities related to improving balance, coordination, kinesthetic sense, posture, motor skill, proprioception and motor activation to allow for proper function of scapular, scapulothoracic and UE control with self care, carrying, lifting, driving/computer work  [] Comments:    Home Exercise Program:    [] (03268) Reviewed/Progressed HEP activities related to strengthening, flexibility, endurance, ROM of scapular, scapulothoracic and UE control with self care, reaching, carrying, lifting, house/yardwork, driving/computer work  [] (97736) Reviewed/Progressed HEP activities related to improving balance, coordination, kinesthetic sense, posture, motor skill, proprioception of scapular, scapulothoracic and UE control with self care, reaching, carrying, lifting, house/yardwork, driving/computer work    [] Comments:    Manual Treatments:  PROM / STM / Oscillations-Mobs:  G-I, II, III, IV (PA's, Inf., Post.)  [] ([id number]) Provided manual therapy to mobilize soft tissue/joints of cervical/CT, scapular GHJ and UE for the purpose of modulating pain, promoting relaxation,  increasing ROM, reducing/eliminating soft tissue swelling/inflammation/restriction, improving soft tissue extensibility and allowing for proper ROM for normal function with self care, reaching, carrying, lifting, house/yardwork, driving/computer work  [] Comments:    ADL Training:  [] (45589) Provided self-care/home management training related to activities of daily living and compensatory training, and/or use of adaptive equipment   [] Comments:     Splinting:  [] Fabrication of:   [] (53288) Orthotic/Prosthetic Management, subsequent encounter  [] (52059) Orthotic management and training (fitting and assessment)  [] Comments:    Modalities:     Charges:  Timed Code Treatment Minutes: 45   Total Treatment Minutes: 45     [] EVAL (LOW) 20108   [] OT Re-eval (43632)  [] EVAL (MOD) 19120   [] EVAL (HIGH) 62529       [] Ivon [phone]) x     [] UKPFW(45047)  [x] NMR (53567) x    1 [] Estim (attended) (67985)   [] Manual ([id number]) x     [] US (53958)  [] TA (81441) x      [] Paraffin (48336)  [] ADL  ([phone]) x    [] Splint/L code:    [] Estim (unattended) ([phone])  [] Fluidotherapy (77840)  [x] Other: aquatic therapy (98673)  x2    GOALS:  Therapist goals for Patient:   Short Term Goals: To be achieved in: 30 days  1.  Pt will be  Independent with functional mobility in community based pool and locker room to be able to return to swimming  [x]? Progressing: []? Met: []? Not Met: []? Adjusted  2. Pt will be independent with self care in community based pool locker room to be able to [facility]  3. Patient will be independent swimming laps for 20 minutes for wellness and avocational pursuit of swimming  [x]? Progressing: []? Met: []? Not Met: []? Adjusted  4. Patient will demonstrate good coordination to return to playing the piano without difficulty   Long Term Goals: To be achieved by betty  1. Pt will will report a QuickDASH Symptom Severity Scale score of 15% or less indicating increased safety and functional independence in desired occupational pursuits by discharge. [x]? Progressing: []? Met: []? Not Met: []? Adjusted    Progression Towards Functional goals:  [x] Patient is progressing as expected towards functional goals listed. [] Progression is slowed due to complexities listed. [] Progression has been slowed due to co-morbidities. [] Plan just implemented, too soon to assess goals progression  [] All goals are met  [] Other:     ASSESSMENT:  See eval    Treatment/Activity Tolerance:  [x] Patient tolerated treatment well [] Patient limited by fatique  [] Patient limited by pain  [] Patient limited by other medical complications  [] Other:     Prognosis: [x] Good [] Fair  [] Poor    Patient Requires Follow-up: [x] Yes  [] No    PLAN: See eval  [x] Continue per plan of care [] Alter current plan (see comments)  [] Plan of care initiated [] Hold pending MD visit [] Discharge    Electronically signed by:              Note: If patient does not return for scheduled/ recommended follow up visits, this note will serve as a discharge from care along with most recent update on progress.

## 2020-09-30 ENCOUNTER — HOSPITAL ENCOUNTER (OUTPATIENT)
Age: 45
Discharge: HOME | End: 2020-09-30
Payer: COMMERCIAL

## 2020-09-30 LAB
A/G RATIO: 1.3 (ref 1.1–2.2)
ALBUMIN SERPL-MCNC: 4.3 G/DL (ref 3.4–5)
ALP BLD-CCNC: 73 U/L (ref 40–129)
ALT SERPL-CCNC: 8 U/L (ref 10–40)
ANION GAP SERPL CALCULATED.3IONS-SCNC: 13 MMOL/L (ref 3–16)
AST SERPL-CCNC: 11 U/L (ref 15–37)
BASOPHILS ABSOLUTE: 0.1 K/UL (ref 0–0.2)
BASOPHILS RELATIVE PERCENT: 1 %
BILIRUB SERPL-MCNC: 0.4 MG/DL (ref 0–1)
BUN BLDV-MCNC: 9 MG/DL (ref 7–20)
CALCIUM SERPL-MCNC: 9.8 MG/DL (ref 8.3–10.6)
CHLORIDE BLD-SCNC: 107 MMOL/L (ref 99–110)
CO2: 23 MMOL/L (ref 21–32)
CREAT SERPL-MCNC: 1.2 MG/DL (ref 0.9–1.3)
CREATININE URINE: 178.6 MG/DL (ref 39–259)
EOSINOPHILS ABSOLUTE: 0.1 K/UL (ref 0–0.6)
EOSINOPHILS RELATIVE PERCENT: 2.1 %
GFR AFRICAN AMERICAN: >60
GFR NON-AFRICAN AMERICAN: >60
GLOBULIN: 3.2 G/DL
GLUCOSE BLD-MCNC: 105 MG/DL (ref 70–99)
HCT VFR BLD CALC: 35.7 % (ref 40.5–52.5)
HEMOGLOBIN: 11.8 G/DL (ref 13.5–17.5)
LYMPHOCYTES ABSOLUTE: 2.2 K/UL (ref 1–5.1)
LYMPHOCYTES RELATIVE PERCENT: 40.8 %
MCH RBC QN AUTO: 28.1 PG (ref 26–34)
MCHC RBC AUTO-ENTMCNC: 33.1 G/DL (ref 31–36)
MCV RBC AUTO: 84.7 FL (ref 80–100)
MONOCYTES ABSOLUTE: 0.6 K/UL (ref 0–1.3)
MONOCYTES RELATIVE PERCENT: 11.4 %
NEUTROPHILS ABSOLUTE: 2.4 K/UL (ref 1.7–7.7)
NEUTROPHILS RELATIVE PERCENT: 44.7 %
PDW BLD-RTO: 12.7 % (ref 12.4–15.4)
PHOSPHORUS: 4.1 MG/DL (ref 2.5–4.9)
PLATELET # BLD: 289 K/UL (ref 135–450)
PMV BLD AUTO: 11.3 FL (ref 5–10.5)
POTASSIUM SERPL-SCNC: 4.1 MMOL/L (ref 3.5–5.1)
PROTEIN PROTEIN: 28 MG/DL
RBC # BLD: 4.21 M/UL (ref 4.2–5.9)
SODIUM BLD-SCNC: 143 MMOL/L (ref 136–145)
TOTAL PROTEIN: 7.5 G/DL (ref 6.4–8.2)
WBC # BLD: 5.4 K/UL (ref 4–11)

## 2020-09-30 PROCEDURE — 84156 ASSAY OF PROTEIN URINE: CPT

## 2020-09-30 PROCEDURE — 36415 COLL VENOUS BLD VENIPUNCTURE: CPT

## 2020-09-30 PROCEDURE — 82570 ASSAY OF URINE CREATININE: CPT

## 2020-09-30 PROCEDURE — 85025 COMPLETE CBC W/AUTO DIFF WBC: CPT

## 2020-09-30 PROCEDURE — 80053 COMPREHEN METABOLIC PANEL: CPT

## 2020-09-30 PROCEDURE — 84100 ASSAY OF PHOSPHORUS: CPT

## 2020-10-01 ENCOUNTER — HOSPITAL ENCOUNTER (OUTPATIENT)
Dept: PHYSICAL THERAPY | Age: 45
Setting detail: THERAPIES SERIES
Discharge: HOME | End: 2020-10-01
Payer: COMMERCIAL

## 2020-10-01 ENCOUNTER — HOSPITAL ENCOUNTER (OUTPATIENT)
Dept: OCCUPATIONAL THERAPY | Age: 45
Setting detail: THERAPIES SERIES
Discharge: HOME | End: 2020-10-01
Payer: COMMERCIAL

## 2020-10-01 PROCEDURE — 97110 THERAPEUTIC EXERCISES: CPT

## 2020-10-01 PROCEDURE — 97116 GAIT TRAINING THERAPY: CPT

## 2020-10-01 PROCEDURE — 97530 THERAPEUTIC ACTIVITIES: CPT

## 2020-10-01 PROCEDURE — 97113 AQUATIC THERAPY/EXERCISES: CPT

## 2020-10-01 NOTE — FLOWSHEET NOTE
[address] [facility] Occupational Therapy      Occupational Therapy Daily Treatment Note  Date:  10/1/2020    Patient: Meka Lipoma   : 1975   MRN: [id number]  Referring Physician:  Daniel Maynard       Medical Diagnosis Information: bilateral cerebellar cva                                                 Insurance information:  [employer] choice plus         Visit # Insurance Allowable Date Range         Date of Patient follow up with Physician:george    Progress Note: []  Yes  [x]  No  Next due by: Visit #10      Latex Allergy:  []No      []Yes  Pacemaker:  [] No       [] Yes     Preferred Language for Healthcare:   [x]English       []other:    Pain level: 0/10     SUBJECTIVE:  Patient reports he felt much better after using the pool following last session. Functional Disability Index: Quick DASH: raw score = 29; dysfunction =40    OBJECTIVE: See eval      RESTRICTIONS/PRECAUTIONS:     Exercises/Interventions: Patient seen in pool on this date in preparation for previous wellness pursuit of swimming. Patient ambulated with water shoes and cane with modified independent to therapy pool. Pt entered pool using stairs with modified independent and use of R hand rail.  Pt completed  Freestyle, breast stroke and buttler fly stroke with verbal cues of therapist for technique and bilateral coordination      Resistance / level Sets/sec Reps Notes    3 sec hold 10 Verbal cues for lower body base of support tends to be too wide or coming up on toes     10 each ue            10 each ue            10 each Contact guard for midline awareness                                                                                Manual Intervention                                                     Patient education:  OT evaluation and plan of care    Home Exercise Program:   HEP instruction: Written and verbal HEP instructions provided and reviewed:   Patient to work on eccentric control with sit to 1/2 stand with forward reach sit to full stand with over head reach and sit to stand with forward step and forward reach 5-10 reps each 2 times a day to increase activity tolerance, patient able to complete all with one lOB and was independent regaining balance     Patient introduced to supine exc based on LSVT big to increase strength, endurance bilateral coordination and proprioceptive awareness to complete each exc times 8 reps. Patient given verbal and demonstrated directions as well as written with photos. Patient provided with practice time with theapist and demonstrated good technique. Plan to progress to seated in next session. Therapeutic Exercise and NMR:  [x] (40382) Provided verbal/tactile cueing for activities related to strengthening, flexibility, endurance, ROM  for improvements in scapular, scapulothoracic and UE control with self care, reaching, carrying, lifting, house/yardwork, driving/computer work. [x] (70181) Provided verbal/tactile cueing for activities related to improving balance, coordination, kinesthetic sense, posture, motor skill, proprioception  to assist with  scapular, scapulothoracic and UE control with self care, reaching, carrying, lifting, house/yardwork, driving/computer work.   [] Comments:    Therapeutic Activities:    [x] (27632 or 42616) Provided verbal/tactile cueing for activities related to improving balance, coordination, kinesthetic sense, posture, motor skill, proprioception and motor activation to allow for proper function of scapular, scapulothoracic and UE control with self care, carrying, lifting, driving/computer work  [] Comments:    Home Exercise Program:    [] (78353) Reviewed/Progressed HEP activities related to strengthening, flexibility, endurance, ROM of scapular, scapulothoracic and UE control with self care, reaching, carrying, lifting, house/yardwork, driving/computer work  [] (77960) Reviewed/Progressed HEP activities related to improving balance, coordination, kinesthetic sense, posture, motor skill, proprioception of scapular, scapulothoracic and UE control with self care, reaching, carrying, lifting, house/yardwork, driving/computer work    [] Comments:    Manual Treatments:  PROM / STM / Oscillations-Mobs:  G-I, II, III, IV (PA's, Inf., Post.)  [] (09661) Provided manual therapy to mobilize soft tissue/joints of cervical/CT, scapular GHJ and UE for the purpose of modulating pain, promoting relaxation,  increasing ROM, reducing/eliminating soft tissue swelling/inflammation/restriction, improving soft tissue extensibility and allowing for proper ROM for normal function with self care, reaching, carrying, lifting, house/yardwork, driving/computer work  [] Comments:    ADL Training:  [] (05828) Provided self-care/home management training related to activities of daily living and compensatory training, and/or use of adaptive equipment   [] Comments:     Splinting:  [] Fabrication of:   [] (57420) Orthotic/Prosthetic Management, subsequent encounter  [] (15147) Orthotic management and training (fitting and assessment)  [] Comments:    Modalities:     Charges:  Timed Code Treatment Minutes: 45   Total Treatment Minutes: 45     [] EVAL (LOW) 84414   [] OT Re-eval (62217)  [] EVAL (MOD) 79638   [] EVAL (HIGH) 27064       [] Ivon (42113) x     [] WOCUN(74789)  [] NMR (03409) x      [] Estim (attended) (99415)   [] Manual ([id number]) x     [] US (56810)  [] TA (25258) x      [] Paraffin (70469)  [] ADL  (53 [phone] [id number]) x    [] Splint/L code:    [] Estim (unattended) (22 [id number])  [] Fluidotherapy (85518)  [x] Other: aquatic therapy (81300) 3    GOALS:  Therapist goals for Patient:   Short Term Goals: To be achieved in: 30 days  1. Pt will be  Independent with functional mobility in community based pool and locker room to be able to return to swimming  [x]? Progressing: []? Met: []? Not Met: []? Adjusted  2.  Pt will be independent with self care in community based pool locker room to be able [facility]  3. Patient will be independent swimming laps for 20 minutes for wellness and avocational pursuit of swimming  [x]? Progressing: []? Met: []? Not Met: []? Adjusted  4. Patient will demonstrate good coordination to return to playing the piano without difficulty   Long Term Goals: To be achieved by betty  1. Pt will will report a QuickDASH Symptom Severity Scale score of 15% or less indicating increased safety and functional independence in desired occupational pursuits by discharge. [x]? Progressing: []? Met: []? Not Met: []? Adjusted    Progression Towards Functional goals:  [x] Patient is progressing as expected towards functional goals listed. [] Progression is slowed due to complexities listed. [] Progression has been slowed due to co-morbidities. [] Plan just implemented, too soon to assess goals progression  [] All goals are met  [] Other:     ASSESSMENT:  See eval    Treatment/Activity Tolerance:  [x] Patient tolerated treatment well [] Patient limited by fatique  [] Patient limited by pain  [] Patient limited by other medical complications  [] Other:     Prognosis: [x] Good [] Fair  [] Poor    Patient Requires Follow-up: [x] Yes  [] No    PLAN: See eval  [x] Continue per plan of care [] Alter current plan (see comments)  [] Plan of care initiated [] Hold pending MD visit [] Discharge    Electronically signed by:              Note: If patient does not return for scheduled/ recommended follow up visits, this note will serve as a discharge from care along with most recent update on progress.

## 2020-10-01 NOTE — FLOWSHEET NOTE
[facility]  Phone: [phone]   Fax: [phone]       Physical Therapy Re-Certification Plan of Care    Dear Marla Robles MD,    We had the pleasure of treating the following patient for physical therapy services at [address] [place] [address]. A summary of our findings can be found in the updated assessment below. This includes our plan of care. If you have any questions or concerns regarding these findings, please do not hesitate to contact me at the office phone number checked above. Thank you for the referral.     Physician Signature:________________________________Date:__________________  By signing above (or electronic signature), therapists plan is approved by physician      Functional Outcome: TUG 9.5\" w/o AD     Tinetti Assessment     Overall Response to Treatment:   [x]Patient is responding well to treatment and improvement is noted with regards  to goals   []Patient should continue to improve in reasonable time if they continue HEP   []Patient has plateaued and is no longer responding to skilled PT intervention    []Patient is getting worse and would benefit from return to referring MD   []Patient unable to adhere to initial POC   [x]Other: Patient's balance & gait is progressing as stamina is improving, however continues to be limited and reduces balance as increases. B hip strength remains limited preventing return to workout routine and normal activities. Patient can continue to benefit from PT services to further progress hip stability and balance to return pt to OF. Date range of Visits: 8/10 - 10/1/2020   Total Visits: 9/10    Recommendation:    [x]Request additional PT visits at 2x / wk for 5 weeks.     []Hold PT, pending MD visit          Physical Therapy Daily Treatment Note  Date:  10/1/2020    Patient Name:  Clare Orellana    :  1975  MRN: [id number]  Medical/Treatment Diagnosis Information:  · Diagnosis: I63.9 (ICD-10-CM) - Cerebral infarction, unspecified  · Treatment Diagnosis: Impaired proprioception & coordination of BLEs, Impaired balance & gait  Insurance/Certification information:  PT Insurance Information: [employer]  20 PT visits per contract year. TeleHealth 80/20 after deductibile  Physician Information:  Referring Practitioner: Gilbert Vaughan MD  Plan of care signed (Y/N): []  Yes [x]  No     Date of Patient follow up with Physician:      Progress Report: [x]  Yes  []  No     Date Range for reporting period:  Beginning  8/10/2020   Ending  10/1/2020    Progress report due (10 Rx/or 30 days whichever is less):   8/82/1270     Recertification due (POC duration/ or 90 days whichever is less):        Visit # Insurance Allowable Auth required? Date Range   9/10 10 visits approved  20 PT visits  []  Yes  [x]  No 8/10 to 9/11, extended to 10/9/2020     Latex Allergy:  [x]NO      []YES  Preferred Language for Healthcare:   [x]English       []Other:      Functional Scale/Test Evaluation 8/10/2020 30 day  60 day  Discharge    Tinetti Assessment 18/28 27/28     TUG 26\" w/RW  9.5\" no AD                 Pain level:  0/10  Location:  na    SUBJECTIVE:    Was worn out after last session but enjoyed the workout. Only goes up and down stairs at home once a day at most, unless he has somewhere to go. Still gets SOB & fatigued easily and feels balance gets worse as he gets tired. Is walking in a straight line more often. Has been unable to return to his workout and swimming due to decreased stamina and strength.         OBJECTIVE:   9/14: pt with L LOB while walking with RW into clinic, PT able to correct without fall; pt reports he was tired from the pool and didn't eat breakfast - /103      RESTRICTIONS/PRECAUTIONS:  HTN    Interventions/Exercises:   Therapeutic Exercises (06198) Resistance / level Sets/sec Reps Notes   Bike  NuStep  Step one                  IB gastroc stretch HR/TR     squats  B UE support, VCs for slow and controlled    Standing LE strength:  Ham curl  Hip abd   Hip ext      [facility]  Navigated 2 flights of stairs reciprocally w/HR    Cybex  Leg press  Hip abd           110#  50#   1        2  2           15  15                         Neuromuscular Re-ed (69146) /  Gait Training (33699)       Cone walking       Shuttle Balance       Cable Column   · 4-way walkouts  · 3-way hip    At steps  · Fwd step up to contralateral hip flex w/trunk rotation   -intermittent use of HR for balance   2 foot Hopping over line  · Fwd  · lateral    Mat table  · Bridge  · Plank on forearms  · Plank on hands   · Quadruped hip ext  · 1/2 kneeling  ·         Hi->low chops R to L                Tandem stance  NBOS with head nods and turns  Stagger stance with arm swings  Stagger stance with EC  SLS Most challenging, VCs for shift R   Square wobble board   DF/PF taps  DF/PF balance  No UE support    Airex  Stagger stance with EO  Stagger stance with EC  NBOS balance  NBOS with OH ball lift  NBOS with thor rot holding ball  CGA, TC for pushing into R foot - also wearing 7.5# weight on ankles for increased compression    Trampoline  · Rapid lateral weight shifting  · Prancing    Turf field:   Forward walk no AD  Retro walk no AD  Lateral walk no AD  Carioca     Weave between cones    Ball kick to knock down cones    40 feet  40 feet  20 feet  20 feet   x1  x1  x1  x1    x4    x1 CGA for all    Stand to 1/2 kneel with 1 UE support on medium plyobox  x1 CGA   1/2 kneel with OH ball lift   x1   CGA/min A for balance                         Therapeutic Activities (78510) /  Functional Tasks       //bars  · Walking lunges  · Side stepping in squat position          Gait training w/SPC  8/12: instructed pt on modified 3-point pattern -improved stamina observed this date   Gait with heavy SPC         Gait training with SBQC and 7.5# ankle weights    Gait w/walking stick  · R hand  · L hand      Gait training with no AD -over uneven surfaces, up & down hills    Gait training with SPC  X 650 feet        Good step length and frank, VCs for L toe out slightly to improve KAMALA and stability       Increased input improved balance        -slight LOB to L initially, no assist required             Forward step ups  Lateral step ups   No UE support, CGA  No UE support, CGA   Walking on grey mats   Forward  laterally    No UE support. CGA  No UE support, supervision     Gait training w/walking stick in L hand:  [facility] P[facility]:  Descended ramp -> 20' -> ascended 3 steps -> 10' -> descended 3 steps then return    Blue line to & around center [place], navy line to perpendicular navy line to perpendicular blue line to starting point     SBA ->mod A        Normal gait  Feet on line          Treadmill gait  2.5 mph X 5 min  B UE support, 0.24 miles total, step length 69 L, 71 R, time on each foot 50 L, 50 R, avg walking speed 1.12                 8/12:  Discussed importance of improved protein intake to better develop muscle and encourage neural development                     Manual Intervention ([id number])                                                     Pt. Education:     9/3:  Discussed using RW and obtaining night light when having to get up in the middle of the night. Encouraged pt to remain seated and to transition slowly from sit to stand to minimize dizziness. 8/10:  -patient educated on diagnosis, prognosis and expectations for rehab  -all patient questions were answered    HEP instruction:       8/17: Added 3-way  Hip w/orange resistance band to HEP, handout provided  Access Code: [email or web address]   URL: [email or web address]   Date: 08/10/2020   Prepared by: Denisa Burnett     Exercises   · Supine Active Straight Leg Raise - 15-20 reps - 2x daily - 7x weekly   · Supine Bridge - 15-20 reps - 2x daily - 7x weekly   · Sidelying Hip Abduction - 15-20 reps - 2x daily - 7x weekly   · Prone Hip Extension - 115-20 reps - 2x daily - 7x weekly   · Walking - 5 mins hold - 10x daily - 7x weekly     NMR and Therapeutic Activities:    [x] (53304 or 08807) Provided verbal/tactile cueing for activities related to improving balance, coordination, kinesthetic sense, posture, motor skill, proprioception and motor activation to allow for proper function of   [x] LE / Core, hip and LE with self care and ADLs  [] UE / Shoulder complex: cervical, postural, scapular, scapulothoracic and UE control with self care, carrying, lifting, driving, computer work. [x] (23918) Gait Re-education- Provided training and instruction to the patient for proper LE, core and hip recruitment, positioning, and eccentric body weight control with ambulation re-education, including ascending & descending stairs     Home Management Training / Self Care:  [] (24292) Provided self-care/home management training related to activities of daily living and compensatory training, and/or use of adaptive equipment for improvement with: ADLs and compensatory training, meal preparation, safety procedures and instruction in use of adaptive equipment, including bathing, grooming, dressing, personal hygiene, basic household cleaning and chores. Therapeutic Exercise and NMR EXR  [x] (27883) Provided verbal/tactile cueing for activities related to strengthening, flexibility, endurance, ROM for improvements in  s[x] LE / Core stability: LE, hip, and core control with self care, mobility, lifting, ambulation.   [] UE / Shoulder complex: cervical, postural, scapular, scapulothoracic and UE control with self care, reaching, carrying, lifting, house/yardwork, driving, computer work.  [] (24592) Provided verbal/tactile cueing for activities related to improving balance, coordination, kinesthetic sense, posture, motor skill, proprioception to assist with   [] LE / Core stability: LE, hip, and core control in self care, mobility, lifting, ambulation and eccentric single leg control. [] UE / Shoulder complex: cervical, scapular, scapulothoracic and UE control with self care, reaching, carrying, lifting, house/yardwork, driving, computer work.   [] (41189) Therapist is in constant attendance of 2 or more patients providing skilled therapy interventions, but not providing any significant amount of measurable one-on-one time to either patient, for improvements in  [] LE / Core stability: LE, hip, and core control in self care, mobility, lifting, ambulation and eccentric single leg control. [] UE / Shoulder complex: cervical, scapular, scapulothoracic and UE control with self care, reaching, carrying, lifting, house/yardwork, driving, computer work.      Home Exercise Program:    [x] (77084) Reviewed/Progressed HEP activities related to strengthening, flexibility, endurance, ROM of   [x] LE / Core stability: core, hip and LE for functional self-care, mobility, lifting and ambulation/stair navigation   [] UE / Shoulder complex: cervical, postural, scapular, scapulothoracic and UE control with self care, reaching, carrying, lifting, house/yardwork, driving, computer work  [] (22730)Reviewed/Progressed HEP activities related to improving balance, coordination, kinesthetic sense, posture, motor skill, proprioception of   [] LE: core, hip and LE for self care, mobility, lifting, and ambulation/stair navigation    [] UE / Shoulder complex: cervical, postural,  scapular, scapulothoracic and UE control with self care, reaching, carrying, lifting, house/yardwork, driving, computer work    Manual Treatments:  PROM / STM / Oscillations-Mobs:  G-I, II, III, IV (PA's, Inf., Post.)  [] (39561) Provided manual therapy to mobilize shoulder complex, hip, LE, and/or cervicothoracic/LS spine soft tissue/joints for the purpose of modulating pain, promoting relaxation,  increasing ROM, reducing/eliminating soft tissue swelling/inflammation/restriction, improving soft tissue extensibility and Progressing: [] Met: [] Not Met: [] Adjusted  3. Patient to ambulate without ' x2 over uneven surfaces IND in order to safely return to community activities   [x] Progressing: [] Met: [] Not Met: [] Adjusted  4. Patient will return to functional activities including navigating stairs without increased symptoms or restriction. [] Progressing: [x] Met: [] Not Met: [] Adjusted  5. Patient to demonstrate Tinetti Assessment of 26/28 to safely resume workout routine unassisted   [x] Progressing: [] Met: [] Not Met: [] Adjusted    Overall Progression Towards Functional goals/ Treatment Progress Update:  [x] Patient is progressing as expected towards functional goals listed. [] Progression is slowed due to complexities/Impairments listed. [] Progression has been slowed due to co-morbidities. [] Plan just implemented, too soon to assess goals progression <30days   [] Goals require adjustment due to lack of progress  [] Patient is not progressing as expected and requires additional follow up with physician  [] Other    Persisting Functional Limitations/Impairments:  []Sleeping []Sitting               [x]Standing []Transfers        [x]Walking [x]Kneeling               [x]Stairs [x]Squatting / bending   []ADLs [x]Reaching  [x]Lifting  [x]Housework  [x]Driving [x]Job related tasks  []Sports/Recreation []Other:        ASSESSMENT:      Patient's balance & gait is progressing as stamina is improving, however continues to be limited and reduces balance as increases. B hip strength remains limited preventing return to workout routine and normal activities. Patient can continue to benefit from PT services to further progress hip stability and balance to return pt to OF.     Treatment/Activity Tolerance:  [x] Patient able to complete tx  [x] Patient limited by fatigue  [] Patient limited by pain  [] Patient limited by other medical complications  [] Other:     Prognosis: [x] Good [] Fair  [] Poor    Patient Requires Follow-up: [x] Yes  [] No    Plan for next treatment session:    Core stability, gait training    PLAN: See fuad. PT 2x / week for 6 weeks. [x] Continue per plan of care [] Alter current plan (see comments)  [] Plan of care initiated [] Hold pending MD visit [] Discharge    Electronically signed by: Abilio Floyd PT, DPT    Note: If patient does not return for scheduled/ recommended follow up visits, his note will serve as a discharge from care along with most recent update on progress.

## 2020-10-05 ENCOUNTER — HOSPITAL ENCOUNTER (OUTPATIENT)
Dept: PHYSICAL THERAPY | Age: 45
Setting detail: THERAPIES SERIES
Discharge: HOME | End: 2020-10-05
Payer: COMMERCIAL

## 2020-10-05 PROCEDURE — 97112 NEUROMUSCULAR REEDUCATION: CPT

## 2020-10-05 PROCEDURE — 97116 GAIT TRAINING THERAPY: CPT

## 2020-10-05 PROCEDURE — 97110 THERAPEUTIC EXERCISES: CPT

## 2020-10-05 NOTE — FLOWSHEET NOTE
[address] [facility] Physical Therapy  Phone: [phone]   Fax: [phone]         Physical Therapy Daily Treatment Note  Date:  10/5/2020    Patient Name:  Zeinab Watson    :  1975  MRN: [id number]  Medical/Treatment Diagnosis Information:  · Diagnosis: I63.9 (ICD-10-CM) - Cerebral infarction, unspecified  · Treatment Diagnosis: Impaired proprioception & coordination of BLEs, Impaired balance & gait  Insurance/Certification information:  PT Insurance Information: [employer]  20 PT visits per contract year. TeleHealth  after deductibile  Physician Information:  Referring Practitioner: Grace Stephens MD  Plan of care signed (Y/N): [x]  Yes []  No     Date of Patient follow up with Physician:      Progress Report: []  Yes  [x]  No     Date Range for reporting period:  Beginning  10/1/2020  Ending      Progress report due (10 Rx/or 30 days whichever is less):   2909     Recertification due (POC duration/ or 90 days whichever is less):    2020    Visit # Insurance Allowable Auth required? Date Range   10/10 + 08 10 visits approved  New set of 8 visits  20 PT visits total []  Yes  [x]  No   10/10 - 2020     Latex Allergy:  [x]NO      []YES  Preferred Language for Healthcare:   [x]English       []Other:      Functional Scale/Test Evaluation 8/10/2020 30 day  60 day  Discharge    Tinetti Assessment      TUG 26\" w/RW  9.5\" no AD                 Pain level:  0/10  Location:  na    SUBJECTIVE:     Went for a long walk on Friday and was more active in the home and went to football game, didn't use cane until he went to game and only used it as very light balance, vs supportive device. No LOB, did notice he was drifting to the left, wanted to challenge himself so didn't use the cane. Carried on with increased activity throughout the weekend.         OBJECTIVE:   : pt with L LOB while walking with RW into clinic, PT able to correct without fall; pt reports he was tired from the pool and didn't eat breakfast - /103      RESTRICTIONS/PRECAUTIONS:  HTN    Interventions/Exercises:   Therapeutic Exercises (29464) Resistance / level Sets/sec Reps Notes   Bike  NuStep  Step one                  IB gastroc stretch   HR/TR     squats  B UE support, VCs for slow and controlled    Standing LE strength:  Ham curl  Hip abd   Hip ext      [facility]  Navigated 2 flights of stairs reciprocally w/HR    Cybex  Leg press  Hip abd    Precore elliptical  Fwd / bwd           110#  50#   1            4.5' / 1.5'                                     Neuromuscular Re-ed (19341) /  Gait Training (99682)       TRX (after elliptical):  Lunge  B heel raises  Push-ups     15 B  15   3       -reported incr'd fatigue and requested to stop          Cable Column   · 4-way walkouts  · 3-way hip    At steps  · Fwd step up to contralateral hip flex w/trunk rotation   -intermittent use of HR for balance   2 foot Hopping over line  · Fwd  · lateral    Mat table  · Bridge  · Plank on forearms  · Plank on hands   · Quadruped hip ext  · 1/2 kneeling  ·         Hi->low chops R to L                Tandem stance  NBOS with head nods and turns  Stagger stance with arm swings  Stagger stance with EC  SLS Most challenging, VCs for shift R   Square wobble board   DF/PF taps  DF/PF balance  No UE support    Airex  Stagger stance with EO  Stagger stance with EC  NBOS balance  NBOS with OH ball lift  NBOS with thor rot holding ball  CGA, TC for pushing into R foot - also wearing 7.5# weight on ankles for increased compression    Trampoline  · Rapid lateral weight shifting  · Prancing    Turf field (after elliptical):   Forward walk no AD  Retro walk no AD  Lateral walk no AD  Carioca   Walking lunges  High marching w/light, green ball OH    Weave between cones    Ball kick to knock down cones    150 feet    25'  25'            x1 x1x1  -rapid knee ext just prior to heel strike noted B         -SBA to max A to regain balance  -Sup to min A to regain balance   Stand to 1/2 kneel with 1 UE support on medium plyobox  x1 CGA   1/2 kneel with OH ball lift   x1   CGA/min A for balance                         Therapeutic Activities (11613) /  Functional Tasks       //bars  · Walking lunges  · Side stepping in squat position          Gait training w/SPC  8/12: instructed pt on modified 3-point pattern -improved stamina observed this date   Gait with heavy SPC         Gait training with SBQC and 7.5# ankle weights    Gait w/walking stick  · R hand  · L hand      Gait training with no AD -over uneven surfaces, up & down hills    Gait training with SPC  X 650 feet        Good step length and frank, VCs for L toe out slightly to improve KAMALA and stability       Increased input improved balance        -slight LOB to L initially, no assist required             Forward step ups  Lateral step ups   No UE support, CGA  No UE support, CGA   Walking on grey mats   Forward  laterally    No UE support. CGA  No UE support, supervision     Gait training w/walking stick in L hand:  Health Plex:  Descended ramp -> 20' -> ascended 3 steps -> 10' -> descended 3 steps then return    Blue line to & around center [place], navy line to perpendicular navy line to perpendicular blue line to starting point     SBA ->mod A        Normal gait  Feet on line          Treadmill gait  2.5 mph X 5 min  B UE support, 0.24 miles total, step length 70 L, 71 R, time on each foot 50 L, 50 R, avg walking speed 1.12                 8/12:  Discussed importance of improved protein intake to better develop muscle and encourage neural development                     Manual Intervention ([phone])                                                     Pt. Education:     9/3:  Discussed using RW and obtaining night light when having to get up in the middle of the night.   Encouraged pt to remain seated and to transition slowly from sit to stand to minimize dizziness. 8/10:  -patient educated on diagnosis, prognosis and expectations for rehab  -all patient questions were answered    HEP instruction:       8/17: Added 3-way  Hip w/orange resistance band to HEP, handout provided  Access Code: [email or web address]   URL: [email or web address]   Date: 08/10/2020   Prepared by: Lynda Landry     Exercises   · Supine Active Straight Leg Raise - 15-20 reps - 2x daily - 7x weekly   · Supine Bridge - 15-20 reps - 2x daily - 7x weekly   · Sidelying Hip Abduction - 15-20 reps - 2x daily - 7x weekly   · Prone Hip Extension - 115-20 reps - 2x daily - 7x weekly   · Walking - 5 mins hold - 10x daily - 7x weekly     NMR and Therapeutic Activities:    [x] (20335 or 73129) Provided verbal/tactile cueing for activities related to improving balance, coordination, kinesthetic sense, posture, motor skill, proprioception and motor activation to allow for proper function of   [x] LE / Core, hip and LE with self care and ADLs  [] UE / Shoulder complex: cervical, postural, scapular, scapulothoracic and UE control with self care, carrying, lifting, driving, computer work. [x] (78921) Gait Re-education- Provided training and instruction to the patient for proper LE, core and hip recruitment, positioning, and eccentric body weight control with ambulation re-education, including ascending & descending stairs     Home Management Training / Self Care:  [] (57172) Provided self-care/home management training related to activities of daily living and compensatory training, and/or use of adaptive equipment for improvement with: ADLs and compensatory training, meal preparation, safety procedures and instruction in use of adaptive equipment, including bathing, grooming, dressing, personal hygiene, basic household cleaning and chores.      Therapeutic Exercise and NMR EXR  [x] (71449) Provided verbal/tactile cueing for activities related to strengthening, flexibility, endurance, ROM for improvements in  s[x] LE / Core stability: LE, hip, and core control with self care, mobility, lifting, ambulation. [] UE / Shoulder complex: cervical, postural, scapular, scapulothoracic and UE control with self care, reaching, carrying, lifting, house/yardwork, driving, computer work.  [] (59649) Provided verbal/tactile cueing for activities related to improving balance, coordination, kinesthetic sense, posture, motor skill, proprioception to assist with   [] LE / Core stability: LE, hip, and core control in self care, mobility, lifting, ambulation and eccentric single leg control. [] UE / Shoulder complex: cervical, scapular, scapulothoracic and UE control with self care, reaching, carrying, lifting, house/yardwork, driving, computer work.   [] (43237) Therapist is in constant attendance of 2 or more patients providing skilled therapy interventions, but not providing any significant amount of measurable one-on-one time to either patient, for improvements in  [] LE / Core stability: LE, hip, and core control in self care, mobility, lifting, ambulation and eccentric single leg control. [] UE / Shoulder complex: cervical, scapular, scapulothoracic and UE control with self care, reaching, carrying, lifting, house/yardwork, driving, computer work.      Home Exercise Program:    [x] (32052) Reviewed/Progressed HEP activities related to strengthening, flexibility, endurance, ROM of   [x] LE / Core stability: core, hip and LE for functional self-care, mobility, lifting and ambulation/stair navigation   [] UE / Shoulder complex: cervical, postural, scapular, scapulothoracic and UE control with self care, reaching, carrying, lifting, house/yardwork, driving, computer work  [] (13769)Reviewed/Progressed HEP activities related to improving balance, coordination, kinesthetic sense, posture, motor skill, proprioception of   [] LE: core, hip and LE for self care, mobility, lifting, and ambulation/stair navigation    [] UE / Shoulder movement, function, and ADLs as indicated by Functional Deficits. [] Progressing: [x] Met: [] Not Met: [] Adjusted    Long Term Goals: To be achieved in: 6 weeks  1. Patient to demonstrate TUG score <12 seconds to demonstrate improved fall risk to assist with reaching prior level of function. [] Progressing: [x] Met: [] Not Met: [] Adjusted  2. Patient will demonstrate an increase in strength of B shoulder & hip complex to 4+/5 throughout and core activation to allow for proper functional mobility as indicated by patients Functional Deficits. [x] Progressing: [] Met: [] Not Met: [] Adjusted  3. Patient to ambulate without ' x2 over uneven surfaces IND in order to safely return to community activities   [x] Progressing: [] Met: [] Not Met: [] Adjusted  4. Patient will return to functional activities including navigating stairs without increased symptoms or restriction. [] Progressing: [x] Met: [] Not Met: [] Adjusted  5. Patient to demonstrate Tinetti Assessment of 26/28 to safely resume workout routine unassisted   [x] Progressing: [] Met: [] Not Met: [] Adjusted    Overall Progression Towards Functional goals/ Treatment Progress Update:  [x] Patient is progressing as expected towards functional goals listed. [] Progression is slowed due to complexities/Impairments listed. [] Progression has been slowed due to co-morbidities.   [] Plan just implemented, too soon to assess goals progression <30days   [] Goals require adjustment due to lack of progress  [] Patient is not progressing as expected and requires additional follow up with physician  [] Other    Persisting Functional Limitations/Impairments:  []Sleeping []Sitting               [x]Standing []Transfers        [x]Walking [x]Kneeling               [x]Stairs [x]Squatting / bending   []ADLs [x]Reaching  [x]Lifting  [x]Housework  [x]Driving [x]Job related tasks  []Sports/Recreation []Other:        ASSESSMENT:       Following elliptical, patient demo'd increased BLE muscle fatigue, altering gait pattern and reducing balance. Patient requested to end session early due to increased fatigue. Treatment/Activity Tolerance:  [x] Patient able to complete tx  [x] Patient limited by fatigue  [] Patient limited by pain  [] Patient limited by other medical complications  [] Other:     Prognosis: [x] Good [] Fair  [] Poor    Patient Requires Follow-up: [x] Yes  [] No    Plan for next treatment session:    Core stability, gait training, improve stamina    PLAN: See fuad. PT 2x / week for 4 weeks per insurance company to 11/06/2020. [x] Continue per plan of care [] Alter current plan (see comments)  [] Plan of care initiated [] Hold pending MD visit [] Discharge    Electronically signed by: Angeline Cole PT, DPT    Note: If patient does not return for scheduled/ recommended follow up visits, his note will serve as a discharge from care along with most recent update on progress.

## 2020-10-07 ENCOUNTER — HOSPITAL ENCOUNTER (OUTPATIENT)
Dept: CT IMAGING | Age: 45
Discharge: HOME | End: 2020-10-07
Payer: COMMERCIAL

## 2020-10-07 PROCEDURE — 71250 CT THORAX DX C-: CPT

## 2020-10-08 ENCOUNTER — APPOINTMENT (OUTPATIENT)
Dept: PHYSICAL THERAPY | Age: 45
End: 2020-10-08
Payer: COMMERCIAL

## 2020-10-13 ENCOUNTER — TELEPHONE (OUTPATIENT)
Dept: PRIMARY CARE CLINIC | Age: 45
End: 2020-10-13

## 2020-10-14 NOTE — PLAN OF CARE
For scheduling: Call Maia at [phone]    For questions or concerns call: Radiology resident on call [phone].    For immediate concerns that are not emergent, you may call our radiology clinic at: [phone]     Problem: Neurological  Intervention: Speech Evaluation/treatment  SLP completed evaluation. Please refer to notes in EMR.

## 2020-10-15 ENCOUNTER — APPOINTMENT (OUTPATIENT)
Dept: PHYSICAL THERAPY | Age: 45
End: 2020-10-15
Payer: COMMERCIAL

## 2020-10-15 ENCOUNTER — APPOINTMENT (OUTPATIENT)
Dept: OCCUPATIONAL THERAPY | Age: 45
End: 2020-10-15
Payer: COMMERCIAL

## 2020-10-16 ENCOUNTER — VIRTUAL VISIT (OUTPATIENT)
Dept: PRIMARY CARE CLINIC | Age: 45
End: 2020-10-16
Payer: COMMERCIAL

## 2020-10-16 PROCEDURE — 99213 OFFICE O/P EST LOW 20 MIN: CPT | Performed by: INTERNAL MEDICINE

## 2020-10-16 RX ORDER — METHYLPREDNISOLONE 4 MG/1
TABLET ORAL
Qty: 21 TABLET | Refills: 0 | Status: SHIPPED | OUTPATIENT
Start: 2020-10-16 | End: 2020-10-22

## 2020-10-16 RX ORDER — PANTOPRAZOLE SODIUM 40 MG/1
40 TABLET, DELAYED RELEASE ORAL
Qty: 90 TABLET | Refills: 3 | Status: SHIPPED | OUTPATIENT
Start: 2020-10-16

## 2020-10-16 RX ORDER — DOXYCYCLINE HYCLATE 100 MG
100 TABLET ORAL 2 TIMES DAILY
Qty: 20 TABLET | Refills: 0 | Status: SHIPPED | OUTPATIENT
Start: 2020-10-16 | End: 2020-10-26

## 2020-10-16 RX ORDER — ALBUTEROL SULFATE 90 UG/1
2 AEROSOL, METERED RESPIRATORY (INHALATION) 4 TIMES DAILY PRN
Qty: 3 INHALER | Refills: 1 | Status: SHIPPED | OUTPATIENT
Start: 2020-10-16

## 2020-10-16 ASSESSMENT — ENCOUNTER SYMPTOMS
BLOOD IN STOOL: 0
CONSTIPATION: 0
COUGH: 0
DIARRHEA: 0
SHORTNESS OF BREATH: 0
CHEST TIGHTNESS: 0
GASTROINTESTINAL NEGATIVE: 1
WHEEZING: 0

## 2020-10-16 NOTE — PROGRESS NOTES
tightness, shortness of breath and wheezing. Quit smoking 2018  . Was going 1 ppd     Was a heavy drinker     No other rec drugs     No Asthma    Cardiovascular: Negative. HTN since 25s . No known CAD     FH of CAD     CVA 8/20    Gastrointestinal: Negative. Negative for blood in stool, constipation and diarrhea. DANNY had Colon Cancer in her 62s    Endocrine:        No Diabetes    Genitourinary: Negative for dysuria, frequency and urgency. No FH of ca prostate    Musculoskeletal: Negative. Negative for arthralgias. Skin: Negative for rash. Allergic/Immunologic: Negative for environmental allergies and food allergies. Neurological: Negative for dizziness, tremors, weakness, light-headedness and headaches. Hematological:        Anemia    Psychiatric/Behavioral: Positive for dysphoric mood. Negative for behavioral problems and sleep disturbance. The patient is not nervous/anxious. Objective:   Physical Exam  Neurological:      Mental Status: He is oriented to person, place, and time. Assessment:      Shant García is a 39 y.o. male evaluated via telephone on 10/16/2020. Consent:  He and/or health care decision maker is aware that that he may receive a bill for this telephone service, depending on his insurance coverage, and has provided verbal consent to proceed: Yes      Documentation:  I communicated with the patient  about this . Details of this discussion including any medical advice provided: as noted       I affirm this is a Patient Initiated Episode with a Patient who has not had a related appointment within my department in the past 7 days or scheduled within the next 24 hours.     Patient identification was verified at the start of the visit: Yes    Total Time: minutes: 11-20 minutes    Note: not billable if this call serves to triage the patient into an appointment for the relevant concern      Vic Kim was seen today for cough and discuss labs. Diagnoses and all orders for this visit:    Iron deficiency anemia, unspecified iron deficiency anemia type on  . Aspirin + Plavix . Also on PPI . Does give a FH of Colon Cancer  -     AFL - Evangeline Opitz, MD, Gastroenterology, [place]    Cough  . Some pleural Effusion . Will Consult with Pulmonary if it does not resolve   -     fluticasone-salmeterol (ADVAIR HFA) 230-21 MCG/ACT inhaler; Inhale 2 puffs into the lungs 2 times daily  -     albuterol sulfate HFA (VENTOLIN HFA) 108 (90 Base) MCG/ACT inhaler; Inhale 2 puffs into the lungs 4 times daily as needed for Wheezing  -     doxycycline hyclate (VIBRA-TABS) 100 MG tablet; Take 1 tablet by mouth 2 times daily for 10 days  -     methylPREDNISolone (MEDROL DOSEPACK) 4 MG tablet; Take by mouth. -     Mike Menard MD, Pulmonary, [place]  -     pantoprazole (PROTONIX) 40 MG tablet; Take 1 tablet by mouth every morning (before breakfast)    Elevated diaphragm  -     pantoprazole (PROTONIX) 40 MG tablet; Take 1 tablet by mouth every morning (before breakfast)        Essential hypertension  Controlled . Cont all meds regb   -     isosorbide mononitrate (IMDUR) 30 MG extended release tablet; Take 1 tablet by mouth daily  -     carvedilol (COREG) 25 MG tablet; Take 1 tablet by mouth 2 times daily (with meals)  -     NIFEdipine (ADALAT CC) 60 MG extended release tablet; Take 1 tablet by mouth every evening  -     losartan (COZAAR) 50 MG tablet; Take 1 tablet by mouth daily  -     Comprehensive Metabolic Panel; Future        Cerebrovascular accident (CVA), unspecified mechanism ([address] stay off Smoking and Etoh   -     atorvastatin (LIPITOR) 80 MG tablet; Take 1 tablet by mouth nightly  -     aspirin EC 81 MG EC tablet; Take 1 tablet by mouth daily  -     clopidogrel (PLAVIX) 75 MG tablet;  Take 1 tablet by mouth daily    Vaccine counseling  Refused Flu vac     -            Plan:              Jered Thacker MD

## 2020-10-19 ENCOUNTER — APPOINTMENT (OUTPATIENT)
Dept: PHYSICAL THERAPY | Age: 45
End: 2020-10-19
Payer: COMMERCIAL

## 2020-10-19 ENCOUNTER — APPOINTMENT (OUTPATIENT)
Dept: OCCUPATIONAL THERAPY | Age: 45
End: 2020-10-19
Payer: COMMERCIAL

## 2020-10-20 ENCOUNTER — HOSPITAL ENCOUNTER (OUTPATIENT)
Dept: PHYSICAL THERAPY | Age: 45
Setting detail: THERAPIES SERIES
Discharge: HOME | End: 2020-10-20
Payer: COMMERCIAL

## 2020-10-20 PROCEDURE — 97110 THERAPEUTIC EXERCISES: CPT

## 2020-10-20 PROCEDURE — 97112 NEUROMUSCULAR REEDUCATION: CPT

## 2020-10-20 NOTE — FLOWSHEET NOTE
[facility]  Phone: [phone]   Fax: [phone]         Physical Therapy Daily Treatment Note  Date:  10/20/2020    Patient Name:  Jez Dow    :  1975  MRN: [id number]  Medical/Treatment Diagnosis Information:  · Diagnosis: I63.9 (ICD-10-CM) - Cerebral infarction, unspecified  · Treatment Diagnosis: Impaired proprioception & coordination of BLEs, Impaired balance & gait  Insurance/Certification information:  PT Insurance Information: [employer]  20 PT visits per contract year. TeleHealth  after deductibile  Physician Information:  Referring Practitioner: Gilbert Vaughan MD  Plan of care signed (Y/N): [x]  Yes []  No     Date of Patient follow up with Physician:      Progress Report: []  Yes  [x]  No     Date Range for reporting period:  Beginning  10/1/2020  Ending      Progress report due (10 Rx/or 30 days whichever is less):        Recertification due (POC duration/ or 90 days whichever is less):    2020    Visit # Insurance Allowable Auth required? Date Range   10/10 +  10 visits approved  New set of 8 visits  20 PT visits total []  Yes  [x]  No   10/10 - 2020     Latex Allergy:  [x]NO      []YES  Preferred Language for Healthcare:   [x]English       []Other:      Functional Scale/Test Evaluation 8/10/2020 30 day  60 day  Discharge    Tinetti Assessment      TUG 26\" w/RW  9.5\" no AD                 Pain level:  0/10  Location:  na    SUBJECTIVE:      Has been working from home more, continues to increase activity levels.       OBJECTIVE:   : pt with L LOB while walking with RW into clinic, PT able to correct without fall; pt reports he was tired from the pool and didn't eat breakfast - /103      RESTRICTIONS/PRECAUTIONS:  HTN    Interventions/Exercises:   Therapeutic Exercises (94440) Resistance / level Sets/sec Reps Notes   Bike  NuStep  Step one                  IB gastroc stretch   HR/TR squats  B UE support, VCs for slow and controlled    Standing LE strength:  Ham curl  Hip abd   Hip ext      [facility]  Navigated 2 flights of stairs reciprocally w/HR    Cybex  Leg press B                   R, L  Hip abd  Knee flex  Knee ext    FM Calf press  FM Fwd step up, step on 3rd hold    Precore elliptical  Fwd / bwd           130#  70#  70#  60#  30#    50#  20#   1        2  1  2  1  2    2  1               15  15  15  15, 5, 5  15    20  7 L                         -had LOB w/final rep, had to step off machine to regain balance, mod A provided as well. Pt requested to ex's on machines at that time due to fatigue. Neuromuscular Re-ed (88174) /  Gait Training (60982)       TRX (after elliptical):  Lunge  B heel raises  Push-ups            -reported incr'd fatigue and requested to stop          Cable Column   · 4-way walkouts  · 3-way hip    At steps  · Fwd step up to contralateral hip flex w/trunk rotation   -intermittent use of HR for balance   2 foot Hopping over line  · Fwd  · lateral    Mat table  · Bridge  · Plank on forearms  · Plank on hands   · Quadruped hip ext  · 1/2 kneeling  ·         Hi->low chops R to L x15, x8 B               Tandem stance  NBOS with head nods and turns  Stagger stance with arm swings  Stagger stance with EC  SLS Most challenging, VCs for shift R   Square wobble board   DF/PF taps  DF/PF balance  No UE support    Airex  Stagger stance with EO  Stagger stance with EC  NBOS balance  NBOS with OH ball lift  NBOS with thor rot holding ball  CGA, TC for pushing into R foot - also wearing 7.5# weight on ankles for increased compression    Trampoline  · Rapid lateral weight shifting  · Prancing    Turf field (after elliptical):   Forward walk no AD  Retro walk no AD  Lateral walk no AD  Carioca   Walking lunges  High marching w/light, green ball OH    Weave between cones    Ball kick to knock down cones    150 feet    25'  25'            x1 x1x1  -rapid knee ext just prior to heel strike noted B         -SBA to max A to regain balance  -Sup to min A to regain balance   Stand to 1/2 kneel with 1 UE support on medium plyobox  x1 CGA   1/2 kneel with OH ball lift   x1   CGA/min A for balance                         Therapeutic Activities (73564) /  Functional Tasks       //bars  · Walking lunges  · Side stepping in squat position          Gait training w/SPC  8/12: instructed pt on modified 3-point pattern -improved stamina observed this date   Gait with heavy SPC         Gait training with SBQC and 7.5# ankle weights    Gait w/walking stick  · R hand  · L hand      Gait training with no AD -over uneven surfaces, up & down hills    Gait training with SPC  X 650 feet        Good step length and frank, VCs for L toe out slightly to improve KAMALA and stability       Increased input improved balance        -slight LOB to L initially, no assist required             Forward step ups  Lateral step ups   No UE support, CGA  No UE support, CGA   Walking on grey mats   Forward  laterally    No UE support. CGA  No UE support, supervision     Gait training w/walking stick in L hand:  [facility] P[facility]:  Descended ramp -> 20' -> ascended 3 steps -> 10' -> descended 3 steps then return    Blue line to & around center [place], navy line to perpendicular navy line to perpendicular blue line to starting point     SBA ->mod A        Normal gait  Feet on line          Treadmill gait  2.5 mph X 5 min  B UE support, 0.24 miles total, step length 69 L, 70 R, time on each foot 49 L, 51 R, avg walking speed 1.12                 8/12:  Discussed importance of improved protein intake to better develop muscle and encourage neural development                     Manual Intervention ([phone])                                                     Pt. Education:     9/3:  Discussed using RW and obtaining night light when having to get up in the middle of the night.   Encouraged pt to remain seated and to transition slowly from sit to stand to minimize dizziness. 8/10:  -patient educated on diagnosis, prognosis and expectations for rehab  -all patient questions were answered    HEP instruction:       10/20:  Encouraged pt to perform squats, lunges and stairs at home of high repetitions to improve LE muscle stamina    8/17: Added 3-way  Hip w/orange resistance band to HEP, handout provided  Access Code: [email or web address]   URL: [email or web address]   Date: 08/10/2020   Prepared by: Claudia Coupe     Exercises   · Supine Active Straight Leg Raise - 15-20 reps - 2x daily - 7x weekly   · Supine Bridge - 15-20 reps - 2x daily - 7x weekly   · Sidelying Hip Abduction - 15-20 reps - 2x daily - 7x weekly   · Prone Hip Extension - 115-20 reps - 2x daily - 7x weekly   · Walking - 5 mins hold - 10x daily - 7x weekly     NMR and Therapeutic Activities:    [x] (08439 or 67199) Provided verbal/tactile cueing for activities related to improving balance, coordination, kinesthetic sense, posture, motor skill, proprioception and motor activation to allow for proper function of   [x] LE / Core, hip and LE with self care and ADLs  [] UE / Shoulder complex: cervical, postural, scapular, scapulothoracic and UE control with self care, carrying, lifting, driving, computer work. [x] (45019) Gait Re-education- Provided training and instruction to the patient for proper LE, core and hip recruitment, positioning, and eccentric body weight control with ambulation re-education, including ascending & descending stairs     Home Management Training / Self Care:  [] (31521) Provided self-care/home management training related to activities of daily living and compensatory training, and/or use of adaptive equipment for improvement with: ADLs and compensatory training, meal preparation, safety procedures and instruction in use of adaptive equipment, including bathing, grooming, dressing, personal hygiene, basic household cleaning and chores. Therapeutic Exercise and NMR EXR  [x] (98933) Provided verbal/tactile cueing for activities related to strengthening, flexibility, endurance, ROM for improvements in  s[x] LE / Core stability: LE, hip, and core control with self care, mobility, lifting, ambulation. [] UE / Shoulder complex: cervical, postural, scapular, scapulothoracic and UE control with self care, reaching, carrying, lifting, house/yardwork, driving, computer work.  [] (00749) Provided verbal/tactile cueing for activities related to improving balance, coordination, kinesthetic sense, posture, motor skill, proprioception to assist with   [] LE / Core stability: LE, hip, and core control in self care, mobility, lifting, ambulation and eccentric single leg control. [] UE / Shoulder complex: cervical, scapular, scapulothoracic and UE control with self care, reaching, carrying, lifting, house/yardwork, driving, computer work.   [] (53750) Therapist is in constant attendance of 2 or more patients providing skilled therapy interventions, but not providing any significant amount of measurable one-on-one time to either patient, for improvements in  [] LE / Core stability: LE, hip, and core control in self care, mobility, lifting, ambulation and eccentric single leg control. [] UE / Shoulder complex: cervical, scapular, scapulothoracic and UE control with self care, reaching, carrying, lifting, house/yardwork, driving, computer work.      Home Exercise Program:    [x] (14849) Reviewed/Progressed HEP activities related to strengthening, flexibility, endurance, ROM of   [x] LE / Core stability: core, hip and LE for functional self-care, mobility, lifting and ambulation/stair navigation   [] UE / Shoulder complex: cervical, postural, scapular, scapulothoracic and UE control with self care, reaching, carrying, lifting, house/yardwork, driving, computer work  [] (91225)Reviewed/Progressed HEP activities related to improving balance, coordination, kinesthetic sense, posture, motor skill, proprioception of   [] LE: core, hip and LE for self care, mobility, lifting, and ambulation/stair navigation    [] UE / Shoulder complex: cervical, postural,  scapular, scapulothoracic and UE control with self care, reaching, carrying, lifting, house/yardwork, driving, computer work    Manual Treatments:  PROM / STM / Oscillations-Mobs:  G-I, II, III, IV (PA's, Inf., Post.)  [] (17956) Provided manual therapy to mobilize shoulder complex, hip, LE, and/or cervicothoracic/LS spine soft tissue/joints for the purpose of modulating pain, promoting relaxation,  increasing ROM, reducing/eliminating soft tissue swelling/inflammation/restriction, improving soft tissue extensibility and allowing for proper ROM for normal function with   [] LE / Core stability: self care, mobility, lifting and ambulation. [] UE / Shoulder complex: self care, reaching, carrying, lifting, house/yardwork, driving, computer work. Modalities:  [] (38870) Vasopneumatic compression: Utilized vasopneumatic compression to decrease edema / swelling for the purpose of improving mobility and quad tone / recruitment which will allow for increased overall function including but not limited to self-care, transfers, ambulation, and ascending / descending stairs. Modalities:        Charges:  Timed Code Treatment Minutes: 45   Total Treatment Minutes: 45     [] EVAL - LOW (42957)   [] EVAL - MOD (17106)  [] EVAL - HIGH (03928)  [] RE-EVAL (48448)  [x] TE (06450) x  2    [] Ionto  [x] NMR (77062) x  1    [] Vaso  [] Manual (22122) x      [] Ultrasound  [] TA x       [] Mech Traction (59144)  [] Gait Training x      [] ES (un) (11814):   [] Aquatic therapy x   [] Other:   [] Group:     GOALS:   Patient stated goal: walking better  [x] Progressing: [] Met: [] Not Met: [] Adjusted    Therapist goals for Patient:   Short Term Goals: To be achieved in: 2 weeks  1.  Independent in HEP and progression per patient tolerance, in order to prevent re-injury. [] Progressing: [x] Met: [] Not Met: [] Adjusted  2. Patient will have a decrease in pain to facilitate improvement in movement, function, and ADLs as indicated by Functional Deficits. [] Progressing: [x] Met: [] Not Met: [] Adjusted    Long Term Goals: To be achieved in: 6 weeks  1. Patient to demonstrate TUG score <12 seconds to demonstrate improved fall risk to assist with reaching prior level of function. [] Progressing: [x] Met: [] Not Met: [] Adjusted  2. Patient will demonstrate an increase in strength of B shoulder & hip complex to 4+/5 throughout and core activation to allow for proper functional mobility as indicated by patients Functional Deficits. [x] Progressing: [] Met: [] Not Met: [] Adjusted  3. Patient to ambulate without ' x2 over uneven surfaces IND in order to safely return to community activities   [x] Progressing: [] Met: [] Not Met: [] Adjusted  4. Patient will return to functional activities including navigating stairs without increased symptoms or restriction. [] Progressing: [x] Met: [] Not Met: [] Adjusted  5. Patient to demonstrate Tinetti Assessment of 26/28 to safely resume workout routine unassisted   [x] Progressing: [] Met: [] Not Met: [] Adjusted    Overall Progression Towards Functional goals/ Treatment Progress Update:  [x] Patient is progressing as expected towards functional goals listed. [] Progression is slowed due to complexities/Impairments listed. [] Progression has been slowed due to co-morbidities.   [] Plan just implemented, too soon to assess goals progression <30days   [] Goals require adjustment due to lack of progress  [] Patient is not progressing as expected and requires additional follow up with physician  [] Other    Persisting Functional Limitations/Impairments:  []Sleeping []Sitting               [x]Standing []Transfers        [x]Walking [x]Kneeling               [x]Stairs [x]Squatting / bending   []ADLs [x]Reaching  [x]Lifting  [x]Housework  [x]Driving [x]Job related tasks  []Sports/Recreation []Other:        ASSESSMENT:      Fatigue levels continue to increase w/repetitions. Balance and gait are largely affected as fatigue increases. Treatment/Activity Tolerance:  [x] Patient able to complete tx  [x] Patient limited by fatigue  [] Patient limited by pain  [] Patient limited by other medical complications  [] Other:     Prognosis: [x] Good [] Fair  [] Poor    Patient Requires Follow-up: [x] Yes  [] No    Plan for next treatment session:    Core stability, gait training, improve stamina    PLAN: See fuad. PT 2x / week for 4 weeks per insurance company to 11/06/2020. [x] Continue per plan of care [] Alter current plan (see comments)  [] Plan of care initiated [] Hold pending MD visit [] Discharge    Electronically signed by: Cipriano Luna PT, DPT    Note: If patient does not return for scheduled/ recommended follow up visits, his note will serve as a discharge from care along with most recent update on progress.

## 2020-10-23 ENCOUNTER — HOSPITAL ENCOUNTER (OUTPATIENT)
Dept: PHYSICAL THERAPY | Age: 45
Setting detail: THERAPIES SERIES
Discharge: HOME | End: 2020-10-23
Payer: COMMERCIAL

## 2020-10-23 PROCEDURE — 97112 NEUROMUSCULAR REEDUCATION: CPT

## 2020-10-23 PROCEDURE — 97116 GAIT TRAINING THERAPY: CPT

## 2020-10-23 NOTE — FLOWSHEET NOTE
[address] [facility]  Phone: [phone]   Fax: [phone]         Physical Therapy Daily Treatment Note  Date:  10/23/2020    Patient Name:  Gely Carmona    :  1975  MRN: [id number]  Medical/Treatment Diagnosis Information:  · Diagnosis: I63.9 (ICD-10-CM) - Cerebral infarction, unspecified  · Treatment Diagnosis: Impaired proprioception & coordination of BLEs, Impaired balance & gait  Insurance/Certification information:  PT Insurance Information: [employer]  20 PT visits per contract year. TeleHealth  after deductibile  Physician Information:  Referring Practitioner: Primitivo Prieto MD  Plan of care signed (Y/N): [x]  Yes []  No     Date of Patient follow up with Physician:      Progress Report: []  Yes  [x]  No     Date Range for reporting period:  Beginning  10/1/2020  Ending      Progress report due (10 Rx/or 30 days whichever is less):        Recertification due (POC duration/ or 90 days whichever is less):    2020    Visit # Insurance Allowable Auth required? Date Range   10/10 +  10 visits approved  New set of 8 visits  20 PT visits total []  Yes  [x]  No   10/10 - 2020     Latex Allergy:  [x]NO      []YES  Preferred Language for Healthcare:   [x]English       []Other:      Functional Scale/Test Evaluation 8/10/2020 30 day  60 day  Discharge    Tinetti Assessment      TUG 26\" w/RW  9.5\" no AD                 Pain level:  0/10  Location:  na    SUBJECTIVE:     Still having a bit of soreness in buttocks from exercises during last session, states it's a good soreness and denies that it's pain.         OBJECTIVE:   : pt with L LOB while walking with RW into clinic, PT able to correct without fall; pt reports he was tired from the pool and didn't eat breakfast - /103      RESTRICTIONS/PRECAUTIONS:  HTN    Interventions/Exercises:   Therapeutic Exercises (13444) Resistance / level Sets/sec Reps Notes Bike  NuStep  Step one   TM -side stepping   0.7 mph1' x2 B            IB gastroc stretch   HR/TR     squats  B UE support, VCs for slow and controlled    Standing LE strength:  Ham curl  Hip abd   Hip ext      Health Plex  Navigated 2 flights of stairs reciprocally w/HR    Cybex  Leg press B                   R, L  Hip abd  Knee flex  Knee ext    FM Calf press  FM Fwd step up, step on 3rd hold    Precore elliptical  Fwd / bwd           130#  70#  70#  60#  30#    50#  20#                         -had LOB w/final rep, had to step off machine to regain balance, mod A provided as well. Pt requested to ex's on machines at that time due to fatigue. Neuromuscular Re-ed (06706) /  Gait Training (03840)       TRX (after elliptical):  Lunge  B heel raises  Push-ups            -reported incr'd fatigue and requested to stop          Cable Column   · 4-way walkouts  · 3-way hip    At steps  · Fwd step up to contralateral hip flex w/trunk rotation   -intermittent use of HR for balance   2 foot Hopping over line  · Fwd  · lateral    Mat table  · Bridge  · Plank on forearms  · Plank on hands   · Quadruped hip ext  · 1/2 kneeling  ·         Hi->low chops R to L  ·         Body blade     45\" B, 30\" Bx15, x8 B               Tandem stance  NBOS with head nods and turns  Stagger stance with arm swings  Stagger stance with EC  SLS Most challenging, VCs for shift R   Square wobble board   DF/PF taps  DF/PF balance  No UE support    Airex  Stagger stance with EO  Stagger stance with EC  NBOS balance  NBOS with OH ball lift  NBOS with thor rot holding ball  CGA, TC for pushing into R foot - also wearing 7.5# weight on ankles for increased compression    Trampoline  · Rapid lateral weight shifting  · Prancing    Turf field:   Forward walk no AD  Retro walk no AD  Lateral walk no AD  Carioca   Walking lunges  High marching w/4# med ball    Weave between cones    Ball kick to knock down cones     Step up obstacle course, aerobic step to BOSU to 8\" step to BOSU and back    Skipping    Strong man ropes -BUE oscillating     Jump rope     30 feet  25'  25'                  75'               x2      x2    15\", 2x10\"    5\"x3, 15\"x2  x2x2x2  -rapid knee ext just prior to heel strike noted B            Stand to 1/2 kneel with 1 UE support on medium plyobox  x1 CGA   1/2 kneel with OH ball lift   x1   CGA/min A for balance                         Therapeutic Activities (03417) /  Functional Tasks       //bars  · Walking lunges  · Side stepping in squat position          Gait training w/SPC  8/12: instructed pt on modified 3-point pattern -improved stamina observed this date   Gait with heavy SPC         Gait training with SBQC and 7.5# ankle weights    Gait w/walking stick  · R hand  · L hand      Gait training with no AD -over uneven surfaces, up & down hills    Gait training with SPC          Good step length and frank, VCs for L toe out slightly to improve KAMALA and stability       Increased input improved balance        -slight LOB to L initially, no assist required             Forward step ups  Lateral step ups   No UE support, CGA  No UE support, CGA   Walking on grey mats   Forward  laterally    No UE support.  CGA  No UE support, supervision     Gait training w/walking stick in L hand:  Health Plex:  Descended ramp -> 20' -> ascended 3 steps -> 10' -> descended 3 steps then return    Blue line to & around center [place], navy line to perpendicular navy line to perpendicular blue line to starting point     SBA ->mod A        Normal gait  Feet on line          Treadmill gait   B UE support, 0.24 miles total, step length 69 L, 70 R, time on each foot 49 L, 51 R, avg walking speed 1.12                 8/12:  Discussed importance of improved protein intake to better develop muscle and encourage neural development                     Manual Intervention ([id number])                                                     Pt. Education: carrying, lifting, house/yardwork, driving, computer work  [] (10473)Reviewed/Progressed HEP activities related to improving balance, coordination, kinesthetic sense, posture, motor skill, proprioception of   [] LE: core, hip and LE for self care, mobility, lifting, and ambulation/stair navigation    [] UE / Shoulder complex: cervical, postural,  scapular, scapulothoracic and UE control with self care, reaching, carrying, lifting, house/yardwork, driving, computer work    Manual Treatments:  PROM / STM / Oscillations-Mobs:  G-I, II, III, IV (PA's, Inf., Post.)  [] (92271) Provided manual therapy to mobilize shoulder complex, hip, LE, and/or cervicothoracic/LS spine soft tissue/joints for the purpose of modulating pain, promoting relaxation,  increasing ROM, reducing/eliminating soft tissue swelling/inflammation/restriction, improving soft tissue extensibility and allowing for proper ROM for normal function with   [] LE / Core stability: self care, mobility, lifting and ambulation. [] UE / Shoulder complex: self care, reaching, carrying, lifting, house/yardwork, driving, computer work. Modalities:  [] (54921) Vasopneumatic compression: Utilized vasopneumatic compression to decrease edema / swelling for the purpose of improving mobility and quad tone / recruitment which will allow for increased overall function including but not limited to self-care, transfers, ambulation, and ascending / descending stairs.        Modalities:        Charges:  Timed Code Treatment Minutes: 47   Total Treatment Minutes: 47     [] EVAL - LOW (81400)   [] EVAL - MOD (58892)  [] EVAL - HIGH (71422)  [] RE-EVAL (44060)  [] TE (52524) x      [] Ionto  [x] NMR (13273) x  2    [] Vaso  [] Manual (20054) x      [] Ultrasound  [] TA x       [] Mech Traction (28268)  [x] Gait Training x 1     [] ES (un) (37937):   [] Aquatic therapy x   [] Other:   [] Group:     GOALS:   Patient stated goal: walking better  [x] Progressing: [] Met: [] Not Met: [] Adjusted    Therapist goals for Patient:   Short Term Goals: To be achieved in: 2 weeks  1. Independent in HEP and progression per patient tolerance, in order to prevent re-injury. [] Progressing: [x] Met: [] Not Met: [] Adjusted  2. Patient will have a decrease in pain to facilitate improvement in movement, function, and ADLs as indicated by Functional Deficits. [] Progressing: [x] Met: [] Not Met: [] Adjusted    Long Term Goals: To be achieved in: 6 weeks  1. Patient to demonstrate TUG score <12 seconds to demonstrate improved fall risk to assist with reaching prior level of function. [] Progressing: [x] Met: [] Not Met: [] Adjusted  2. Patient will demonstrate an increase in strength of B shoulder & hip complex to 4+/5 throughout and core activation to allow for proper functional mobility as indicated by patients Functional Deficits. [x] Progressing: [] Met: [] Not Met: [] Adjusted  3. Patient to ambulate without ' x2 over uneven surfaces IND in order to safely return to community activities   [x] Progressing: [] Met: [] Not Met: [] Adjusted  4. Patient will return to functional activities including navigating stairs without increased symptoms or restriction. [] Progressing: [x] Met: [] Not Met: [] Adjusted  5. Patient to demonstrate Tinetti Assessment of 26/28 to safely resume workout routine unassisted   [x] Progressing: [] Met: [] Not Met: [] Adjusted    Overall Progression Towards Functional goals/ Treatment Progress Update:  [x] Patient is progressing as expected towards functional goals listed. [] Progression is slowed due to complexities/Impairments listed. [] Progression has been slowed due to co-morbidities.   [] Plan just implemented, too soon to assess goals progression <30days   [] Goals require adjustment due to lack of progress  [] Patient is not progressing as expected and requires additional follow up with physician  [] Other    Persisting Functional Limitations/Impairments:  []Sleeping []Sitting               [x]Standing []Transfers        [x]Walking [x]Kneeling               [x]Stairs [x]Squatting / bending   []ADLs [x]Reaching  [x]Lifting  [x]Housework  [x]Driving [x]Job related tasks  []Sports/Recreation []Other:        ASSESSMENT:       Pt's coordination is steadily improving, endurance is slowly improving. Pt plans to jump rope and further increase activity at home. Treatment/Activity Tolerance:  [x] Patient able to complete tx  [x] Patient limited by fatigue  [] Patient limited by pain  [] Patient limited by other medical complications  [] Other:     Prognosis: [x] Good [] Fair  [] Poor    Patient Requires Follow-up: [x] Yes  [] No    Plan for next treatment session:    Core stability, gait training, improve stamina    PLAN: See fuad. PT 2x / week for 4 weeks per insurance company to 11/06/2020. [x] Continue per plan of care [] Alter current plan (see comments)  [] Plan of care initiated [] Hold pending MD visit [] Discharge    Electronically signed by: Bebe Keita PT, DPT    Note: If patient does not return for scheduled/ recommended follow up visits, his note will serve as a discharge from care along with most recent update on progress.

## 2020-10-28 ENCOUNTER — OFFICE VISIT (OUTPATIENT)
Dept: PULMONOLOGY | Age: 45
End: 2020-10-28
Payer: COMMERCIAL

## 2020-10-28 ENCOUNTER — APPOINTMENT (OUTPATIENT)
Dept: PHYSICAL THERAPY | Age: 45
End: 2020-10-28
Payer: COMMERCIAL

## 2020-10-28 VITALS
TEMPERATURE: 98.7 F | SYSTOLIC BLOOD PRESSURE: 146 MMHG | DIASTOLIC BLOOD PRESSURE: 99 MMHG | BODY MASS INDEX: 26.18 KG/M2 | RESPIRATION RATE: 16 BRPM | HEART RATE: 70 BPM | HEIGHT: 74 IN | WEIGHT: 204 LBS | OXYGEN SATURATION: 93 %

## 2020-10-28 PROCEDURE — 99204 OFFICE O/P NEW MOD 45 MIN: CPT | Performed by: INTERNAL MEDICINE

## 2020-10-28 RX ORDER — FLUTICASONE PROPIONATE 50 MCG
2 SPRAY, SUSPENSION (ML) NASAL DAILY
Qty: 1 BOTTLE | Refills: 5 | Status: SHIPPED | OUTPATIENT
Start: 2020-10-28

## 2020-10-28 RX ORDER — PREDNISONE 20 MG/1
20 TABLET ORAL DAILY
Qty: 10 TABLET | Refills: 0 | Status: SHIPPED | OUTPATIENT
Start: 2020-10-28 | End: 2020-11-07

## 2020-10-28 NOTE — PROGRESS NOTES
PATIENT IS SEEN AT THE REQUEST OF DR. Hernandez for cough    Chief complaint  This is a 39y.o. year old male  who comes to see me with a chief complaint of   Chief Complaint   Patient presents with    Cough     ref by Dr. Pina Marte        HPI  Here with a cc of cough    Starting coughing since being in the hospital back in July. Did not go to the hospital for a cough. Was in the hospital for a stroke. Coughing ever since then. Cough happens all of the time. Cough medicine helped suppress the cough and antibiotics did as well but did not control his cough. Cough is clear mucus  Has sinus drainage. He was given advair and albuterol but has not tried them yet. He was put on new medications for his stroke and has continued with them since discharge. He is not on an ACE but is on an ARB    Occupation:  Customer service     Pets: None    Hobbies/Exposures: None    TB Exposure:  None    Lung Procedures:  None      Past Medical History:   Diagnosis Date    Cough     Hyperlipidemia     Hypertension        No past surgical history on file.     Social History     Socioeconomic History    Marital status: Legally      Spouse name: Not on file    Number of children: Not on file    Years of education: Not on file    Highest education level: Not on file   Occupational History    Not on file   Social Needs    Financial resource strain: Not on file    Food insecurity     Worry: Not on file     Inability: Not on file    Transportation needs     Medical: Not on file     Non-medical: Not on file   Tobacco Use    Smoking status: Former Smoker     Packs/day: 1.00     Types: Cigarettes     Last attempt to quit: 3/5/2018     Years since quittin.6    Smokeless tobacco: Never Used   Substance and Sexual Activity    Alcohol use: Yes     Comment: occacional    Drug use: No    Sexual activity: Not on file   Lifestyle    Physical activity     Days per week: Not on file     Minutes per session: Not on file    Stress: Not on file   Relationships    Social connections     Talks on phone: Not on file     Gets together: Not on file     Attends Sikhism service: Not on file     Active member of club or organization: Not on file     Attends meetings of clubs or organizations: Not on file     Relationship status: Not on file    Intimate partner violence     Fear of current or ex partner: Not on file     Emotionally abused: Not on file     Physically abused: Not on file     Forced sexual activity: Not on file   Other Topics Concern    Not on file   Social History Narrative    Not on file       Family History   Problem Relation Age of Onset    Diabetes Father     High Blood Pressure Father     Diabetes Mother     High Blood Pressure Mother     Diabetes Maternal Grandmother     Diabetes Maternal Grandfather     Diabetes Paternal Grandmother     Diabetes Paternal Grandfather          Current Outpatient Medications:     fluticasone-salmeterol (ADVAIR HFA) 230-21 MCG/ACT inhaler, Inhale 2 puffs into the lungs 2 times daily, Disp: 1 Inhaler, Rfl: 3    albuterol sulfate HFA (VENTOLIN HFA) 108 (90 Base) MCG/ACT inhaler, Inhale 2 puffs into the lungs 4 times daily as needed for Wheezing, Disp: 3 Inhaler, Rfl: 1    pantoprazole (PROTONIX) 40 MG tablet, Take 1 tablet by mouth every morning (before breakfast), Disp: 90 tablet, Rfl: 3    isosorbide mononitrate (IMDUR) 30 MG extended release tablet, Take 1 tablet by mouth daily, Disp: 90 tablet, Rfl: 3    carvedilol (COREG) 25 MG tablet, Take 1 tablet by mouth 2 times daily (with meals), Disp: 180 tablet, Rfl: 3    NIFEdipine (ADALAT CC) 60 MG extended release tablet, Take 1 tablet by mouth every evening, Disp: 90 tablet, Rfl: 3    losartan (COZAAR) 50 MG tablet, Take 1 tablet by mouth daily, Disp: 90 tablet, Rfl: 3    atorvastatin (LIPITOR) 80 MG tablet, Take 1 tablet by mouth nightly, Disp: 30 tablet, Rfl: 3    aspirin EC 81 MG EC tablet, Take 1 tablet by mouth daily, Disp: 90 tablet, Rfl: 5    clopidogrel (PLAVIX) 75 MG tablet, Take 1 tablet by mouth daily, Disp: 90 tablet, Rfl: 3    scopolamine (TRANSDERM-SCOP) transdermal patch, Place 1 patch onto the skin every 72 hours, Disp: 30 patch, Rfl: 1      ROS:  GENERAL:  No fevers, chills, or night sweats  HEENT:  No double vision, blurry vision, or difficulty swallowing  HEART:  No chest pain, no palpitations  LUNGS:  Chronic coughing since July   ABDOMEN:  No abdominal pains, no changes in stools  GENITOURINARY:  No increased frequency, no blood in urine  EXTREMITIES:  No swelling, no lesions  NEURO:  Recent CVA  SKIN:  No new rashes, no changes in hair or nails  LYMPH:  No masses, no swelling neck, armpits, or groin    PHYSICAL EXAM:  Vitals:    10/28/20 0736   BP: (!) 146/99   Pulse: 70   Resp: 16   Temp: 98.7 °F (37.1 °C)   SpO2: 93%       GENERAL:  Well nourished, alert, appears stated age, no distress  HEENT:  No scleral icterus, no conjunctival irritation. Mallampati IV with mild injection  NECK:  No thyromegaly, no bruits  LYMPH:  No cervical or supraclavicular adenopathy  HEART:  Regular rate and rhythm, no murmurs  LUNGS:  Clear bilaterally, coughing during exam  ABDOMEN:  No distention, no organomegaly  EXTREMITIES:  No edema, no digital clubbing  NEURO:  No localizing deficits, CN II-XII intact    Pulmonary Function Testing  None    Chest imaging from 10/2020 is reviewed. My interpretation is no acute process. May have some mucus in airways, trace effusion, small hiatal hernia      ECHO 7/2020  Left ventricular cavity size is normal with moderate LVH . Overall left ventricular systolic function appears normal with an ejection   fraction of 55%. No regional wall motion abnormalities   Normal diastolic function. Mild mitral regurgitation   A bubble study was performed and fails to show evidence of right to left   shunting. Global strain is -17.9%. The aortic root is dilated, measuring 3.9 cm.    The ascending aorta is mildly dilated, measuring 3.8 cm. Lab Results   Component Value Date    WBC 5.4 09/30/2020    HGB 11.8 (L) 09/30/2020    HCT 35.7 (L) 09/30/2020    MCV 84.7 09/30/2020     09/30/2020       Lab Results   Component Value Date    BNP 15 11/04/2013       Lab Results   Component Value Date    CREATININE 1.2 09/30/2020    BUN 9 09/30/2020     09/30/2020    K 4.1 09/30/2020     09/30/2020    CO2 23 09/30/2020       I reviewed above labs and studies pertinent to this visit and date    Assessment/Plan:  1. Cough  Seems rather subacute and suprisingly started while he was in the hospital.  Only positive ROS is sinus drainage. He is on an ARB (new) and cough can be reported in up to 3% of patients. I would lean towards more acute-subacute causes. Will try prednisone for 10 days, and flonase daily. I don't think he needs advair at this time. Ok to try to use albuterol for this cough. Will hold off on PFTs for now. Follow up in 4-6 weeks to assess response. May have to get off of ARB just to remove any potential triggers. - fluticasone (FLONASE) 50 MCG/ACT nasal spray; 2 sprays by Each Nostril route daily  Dispense: 1 Bottle; Refill: 5  - predniSONE (DELTASONE) 20 MG tablet; Take 1 tablet by mouth daily for 10 days  Dispense: 10 tablet; Refill: 0    I reviewed the admission progress notes from July. Doctors were not documenting a cough. So either this cough has been going on longer than her realizes or it has been even more recent than July    Pleural effusion are insignificant in my opinion     Consultation note well will be sent to referring physician regarding findings and plan.

## 2020-10-28 NOTE — PATIENT INSTRUCTIONS
Take prednisone for 10 days    Start flonase every day    Don't use advair    Use albuterol as needed for coughing or shortness of breath or wheezing    Follow up in 4-6 weeks

## 2020-10-28 NOTE — Clinical Note
Dr. Lidia Gerard,    Thanks for referral.  He said his cough started suddenly while admitted back in July. I reviewed the notes from the admission and there was no documentation of a cough by practioners, which means this cough is more recent than July. The only positive ROS was sinus drainage. I am giving him prednisone for 10 days and started flonase. I only want him to use Albuterol for now. I am holding PFT for now due to this being more of an acute-subacute cough.  Pleural effusions on CT are miniscule and likely not playing a factor here   Thanks  Rhoda Abreu

## 2020-10-29 ENCOUNTER — HOSPITAL ENCOUNTER (OUTPATIENT)
Dept: PHYSICAL THERAPY | Age: 45
Setting detail: THERAPIES SERIES
Discharge: HOME | End: 2020-10-29
Payer: COMMERCIAL

## 2020-10-29 NOTE — PROGRESS NOTES
Physical Therapy  Cancellation/No-show Note  Patient Name:  Clare Orellana  :  1975   Date:  10/29/2020  Cancelled visits to date: 1  No-shows to date: 3    Patient status for today's appointment patient:  []  Cancelled   []  Rescheduled appointment  [x]  No-show 2020, , 10/29     Reason given by patient:  []  Patient ill  []  Conflicting appointment  []  No transportation    []  Conflict with work  [x]  No reason given  []  Other:     Comments:      Phone call information:   []  Phone call made today to patient at 10:20 am at number provided:      []  Patient answered, conversation as follows:    [x]  Patient did not answer, message left as follows: missed apt today, is scheduled for tomorrow as well, please ruth if unable to make it  []  Phone call not made today    Electronically signed by:  Norma Wiseman, PT, DPT

## 2020-10-30 ENCOUNTER — APPOINTMENT (OUTPATIENT)
Dept: PHYSICAL THERAPY | Age: 45
End: 2020-10-30
Payer: COMMERCIAL

## 2020-11-10 ENCOUNTER — TELEPHONE (OUTPATIENT)
Dept: PRIMARY CARE CLINIC | Age: 45
End: 2020-11-10

## 2020-12-16 ENCOUNTER — OFFICE VISIT (OUTPATIENT)
Dept: PULMONOLOGY | Age: 45
End: 2020-12-16
Payer: COMMERCIAL

## 2020-12-16 VITALS
HEIGHT: 74 IN | HEART RATE: 58 BPM | TEMPERATURE: 97.7 F | DIASTOLIC BLOOD PRESSURE: 80 MMHG | RESPIRATION RATE: 14 BRPM | WEIGHT: 213.2 LBS | OXYGEN SATURATION: 97 % | SYSTOLIC BLOOD PRESSURE: 136 MMHG | BODY MASS INDEX: 27.36 KG/M2

## 2020-12-16 PROCEDURE — 99214 OFFICE O/P EST MOD 30 MIN: CPT | Performed by: INTERNAL MEDICINE

## 2020-12-16 NOTE — PATIENT INSTRUCTIONS
Start advair one puff twice a day with spacer.   Sample of spacer    Use albuterol as needed    Breathing studies at hospital    Follow up in 3 months

## 2020-12-16 NOTE — PROGRESS NOTES
Chief complaint  This is a 39y.o. year old male  who comes to see me with a chief complaint of   Chief Complaint   Patient presents with    Cough     4-6 wk f/u       HPI  Here with a cc of cough    Last visit I gave prednisone and flonase for a subacute cough he had while admitted to the hospital for a stroke. He said he was coughing all of the time during his stay but I reviewed the notes and could not find one subjective compliant about a cough. Regardless he says his cough does improve with albuterol and flonase. Symptoms are controlled for too long before he has to take albuterol again. No other changes. Remains healthy and in routine state of health       Past Medical History:   Diagnosis Date    Cough     Hyperlipidemia     Hypertension        History reviewed. No pertinent surgical history.     Social History     Socioeconomic History    Marital status: Legally      Spouse name: Not on file    Number of children: Not on file    Years of education: Not on file    Highest education level: Not on file   Occupational History    Not on file   Social Needs    Financial resource strain: Not on file    Food insecurity     Worry: Not on file     Inability: Not on file    Transportation needs     Medical: Not on file     Non-medical: Not on file   Tobacco Use    Smoking status: Former Smoker     Packs/day: 1.00     Types: Cigarettes     Quit date: 3/5/2018     Years since quittin.7    Smokeless tobacco: Never Used   Substance and Sexual Activity    Alcohol use: Not Currently    Drug use: No    Sexual activity: Not on file   Lifestyle    Physical activity     Days per week: Not on file     Minutes per session: Not on file    Stress: Not on file   Relationships    Social connections     Talks on phone: Not on file     Gets together: Not on file     Attends Restorationist service: Not on file     Active member of club or organization: Not on file     Attends meetings of clubs or organizations: Not on file     Relationship status: Not on file    Intimate partner violence     Fear of current or ex partner: Not on file     Emotionally abused: Not on file     Physically abused: Not on file     Forced sexual activity: Not on file   Other Topics Concern    Not on file   Social History Narrative    Not on file       Family History   Problem Relation Age of Onset    Diabetes Father     High Blood Pressure Father     Diabetes Mother     High Blood Pressure Mother     Diabetes Maternal Grandmother     Diabetes Maternal Grandfather     Diabetes Paternal Grandmother     Diabetes Paternal Grandfather          Current Outpatient Medications:     fluticasone (FLONASE) 50 MCG/ACT nasal spray, 2 sprays by Each Nostril route daily, Disp: 1 Bottle, Rfl: 5    albuterol sulfate HFA (VENTOLIN HFA) 108 (90 Base) MCG/ACT inhaler, Inhale 2 puffs into the lungs 4 times daily as needed for Wheezing, Disp: 3 Inhaler, Rfl: 1    pantoprazole (PROTONIX) 40 MG tablet, Take 1 tablet by mouth every morning (before breakfast), Disp: 90 tablet, Rfl: 3    isosorbide mononitrate (IMDUR) 30 MG extended release tablet, Take 1 tablet by mouth daily, Disp: 90 tablet, Rfl: 3    carvedilol (COREG) 25 MG tablet, Take 1 tablet by mouth 2 times daily (with meals), Disp: 180 tablet, Rfl: 3    NIFEdipine (ADALAT CC) 60 MG extended release tablet, Take 1 tablet by mouth every evening, Disp: 90 tablet, Rfl: 3    losartan (COZAAR) 50 MG tablet, Take 1 tablet by mouth daily, Disp: 90 tablet, Rfl: 3    atorvastatin (LIPITOR) 80 MG tablet, Take 1 tablet by mouth nightly, Disp: 30 tablet, Rfl: 3    aspirin EC 81 MG EC tablet, Take 1 tablet by mouth daily, Disp: 90 tablet, Rfl: 5    clopidogrel (PLAVIX) 75 MG tablet, Take 1 tablet by mouth daily, Disp: 90 tablet, Rfl: 3    fluticasone-salmeterol (ADVAIR HFA) 230-21 MCG/ACT inhaler, Inhale 2 puffs into the lungs 2 times daily (Patient not taking: Reported on 12/16/2020), Disp: 1 Inhaler, Rfl: 3    scopolamine (TRANSDERM-SCOP) transdermal patch, Place 1 patch onto the skin every 72 hours (Patient not taking: Reported on 12/16/2020), Disp: 30 patch, Rfl: 1      ROS:  GENERAL:  No fevers, chills, or night sweats  HEENT:  No double vision, blurry vision, or difficulty swallowing  HEART:  No chest pain, no palpitations  LUNGS:  Cough has improved with albuterol and flonase   ABDOMEN:  No abdominal pains, no changes in stools  GENITOURINARY:  No increased frequency, no blood in urine  EXTREMITIES:  No swelling, no lesions  NEURO:  No residual effects from his CVA at this time   SKIN:  No new rashes, no changes in hair or nails  LYMPH:  No masses, no swelling neck, armpits, or groin    PHYSICAL EXAM:  Vitals:    12/16/20 1141   BP: (!) 134/90   Pulse: 58   Resp: 14   Temp: 97.7 °F (36.5 °C)   SpO2: 97%       GENERAL:  Well nourished, alert, appears stated age, no distress  HEENT:  No scleral icterus, no conjunctival irritation. Mallampati IV with mild injection  NECK:  No thyromegaly, no bruits  LYMPH:  No cervical or supraclavicular adenopathy  HEART:  Regular rate and rhythm, no murmurs  LUNGS:  Clear bilaterally  ABDOMEN:  No distention, no organomegaly  EXTREMITIES:  No edema, no digital clubbing  NEURO:  No localizing deficits, CN II-XII intact    Pulmonary Function Testing  None    Chest imaging from 10/2020 is reviewed. My interpretation is no acute process. May have some mucus in airways, trace effusion, small hiatal hernia      ECHO 7/2020  Left ventricular cavity size is normal with moderate LVH . Overall left ventricular systolic function appears normal with an ejection   fraction of 55%. No regional wall motion abnormalities   Normal diastolic function. Mild mitral regurgitation   A bubble study was performed and fails to show evidence of right to left   shunting. Global strain is -17.9%. The aortic root is dilated, measuring 3.9 cm.    The ascending aorta is mildly dilated, measuring 3.8 cm. Lab Results   Component Value Date    WBC 5.4 09/30/2020    HGB 11.8 (L) 09/30/2020    HCT 35.7 (L) 09/30/2020    MCV 84.7 09/30/2020     09/30/2020       Lab Results   Component Value Date    BNP 15 11/04/2013       Lab Results   Component Value Date    CREATININE 1.2 09/30/2020    BUN 9 09/30/2020     09/30/2020    K 4.1 09/30/2020     09/30/2020    CO2 23 09/30/2020       I reviewed above labs and studies pertinent to this visit and date    Assessment/Plan:  1. Cough  I suspect cough variant asthma at this time due to his response to albuterol. Will have him start advair (he has it already and not sure the dose of inhaler) one puff bid. Use with spacer. Sample spacer today. Full PFTs and COVID testing. Albuterol prn. Flonase daily.   - Full PFT Study With Bronchodilator; Future  - COVID-19 Ambulatory;  Future    Follow up in 3 months

## 2021-01-14 ENCOUNTER — HOSPITAL ENCOUNTER (OUTPATIENT)
Age: 46
Discharge: HOME | End: 2021-01-14
Payer: COMMERCIAL

## 2021-01-14 LAB
A/G RATIO: 1.6 (ref 1.1–2.2)
ALBUMIN SERPL-MCNC: 4.3 G/DL (ref 3.4–5)
ALP BLD-CCNC: 71 U/L (ref 40–129)
ALT SERPL-CCNC: 21 U/L (ref 10–40)
ANION GAP SERPL CALCULATED.3IONS-SCNC: 10 MMOL/L (ref 3–16)
AST SERPL-CCNC: 14 U/L (ref 15–37)
BILIRUB SERPL-MCNC: <0.2 MG/DL (ref 0–1)
BUN BLDV-MCNC: 23 MG/DL (ref 7–20)
CALCIUM SERPL-MCNC: 9.5 MG/DL (ref 8.3–10.6)
CHLORIDE BLD-SCNC: 105 MMOL/L (ref 99–110)
CO2: 24 MMOL/L (ref 21–32)
CREAT SERPL-MCNC: 1.3 MG/DL (ref 0.9–1.3)
FERRITIN: 85.3 NG/ML (ref 30–400)
GFR AFRICAN AMERICAN: >60
GFR NON-AFRICAN AMERICAN: 59
GLOBULIN: 2.7 G/DL
GLUCOSE BLD-MCNC: 114 MG/DL (ref 70–99)
HCT VFR BLD CALC: 34.1 % (ref 40.5–52.5)
HEMOGLOBIN: 11.2 G/DL (ref 13.5–17.5)
IRON SATURATION: 17 % (ref 20–50)
IRON: 45 UG/DL (ref 59–158)
MCH RBC QN AUTO: 27.9 PG (ref 26–34)
MCHC RBC AUTO-ENTMCNC: 33 G/DL (ref 31–36)
MCV RBC AUTO: 84.6 FL (ref 80–100)
PDW BLD-RTO: 13.1 % (ref 12.4–15.4)
PHOSPHORUS: 3.8 MG/DL (ref 2.5–4.9)
PLATELET # BLD: 261 K/UL (ref 135–450)
PMV BLD AUTO: 10.8 FL (ref 5–10.5)
POTASSIUM SERPL-SCNC: 4.5 MMOL/L (ref 3.5–5.1)
RBC # BLD: 4.03 M/UL (ref 4.2–5.9)
SODIUM BLD-SCNC: 139 MMOL/L (ref 136–145)
TOTAL IRON BINDING CAPACITY: 270 UG/DL (ref 260–445)
TOTAL PROTEIN: 7 G/DL (ref 6.4–8.2)
WBC # BLD: 5.1 K/UL (ref 4–11)

## 2021-01-14 PROCEDURE — 82728 ASSAY OF FERRITIN: CPT

## 2021-01-14 PROCEDURE — 36415 COLL VENOUS BLD VENIPUNCTURE: CPT

## 2021-01-14 PROCEDURE — 85027 COMPLETE CBC AUTOMATED: CPT

## 2021-01-14 PROCEDURE — 83550 IRON BINDING TEST: CPT

## 2021-01-14 PROCEDURE — 83540 ASSAY OF IRON: CPT

## 2021-01-14 PROCEDURE — 80053 COMPREHEN METABOLIC PANEL: CPT

## 2021-01-14 PROCEDURE — 84100 ASSAY OF PHOSPHORUS: CPT

## 2021-01-21 DIAGNOSIS — I63.9 CEREBROVASCULAR ACCIDENT (CVA), UNSPECIFIED MECHANISM (HCC): ICD-10-CM

## 2021-01-21 RX ORDER — ATORVASTATIN CALCIUM 80 MG/1
TABLET, FILM COATED ORAL
Qty: 30 TABLET | Refills: 3 | Status: SHIPPED | OUTPATIENT
Start: 2021-01-21 | End: 2022-06-10 | Stop reason: SDUPTHER

## 2021-01-25 ENCOUNTER — OFFICE VISIT (OUTPATIENT)
Dept: PRIMARY CARE CLINIC | Age: 46
End: 2021-01-25
Payer: COMMERCIAL

## 2021-01-25 DIAGNOSIS — Z20.828 EXPOSURE TO SARS-ASSOCIATED CORONAVIRUS: Primary | ICD-10-CM

## 2021-01-25 PROCEDURE — 99211 OFF/OP EST MAY X REQ PHY/QHP: CPT | Performed by: NURSE PRACTITIONER

## 2021-01-26 LAB — SARS-COV-2: NOT DETECTED

## 2021-02-25 ENCOUNTER — OFFICE VISIT (OUTPATIENT)
Dept: PRIMARY CARE CLINIC | Age: 46
End: 2021-02-25
Payer: COMMERCIAL

## 2021-02-25 DIAGNOSIS — Z20.828 EXPOSURE TO SARS-ASSOCIATED CORONAVIRUS: Primary | ICD-10-CM

## 2021-02-25 PROCEDURE — 99211 OFF/OP EST MAY X REQ PHY/QHP: CPT | Performed by: NURSE PRACTITIONER

## 2021-02-25 NOTE — PROGRESS NOTES
Carlitos Cain received a viral test for COVID-19. They were educated on isolation and quarantine as appropriate. For any symptoms, they were directed to seek care from their PCP, given contact information to establish with a doctor, directed to an urgent care or the emergency room.

## 2021-02-25 NOTE — PATIENT INSTRUCTIONS
You have received a viral test for COVID-19. Below is education on quarantine per the CDC guidelines. For any symptoms, seek care from your PCP, call [phone] to establish care with a doctor, or go directly to an urgent care or the emergency room. Test results will take 2-7 days and will be sent to you in your [employer] account. If you test positive, you will be contacted via phone. If you test negative, the ONLY communication will be through [address] GO TO [employer] AND SIGN UP FOR [employer]  (LOWER LEFT OF THE HOME PAGE)  No test is 100%. If you have symptoms, you should follow the guidance of quarantine as previously stated. You can still be contagious if you have symptoms. Your [facility] will reach out to you if you have a positive result. They will provide you with a return to work date and note. If you were tested for a pre-op, then you should remain in quarantine until your procedure. How do I know if I need to be in quarantine? If you live in a community where COVID-19 is or might be spreading (currently, that is virtually everywhere in the [place])  Be alert for symptoms. Watch for fever, cough, shortness of breath, or other symptoms of COVID-19.  ? Take your temperature if symptoms develop. ? Practice social distancing. Maintain 6 feet of distance from others and stay out of crowded places. ? Follow CDC guidance if symptoms develop. If you feel healthy but:  ? Recently had close contact with a person with COVID-19 you need to Quarantine:  ? Stay home until 14 days after your last exposure. ? Check your temperature twice a day and watch for symptoms of COVID-19.  ? If possible, stay away from people who are at higher-risk for getting very sick from COVID-19. Stay Home and Monitor Your Health if you:  ? Have been diagnosed with COVID-19, or  ? Are waiting for test results, or  ?  Have cough, fever, or shortness of breath, or symptoms of COVID-19 When You Can be Around Others After You Had or Likely Had COVID-19     If you have or think you might have COVID-19, it is important to stay home and away from other people. Staying away from others helps stop the spread of COVID-19. If you have an emergency warning sign (including trouble breathing), get emergency medical care immediately. When you can be around others (end home isolation) depends on different factors for different situations. Find CDC's recommendations for your situation below. I think or know I had COVID-19, and I had symptoms  You can be with others after  ? 3 days with no fever and  ? Respiratory symptoms have improved (e.g. cough, shortness of breath) and  ? 10 days since symptoms first appeared  Depending on your healthcare provider's advice and availability of testing, you might get tested to see if you still have COVID-19. If you will be tested, you can be around others when you have no fever, respiratory symptoms have improved, and you receive two negative test results in a row, at least 24 hours apart. I tested positive for COVID-19 but had no symptoms  If you continue to have no symptoms, you can be with others after:  ? 10 days have passed since test or 14 days since your exposure test   Depending on your healthcare provider's advice and availability of testing, you might get tested to see if you still have COVID-19. If you will be tested, you can be around others after you receive two negative test results in a row, at least 24 hours apart. If you develop symptoms after testing positive, follow the guidance above for I think or know I had COVID, and I had symptoms.   For Anyone Who Has Been Around a Person with COVID-19  It is important to remember that anyone who has close contact with someone with COVID-19 should stay home for 14 days after exposure based on the time it takes to develop illness. Testing is not necessary.     www.cdc.gov/coronavirus/2019-ncov/index.html

## 2021-02-26 LAB — SARS-COV-2: NOT DETECTED

## 2021-03-01 ENCOUNTER — HOSPITAL ENCOUNTER (OUTPATIENT)
Dept: PULMONOLOGY | Age: 46
Discharge: HOME | End: 2021-03-01
Payer: COMMERCIAL

## 2021-03-01 VITALS — RESPIRATION RATE: 16 BRPM | OXYGEN SATURATION: 99 % | HEART RATE: 72 BPM

## 2021-03-01 DIAGNOSIS — R05.9 COUGH: ICD-10-CM

## 2021-03-01 LAB
DLCO %PRED: 67 %
DLCO PRED: NORMAL
DLCO/VA %PRED: NORMAL
DLCO/VA PRED: NORMAL
DLCO/VA: NORMAL
DLCO: NORMAL
EXPIRATORY TIME-POST: NORMAL
EXPIRATORY TIME: NORMAL
FEF 25-75% %CHNG: NORMAL
FEF 25-75% %PRED-POST: NORMAL
FEF 25-75% %PRED-PRE: NORMAL
FEF 25-75% PRED: NORMAL
FEF 25-75%-POST: NORMAL
FEF 25-75%-PRE: NORMAL
FEV1 %PRED-POST: 81 %
FEV1 %PRED-PRE: 79 %
FEV1 PRED: NORMAL
FEV1-POST: NORMAL
FEV1-PRE: NORMAL
FEV1/FVC %PRED-POST: NORMAL
FEV1/FVC %PRED-PRE: NORMAL
FEV1/FVC PRED: NORMAL
FEV1/FVC-POST: 90 %
FEV1/FVC-PRE: 93 %
FVC %PRED-POST: NORMAL
FVC %PRED-PRE: NORMAL
FVC PRED: NORMAL
FVC-POST: NORMAL
FVC-PRE: NORMAL
GAW %PRED: NORMAL
GAW PRED: NORMAL
GAW: NORMAL
IC %PRED: NORMAL
IC PRED: NORMAL
IC: NORMAL
MEP: NORMAL
MIP: NORMAL
MVV %PRED-PRE: NORMAL
MVV PRED: NORMAL
MVV-PRE: NORMAL
PEF %PRED-POST: NORMAL
PEF %PRED-PRE: NORMAL
PEF PRED: NORMAL
PEF%CHNG: NORMAL
PEF-POST: NORMAL
PEF-PRE: NORMAL
RAW %PRED: NORMAL
RAW PRED: NORMAL
RAW: NORMAL
RV %PRED: NORMAL
RV PRED: NORMAL
RV: NORMAL
SVC %PRED: NORMAL
SVC PRED: NORMAL
SVC: NORMAL
TLC %PRED: 86 %
TLC PRED: NORMAL
TLC: NORMAL
VA %PRED: NORMAL
VA PRED: NORMAL
VA: NORMAL
VTG %PRED: NORMAL
VTG PRED: NORMAL
VTG: NORMAL

## 2021-03-01 PROCEDURE — 94760 N-INVAS EAR/PLS OXIMETRY 1: CPT

## 2021-03-01 PROCEDURE — 94060 EVALUATION OF WHEEZING: CPT

## 2021-03-01 PROCEDURE — 94726 PLETHYSMOGRAPHY LUNG VOLUMES: CPT

## 2021-03-01 PROCEDURE — 6370000000 HC RX 637 (ALT 250 FOR IP): Performed by: INTERNAL MEDICINE

## 2021-03-01 PROCEDURE — 94200 LUNG FUNCTION TEST (MBC/MVV): CPT

## 2021-03-01 PROCEDURE — 94729 DIFFUSING CAPACITY: CPT

## 2021-03-01 RX ORDER — ALBUTEROL SULFATE 90 UG/1
4 AEROSOL, METERED RESPIRATORY (INHALATION) ONCE
Status: COMPLETED | OUTPATIENT
Start: 2021-03-01 | End: 2021-03-01

## 2021-03-01 RX ADMIN — Medication 4 PUFF: at 08:27

## 2021-03-01 ASSESSMENT — PULMONARY FUNCTION TESTS
FEV1/FVC_PRE: 93
FEV1/FVC_POST: 90
FEV1_PERCENT_PREDICTED_POST: 81
FEV1_PERCENT_PREDICTED_PRE: 79

## 2021-03-22 ENCOUNTER — OFFICE VISIT (OUTPATIENT)
Dept: PULMONOLOGY | Age: 46
End: 2021-03-22
Payer: COMMERCIAL

## 2021-03-22 VITALS
DIASTOLIC BLOOD PRESSURE: 90 MMHG | WEIGHT: 232.4 LBS | OXYGEN SATURATION: 96 % | BODY MASS INDEX: 29.82 KG/M2 | HEART RATE: 82 BPM | RESPIRATION RATE: 16 BRPM | TEMPERATURE: 97.1 F | HEIGHT: 74 IN | SYSTOLIC BLOOD PRESSURE: 132 MMHG

## 2021-03-22 DIAGNOSIS — J45.991 COUGH VARIANT ASTHMA: Primary | ICD-10-CM

## 2021-03-22 PROCEDURE — 99213 OFFICE O/P EST LOW 20 MIN: CPT | Performed by: INTERNAL MEDICINE

## 2021-03-22 NOTE — PROGRESS NOTES
Chief complaint  This is a 39y.o. year old male  who comes to see me with a chief complaint of   Chief Complaint   Patient presents with    Follow-up     Cough       HPI  Here with a cc of cough    Did PFTs (normal) since last visit and I started him on Advair bid and prn albuterol. Advair has improved to nearly resolved his cough. He does not even have to use albuterol anymore. He will cough when exposed to perfume or when he laughs. Much better overall         Past Medical History:   Diagnosis Date    Cough     Hyperlipidemia     Hypertension        No past surgical history on file.     Social History     Socioeconomic History    Marital status: Legally      Spouse name: Not on file    Number of children: Not on file    Years of education: Not on file    Highest education level: Not on file   Occupational History    Not on file   Social Needs    Financial resource strain: Not on file    Food insecurity     Worry: Not on file     Inability: Not on file    Transportation needs     Medical: Not on file     Non-medical: Not on file   Tobacco Use    Smoking status: Former Smoker     Packs/day: 1.00     Types: Cigarettes     Quit date: 3/5/2018     Years since quitting: 3.0    Smokeless tobacco: Never Used   Substance and Sexual Activity    Alcohol use: Not Currently    Drug use: No    Sexual activity: Not on file   Lifestyle    Physical activity     Days per week: Not on file     Minutes per session: Not on file    Stress: Not on file   Relationships    Social connections     Talks on phone: Not on file     Gets together: Not on file     Attends Mandaen service: Not on file     Active member of club or organization: Not on file     Attends meetings of clubs or organizations: Not on file     Relationship status: Not on file    Intimate partner violence     Fear of current or ex partner: Not on file     Emotionally abused: Not on file     Physically abused: Not on file     Forced sexual activity: Not on file   Other Topics Concern    Not on file   Social History Narrative    Not on file       Family History   Problem Relation Age of Onset    Diabetes Father     High Blood Pressure Father     Diabetes Mother     High Blood Pressure Mother     Diabetes Maternal Grandmother     Diabetes Maternal Grandfather     Diabetes Paternal Grandmother     Diabetes Paternal Grandfather          Current Outpatient Medications:     atorvastatin (LIPITOR) 80 MG tablet, TAKE 1 TABLET BY MOUTH EVERY DAY AT NIGHT, Disp: 30 tablet, Rfl: 3    Spacer/Aero-Holding Chambers SHERRI, 1 Device by Does not apply route daily as needed (use with inhaler), Disp: 1 Device, Rfl: 0    fluticasone (FLONASE) 50 MCG/ACT nasal spray, 2 sprays by Each Nostril route daily, Disp: 1 Bottle, Rfl: 5    fluticasone-salmeterol (ADVAIR HFA) 230-21 MCG/ACT inhaler, Inhale 2 puffs into the lungs 2 times daily, Disp: 1 Inhaler, Rfl: 3    albuterol sulfate HFA (VENTOLIN HFA) 108 (90 Base) MCG/ACT inhaler, Inhale 2 puffs into the lungs 4 times daily as needed for Wheezing, Disp: 3 Inhaler, Rfl: 1    pantoprazole (PROTONIX) 40 MG tablet, Take 1 tablet by mouth every morning (before breakfast), Disp: 90 tablet, Rfl: 3    isosorbide mononitrate (IMDUR) 30 MG extended release tablet, Take 1 tablet by mouth daily, Disp: 90 tablet, Rfl: 3    carvedilol (COREG) 25 MG tablet, Take 1 tablet by mouth 2 times daily (with meals), Disp: 180 tablet, Rfl: 3    NIFEdipine (ADALAT CC) 60 MG extended release tablet, Take 1 tablet by mouth every evening, Disp: 90 tablet, Rfl: 3    losartan (COZAAR) 50 MG tablet, Take 1 tablet by mouth daily, Disp: 90 tablet, Rfl: 3    aspirin EC 81 MG EC tablet, Take 1 tablet by mouth daily, Disp: 90 tablet, Rfl: 5    clopidogrel (PLAVIX) 75 MG tablet, Take 1 tablet by mouth daily, Disp: 90 tablet, Rfl: 3    scopolamine (TRANSDERM-SCOP) transdermal patch, Place 1 patch onto the skin every 72 hours, Disp: 30 patch, Rfl: 1        PHYSICAL EXAM:  Vitals:    03/22/21 1220   BP: (!) 132/90   Pulse: 82   Resp: 16   Temp: 97.1 °F (36.2 °C)   SpO2: 96%       GENERAL:  Well nourished, alert, appears stated age, no distress  HEENT:  No scleral icterus, no conjunctival irritation. Mallampati IV with mild injection  NECK:  No thyromegaly, no bruits  LYMPH:  No cervical or supraclavicular adenopathy  HEART:  Regular rate and rhythm, no murmurs  LUNGS:  Clear bilaterally  ABDOMEN:  No distention, no organomegaly  EXTREMITIES:  No edema, no digital clubbing  NEURO:  No localizing deficits, CN II-XII intact    Pulmonary Function Testing 3/21  My impression is mild reduction in diffusing capacity that corrects for volume    Chest imaging from 10/2020 is reviewed. My interpretation is no acute process. May have some mucus in airways, trace effusion, small hiatal hernia      ECHO 7/2020  Left ventricular cavity size is normal with moderate LVH . Overall left ventricular systolic function appears normal with an ejection   fraction of 55%. No regional wall motion abnormalities   Normal diastolic function. Mild mitral regurgitation   A bubble study was performed and fails to show evidence of right to left   shunting. Global strain is -17.9%. The aortic root is dilated, measuring 3.9 cm. The ascending aorta is mildly dilated, measuring 3.8 cm. Lab Results   Component Value Date    WBC 5.1 01/14/2021    HGB 11.2 (L) 01/14/2021    HCT 34.1 (L) 01/14/2021    MCV 84.6 01/14/2021     01/14/2021       Lab Results   Component Value Date    BNP 15 11/04/2013       Lab Results   Component Value Date    CREATININE 1.3 01/14/2021    BUN 23 (H) 01/14/2021     01/14/2021    K 4.5 01/14/2021     01/14/2021    CO2 24 01/14/2021       I reviewed above labs and studies pertinent to this visit and date    Assessment/Plan:  1. Cough variant asthma  Improved with advair bid.   Continue with this and use albuterol prn or prior to situations that make him cough    Follow up in 4 months You can access the FollowMyHealth Patient Portal offered by [facility] by registering at the following website: [email or web address]. By joining [employer]’s FollowMyHealth portal, you will also be able to view your health information using other applications (apps) compatible with our system.

## 2021-06-01 ENCOUNTER — TELEPHONE (OUTPATIENT)
Dept: ORTHOPEDIC SURGERY | Age: 46
End: 2021-06-01

## 2021-12-13 DIAGNOSIS — I10 ESSENTIAL HYPERTENSION: ICD-10-CM

## 2021-12-14 RX ORDER — LOSARTAN POTASSIUM 50 MG/1
TABLET ORAL
Qty: 90 TABLET | Refills: 0 | Status: SHIPPED | OUTPATIENT
Start: 2021-12-14 | End: 2022-03-30

## 2022-01-05 DIAGNOSIS — I10 ESSENTIAL HYPERTENSION: ICD-10-CM

## 2022-01-05 RX ORDER — NIFEDIPINE 60 MG/1
TABLET, FILM COATED, EXTENDED RELEASE ORAL
Qty: 30 TABLET | Refills: 2 | Status: SHIPPED | OUTPATIENT
Start: 2022-01-05 | End: 2022-03-09

## 2022-01-05 RX ORDER — CARVEDILOL 25 MG/1
TABLET ORAL
Qty: 60 TABLET | Refills: 2 | Status: SHIPPED | OUTPATIENT
Start: 2022-01-05 | End: 2022-03-09

## 2022-02-07 DIAGNOSIS — I10 ESSENTIAL HYPERTENSION: ICD-10-CM

## 2022-02-07 RX ORDER — LOSARTAN POTASSIUM 50 MG/1
TABLET ORAL
Qty: 90 TABLET | Refills: 0 | OUTPATIENT
Start: 2022-02-07

## 2022-02-07 NOTE — TELEPHONE ENCOUNTER
Medication:   Requested Prescriptions     Pending Prescriptions Disp Refills    losartan (COZAAR) 50 MG tablet [Pharmacy Med Name: LOSARTAN POTASSIUM 50 MG TAB] 90 tablet 0     Sig: TAKE 1 TABLET BY MOUTH EVERY DAY        Last Filled:      Patient Phone Number: [phone] (home)     Last appt: 10/16/2020   Next appt: Visit date not found    Last OARRS: No flowsheet data found.

## 2022-03-01 DIAGNOSIS — R05.9 COUGH: ICD-10-CM

## 2022-03-01 NOTE — TELEPHONE ENCOUNTER
Medication:   Requested Prescriptions     Pending Prescriptions Disp Refills    [place] 620-85 MCG/ACT inhaler [Pharmacy Med Name: [place] 037-94 MCG INHALER] 12 each 3     Sig: TAKE 2 PUFFS BY MOUTH TWICE A DAY        Last Filled:      Patient Phone Number: [phone] (home)     Last appt: 10/16/2020   Next appt: Visit date not found    Last OARRS: No flowsheet data found.

## 2022-03-03 RX ORDER — FLUTICASONE PROPIONATE AND SALMETEROL XINAFOATE 230; 21 UG/1; UG/1
AEROSOL, METERED RESPIRATORY (INHALATION)
Qty: 12 EACH | Refills: 3 | Status: SHIPPED | OUTPATIENT
Start: 2022-03-03

## 2022-03-08 DIAGNOSIS — I10 ESSENTIAL HYPERTENSION: ICD-10-CM

## 2022-03-09 RX ORDER — NIFEDIPINE 60 MG/1
TABLET, FILM COATED, EXTENDED RELEASE ORAL
Qty: 30 TABLET | Refills: 2 | Status: SHIPPED | OUTPATIENT
Start: 2022-03-09 | End: 2022-06-02

## 2022-03-09 RX ORDER — CARVEDILOL 25 MG/1
TABLET ORAL
Qty: 60 TABLET | Refills: 2 | Status: SHIPPED | OUTPATIENT
Start: 2022-03-09 | End: 2022-06-02

## 2022-03-09 NOTE — TELEPHONE ENCOUNTER
Medication:   Requested Prescriptions     Pending Prescriptions Disp Refills    NIFEdipine (ADALAT CC) 60 MG extended release tablet [Pharmacy Med Name: NIFEDIPINE ER 60 MG TABLET] 30 tablet 2     Sig: TAKE 1 TABLET BY MOUTH EVERY DAY IN THE EVENING    carvedilol (COREG) 25 MG tablet [Pharmacy Med Name: CARVEDILOL 25 MG TABLET] 60 tablet 2     Sig: TAKE 1 TABLET BY MOUTH TWICE A DAY WITH MEALS        Last Filled:      Patient Phone Number: [phone] (home)     Last appt: 10/16/2020   Next appt: Visit date not found    Last OARRS: No flowsheet data found.

## 2022-03-30 DIAGNOSIS — I10 ESSENTIAL HYPERTENSION: ICD-10-CM

## 2022-03-30 RX ORDER — LOSARTAN POTASSIUM 50 MG/1
TABLET ORAL
Qty: 90 TABLET | Refills: 0 | Status: SHIPPED | OUTPATIENT
Start: 2022-03-30 | End: 2022-06-10 | Stop reason: SDUPTHER

## 2022-06-01 DIAGNOSIS — I10 ESSENTIAL HYPERTENSION: ICD-10-CM

## 2022-06-01 NOTE — TELEPHONE ENCOUNTER
Medication:   Requested Prescriptions     Pending Prescriptions Disp Refills    carvedilol (COREG) 25 MG tablet [Pharmacy Med Name: CARVEDILOL 25 MG TABLET] 180 tablet      Sig: TAKE 1 TABLET BY MOUTH TWICE A DAY WITH MEALS    NIFEdipine (ADALAT CC) 60 MG extended release tablet [Pharmacy Med Name: NIFEDIPINE ER 60 MG TABLET] 90 tablet      Sig: TAKE 1 TABLET BY MOUTH EVERY DAY IN THE EVENING        Last Filled:      Patient Phone Number: [phone] (home)     Last appt: 10/16/2020   Next appt: Visit date not found    Last OARRS: No flowsheet data found.

## 2022-06-02 RX ORDER — CARVEDILOL 25 MG/1
TABLET ORAL
Qty: 180 TABLET | Refills: 3 | Status: SHIPPED | OUTPATIENT
Start: 2022-06-02 | End: 2022-06-10 | Stop reason: SDUPTHER

## 2022-06-02 RX ORDER — NIFEDIPINE 60 MG/1
TABLET, FILM COATED, EXTENDED RELEASE ORAL
Qty: 90 TABLET | Refills: 3 | Status: SHIPPED | OUTPATIENT
Start: 2022-06-02 | End: 2022-06-10 | Stop reason: SDUPTHER

## 2022-06-09 NOTE — TELEPHONE ENCOUNTER
[facility] [phone] requested a refill on Losartin. However he has not been seen since 03/2021. The pharmacy has been notified that he needs an appointment and I left a message on his VM.

## 2022-06-10 ENCOUNTER — TELEMEDICINE (OUTPATIENT)
Dept: PRIMARY CARE CLINIC | Age: 47
End: 2022-06-10
Payer: COMMERCIAL

## 2022-06-10 DIAGNOSIS — Z11.59 ENCOUNTER FOR HCV SCREENING TEST FOR LOW RISK PATIENT: Primary | ICD-10-CM

## 2022-06-10 DIAGNOSIS — I63.9 CEREBROVASCULAR ACCIDENT (CVA), UNSPECIFIED MECHANISM (HCC): ICD-10-CM

## 2022-06-10 DIAGNOSIS — I10 ESSENTIAL HYPERTENSION: ICD-10-CM

## 2022-06-10 DIAGNOSIS — Z80.0 FAMILY HISTORY OF COLON CANCER: Primary | ICD-10-CM

## 2022-06-10 PROCEDURE — 99214 OFFICE O/P EST MOD 30 MIN: CPT | Performed by: INTERNAL MEDICINE

## 2022-06-10 RX ORDER — ASPIRIN 81 MG/1
81 TABLET ORAL DAILY
Qty: 90 TABLET | Refills: 5 | Status: SHIPPED | OUTPATIENT
Start: 2022-06-10

## 2022-06-10 RX ORDER — CARVEDILOL 25 MG/1
25 TABLET ORAL 2 TIMES DAILY WITH MEALS
Qty: 180 TABLET | Refills: 3 | Status: SHIPPED | OUTPATIENT
Start: 2022-06-10

## 2022-06-10 RX ORDER — ATORVASTATIN CALCIUM 80 MG/1
80 TABLET, FILM COATED ORAL DAILY
Qty: 90 TABLET | Refills: 3 | Status: SHIPPED | OUTPATIENT
Start: 2022-06-10

## 2022-06-10 RX ORDER — NIFEDIPINE 60 MG/1
60 TABLET, FILM COATED, EXTENDED RELEASE ORAL DAILY
Qty: 90 TABLET | Refills: 3 | Status: SHIPPED | OUTPATIENT
Start: 2022-06-10

## 2022-06-10 RX ORDER — LOSARTAN POTASSIUM 50 MG/1
50 TABLET ORAL DAILY
Qty: 90 TABLET | Refills: 3 | Status: SHIPPED | OUTPATIENT
Start: 2022-06-10

## 2022-06-10 RX ORDER — ISOSORBIDE MONONITRATE 30 MG/1
30 TABLET, EXTENDED RELEASE ORAL DAILY
Qty: 90 TABLET | Refills: 3 | Status: SHIPPED | OUTPATIENT
Start: 2022-06-10

## 2022-06-10 RX ORDER — CLOPIDOGREL BISULFATE 75 MG/1
75 TABLET ORAL DAILY
Qty: 90 TABLET | Refills: 3 | Status: SHIPPED | OUTPATIENT
Start: 2022-06-10

## 2022-06-10 ASSESSMENT — ENCOUNTER SYMPTOMS
GASTROINTESTINAL NEGATIVE: 1
WHEEZING: 0
COUGH: 0
SHORTNESS OF BREATH: 0
DIARRHEA: 0
CONSTIPATION: 0
CHEST TIGHTNESS: 0
BLOOD IN STOOL: 0

## 2022-06-10 NOTE — PROGRESS NOTES
Subjective:      Patient ID: Jessika Lundberg is a 52 y.o. male. 6/10/2022 Patient presents with:  Hypertension: refills on meds               VV 10/16/2020 Patient presents with:  Cough: since he was in [facility]: and CT Chest done recently                  Last seem9/24/2020 Patient presents with:  Hypertension  . Had a CVA  7/20 . Was not taking meds correctly  Cough since he went in to [facility] 7/20    Last seen 2016    Mr. Nimo Dominguez is a 38 yo M PMH HTN, hyperlipidemia who presented to [facility] ED early Tuesday 7/21 AM with complaint of HA and dizziness. which began on Monday 7/20 AM.       In the ED was found to be hypertensive () and CTA revealed severe stenosis of both ICAs, VAs. MRI revealed small right and moderate left cerebellar infarcts.   stroke team consulted and recommended against tPA. Patient was placed on Cardene gtt.     ECHO w/bubble study showed EF of 55%, negative for R to L shunting. Secondary HTN workup not indicative of aldosteronism; renal u/s negative for LEIGHTON. Plasma fractionated NE and dopamine were found to be elevated; MRI abdomen was ordered to assess for adrenal mass.     At time of discharge, patient was stable and HTN well-controlled. He has no extremity weakness. Ataxia (left-sided lean) and dysmetria remain but much improved since initial presentation      MRI BRAIN W WO CONTRAST  Final Result     1. Small right and moderate left cerebellar infarcts. No acute hemorrhage. 2.  Mild chronic small vessel ischemic disease. Chronic right basal ganglia lacunar infarct.     MRI Abdomen     No adrenal massv     There is no evidence to suggest renal artery stenosis bilaterally.    Renal resistive indices are normal bilaterally. Review of Systems   Constitutional: Negative for chills, fatigue and fever. Flu vac  Refused     Tdap 9/20    Pneum vac  9/20    covid vac 9/21     HENT: Negative.          Dental care reg    Eyes: Positive for visual disturbance. Last exam 9/19   Respiratory: Negative for cough, chest tightness, shortness of breath and wheezing. Quit smoking 2018  . Was going 1 ppd     Was a heavy drinker     No other rec drugs     No Asthma    Cardiovascular: Negative. HTN since 25s . No known CAD     FH of CAD     CVA 8/20    Gastrointestinal: Negative. Negative for blood in stool, constipation and diarrhea. DANNY had Colon Cancer in her 62s    Endocrine:        No Diabetes    Genitourinary: Negative for dysuria, frequency and urgency. No FH of ca prostate    Musculoskeletal: Negative. Negative for arthralgias. Skin: Negative for rash. Allergic/Immunologic: Negative for environmental allergies and food allergies. Neurological: Negative for dizziness, tremors, weakness, light-headedness and headaches. Hematological:        Anemia    Psychiatric/Behavioral: Positive for dysphoric mood. Negative for behavioral problems and sleep disturbance. The patient is not nervous/anxious. Objective:   Physical Exam  Constitutional:       Appearance: Normal appearance. Pulmonary:      Effort: Pulmonary effort is normal.   Neurological:      Mental Status: He is oriented to person, place, and time. Psychiatric:         Mood and Affect: Mood normal.         Behavior: Behavior normal.         Assessment:      Gilford Cage is a 52 y.o. male evaluated via  [address] [place] [address]  on 6/10/2022. Consent:  He and/or health care decision maker is aware that that he may receive a bill for this telephone service, depending on his insurance coverage, and has provided verbal consent to proceed: Yes      Documentation:  I communicated with the patient  about this .    Details of this discussion including any medical advice provided: as noted       I affirm this is a Patient Initiated Episode with a Patient who has not had a related appointment within my department in the past 7 days or scheduled within the next 24 hours. Patient identification was verified at the start of the visit: Yes    Total Time: minutes: 11-20 minutes    Note: not billable if this call serves to triage the patient into an appointment for the relevant concern      Maury Peacock MD   Eric Powers was seen today for hypertension. Diagnoses and all orders for this visit:    Family history of colon cancer  -     AFL - Alen Horowitz MD, Gastroenterology (ERCP), [facility] SPECIAL[facility]    Cerebrovascular accident (CVA), unspecified mechanism ([facility]  -     atorvastatin (LIPITOR) 80 MG tablet; Take 1 tablet by mouth daily  -     aspirin EC 81 MG EC tablet; Take 1 tablet by mouth daily  -     clopidogrel (PLAVIX) 75 MG tablet; Take 1 tablet by mouth daily    Essential hypertension  -     isosorbide mononitrate (IMDUR) 30 MG extended release tablet; Take 1 tablet by mouth daily  -     losartan (COZAAR) 50 mg tablet; Take 1 tablet by mouth daily  -     NIFEdipine (ADALAT CC) 60 MG extended release tablet; Take 1 tablet by mouth daily  -     carvedilol (COREG) 25 MG tablet;  Take 1 tablet by mouth 2 times daily (with meals)        Elevated diaphragm  -       -            Plan:              Maury Peacock MD

## 2022-09-09 NOTE — PROGRESS NOTES
Health Maintenance Due   Topic Date Due   • COVID-19 Vaccine (1) Never done   • Colorectal Cancer Screen-  Never done   • Influenza Vaccine (1) 09/01/2022   • Breast Cancer Screening  09/27/2022       Patient is due for topics listed above, she wishes to proceed with Colorectal Cancer Screening: Cologuard and Mammogram, but is not proceeding with Immunization(s) COVID-19 and Influenza at this time. The following has occurred: Orders placed for Colorectal Cancer Screening: Cologuard and Mammogram. Education provided for Immunization(s) COVID-19 and Influenza.   Physical Therapy  Facility/Department: [facility] ACUTE REHAB UNIT  Daily Treatment Note  NAME: Cassidy Fierro  : 1975  MRN: [id number]    Date of Service: 2020    Discharge Recommendations:  Outpatient PT, Home with assist PRN   PT Equipment Recommendations  Other: plan to continue to assess DME needs pending progress    Assessment   Body structures, Functions, Activity limitations: Decreased functional mobility ; Decreased safe awareness;Decreased balance;Decreased endurance;Decreased coordination;Decreased strength  Assessment: Patient demonstrates impaired functional mobility that presents well below his typically (I) baseline, following CVA. Patient left UE and LE coordination most notable during functional mobility. Patient is highly motivated to return to his prior level of function. Pt limited by fluctuations in alertness, fatigue and BP during both early and late morning sessions. Patient will continue to benefit from additional skilled PT intervention to facilitate safe mobility and to optimize (I) to promote return to prior level of function. Treatment Diagnosis: Decreased independence with functional mobility. Prognosis: Excellent  Patient Education: Patient educated on role of PT, use of call light, gait mechanics, and PT recommendations - patient verbalizes understanding. Barriers to Learning: none  REQUIRES PT FOLLOW UP: Yes  Activity Tolerance  Activity Tolerance: Treatment limited secondary to medical complications (free text)  Activity Tolerance: Pt w/ symptomatic high BP upon sitting up. Pt had not received morning meds yet. RN aware and will administer ASAP. During late morning session, pt became extremely fatigued unable to maintain eyes open 20 min. Into session. Pt attempted to continue to participate, eventually c/o nausea. BP at start of session 151/102, after nausea 133/97. Communicated findings w/ RN after pt returned to room.     Patient Diagnosis(es): There were no encounter diagnoses. has a past medical history of Hyperlipidemia and Hypertension. has no past surgical history on file. Restrictions  Restrictions/Precautions  Restrictions/Precautions: Fall Risk  Position Activity Restriction  Other position/activity restrictions: activity as tolerated  Subjective      Pain Assessment  Pain Level: 0  Vital Signs  BP: (!) 160/110  BP Location: Right upper arm  Patient Position: Sitting       Orientation  Orientation  Overall Orientation Status: Within Normal Limits  Cognition      Objective   Bed mobility  Rolling to Left: Supervision  Supine to Sit: Supervision  Sit to Supine: Supervision  Scooting: Supervision  Comment: pt dressed lower body and upper body in supine then transitioned to long sit to pull shirt down in back. Pt c/o dizziness, BP  high, therefor returned to supine w/ head elevated. Set pt up for breakfast, alerted nurse who reports she will administer meds. 2nd Session: Pt found sleeping in recliner upon PT arrival.  Pt was agreeable to therapy session, denied pain. Pt denies dizziness, but refers to it as fatigued. Pt amb w/ RW and CGA 50'  Pt amb 25' without AD and CGA. Performed standing balance activities: marching, marching w/ opp UE lift, alt foot to target. All w/ mult lateral LOB, towards L > R. Pt mostly relying on UE reactions to //bars, minimal LE stepping reactions. Pt given seated rest breaks due to appearing very fatigued. Difficulty maintaining eyes open (see BP above). Pt amb forwards/backwards 10' x 2 each without UE support in //bars, LOB posteriorly w/ backwards walking. Sidestepping 10' x 2 without UE support, no LOB. Pt amb back to room 50' w/ RW and min A due to excessive L lateral lean. Pt performed toilet transfer and toilet tasks w/ SBA including hand hygiene at sink, no LOB. Pt requests supine in bed to sleep, performed w/ supervision.  Pt left supine at end of session with bed alarm set and call light/needs within reach.  RN made aware of pt status and BP.     3rd Session: Pt found sleeping in bed upon PT arrival.  Pt was agreeable to therapy session denying pain but stating that he felt very dizzy just as he has lately. He was mod I for bed mobility to sit up on EOB and donned his shoes before standing to walker. Pt was encouraged to lengthen step length on R LE during ambulation with walker as he has shorter step length to compensate for impaired balance. Pt CGA with walker and therapist had him do exercise of taking 1 step x 10 with 1 HHA and L LE ascending first. He then was directed in heel raises on L LE x 15 and standing on step heel taps with R LE tapping floor below step x 10 to build up strength and stability of L LE. All exercises with CGA. He was instructed in ambulation with rail on wall x 50' CGA and then with cane after a rest x 10' with min A. He rested again and ambulated another 10' with cane with cueing to take his time with ambulation and to take even step lengths. At the end of session pt ambulated back to room and requested to lie down in bed. He was left with all needs met and call light within reach. Pt limited by fatigue and dizziness but was agreeable to all therapy this session. Goals  Short term goals  Time Frame for Short term goals: STG=LTG  Long term goals  Time Frame for Long term goals : 10-14 Days  Long term goal 1: Pt will complete bed mobility independently  Long term goal 2: Pt will transfer sit <> stand independently  Long term goal 3: Pt will ambulate 200' with LRAD MOD I  Long term goal 4: Pt will ascend/descend 12 steps with 1 hand rail MOD I  Patient Goals   Patient goals :  To return to walking without an AD    Plan    Plan  Times per week: 5x/wk for 90 minutes/day  Current Treatment Recommendations: Strengthening, Transfer Training, Endurance Training, Patient/Caregiver Education & Training, Balance Training, Gait Training, Functional Mobility Training, Safety Education & Training, Neuromuscular Re-education, Stair training, Equipment Evaluation, Education, & procurement, Home Exercise Program  Safety Devices  Type of devices: Call light within reach, Gait belt, Left in bed, Bed alarm in place(RN present at end of session)     Therapy Time   Individual Concurrent Group Co-treatment   Time In 0755         Time Out 0815         Minutes 20         Timed Code Treatment Minutes: 20 Minutes     Second Session Therapy Time:    Individual Concurrent Group Co-treatment   Time In 1030         Time Out 1123         Minutes 48          Third Session Therapy Time:   Individual Concurrent Group Co-treatment   Time In 1247         Time Out 1317         Minutes 30           Timed Code Treatment Minutes:       Total Treatment Minutes:      Timed Code Treatment Minutes:  20+53+30= 103     Total Treatment Minutes:  103         Ritchie Ellis PT, DPT

## 2023-04-03 DIAGNOSIS — I10 ESSENTIAL HYPERTENSION: ICD-10-CM

## 2023-04-03 RX ORDER — CARVEDILOL 25 MG/1
TABLET ORAL
Qty: 710 TABLET | Refills: 0 | Status: SHIPPED | OUTPATIENT
Start: 2023-04-03

## 2023-04-03 NOTE — TELEPHONE ENCOUNTER
Medication:   Requested Prescriptions     Pending Prescriptions Disp Refills    carvedilol (COREG) 25 MG tablet [Pharmacy Med Name: CARVEDILOL 25MG TABS] 710 tablet 0     Sig: TAKE 1 TABLET BY MOUTH 2 TIMES DAILY WITH MEALS        Last Filled:      Patient Phone Number: [phone] (home)     Last appt: 6/10/2022   Next appt: Visit date not found    Last OARRS: No flowsheet data found.

## 2023-06-20 DIAGNOSIS — I63.9 CEREBROVASCULAR ACCIDENT (CVA), UNSPECIFIED MECHANISM (HCC): ICD-10-CM

## 2023-06-20 DIAGNOSIS — I10 ESSENTIAL HYPERTENSION: ICD-10-CM

## 2023-06-20 NOTE — TELEPHONE ENCOUNTER
Medication:   Requested Prescriptions     Pending Prescriptions Disp Refills    losartan (COZAAR) 50 MG tablet [Pharmacy Med Name: LOSARTAN POTASSIUM 50MG TABS] 90 tablet 3     Sig: TAKE 1 TABLET BY MOUTH ONCE DAILY    clopidogrel (PLAVIX) 75 MG tablet [Pharmacy Med Name: CLOPIDOGREL BISULFATE 75MG TABS] 90 tablet 3     Sig: TAKE 1 TABLET BY MOUTH ONCE DAILY    atorvastatin (LIPITOR) 80 MG tablet [Pharmacy Med Name: ATORVASTATIN CALCIUM 80MG TABS] 90 tablet 3     Sig: TAKE 1 TABLET BY MOUTH ONCE DAILY       Last Filled:      Patient Phone Number: [phone] (home)     Last appt: 6/10/2022   Next appt: Visit date not found    Last Lipid:   Lab Results   Component Value Date/Time    CHOL 213 07/21/2020 05:17 PM    TRIG 100 07/21/2020 05:17 PM    HDL 52 07/21/2020 05:17 PM    HDL 52 11/14/2011 12:41 PM    LDLCALC 141 07/21/2020 05:17 PM

## 2023-06-21 ENCOUNTER — TELEPHONE (OUTPATIENT)
Dept: PRIMARY CARE CLINIC | Age: 48
End: 2023-06-21

## 2023-06-21 DIAGNOSIS — I10 ESSENTIAL HYPERTENSION: ICD-10-CM

## 2023-06-21 RX ORDER — LOSARTAN POTASSIUM 50 MG/1
TABLET ORAL
Qty: 90 TABLET | Refills: 3 | Status: SHIPPED | OUTPATIENT
Start: 2023-06-21

## 2023-06-21 RX ORDER — CLOPIDOGREL BISULFATE 75 MG/1
TABLET ORAL
Qty: 90 TABLET | Refills: 3 | Status: SHIPPED | OUTPATIENT
Start: 2023-06-21

## 2023-06-21 RX ORDER — CARVEDILOL 25 MG/1
TABLET ORAL
Qty: 60 TABLET | Refills: 2 | Status: SHIPPED | OUTPATIENT
Start: 2023-06-21

## 2023-06-21 RX ORDER — ATORVASTATIN CALCIUM 80 MG/1
TABLET, FILM COATED ORAL
Qty: 90 TABLET | Refills: 3 | Status: SHIPPED | OUTPATIENT
Start: 2023-06-21

## 2023-06-21 NOTE — TELEPHONE ENCOUNTER
Pt called in requesting refill on his Carvedilol  (COREG) 25 MG tablet. His callback is [phone]    Pharmacy is [facility] .

## 2023-06-22 ENCOUNTER — TELEPHONE (OUTPATIENT)
Dept: PRIMARY CARE CLINIC | Age: 48
End: 2023-06-22

## 2023-06-22 DIAGNOSIS — I10 ESSENTIAL HYPERTENSION: ICD-10-CM

## 2023-06-22 RX ORDER — NIFEDIPINE 60 MG/1
60 TABLET, FILM COATED, EXTENDED RELEASE ORAL DAILY
Qty: 90 TABLET | Refills: 3 | Status: SHIPPED | OUTPATIENT
Start: 2023-06-22

## 2023-06-22 NOTE — TELEPHONE ENCOUNTER
Medication:   Requested Prescriptions     Pending Prescriptions Disp Refills    NIFEdipine (ADALAT CC) 60 MG extended release tablet 90 tablet 3     Sig: Take 1 tablet by mouth daily        Last Filled:      Patient Phone Number: [phone] (home)     Last appt: 6/10/2022   Next appt: Visit date not found    Last OARRS: No flowsheet data found.

## 2023-09-12 DIAGNOSIS — I10 ESSENTIAL HYPERTENSION: ICD-10-CM

## 2023-09-12 RX ORDER — CARVEDILOL 25 MG/1
TABLET ORAL
Qty: 60 TABLET | Refills: 2 | OUTPATIENT
Start: 2023-09-12

## 2023-09-14 DIAGNOSIS — I10 ESSENTIAL HYPERTENSION: ICD-10-CM

## 2023-09-14 RX ORDER — NIFEDIPINE 60 MG/1
60 TABLET, FILM COATED, EXTENDED RELEASE ORAL DAILY
Qty: 7 TABLET | Refills: 0 | Status: SHIPPED | OUTPATIENT
Start: 2023-09-14

## 2023-09-14 RX ORDER — CARVEDILOL 25 MG/1
TABLET ORAL
Qty: 14 TABLET | Refills: 0 | Status: SHIPPED | OUTPATIENT
Start: 2023-09-14

## 2023-09-17 SDOH — ECONOMIC STABILITY: HOUSING INSECURITY
IN THE LAST 12 MONTHS, WAS THERE A TIME WHEN YOU DID NOT HAVE A STEADY PLACE TO SLEEP OR SLEPT IN A SHELTER (INCLUDING NOW)?: NO

## 2023-09-17 SDOH — ECONOMIC STABILITY: TRANSPORTATION INSECURITY
IN THE PAST 12 MONTHS, HAS LACK OF TRANSPORTATION KEPT YOU FROM MEETINGS, WORK, OR FROM GETTING THINGS NEEDED FOR DAILY LIVING?: NO

## 2023-09-17 SDOH — ECONOMIC STABILITY: INCOME INSECURITY: HOW HARD IS IT FOR YOU TO PAY FOR THE VERY BASICS LIKE FOOD, HOUSING, MEDICAL CARE, AND HEATING?: SOMEWHAT HARD

## 2023-09-17 SDOH — ECONOMIC STABILITY: FOOD INSECURITY: WITHIN THE PAST 12 MONTHS, YOU WORRIED THAT YOUR FOOD WOULD RUN OUT BEFORE YOU GOT MONEY TO BUY MORE.: SOMETIMES TRUE

## 2023-09-17 SDOH — ECONOMIC STABILITY: FOOD INSECURITY: WITHIN THE PAST 12 MONTHS, THE FOOD YOU BOUGHT JUST DIDN'T LAST AND YOU DIDN'T HAVE MONEY TO GET MORE.: SOMETIMES TRUE

## 2023-09-17 ASSESSMENT — PATIENT HEALTH QUESTIONNAIRE - PHQ9
SUM OF ALL RESPONSES TO PHQ QUESTIONS 1-9: 0
1. LITTLE INTEREST OR PLEASURE IN DOING THINGS: 0
2. FEELING DOWN, DEPRESSED OR HOPELESS: 0
SUM OF ALL RESPONSES TO PHQ9 QUESTIONS 1 & 2: 0
1. LITTLE INTEREST OR PLEASURE IN DOING THINGS: NOT AT ALL
SUM OF ALL RESPONSES TO PHQ QUESTIONS 1-9: 0
2. FEELING DOWN, DEPRESSED OR HOPELESS: NOT AT ALL
SUM OF ALL RESPONSES TO PHQ9 QUESTIONS 1 & 2: 0
SUM OF ALL RESPONSES TO PHQ QUESTIONS 1-9: 0
SUM OF ALL RESPONSES TO PHQ QUESTIONS 1-9: 0

## 2023-09-18 ENCOUNTER — OFFICE VISIT (OUTPATIENT)
Dept: PRIMARY CARE CLINIC | Age: 48
End: 2023-09-18

## 2023-09-18 VITALS
HEART RATE: 75 BPM | DIASTOLIC BLOOD PRESSURE: 85 MMHG | TEMPERATURE: 98.2 F | HEIGHT: 74 IN | WEIGHT: 241 LBS | SYSTOLIC BLOOD PRESSURE: 125 MMHG | OXYGEN SATURATION: 97 % | BODY MASS INDEX: 30.93 KG/M2

## 2023-09-18 DIAGNOSIS — I10 ESSENTIAL HYPERTENSION: ICD-10-CM

## 2023-09-18 DIAGNOSIS — I63.9 CEREBROVASCULAR ACCIDENT (CVA), UNSPECIFIED MECHANISM (HCC): ICD-10-CM

## 2023-09-18 DIAGNOSIS — Z12.11 SCREEN FOR COLON CANCER: ICD-10-CM

## 2023-09-18 DIAGNOSIS — Z00.00 PHYSICAL EXAM: Primary | ICD-10-CM

## 2023-09-18 DIAGNOSIS — Z23 FLU VACCINE NEED: ICD-10-CM

## 2023-09-18 PROCEDURE — 3079F DIAST BP 80-89 MM HG: CPT | Performed by: INTERNAL MEDICINE

## 2023-09-18 PROCEDURE — 3074F SYST BP LT 130 MM HG: CPT | Performed by: INTERNAL MEDICINE

## 2023-09-18 PROCEDURE — 99214 OFFICE O/P EST MOD 30 MIN: CPT | Performed by: INTERNAL MEDICINE

## 2023-09-18 RX ORDER — CLOPIDOGREL BISULFATE 75 MG/1
75 TABLET ORAL DAILY
Qty: 90 TABLET | Refills: 4 | Status: SHIPPED | OUTPATIENT
Start: 2023-09-18

## 2023-09-18 RX ORDER — LOSARTAN POTASSIUM 50 MG/1
50 TABLET ORAL DAILY
Qty: 90 TABLET | Refills: 3 | Status: SHIPPED | OUTPATIENT
Start: 2023-09-18

## 2023-09-18 RX ORDER — NIFEDIPINE 60 MG/1
60 TABLET, FILM COATED, EXTENDED RELEASE ORAL DAILY
Qty: 90 TABLET | Refills: 4 | Status: SHIPPED | OUTPATIENT
Start: 2023-09-18

## 2023-09-18 RX ORDER — ISOSORBIDE MONONITRATE 30 MG/1
30 TABLET, EXTENDED RELEASE ORAL DAILY
Qty: 90 TABLET | Refills: 3 | Status: SHIPPED | OUTPATIENT
Start: 2023-09-18

## 2023-09-18 RX ORDER — CARVEDILOL 25 MG/1
25 TABLET ORAL 2 TIMES DAILY WITH MEALS
Qty: 180 TABLET | Refills: 4 | Status: SHIPPED | OUTPATIENT
Start: 2023-09-18

## 2023-09-18 RX ORDER — ATORVASTATIN CALCIUM 80 MG/1
80 TABLET, FILM COATED ORAL DAILY
Qty: 90 TABLET | Refills: 4 | Status: SHIPPED | OUTPATIENT
Start: 2023-09-18

## 2023-09-18 SDOH — ECONOMIC STABILITY: INCOME INSECURITY: HOW HARD IS IT FOR YOU TO PAY FOR THE VERY BASICS LIKE FOOD, HOUSING, MEDICAL CARE, AND HEATING?: NOT HARD AT ALL

## 2023-09-18 SDOH — ECONOMIC STABILITY: FOOD INSECURITY: WITHIN THE PAST 12 MONTHS, THE FOOD YOU BOUGHT JUST DIDN'T LAST AND YOU DIDN'T HAVE MONEY TO GET MORE.: NEVER TRUE

## 2023-09-18 SDOH — ECONOMIC STABILITY: FOOD INSECURITY: WITHIN THE PAST 12 MONTHS, YOU WORRIED THAT YOUR FOOD WOULD RUN OUT BEFORE YOU GOT MONEY TO BUY MORE.: NEVER TRUE

## 2023-09-18 ASSESSMENT — ENCOUNTER SYMPTOMS
GASTROINTESTINAL NEGATIVE: 1
WHEEZING: 0
BLOOD IN STOOL: 0
DIARRHEA: 0
CHEST TIGHTNESS: 0
SHORTNESS OF BREATH: 0
COUGH: 0
CONSTIPATION: 0

## 2023-09-18 NOTE — PROGRESS NOTES
Subjective:      Patient ID: Shama Vieyra is a 50 y.o. male. 9/18/2023  Patient presents with:  Hypertension  Check-Up:                   VV  6/10/2022 Patient presents with:  Hypertension: refills on meds               VV 10/16/2020 Patient presents with:  Cough: since he was in [address]: and CT Chest done recently                  Last seem9/24/2020 Patient presents with:  Hypertension  . Had a CVA  7/20 . Was not taking meds correctly  Cough since he went in to [facility] 7/20    Last seen 2016    Mr. Lennie Jung is a 40 yo M PMH HTN, hyperlipidemia who presented to [facility] ED early Tuesday 7/21 AM with complaint of HA and dizziness. which began on Monday 7/20 AM.       In the ED was found to be hypertensive () and CTA revealed severe stenosis of both ICAs, VAs. MRI revealed small right and moderate left cerebellar infarcts.  stroke team consulted and recommended against tPA. Patient was placed on Cardene gtt. ECHO w/bubble study showed EF of 55%, negative for R to L shunting. Secondary HTN workup not indicative of aldosteronism; renal u/s negative for LEIGHTON. Plasma fractionated NE and dopamine were found to be elevated; MRI abdomen was ordered to assess for adrenal mass. MRI BRAIN W WO CONTRAST  Final Result     1. Small right and moderate left cerebellar infarcts. No acute hemorrhage. 2.  Mild chronic small vessel ischemic disease. Chronic right basal ganglia lacunar infarct. MRI Abdomen     No adrenal massv     There is no evidence to suggest renal artery stenosis bilaterally. Renal resistive indices are normal bilaterally. Review of Systems   Constitutional:  Negative for chills, fatigue and fever. Flu vac      Tdap 9/20    Pneum vac  9/20    covid vac 9/21     HENT: Negative. Dental care reg    Eyes:         Last exam 9/19   Respiratory:  Negative for cough, chest tightness, shortness of breath and wheezing.

## 2024-01-02 NOTE — DISCHARGE SUMMARY
Physical Medicine & Rehabilitation  Discharge Summary     Patient Identification:  Valdemar Waters  : 1975  Admit date: 2020  Discharge date:  20  Attending provider: Jes Robertson MD        Primary care provider: Marilyn Vargas MD     Discharge Diagnoses:   Patient Active Problem List   Diagnosis    Hypertensive urgency    Hypertension    Acute CVA (cerebrovascular accident) ([address]    Hypertensive emergency    Cerebellar infarction ([address]       History of Present Illness/Acute Hospital Course:  40 yo M who presented with headache and dizziness. Patient found to be hypertensive  ,and CTA revealed severe stenosis of both ICAs, VAs. CT showed both a left and right cerebellar infarcts, and this was MRI confirmed.  The patient has been cleared by Medicine for admission to our rehabilitation unit today. Jace Soto has been approved by his insurance for rehabilitation unit treatment. Patient is able to tolerate 3 hours of therapy 5 days per week and is medically appropriate for rehab at the Acute Rehabilitation Unit. Approved for [employer] pre-certification , which has been obtained. Patient lives in a 1 level apartment with his son.       Inpatient Rehabilitation Course:   Valdemar Waters is a 39 y.o. male admitted to inpatient rehabilitation on 2020 with CVA. The patient participated in an aggressive multidisciplinary inpatient rehabilitation program involving 3 hours of therapy per day, at least 5 days per week.      Impairments: Decreased functional mobility, balance issues    Medical Management:  Bilateral cerebellar infarcts L>R - Neurology, DAPT x 3 months, followed by Plavix monotherapy     Severe stenosis of ICA, VA bilaterally- neurosurgery consult, no intervention at this time     Hypertensive emergency - resolved, nephrology consulted,likely due to essential hypertension w/ noncompliance of medications, Continue carvedilol 25 mg BID, Losartan 100mg, Nifedipine Post resuscitation   Will consult cardiology  Trending troponin, serial EKG on telemetry       60mg, and Imdur 30mg, will need follow-up with Dr. Aren Valdovinos after d/c     Hyperlipidemia-- Atorvastatin     Discharge Exam:  Constitutional: Alert, WDWN, Pleasant, no distress  Head: Normocephalic, atruamatic, MMM  Eyes: Conjunctiva noninjected, no icterus, no drainage  Pulm: CTA bilat. Respirations non-labored. CV: No murmurs noted. RRR. Abd: Soft, nontender. NABS+  Ext: No edema, no varicosities  Neuro: Alert, fully oriented, appropriate   MSK: No joint abnormalities noted       Discharge Functional Status:    Physical therapy:  Bed Mobility: Scooting: Independent  Transfers: Sit to Stand: Stand by assistance(From recliner, w/c, chair and mat table)  Stand to sit: Stand by assistance  Bed to Chair: Stand by assistance  Comment: Pt completed 10 reps of L step ups on a 6\" step without UE support and 10 reps of L step ups on a 12\" step with 0 UE A with CGA. PT completed 10 reps of B toe taps on a 6: step in the hip abd/add direction requiring facilitation for hip positioning. Pt completed 6 minutes of standing with lunges alternating LEs to command to number on a clock while standing in the middle of the clock. Pt fluctuating between CGA and MOD A 2/2 LOB. Pt with difficulty when advancing R LE or crossing midline. Pt completed 5' of walking in tall kneeling. Pt with poor wt shift then requiring increased number of \"steps\" to complete distance. Pt completed seated and standing dynamic reaching tasks to complete donning foot wear, pericare, toileting and hand hygiene fluctuating between supervision and CGA. , Ambulation 1  Surface: level tile  Device: No Device  Assistance: Contact guard assistance(Fluctuating between SBA and CGA)  Quality of Gait: Reciprocal pattern with varied step width and wt shift. Decreased frank. UEs positioned in low guard during ambulation. +LOB. Increased lateral sway intermittently.   Distance: 380', 300', 2 x 50', and short distances in the gym/room  Comments: VC for LE positioning, UE patch  Commonly known as:  TRANSDERM-SCOP  Place 1 patch onto the skin every 72 hours  Start taking on:  August 9, 2020        CHANGE how you take these medications    carvedilol 25 MG tablet  Commonly known as:  COREG  Take 1 tablet by mouth 2 times daily (with meals)  What changed:  See the new instructions. NIFEdipine 60 MG extended release tablet  Commonly known as:  ADALAT CC  Take 1 tablet by mouth every evening  What changed:    · how much to take  · how to take this  · when to take this  · additional instructions        CONTINUE taking these medications    aspirin 81 MG chewable tablet  Take 1 tablet by mouth daily     atorvastatin 80 MG tablet  Commonly known as:  LIPITOR  Take 1 tablet by mouth nightly     clopidogrel 75 MG tablet  Commonly known as:  PLAVIX  Take 1 tablet by mouth daily     isosorbide mononitrate 30 MG extended release tablet  Commonly known as:  IMDUR  Take 1 tablet by mouth daily     losartan 100 MG tablet  Commonly known as:  COZAAR  Take 1 tablet by mouth daily     pantoprazole 40 MG tablet  Commonly known as:  PROTONIX  Take 1 tablet by mouth every morning (before breakfast)           Where to Get Your Medications      [id number] [place] [place] [facility] [address] [facility]    Phone:  [phone]   · carvedilol 25 MG tablet  · isosorbide mononitrate 30 MG extended release tablet  · NIFEdipine 60 MG extended release tablet  · pantoprazole 40 MG tablet  · polyethylene glycol 17 g packet  · scopolamine transdermal patch           I spent over 35 minutes on this discharge encounter between counseling, coordination of care, and medication reconciliation. To comply with [facility] erick R.II.4.1:   Discharge order placed in advance to facilitate patients discharge needs.       Jacoby Garcia,

## 2024-10-27 DIAGNOSIS — I10 ESSENTIAL HYPERTENSION: ICD-10-CM

## 2024-10-28 RX ORDER — LOSARTAN POTASSIUM 50 MG/1
50 TABLET ORAL DAILY
Qty: 90 TABLET | Refills: 1 | Status: SHIPPED | OUTPATIENT
Start: 2024-10-28

## 2024-10-28 NOTE — TELEPHONE ENCOUNTER
Medication:   Requested Prescriptions     Pending Prescriptions Disp Refills    losartan (COZAAR) 50 MG tablet [Pharmacy Med Name: LOSARTAN POTASSIUM 50 MG TAB] 90 tablet 1     Sig: TAKE 1 TABLET BY MOUTH EVERY DAY        Last Filled:      Patient Phone Number: [phone] (home)     Last appt: 9/18/2023   Next appt: Visit date not found    Last OARRS:        No data to display

## 2024-11-06 DIAGNOSIS — I63.9 CEREBROVASCULAR ACCIDENT (CVA), UNSPECIFIED MECHANISM (HCC): ICD-10-CM

## 2024-11-07 RX ORDER — CLOPIDOGREL BISULFATE 75 MG/1
75 TABLET ORAL DAILY
Qty: 90 TABLET | Refills: 4 | Status: SHIPPED | OUTPATIENT
Start: 2024-11-07

## 2024-11-07 RX ORDER — ATORVASTATIN CALCIUM 80 MG/1
80 TABLET, FILM COATED ORAL DAILY
Qty: 90 TABLET | Refills: 4 | Status: SHIPPED | OUTPATIENT
Start: 2024-11-07

## 2024-11-07 NOTE — TELEPHONE ENCOUNTER
Medication:   Requested Prescriptions     Pending Prescriptions Disp Refills    clopidogrel (PLAVIX) 75 MG tablet [Pharmacy Med Name: CLOPIDOGREL 75 MG TABLET] 90 tablet 4     Sig: TAKE 1 TABLET BY MOUTH EVERY DAY    atorvastatin (LIPITOR) 80 MG tablet [Pharmacy Med Name: ATORVASTATIN 80 MG TABLET] 90 tablet 4     Sig: TAKE 1 TABLET BY MOUTH EVERY DAY        Last Filled:      Patient Phone Number: [phone] (home)     Last appt: 9/18/2023   Next appt: Visit date not found    Last OARRS:        No data to display

## 2024-11-08 DIAGNOSIS — I10 ESSENTIAL HYPERTENSION: ICD-10-CM

## 2024-11-08 NOTE — TELEPHONE ENCOUNTER
Medication:   Requested Prescriptions     Pending Prescriptions Disp Refills    NIFEdipine (ADALAT CC) 60 MG extended release tablet [Pharmacy Med Name: NIFEDIPINE ER 60 MG TABLET] 90 tablet 3     Sig: TAKE 1 TABLET BY MOUTH EVERY DAY        Last Filled:      Patient Phone Number: [phone] (home)     Last appt: 9/18/2023   Next appt: Visit date not found    Last OARRS:        No data to display

## 2024-11-30 DIAGNOSIS — I10 ESSENTIAL HYPERTENSION: ICD-10-CM

## 2024-12-02 RX ORDER — CARVEDILOL 25 MG/1
25 TABLET ORAL 2 TIMES DAILY WITH MEALS
Qty: 180 TABLET | Refills: 1 | Status: SHIPPED | OUTPATIENT
Start: 2024-12-02

## 2024-12-02 NOTE — TELEPHONE ENCOUNTER
Medication:   Requested Prescriptions     Pending Prescriptions Disp Refills    carvedilol (COREG) 25 MG tablet [Pharmacy Med Name: CARVEDILOL 25 MG TABLET] 180 tablet 1     Sig: TAKE 1 TABLET BY MOUTH 2 TIMES DAILY (WITH MEALS) TAKE 1 TABLET BY MOUTH 2 TIMES DAILY WITH MEALS        Last Filled:      Patient Phone Number: [phone] (home)     Last appt: 9/18/2023   Next appt: 12/12/2024    Last OARRS:        No data to display

## 2025-02-23 DIAGNOSIS — I10 ESSENTIAL HYPERTENSION: ICD-10-CM

## 2025-02-24 RX ORDER — LOSARTAN POTASSIUM 50 MG/1
50 TABLET ORAL DAILY
Qty: 90 TABLET | Refills: 1 | OUTPATIENT
Start: 2025-02-24

## 2025-05-19 DIAGNOSIS — I10 ESSENTIAL HYPERTENSION: ICD-10-CM

## 2025-05-19 RX ORDER — CARVEDILOL 25 MG/1
25 TABLET ORAL 2 TIMES DAILY WITH MEALS
Qty: 180 TABLET | Refills: 1 | Status: SHIPPED | OUTPATIENT
Start: 2025-05-19

## 2025-05-19 NOTE — TELEPHONE ENCOUNTER
Medication:   Requested Prescriptions     Pending Prescriptions Disp Refills    carvedilol (COREG) 25 MG tablet [Pharmacy Med Name: CARVEDILOL 25 MG TABLET] 180 tablet 1     Sig: TAKE 1 TABLET BY MOUTH 2 TIMES DAILY (WITH MEALS) TAKE 1 TABLET BY MOUTH 2 TIMES DAILY WITH MEALS        Last Filled:      Patient Phone Number: [phone] (home)     Last appt: 9/18/2023   Next appt: Visit date not found    Last OARRS:        No data to display